# Patient Record
Sex: MALE | Race: WHITE | NOT HISPANIC OR LATINO | Employment: OTHER | URBAN - NONMETROPOLITAN AREA
[De-identification: names, ages, dates, MRNs, and addresses within clinical notes are randomized per-mention and may not be internally consistent; named-entity substitution may affect disease eponyms.]

---

## 2017-02-13 RX ORDER — DIPHENHYDRAMINE HCL 25 MG
CAPSULE ORAL
Qty: 30 CAPSULE | Refills: 2 | Status: SHIPPED | OUTPATIENT
Start: 2017-02-13 | End: 2017-05-16 | Stop reason: SDUPTHER

## 2017-02-27 ENCOUNTER — LAB (OUTPATIENT)
Dept: FAMILY MEDICINE CLINIC | Facility: CLINIC | Age: 52
End: 2017-02-27

## 2017-02-27 DIAGNOSIS — E11.9 TYPE 2 DIABETES MELLITUS WITHOUT COMPLICATION, WITHOUT LONG-TERM CURRENT USE OF INSULIN (HCC): ICD-10-CM

## 2017-02-27 DIAGNOSIS — E78.5 HYPERLIPIDEMIA, UNSPECIFIED HYPERLIPIDEMIA TYPE: ICD-10-CM

## 2017-02-27 DIAGNOSIS — M25.552 HIP PAIN, LEFT: ICD-10-CM

## 2017-02-27 DIAGNOSIS — E55.9 VITAMIN D DEFICIENCY: ICD-10-CM

## 2017-02-27 DIAGNOSIS — K21.9 GASTROESOPHAGEAL REFLUX DISEASE WITHOUT ESOPHAGITIS: ICD-10-CM

## 2017-02-27 LAB
25(OH)D3 SERPL-MCNC: 31 NG/ML
ALBUMIN SERPL-MCNC: 3.9 G/DL (ref 3.5–5)
ALBUMIN/GLOB SERPL: 1.3 G/DL (ref 1.5–2.5)
ALP SERPL-CCNC: 137 U/L (ref 40–129)
ALT SERPL W P-5'-P-CCNC: 22 U/L (ref 10–44)
ANION GAP SERPL CALCULATED.3IONS-SCNC: 6.9 MMOL/L (ref 3.6–11.2)
AST SERPL-CCNC: 20 U/L (ref 10–34)
BASOPHILS # BLD AUTO: 0.06 10*3/MM3 (ref 0–0.3)
BASOPHILS NFR BLD AUTO: 0.6 % (ref 0–2)
BILIRUB SERPL-MCNC: 0.3 MG/DL (ref 0.2–1.8)
BUN BLD-MCNC: 9 MG/DL (ref 7–21)
BUN/CREAT SERPL: 11.4 (ref 7–25)
CALCIUM SPEC-SCNC: 9.4 MG/DL (ref 7.7–10)
CHLORIDE SERPL-SCNC: 105 MMOL/L (ref 99–112)
CHOLEST SERPL-MCNC: 106 MG/DL (ref 0–200)
CO2 SERPL-SCNC: 30.1 MMOL/L (ref 24.3–31.9)
CREAT BLD-MCNC: 0.79 MG/DL (ref 0.43–1.29)
DEPRECATED RDW RBC AUTO: 46 FL (ref 37–54)
EOSINOPHIL # BLD AUTO: 0.51 10*3/MM3 (ref 0–0.7)
EOSINOPHIL NFR BLD AUTO: 4.7 % (ref 0–5)
ERYTHROCYTE [DISTWIDTH] IN BLOOD BY AUTOMATED COUNT: 13.6 % (ref 11.5–14.5)
GFR SERPL CREATININE-BSD FRML MDRD: 104 ML/MIN/1.73
GLOBULIN UR ELPH-MCNC: 3.1 GM/DL
GLUCOSE BLD-MCNC: 122 MG/DL (ref 70–110)
HBA1C MFR BLD: 5.6 % (ref 4.5–5.7)
HCT VFR BLD AUTO: 50.4 % (ref 42–52)
HDLC SERPL-MCNC: 31 MG/DL (ref 60–100)
HGB BLD-MCNC: 16.6 G/DL (ref 14–18)
IMM GRANULOCYTES # BLD: 0.05 10*3/MM3 (ref 0–0.03)
IMM GRANULOCYTES NFR BLD: 0.5 % (ref 0–0.5)
LDLC SERPL CALC-MCNC: 51 MG/DL (ref 0–100)
LDLC/HDLC SERPL: 1.64 {RATIO}
LYMPHOCYTES # BLD AUTO: 3.61 10*3/MM3 (ref 1–3)
LYMPHOCYTES NFR BLD AUTO: 33.5 % (ref 21–51)
MCH RBC QN AUTO: 32.2 PG (ref 27–33)
MCHC RBC AUTO-ENTMCNC: 32.9 G/DL (ref 33–37)
MCV RBC AUTO: 97.9 FL (ref 80–94)
MONOCYTES # BLD AUTO: 1.3 10*3/MM3 (ref 0.1–0.9)
MONOCYTES NFR BLD AUTO: 12.1 % (ref 0–10)
NEUTROPHILS # BLD AUTO: 5.24 10*3/MM3 (ref 1.4–6.5)
NEUTROPHILS NFR BLD AUTO: 48.6 % (ref 30–70)
OSMOLALITY SERPL CALC.SUM OF ELEC: 283.1 MOSM/KG (ref 273–305)
PLATELET # BLD AUTO: 302 10*3/MM3 (ref 130–400)
PMV BLD AUTO: 10.1 FL (ref 6–10)
POTASSIUM BLD-SCNC: 3.9 MMOL/L (ref 3.5–5.3)
PROT SERPL-MCNC: 7 G/DL (ref 6–8)
RBC # BLD AUTO: 5.15 10*6/MM3 (ref 4.7–6.1)
SODIUM BLD-SCNC: 142 MMOL/L (ref 135–153)
TRIGL SERPL-MCNC: 121 MG/DL (ref 0–150)
TSH SERPL DL<=0.05 MIU/L-ACNC: 4.73 MIU/ML (ref 0.55–4.78)
URATE SERPL-MCNC: 3.2 MG/DL (ref 3.7–7)
VIT B12 BLD-MCNC: 387 PG/ML (ref 211–911)
VLDLC SERPL-MCNC: 24.2 MG/DL
WBC NRBC COR # BLD: 10.77 10*3/MM3 (ref 4.5–12.5)

## 2017-02-27 PROCEDURE — 82607 VITAMIN B-12: CPT | Performed by: NURSE PRACTITIONER

## 2017-02-27 PROCEDURE — 82306 VITAMIN D 25 HYDROXY: CPT | Performed by: NURSE PRACTITIONER

## 2017-02-27 PROCEDURE — 80050 GENERAL HEALTH PANEL: CPT | Performed by: NURSE PRACTITIONER

## 2017-02-27 PROCEDURE — 84550 ASSAY OF BLOOD/URIC ACID: CPT | Performed by: NURSE PRACTITIONER

## 2017-02-27 PROCEDURE — 83036 HEMOGLOBIN GLYCOSYLATED A1C: CPT | Performed by: NURSE PRACTITIONER

## 2017-02-27 PROCEDURE — 36415 COLL VENOUS BLD VENIPUNCTURE: CPT

## 2017-02-27 PROCEDURE — 80061 LIPID PANEL: CPT | Performed by: NURSE PRACTITIONER

## 2017-03-01 ENCOUNTER — OFFICE VISIT (OUTPATIENT)
Dept: FAMILY MEDICINE CLINIC | Facility: CLINIC | Age: 52
End: 2017-03-01

## 2017-03-01 VITALS
DIASTOLIC BLOOD PRESSURE: 80 MMHG | BODY MASS INDEX: 31.83 KG/M2 | WEIGHT: 210 LBS | HEART RATE: 100 BPM | SYSTOLIC BLOOD PRESSURE: 120 MMHG | OXYGEN SATURATION: 96 % | HEIGHT: 68 IN | TEMPERATURE: 98.7 F

## 2017-03-01 DIAGNOSIS — K21.9 GASTROESOPHAGEAL REFLUX DISEASE WITHOUT ESOPHAGITIS: ICD-10-CM

## 2017-03-01 DIAGNOSIS — I10 BENIGN ESSENTIAL HYPERTENSION: Chronic | ICD-10-CM

## 2017-03-01 DIAGNOSIS — M05.79 RHEUMATOID ARTHRITIS INVOLVING MULTIPLE SITES WITH POSITIVE RHEUMATOID FACTOR (HCC): Chronic | ICD-10-CM

## 2017-03-01 DIAGNOSIS — E11.9 TYPE 2 DIABETES MELLITUS WITHOUT COMPLICATION, WITHOUT LONG-TERM CURRENT USE OF INSULIN (HCC): Primary | Chronic | ICD-10-CM

## 2017-03-01 DIAGNOSIS — F51.04 CHRONIC INSOMNIA: Chronic | ICD-10-CM

## 2017-03-01 DIAGNOSIS — E53.8 VITAMIN B 12 DEFICIENCY: ICD-10-CM

## 2017-03-01 DIAGNOSIS — E55.9 VITAMIN D DEFICIENCY: ICD-10-CM

## 2017-03-01 DIAGNOSIS — F17.200 TOBACCO USE DISORDER: ICD-10-CM

## 2017-03-01 DIAGNOSIS — E78.5 HYPERLIPIDEMIA, UNSPECIFIED HYPERLIPIDEMIA TYPE: Chronic | ICD-10-CM

## 2017-03-01 PROCEDURE — 99214 OFFICE O/P EST MOD 30 MIN: CPT | Performed by: NURSE PRACTITIONER

## 2017-03-01 RX ORDER — LANOLIN ALCOHOL/MO/W.PET/CERES
1000 CREAM (GRAM) TOPICAL DAILY
Qty: 30 TABLET | Refills: 5 | Status: SHIPPED | OUTPATIENT
Start: 2017-03-01 | End: 2017-09-05 | Stop reason: SDUPTHER

## 2017-03-01 RX ORDER — ATORVASTATIN CALCIUM 10 MG/1
10 TABLET, FILM COATED ORAL NIGHTLY
Qty: 30 TABLET | Refills: 5 | Status: SHIPPED | OUTPATIENT
Start: 2017-03-01 | End: 2017-09-05 | Stop reason: SDUPTHER

## 2017-03-01 RX ORDER — ERGOCALCIFEROL 1.25 MG/1
50000 CAPSULE ORAL WEEKLY
Qty: 12 CAPSULE | Refills: 0 | Status: SHIPPED | OUTPATIENT
Start: 2017-03-01 | End: 2017-06-02 | Stop reason: SDUPTHER

## 2017-03-01 NOTE — PROGRESS NOTES
HPI Comments: Raimundo presents to the clinic today to review his recent lab results pertaining to his diabetes, HTN, GERD and Hyperlipidemia. Overall, he reports he feels well except his continued joint pain. Refer to ROS for additional information.    Hypertension   The current episode started more than 1 year ago. The problem is controlled. Pertinent negatives include no anxiety, blurred vision, chest pain, headaches, malaise/fatigue, palpitations, peripheral edema, shortness of breath or sweats.   Diabetes   He has type 2 diabetes mellitus. His disease course has been improving. Pertinent negatives for diabetes include no blurred vision, no chest pain, no fatigue, no polydipsia, no polyphagia, no polyuria and no weakness. Symptoms are improving. Risk factors for coronary artery disease include dyslipidemia, hypertension, male sex, sedentary lifestyle and tobacco exposure. Current diabetic treatment includes oral agent (monotherapy). He is compliant with medication regimen most of the time. He has had a previous visit with a dietitian. An ACE inhibitor/angiotensin II receptor blocker is being taken. Eye exam is current.     The following portions of the patient's history were reviewed and updated as appropriate: allergies, current medications, past family history, past medical history, past social history, past surgical history and problem list.    Review of Systems   Constitutional: Negative for activity change, appetite change, chills, fatigue and fever.   HENT: Negative.    Eyes: Negative for visual disturbance.   Respiratory: Negative for cough, chest tightness, shortness of breath and wheezing.    Cardiovascular: Negative for chest pain, palpitations and leg swelling.   Gastrointestinal: Negative for abdominal pain, nausea and vomiting.   Endocrine: Negative for cold intolerance, heat intolerance, polydipsia, polyphagia and polyuria.   Musculoskeletal: Positive for arthralgias, back pain and joint swelling.    Skin: Negative for color change and rash.   Neurological: Negative for dizziness, tremors, speech difficulty, weakness, light-headedness and headaches.   Hematological: Negative for adenopathy.   Psychiatric/Behavioral: Positive for sleep disturbance. Negative for confusion, decreased concentration and suicidal ideas. The patient is not nervous/anxious.    All other systems reviewed and are negative.    Physical Exam   Constitutional: He is oriented to person, place, and time. He appears well-developed and well-nourished. No distress.   HENT:   Head: Normocephalic.   Nose: Nose normal.   Mouth/Throat: Oropharynx is clear and moist.   Eyes: Conjunctivae are normal. Pupils are equal, round, and reactive to light. Right eye exhibits no discharge. Left eye exhibits no discharge. No scleral icterus.   Neck: Neck supple. No JVD present.   Cardiovascular: Normal rate, regular rhythm and normal heart sounds.  Exam reveals no friction rub.    No murmur heard.  Pulmonary/Chest: Effort normal and breath sounds normal. No respiratory distress. He has no decreased breath sounds. He has no wheezes. He has no rhonchi. He has no rales.   Abdominal: Soft. Bowel sounds are normal. He exhibits no distension. There is no tenderness. There is no rebound and no guarding.   Musculoskeletal: He exhibits no edema or tenderness.    Raimundo had a diabetic foot exam performed (Thick left great toe nail; Very dry skin ) today.   During the foot exam he had a monofilament test performed.    Neurological Sensory Findings -  Unaltered sharp/dull right ankle/foot discrimination and unaltered sharp/dull left ankle/foot discrimination.    Vascular Status -  His exam exhibits right foot vasculature normal. His exam exhibits no right foot edema. His exam exhibits left foot vasculature normal. His exam exhibits no left foot edema.   Skin Integrity  -  His right foot skin is intact.     Raimundo 's left foot skin is intact. .  Lymphadenopathy:     He  has no cervical adenopathy.   Neurological: He is alert and oriented to person, place, and time.   Skin: Skin is warm and dry. No rash noted. No erythema.   Psychiatric: He has a normal mood and affect. His behavior is normal. Judgment and thought content normal.   Nursing note and vitals reviewed.    Assessment/Plan   Diagnoses and all orders for this visit:    Type 2 diabetes mellitus without complication, without long-term current use of insulin  Comments:  Reviewed his recent lab results pertaining to his DM, type 2: A1C: 5.6 with a FBS: 122 mg/dL.   Continue current regimen. Will refer to Podiatry.   Orders:  -     Ambulatory Referral to Podiatry    Benign essential hypertension  Comments:  Stable; Reviewed his CMP which included Creatinine of 0.79 and EGFR of 104.     Hyperlipidemia, unspecified hyperlipidemia type  Comments:  Reviewed lipid panel which included TC:106 mg/dL;Triglycerides:121 mg/dL; HDL:31 mg/dL and LDL:57 mg/dL.   Orders:  -     atorvastatin (LIPITOR) 10 MG tablet; Take 1 tablet by mouth Every Night.    Rheumatoid arthritis involving multiple sites with positive rheumatoid factor  Comments:  Discussed options with Raimundo. Will refer to  Rheumatology referral for him.     Chronic insomnia  Comments:  Discussed options with Raimundo. Will consider Trazadone. Sleep Hygiene reinforced.    Gastroesophageal reflux disease without esophagitis  Comments:  Stable; Continue current regimen.     Vitamin D deficiency  Comments:  Reviewed Vitamin D which was:31.  Orders:  -     vitamin D (ERGOCALCIFEROL) 32710 UNITS capsule capsule; Take 1 capsule by mouth 1 (One) Time Per Week for 12 doses.    Vitamin B 12 deficiency  Comments:  Reviewed Vitamin B12 which was 387.    Orders:  -     vitamin B-12 (CYANOCOBALAMIN) 1000 MCG tablet; Take 1 tablet by mouth Daily for 30 days.    Tobacco use disorder  Comments:  Encouraged tobacco cessation.     Findings and recommendations discussed with Raimundo. Reviewed lab  results with him. Lifestyle and risk reduction modifications. Will f/u with me in four months; sooner if problems/concerns. Routine labs ordered for him to have done prior to his next appt.

## 2017-03-01 NOTE — PATIENT INSTRUCTIONS
Hypertension  Hypertension is another name for high blood pressure. High blood pressure forces your heart to work harder to pump blood. A blood pressure reading has two numbers, which includes a higher number over a lower number (example: 110/72).  HOME CARE   · Have your blood pressure rechecked by your doctor.  · Only take medicine as told by your doctor. Follow the directions carefully. The medicine does not work as well if you skip doses. Skipping doses also puts you at risk for problems.  · Do not smoke.  · Monitor your blood pressure at home as told by your doctor.  GET HELP IF:  · You think you are having a reaction to the medicine you are taking.  · You have repeat headaches or feel dizzy.  · You have puffiness (swelling) in your ankles.  · You have trouble with your vision.  GET HELP RIGHT AWAY IF:   · You get a very bad headache and are confused.  · You feel weak, numb, or faint.  · You get chest or belly (abdominal) pain.  · You throw up (vomit).  · You cannot breathe very well.  MAKE SURE YOU:   · Understand these instructions.  · Will watch your condition.  · Will get help right away if you are not doing well or get worse.     This information is not intended to replace advice given to you by your health care provider. Make sure you discuss any questions you have with your health care provider.     Document Released: 06/05/2009 Document Revised: 12/23/2014 Document Reviewed: 10/10/2014  [employer] Patient Education ©2016 [employer]  Type 2 Diabetes Mellitus, Adult  Type 2 diabetes mellitus, often simply referred to as type 2 diabetes, is a long-lasting (chronic) disease. In type 2 diabetes, the pancreas does not make enough insulin (a hormone), the cells are less responsive to the insulin that is made (insulin resistance), or both. Normally, insulin moves sugars from food into the tissue cells. The tissue cells use the sugars for energy. The lack of insulin or the lack of normal response to  insulin causes excess sugars to build up in the blood instead of going into the tissue cells. As a result, high blood sugar (hyperglycemia) develops. The effect of high sugar (glucose) levels can cause many complications.   Type 2 diabetes was also previously called adult-onset diabetes, but it can occur at any age.     RISK FACTORS   A person is predisposed to developing type 2 diabetes if someone in the family has the disease and also has one or more of the following primary risk factors:  · Weight gain, or being overweight or obese.  · An inactive lifestyle.  · A history of consistently eating high-calorie foods.  Maintaining a normal weight and regular physical activity can reduce the chance of developing type 2 diabetes.  SYMPTOMS   A person with type 2 diabetes may not show symptoms initially. The symptoms of type 2 diabetes appear slowly. The symptoms include:  · Increased thirst (polydipsia).  · Increased urination (polyuria).  · Increased urination during the night (nocturia).  · Sudden or unexplained weight changes.  · Frequent, recurring infections.  · Tiredness (fatigue).  · Weakness.  · Vision changes, such as blurred vision.  · Fruity smell to your breath.  · Abdominal pain.  · Nausea or vomiting.  · Cuts or bruises which are slow to heal.  · Tingling or numbness in the hands or feet.  · An open skin wound (ulcer).  DIAGNOSIS  Type 2 diabetes is frequently not diagnosed until complications of diabetes are present. Type 2 diabetes is diagnosed when symptoms or complications are present and when blood glucose levels are increased. Your blood glucose level may be checked by one or more of the following blood tests:  · A fasting blood glucose test. You will not be allowed to eat for at least 8 hours before a blood sample is taken.  · A random blood glucose test. Your blood glucose is checked at any time of the day regardless of when you ate.  · A hemoglobin A1c blood glucose test. A hemoglobin A1c test  provides information about blood glucose control over the previous 3 months.  · An oral glucose tolerance test (OGTT). Your blood glucose is measured after you have not eaten (fasted) for 2 hours and then after you drink a glucose-containing beverage.  TREATMENT   · You may need to take insulin or diabetes medicine daily to keep blood glucose levels in the desired range.  · If you use insulin, you may need to adjust the dosage depending on the carbohydrates that you eat with each meal or snack.  · Lifestyle changes are recommended as part of your treatment. These may include:    Following an individualized diet plan developed by a nutritionist or dietitian.    Exercising daily.  Your health care providers will set individualized treatment goals for you based on your age, your medicines, how long you have had diabetes, and any other medical conditions you have. Generally, the goal of treatment is to maintain the following blood glucose levels:  · Before meals (preprandial):  mg/dL.  · After meals (postprandial): below 180 mg/dL.  · A1c: less than 6.5-7%.  HOME CARE INSTRUCTIONS   · Have your hemoglobin A1c level checked twice a year.  · Perform daily blood glucose monitoring as directed by your health care provider.  · Monitor urine ketones when you are ill and as directed by your health care provider.  · Take your diabetes medicine or insulin as directed by your health care provider to maintain your blood glucose levels in the desired range.    Never run out of diabetes medicine or insulin. It is needed every day.    If you are using insulin, you may need to adjust the amount of insulin given based on your intake of carbohydrates. Carbohydrates can raise blood glucose levels but need to be included in your diet. Carbohydrates provide vitamins, minerals, and fiber which are an essential part of a healthy diet. Carbohydrates are found in fruits, vegetables, whole grains, dairy products, legumes, and foods  containing added sugars.  · Eat healthy foods. You should make an appointment to see a registered dietitian to help you create an eating plan that is right for you.  · Lose weight if you are overweight.  · Carry a medical alert card or wear your medical alert jewelry.  · Carry a 15-gram carbohydrate snack with you at all times to treat low blood glucose (hypoglycemia). Some examples of 15-gram carbohydrate snacks include:    Glucose tablets, 3 or 4.    Glucose gel, 15-gram tube.    Raisins, 2 tablespoons (24 grams).    Jelly beans, 6.    Animal crackers, 8.    Regular pop, 4 ounces (120 mL).    Gummy treats, 9.  · Recognize hypoglycemia. Hypoglycemia occurs with blood glucose levels of 70 mg/dL and below. The risk for hypoglycemia increases when fasting or skipping meals, during or after intense exercise, and during sleep. Hypoglycemia symptoms can include:    Tremors or shakes.    Decreased ability to concentrate.    Sweating.    Increased heart rate.    Headache.    Dry mouth.    Hunger.    Irritability.    Anxiety.    Restless sleep.    Altered speech or coordination.    Confusion.  · Treat hypoglycemia promptly. If you are alert and able to safely swallow, follow the 15:15 rule:    Take 15-20 grams of rapid-acting glucose or carbohydrate. Rapid-acting options include glucose gel, glucose tablets, or 4 ounces (120 mL) of fruit juice, regular soda, or low-fat milk.    Check your blood glucose level 15 minutes after taking the glucose.    Take 15-20 grams more of glucose if the repeat blood glucose level is still 70 mg/dL or below.    Eat a meal or snack within 1 hour once blood glucose levels return to normal.  · Be alert to feeling very thirsty and urinating more frequently than usual, which are early signs of hyperglycemia. An early awareness of hyperglycemia allows for prompt treatment. Treat hyperglycemia as directed by your health care provider.  · Engage in at least 150 minutes of moderate-intensity physical  activity a week, spread over at least 3 days of the week or as directed by your health care provider. In addition, you should engage in resistance exercise at least 2 times a week or as directed by your health care provider. Try to spend no more than 90 minutes at one time inactive.  · Adjust your medicine and food intake as needed if you start a new exercise or sport.  · Follow your sick-day plan anytime you are unable to eat or drink as usual.  · Do not use any tobacco products including cigarettes, chewing tobacco, or electronic cigarettes. If you need help quitting, ask your health care provider.  · Limit alcohol intake to no more than 1 drink per day for nonpregnant women and 2 drinks per day for men. You should drink alcohol only when you are also eating food. Talk with your health care provider whether alcohol is safe for you. Tell your health care provider if you drink alcohol several times a week.  · Keep all follow-up visits as directed by your health care provider. This is important.  · Schedule an eye exam soon after the diagnosis of type 2 diabetes and then annually.  · Perform daily skin and foot care. Examine your skin and feet daily for cuts, bruises, redness, nail problems, bleeding, blisters, or sores. A foot exam by a health care provider should be done annually.  · Brush your teeth and gums at least twice a day and floss at least once a day. Follow up with your dentist regularly.  · Share your diabetes management plan with your workplace or school.  · Keep your immunizations up to date. It is recommended that you receive a flu (influenza) vaccine every year. It is also recommended that you receive a pneumonia (pneumococcal) vaccine. If you are 65 years of age or older and have never received a pneumonia vaccine, this vaccine may be given as a series of two separate shots. Ask your health care provider which additional vaccines may be recommended.  · Learn to manage stress.  · Obtain ongoing  diabetes education and support as needed.  · Participate in or seek rehabilitation as needed to maintain or improve independence and quality of life. Request a physical or occupational therapy referral if you are having foot or hand numbness, or difficulties with grooming, dressing, eating, or physical activity.  SEEK MEDICAL CARE IF:   · You are unable to eat food or drink fluids for more than 6 hours.  · You have nausea and vomiting for more than 6 hours.  · Your blood glucose level is over 240 mg/dL.  · There is a change in mental status.  · You develop an additional serious illness.  · You have diarrhea for more than 6 hours.  · You have been sick or have had a fever for a couple of days and are not getting better.  · You have pain during any physical activity.    SEEK IMMEDIATE MEDICAL CARE IF:  · You have difficulty breathing.  · You have moderate to large ketone levels.     This information is not intended to replace advice given to you by your health care provider. Make sure you discuss any questions you have with your health care provider.     Document Released: 12/18/2006 Document Revised: 09/07/2016 Document Reviewed: 10/20/2016  [employer] Interactive Patient Education ©2016 [employer]  Diabetes and Foot Care  Diabetes may cause you to have problems because of poor blood supply (circulation) to your feet and legs. This may cause the skin on your feet to become thinner, break easier, and heal more slowly. Your skin may become dry, and the skin may peel and crack. You may also have nerve damage in your legs and feet causing decreased feeling in them. You may not notice minor injuries to your feet that could lead to infections or more serious problems. Taking care of your feet is one of the most important things you can do for yourself.  HOME CARE INSTRUCTIONS  · Wear shoes at all times, even in the house. Do not go barefoot. Bare feet are easily injured.  · Check your feet daily for blisters, cuts, and  redness. If you cannot see the bottom of your feet, use a mirror or ask someone for help.  · Wash your feet with warm water (do not use hot water) and mild soap. Then pat your feet and the areas between your toes until they are completely dry. Do not soak your feet as this can dry your skin.  · Apply a moisturizing lotion or petroleum jelly (that does not contain alcohol and is unscented) to the skin on your feet and to dry, brittle toenails. Do not apply lotion between your toes.  · Trim your toenails straight across. Do not dig under them or around the cuticle. File the edges of your nails with an emery board or nail file.  · Do not cut corns or calluses or try to remove them with medicine.  · Wear clean socks or stockings every day. Make sure they are not too tight. Do not wear knee-high stockings since they may decrease blood flow to your legs.  · Wear shoes that fit properly and have enough cushioning. To break in new shoes, wear them for just a few hours a day. This prevents you from injuring your feet. Always look in your shoes before you put them on to be sure there are no objects inside.  · Do not cross your legs. This may decrease the blood flow to your feet.  · If you find a minor scrape, cut, or break in the skin on your feet, keep it and the skin around it clean and dry. These areas may be cleansed with mild soap and water. Do not cleanse the area with peroxide, alcohol, or iodine.  · When you remove an adhesive bandage, be sure not to damage the skin around it.  · If you have a wound, look at it several times a day to make sure it is healing.  · Do not use heating pads or hot water bottles. They may burn your skin. If you have lost feeling in your feet or legs, you may not know it is happening until it is too late.  · Make sure your health care provider performs a complete foot exam at least annually or more often if you have foot problems. Report any cuts, sores, or bruises to your health care  provider immediately.  SEEK MEDICAL CARE IF:   · You have an injury that is not healing.  · You have cuts or breaks in the skin.  · You have an ingrown nail.  · You notice redness on your legs or feet.  · You feel burning or tingling in your legs or feet.  · You have pain or cramps in your legs and feet.  · Your legs or feet are numb.  · Your feet always feel cold.  SEEK IMMEDIATE MEDICAL CARE IF:   · There is increasing redness, swelling, or pain in or around a wound.  · There is a red line that goes up your leg.  · Pus is coming from a wound.  · You develop a fever or as directed by your health care provider.  · You notice a bad smell coming from an ulcer or wound.     This information is not intended to replace advice given to you by your health care provider. Make sure you discuss any questions you have with your health care provider.     Document Released: 12/15/2001 Document Revised: 11/28/2016 Document Reviewed: 05/27/2014  [employer] Interactive Patient Education ©2016 [employer]

## 2017-03-30 ENCOUNTER — OFFICE VISIT (OUTPATIENT)
Dept: FAMILY MEDICINE CLINIC | Facility: CLINIC | Age: 52
End: 2017-03-30

## 2017-03-30 VITALS
DIASTOLIC BLOOD PRESSURE: 82 MMHG | TEMPERATURE: 97.3 F | WEIGHT: 208 LBS | OXYGEN SATURATION: 94 % | SYSTOLIC BLOOD PRESSURE: 120 MMHG | HEIGHT: 68 IN | HEART RATE: 87 BPM | BODY MASS INDEX: 31.52 KG/M2

## 2017-03-30 DIAGNOSIS — I10 BENIGN ESSENTIAL HYPERTENSION: ICD-10-CM

## 2017-03-30 DIAGNOSIS — S86.911A KNEE STRAIN, RIGHT, INITIAL ENCOUNTER: Primary | ICD-10-CM

## 2017-03-30 DIAGNOSIS — M54.42 CHRONIC LEFT-SIDED LOW BACK PAIN WITH LEFT-SIDED SCIATICA: ICD-10-CM

## 2017-03-30 DIAGNOSIS — G89.29 CHRONIC LEFT-SIDED LOW BACK PAIN WITH LEFT-SIDED SCIATICA: ICD-10-CM

## 2017-03-30 DIAGNOSIS — E78.2 MIXED HYPERLIPIDEMIA: ICD-10-CM

## 2017-03-30 PROCEDURE — 99214 OFFICE O/P EST MOD 30 MIN: CPT | Performed by: PHYSICIAN ASSISTANT

## 2017-03-30 RX ORDER — CYCLOBENZAPRINE HCL 10 MG
10 TABLET ORAL 3 TIMES DAILY PRN
Qty: 90 TABLET | Refills: 5 | Status: SHIPPED | OUTPATIENT
Start: 2017-03-30 | End: 2017-09-29 | Stop reason: SDUPTHER

## 2017-05-15 DIAGNOSIS — J30.9 CHRONIC ALLERGIC RHINITIS: ICD-10-CM

## 2017-05-15 DIAGNOSIS — E11.9 TYPE 2 DIABETES MELLITUS WITHOUT COMPLICATIONS (HCC): ICD-10-CM

## 2017-05-15 RX ORDER — METFORMIN HYDROCHLORIDE 500 MG/1
TABLET, EXTENDED RELEASE ORAL
Qty: 30 TABLET | Refills: 2 | Status: SHIPPED | OUTPATIENT
Start: 2017-05-15 | End: 2017-08-15 | Stop reason: SDUPTHER

## 2017-05-15 RX ORDER — LORATADINE 10 MG/1
TABLET ORAL
Qty: 30 TABLET | Refills: 4 | Status: SHIPPED | OUTPATIENT
Start: 2017-05-15 | End: 2017-09-29 | Stop reason: SDUPTHER

## 2017-05-16 RX ORDER — DIPHENHYDRAMINE HCL 25 MG
25 CAPSULE ORAL NIGHTLY PRN
Qty: 30 CAPSULE | Refills: 2 | Status: SHIPPED | OUTPATIENT
Start: 2017-05-16 | End: 2017-08-15 | Stop reason: SDUPTHER

## 2017-06-02 DIAGNOSIS — E55.9 VITAMIN D DEFICIENCY: ICD-10-CM

## 2017-06-05 RX ORDER — ERGOCALCIFEROL 1.25 MG/1
CAPSULE ORAL
Qty: 4 CAPSULE | Refills: 0 | Status: SHIPPED | OUTPATIENT
Start: 2017-06-05 | End: 2017-07-05

## 2017-06-27 ENCOUNTER — LAB (OUTPATIENT)
Dept: FAMILY MEDICINE CLINIC | Facility: CLINIC | Age: 52
End: 2017-06-27

## 2017-06-27 DIAGNOSIS — E78.5 HYPERLIPIDEMIA, UNSPECIFIED HYPERLIPIDEMIA TYPE: Chronic | ICD-10-CM

## 2017-06-27 DIAGNOSIS — E55.9 VITAMIN D DEFICIENCY: ICD-10-CM

## 2017-06-27 DIAGNOSIS — E53.8 VITAMIN B 12 DEFICIENCY: ICD-10-CM

## 2017-06-27 DIAGNOSIS — K21.9 GASTROESOPHAGEAL REFLUX DISEASE WITHOUT ESOPHAGITIS: ICD-10-CM

## 2017-06-27 DIAGNOSIS — M05.79 RHEUMATOID ARTHRITIS INVOLVING MULTIPLE SITES WITH POSITIVE RHEUMATOID FACTOR (HCC): Chronic | ICD-10-CM

## 2017-06-27 DIAGNOSIS — E11.9 TYPE 2 DIABETES MELLITUS WITHOUT COMPLICATION, WITHOUT LONG-TERM CURRENT USE OF INSULIN (HCC): ICD-10-CM

## 2017-06-27 LAB
25(OH)D3 SERPL-MCNC: 87 NG/ML
ALBUMIN SERPL-MCNC: 4.2 G/DL (ref 3.5–5)
ALBUMIN/GLOB SERPL: 1.4 G/DL (ref 1.5–2.5)
ALP SERPL-CCNC: 124 U/L (ref 40–129)
ALT SERPL W P-5'-P-CCNC: 23 U/L (ref 10–44)
ANION GAP SERPL CALCULATED.3IONS-SCNC: 6.7 MMOL/L (ref 3.6–11.2)
AST SERPL-CCNC: 24 U/L (ref 10–34)
BASOPHILS # BLD AUTO: 0.07 10*3/MM3 (ref 0–0.3)
BASOPHILS NFR BLD AUTO: 0.5 % (ref 0–2)
BILIRUB SERPL-MCNC: 0.6 MG/DL (ref 0.2–1.8)
BUN BLD-MCNC: 13 MG/DL (ref 7–21)
BUN/CREAT SERPL: 16.9 (ref 7–25)
CALCIUM SPEC-SCNC: 9.6 MG/DL (ref 7.7–10)
CHLORIDE SERPL-SCNC: 106 MMOL/L (ref 99–112)
CHOLEST SERPL-MCNC: 131 MG/DL (ref 0–200)
CO2 SERPL-SCNC: 28.3 MMOL/L (ref 24.3–31.9)
CREAT BLD-MCNC: 0.77 MG/DL (ref 0.43–1.29)
DEPRECATED RDW RBC AUTO: 48.3 FL (ref 37–54)
EOSINOPHIL # BLD AUTO: 0.45 10*3/MM3 (ref 0–0.7)
EOSINOPHIL NFR BLD AUTO: 3.5 % (ref 0–5)
ERYTHROCYTE [DISTWIDTH] IN BLOOD BY AUTOMATED COUNT: 13.5 % (ref 11.5–14.5)
ERYTHROCYTE [SEDIMENTATION RATE] IN BLOOD: 5 MM/HR (ref 0–15)
GFR SERPL CREATININE-BSD FRML MDRD: 107 ML/MIN/1.73
GLOBULIN UR ELPH-MCNC: 3 GM/DL
GLUCOSE BLD-MCNC: 95 MG/DL (ref 70–110)
HBA1C MFR BLD: 5.9 % (ref 4.5–5.7)
HCT VFR BLD AUTO: 50.8 % (ref 42–52)
HDLC SERPL-MCNC: 33 MG/DL (ref 60–100)
HGB BLD-MCNC: 16.8 G/DL (ref 14–18)
IMM GRANULOCYTES # BLD: 0.06 10*3/MM3 (ref 0–0.03)
IMM GRANULOCYTES NFR BLD: 0.5 % (ref 0–0.5)
LDLC SERPL CALC-MCNC: 73 MG/DL (ref 0–100)
LDLC/HDLC SERPL: 2.22 {RATIO}
LYMPHOCYTES # BLD AUTO: 3.77 10*3/MM3 (ref 1–3)
LYMPHOCYTES NFR BLD AUTO: 29.3 % (ref 21–51)
MCH RBC QN AUTO: 32.9 PG (ref 27–33)
MCHC RBC AUTO-ENTMCNC: 33.1 G/DL (ref 33–37)
MCV RBC AUTO: 99.4 FL (ref 80–94)
MONOCYTES # BLD AUTO: 1.59 10*3/MM3 (ref 0.1–0.9)
MONOCYTES NFR BLD AUTO: 12.3 % (ref 0–10)
NEUTROPHILS # BLD AUTO: 6.94 10*3/MM3 (ref 1.4–6.5)
NEUTROPHILS NFR BLD AUTO: 53.9 % (ref 30–70)
OSMOLALITY SERPL CALC.SUM OF ELEC: 281.2 MOSM/KG (ref 273–305)
PLATELET # BLD AUTO: 311 10*3/MM3 (ref 130–400)
PMV BLD AUTO: 10.2 FL (ref 6–10)
POTASSIUM BLD-SCNC: 3.6 MMOL/L (ref 3.5–5.3)
PROT SERPL-MCNC: 7.2 G/DL (ref 6–8)
RBC # BLD AUTO: 5.11 10*6/MM3 (ref 4.7–6.1)
SODIUM BLD-SCNC: 141 MMOL/L (ref 135–153)
TRIGL SERPL-MCNC: 124 MG/DL (ref 0–150)
TSH SERPL DL<=0.05 MIU/L-ACNC: 5.55 MIU/ML (ref 0.55–4.78)
URATE SERPL-MCNC: 4 MG/DL (ref 3.7–7)
VIT B12 BLD-MCNC: 610 PG/ML (ref 211–911)
VLDLC SERPL-MCNC: 24.8 MG/DL
WBC NRBC COR # BLD: 12.88 10*3/MM3 (ref 4.5–12.5)

## 2017-06-27 PROCEDURE — 83036 HEMOGLOBIN GLYCOSYLATED A1C: CPT | Performed by: NURSE PRACTITIONER

## 2017-06-27 PROCEDURE — 80061 LIPID PANEL: CPT | Performed by: NURSE PRACTITIONER

## 2017-06-27 PROCEDURE — 82607 VITAMIN B-12: CPT | Performed by: NURSE PRACTITIONER

## 2017-06-27 PROCEDURE — 82306 VITAMIN D 25 HYDROXY: CPT | Performed by: NURSE PRACTITIONER

## 2017-06-27 PROCEDURE — 85652 RBC SED RATE AUTOMATED: CPT | Performed by: NURSE PRACTITIONER

## 2017-06-27 PROCEDURE — 80050 GENERAL HEALTH PANEL: CPT | Performed by: NURSE PRACTITIONER

## 2017-06-27 PROCEDURE — 36415 COLL VENOUS BLD VENIPUNCTURE: CPT

## 2017-06-27 PROCEDURE — 84550 ASSAY OF BLOOD/URIC ACID: CPT | Performed by: NURSE PRACTITIONER

## 2017-07-03 DIAGNOSIS — E55.9 VITAMIN D DEFICIENCY: ICD-10-CM

## 2017-07-03 RX ORDER — ERGOCALCIFEROL 1.25 MG/1
CAPSULE ORAL
Qty: 4 CAPSULE | Refills: 0 | OUTPATIENT
Start: 2017-07-03

## 2017-07-05 ENCOUNTER — OFFICE VISIT (OUTPATIENT)
Dept: FAMILY MEDICINE CLINIC | Facility: CLINIC | Age: 52
End: 2017-07-05

## 2017-07-05 DIAGNOSIS — I10 BENIGN ESSENTIAL HYPERTENSION: Chronic | ICD-10-CM

## 2017-07-05 DIAGNOSIS — E55.9 VITAMIN D DEFICIENCY: ICD-10-CM

## 2017-07-05 DIAGNOSIS — E78.2 MIXED HYPERLIPIDEMIA: Chronic | ICD-10-CM

## 2017-07-05 DIAGNOSIS — E11.9 TYPE 2 DIABETES MELLITUS WITHOUT COMPLICATION, WITHOUT LONG-TERM CURRENT USE OF INSULIN (HCC): Primary | Chronic | ICD-10-CM

## 2017-07-05 DIAGNOSIS — M05.79 RHEUMATOID ARTHRITIS INVOLVING MULTIPLE SITES WITH POSITIVE RHEUMATOID FACTOR (HCC): Chronic | ICD-10-CM

## 2017-07-05 DIAGNOSIS — E53.8 VITAMIN B 12 DEFICIENCY: ICD-10-CM

## 2017-07-05 DIAGNOSIS — Z72.0 CHEWS TOBACCO: ICD-10-CM

## 2017-07-05 DIAGNOSIS — F51.04 CHRONIC INSOMNIA: Chronic | ICD-10-CM

## 2017-07-05 PROCEDURE — 99214 OFFICE O/P EST MOD 30 MIN: CPT | Performed by: NURSE PRACTITIONER

## 2017-07-05 RX ORDER — PSYLLIUM HUSK 3.4 G/7G
POWDER ORAL
Refills: 0 | COMMUNITY
Start: 2017-07-03 | End: 2017-09-05 | Stop reason: SDUPTHER

## 2017-07-05 NOTE — PATIENT INSTRUCTIONS
Type 2 Diabetes Mellitus, Diagnosis, Adult  Type 2 diabetes (type 2 diabetes mellitus) is a long-term (chronic) disease. It may be caused by one or both of these problems:  · Your body does not make enough of a hormone called insulin.  · Your body does not react in a normal way to insulin that it makes.  Insulin lets sugars (glucose) go into cells in the body. This gives you energy. If you have type 2 diabetes, sugars cannot get into cells. This causes high blood sugar (hyperglycemia).  Your doctor will set treatment goals for you. Generally, you should have these blood sugar levels:  · Before meals (preprandial):  mg/dL (4.4-7.2 mmol/L).  · After meals (postprandial): below 180 mg/dL (10 mmol/L).  · A1c (hemoglobin A1c) level: less than 7%.  HOME CARE  Questions to Ask Your Doctor  You may want to ask these questions:  · Do I need to meet with a diabetes educator?  · Where can I find a support group for people with diabetes?  · What equipment will I need to care for myself at home?  · What diabetes medicines do I need? When should I take them?  · How often do I need to check my blood sugar?  · What number can I call if I have questions?  · When is my next doctor's visit?  General Instructions  · Take over-the-counter and prescription medicines only as told by your doctor.  · Keep all follow-up visits as told by your doctor. This is important.  GET HELP IF:  · Your blood sugar is at or above 240 mg/dL (13.3 mmol/L) for 2 days in a row.  · You have been sick or have had a fever for 2 days or more and you are not getting better.  · You have any of these problems for more than 6 hours:    You cannot eat or drink.    You feel sick to your stomach (nauseous).    You throw up (vomit).    You have watery poop (diarrhea).  GET HELP RIGHT AWAY IF:  · Your blood sugar is lower than 54 mg/dL (3 mmol/L).  · You get confused.  · You have trouble:    Thinking clearly.    Breathing.  · You have moderate or large ketone  "levels in your pee (urine).     This information is not intended to replace advice given to you by your health care provider. Make sure you discuss any questions you have with your health care provider.     Document Released: 09/26/2009 Document Revised: 04/10/2017 Document Reviewed: 01/20/2017  [employer] Interactive Patient Education ©2017 [employer]    Fat and Cholesterol Restricted Diet  Getting too much fat and cholesterol in your diet may cause health problems. Following this diet helps keep your fat and cholesterol at normal levels. This can keep you from getting sick.  WHAT TYPES OF FAT SHOULD I CHOOSE?  · Choose monosaturated and polyunsaturated fats. These are found in foods such as olive oil, canola oil, flaxseeds, walnuts, almonds, and seeds.  · Eat more omega-3 fats. Good choices include salmon, mackerel, sardines, tuna, flaxseed oil, and ground flaxseeds.  · Limit saturated fats. These are in animal products such as meats, butter, and cream. They can also be in plant products such as palm oil, palm kernel oil, and coconut oil.  ·  Avoid foods with partially hydrogenated oils in them. These contain trans fats. Examples of foods that have trans fats are stick margarine, some tub margarines, cookies, crackers, and other baked goods.  WHAT GENERAL GUIDELINES DO I NEED TO FOLLOW?   · Check food labels. Look for the words \"trans fat\" and \"saturated fat.\"  · When preparing a meal:    Fill half of your plate with vegetables and green salads.    Fill one fourth of your plate with whole grains. Look for the word \"whole\" as the first word in the ingredient list.    Fill one fourth of your plate with lean protein foods.  · Limit fruit to two servings a day. Choose fruit instead of juice.  · Eat more foods with soluble fiber. Examples of foods with this type of fiber are apples, broccoli, carrots, beans, peas, and barley. Try to get 20-30 g (grams) of fiber per day.  · Eat more home-cooked foods. Eat less at " restaurants and buffets.  · Limit or avoid alcohol.  · Limit foods high in starch and sugar.  · Limit fried foods.  · Cook foods without frying them. Baking, boiling, grilling, and broiling are all great options.  · Lose weight if you are overweight. Losing even a small amount of weight can help your overall health. It can also help prevent diseases such as diabetes and heart disease.  WHAT FOODS CAN I EAT?  Grains  Whole grains, such as whole wheat or whole grain breads, crackers, cereals, and pasta. Unsweetened oatmeal, bulgur, barley, quinoa, or brown rice. Corn or whole wheat flour tortillas.  Vegetables  Fresh or frozen vegetables (raw, steamed, roasted, or grilled). Green salads.  Fruits  All fresh, canned (in natural juice), or frozen fruits.  Meat and Other Protein Products  Ground beef (85% or leaner), grass-fed beef, or beef trimmed of fat. Skinless chicken or turkey. Ground chicken or turkey. Pork trimmed of fat. All fish and seafood. Eggs. Dried beans, peas, or lentils. Unsalted nuts or seeds. Unsalted canned or dry beans.  Dairy  Low-fat dairy products, such as skim or 1% milk, 2% or reduced-fat cheeses, low-fat ricotta or cottage cheese, or plain low-fat yogurt.  Fats and Oils  Tub margarines without trans fats. Light or reduced-fat mayonnaise and salad dressings. Avocado. Olive, canola, sesame, or safflower oils. Natural peanut or almond butter (choose ones without added sugar and oil).  The items listed above may not be a complete list of recommended foods or beverages. Contact your dietitian for more options.  WHAT FOODS ARE NOT RECOMMENDED?  Grains  White bread. White pasta. White rice. Cornbread. Bagels, pastries, and croissants. Crackers that contain trans fat.  Vegetables  White potatoes. Corn. Creamed or fried vegetables. Vegetables in a cheese sauce.  Fruits  Dried fruits. Canned fruit in light or heavy syrup. Fruit juice.  Meat and Other Protein Products  Fatty cuts of meat. Ribs, chicken  wings, pelletier, sausage, bologna, salami, chitterlings, fatback, hot dogs, bratwurst, and packaged luncheon meats. Liver and organ meats.  Dairy  Whole or 2% milk, cream, half-and-half, and cream cheese. Whole milk cheeses. Whole-fat or sweetened yogurt. Full-fat cheeses. Nondairy creamers and whipped toppings. Processed cheese, cheese spreads, or cheese curds.  Sweets and Desserts  Corn syrup, sugars, honey, and molasses. Candy. Jam and jelly. Syrup. Sweetened cereals. Cookies, pies, cakes, donuts, muffins, and ice cream.  Fats and Oils  Butter, stick margarine, lard, shortening, ghee, or pelletier fat. Coconut, palm kernel, or palm oils.  Beverages  Alcohol. Sweetened drinks (such as sodas, lemonade, and fruit drinks or punches).  The items listed above may not be a complete list of foods and beverages to avoid. Contact your dietitian for more information.     This information is not intended to replace advice given to you by your health care provider. Make sure you discuss any questions you have with your health care provider.     Document Released: 06/18/2013 Document Revised: 01/08/2016 Document Reviewed: 03/19/2015  [employer] Interactive Patient Education ©2017 [employer]

## 2017-07-10 VITALS
DIASTOLIC BLOOD PRESSURE: 80 MMHG | BODY MASS INDEX: 31.98 KG/M2 | WEIGHT: 211 LBS | HEIGHT: 68 IN | SYSTOLIC BLOOD PRESSURE: 114 MMHG | TEMPERATURE: 98.5 F | OXYGEN SATURATION: 96 % | HEART RATE: 94 BPM | RESPIRATION RATE: 14 BRPM

## 2017-07-10 NOTE — PROGRESS NOTES
"  HPI Comments: Raimundo presents to the clinic today to review his recent lab results pertaining to his diabetes, HTN, GERD and Hyperlipidemia. Overall, he reports he feels well except his continued joint pain. Refer to ROS for additional information.    Hypertension   The current episode started more than 1 year ago. The problem is controlled. Pertinent negatives include no anxiety, blurred vision, chest pain, headaches, malaise/fatigue, palpitations, peripheral edema, shortness of breath or sweats.   Diabetes   He has type 2 diabetes mellitus. His disease course has been improving. Pertinent negatives for diabetes include no blurred vision, no chest pain, no fatigue, no polydipsia, no polyphagia, no polyuria and no weakness. Symptoms are improving. Risk factors for coronary artery disease include dyslipidemia, hypertension, male sex, sedentary lifestyle and tobacco exposure. Current diabetic treatment includes oral agent (monotherapy). He is compliant with medication regimen most of the time. He has had a previous visit with a dietitian. An ACE inhibitor/angiotensin II receptor blocker is being taken. Eye exam is current.     Vitals:    07/05/17 1036   BP: 114/80   BP Location: Right arm   Patient Position: Sitting   Cuff Size: Adult   Pulse: 94   Resp: 14   Temp: 98.5 °F (36.9 °C)   TempSrc: Oral   SpO2: 96%   Weight: 211 lb (95.7 kg)   Height: 68\" (172.7 cm)      The following portions of the patient's history were reviewed and updated as appropriate: allergies, current medications, past family history, past medical history, past social history, past surgical history and problem list.    Review of Systems   Constitutional: Negative for activity change, appetite change, chills, fatigue, fever and unexpected weight change.   HENT: Negative.    Eyes: Negative for visual disturbance.   Respiratory: Negative for cough, chest tightness, shortness of breath and wheezing.    Cardiovascular: Negative for chest pain and " palpitations.   Gastrointestinal: Negative for abdominal pain, nausea and vomiting.   Endocrine: Negative for cold intolerance, heat intolerance, polydipsia, polyphagia and polyuria.   Musculoskeletal: Positive for arthralgias and back pain.   Skin: Negative for color change and rash.   Allergic/Immunologic: Positive for environmental allergies.   Neurological: Negative for dizziness, tremors, speech difficulty, weakness, light-headedness and headaches.   Hematological: Negative for adenopathy.   Psychiatric/Behavioral: Positive for sleep disturbance. Negative for confusion, decreased concentration and suicidal ideas. The patient is not nervous/anxious.    All other systems reviewed and are negative.    Physical Exam   Constitutional: He is oriented to person, place, and time. He appears well-developed and well-nourished. No distress.   HENT:   Head: Normocephalic.   Right Ear: Tympanic membrane and ear canal normal.   Left Ear: Tympanic membrane and ear canal normal.   Nose: Nose normal.   Mouth/Throat: Oropharynx is clear and moist. Dental caries present. No oropharyngeal exudate or posterior oropharyngeal erythema.   Eyes: Conjunctivae and EOM are normal. Pupils are equal, round, and reactive to light. No scleral icterus.   Neck: Neck supple. No JVD present. No rigidity.   Cardiovascular: Normal rate, regular rhythm, normal heart sounds and intact distal pulses.  Exam reveals no friction rub.    No murmur heard.  Pulmonary/Chest: Effort normal. He has decreased breath sounds. He has no wheezes. He has no rhonchi. He has no rales.   Abdominal: Soft. Bowel sounds are normal. He exhibits no distension. There is no tenderness. There is no rigidity, no rebound and no guarding.   Musculoskeletal: He exhibits tenderness. He exhibits no edema.   Generalized tenderness with multiple joints   Lymphadenopathy:     He has no cervical adenopathy.   Neurological: He is alert and oriented to person, place, and time.   Skin:  Skin is warm and dry. No rash noted. No erythema.   Psychiatric: He has a normal mood and affect. His speech is normal and behavior is normal. Judgment and thought content normal. Cognition and memory are normal.   Vitals reviewed.    Assessment/Plan   Problems Addressed this Visit        Cardiovascular and Mediastinum    Benign essential hypertension    Hyperlipidemia    Relevant Orders    Lipid Panel       Digestive    Vitamin D deficiency    Relevant Orders    Vitamin D 25 Hydroxy    Vitamin B 12 deficiency    Relevant Orders    Vitamin B12       Endocrine    Type 2 diabetes mellitus without complications - Primary    Relevant Medications    Blood Glucose Monitoring Suppl W/DEVICE kit    Other Relevant Orders    Comprehensive Metabolic Panel    TSH    Hemoglobin A1c    MicroAlbumin, Urine, Random    Hepatitis C Antibody       Musculoskeletal and Integument    Rheumatoid arthritis with positive rheumatoid factor    Relevant Orders    CBC & Differential    Uric Acid    Sedimentation Rate       Other    Chews tobacco    Chronic insomnia      Findings and recommendations discussed with Raimundo. Reviewed lab results with him which included a FBS of 95 mg/dL and an A1C of 5.9. Lipid panel reviewed  with Raimundo which included a TC of 131 mg/dl; Triglycerides of 124 mg/dL; HDL of 33 mg/dL and LDL of 73 mg/dL. Reinforced lifestyle modifications which include nutrition and exercise recommendations. Discussed Health Maintenance recommendations and he would like to postpone Colonoscopy until later.   Raimundo will f/u with me in November; sooner if problems/concerns occur.

## 2017-07-15 DIAGNOSIS — E55.9 VITAMIN D DEFICIENCY: ICD-10-CM

## 2017-07-17 RX ORDER — ERGOCALCIFEROL 1.25 MG/1
CAPSULE ORAL
Qty: 4 CAPSULE | Refills: 0 | OUTPATIENT
Start: 2017-07-17

## 2017-08-02 DIAGNOSIS — E11.9 TYPE 2 DIABETES MELLITUS WITHOUT COMPLICATION, WITHOUT LONG-TERM CURRENT USE OF INSULIN (HCC): Chronic | ICD-10-CM

## 2017-08-02 RX ORDER — BLOOD-GLUCOSE METER
KIT MISCELLANEOUS
Refills: 0 | OUTPATIENT
Start: 2017-08-02

## 2017-08-15 DIAGNOSIS — K21.9 GASTROESOPHAGEAL REFLUX DISEASE WITHOUT ESOPHAGITIS: ICD-10-CM

## 2017-08-15 DIAGNOSIS — E11.9 TYPE 2 DIABETES MELLITUS WITHOUT COMPLICATIONS (HCC): ICD-10-CM

## 2017-08-15 RX ORDER — DIPHENHYDRAMINE HYDROCHLORIDE 12.5 MG/5ML
LIQUID ORAL
Qty: 30 CAPSULE | Refills: 2 | Status: SHIPPED | OUTPATIENT
Start: 2017-08-15 | End: 2017-11-04 | Stop reason: SDUPTHER

## 2017-08-15 RX ORDER — RANITIDINE 300 MG/1
TABLET ORAL
Qty: 60 TABLET | Refills: 2 | Status: SHIPPED | OUTPATIENT
Start: 2017-08-15 | End: 2017-11-04 | Stop reason: SDUPTHER

## 2017-08-15 RX ORDER — METFORMIN HYDROCHLORIDE 500 MG/1
TABLET, EXTENDED RELEASE ORAL
Qty: 30 TABLET | Refills: 2 | Status: SHIPPED | OUTPATIENT
Start: 2017-08-15 | End: 2017-11-04 | Stop reason: SDUPTHER

## 2017-09-05 DIAGNOSIS — E78.5 HYPERLIPIDEMIA, UNSPECIFIED HYPERLIPIDEMIA TYPE: Chronic | ICD-10-CM

## 2017-09-05 DIAGNOSIS — E53.8 VITAMIN B 12 DEFICIENCY: ICD-10-CM

## 2017-09-05 RX ORDER — ATORVASTATIN CALCIUM 10 MG/1
TABLET, FILM COATED ORAL
Qty: 30 TABLET | Refills: 5 | Status: ON HOLD | OUTPATIENT
Start: 2017-09-05 | End: 2018-02-22

## 2017-09-29 ENCOUNTER — OFFICE VISIT (OUTPATIENT)
Dept: FAMILY MEDICINE CLINIC | Facility: CLINIC | Age: 52
End: 2017-09-29

## 2017-09-29 VITALS
OXYGEN SATURATION: 95 % | SYSTOLIC BLOOD PRESSURE: 123 MMHG | HEIGHT: 68 IN | WEIGHT: 212.8 LBS | DIASTOLIC BLOOD PRESSURE: 75 MMHG | BODY MASS INDEX: 32.25 KG/M2 | TEMPERATURE: 98.5 F | HEART RATE: 104 BPM

## 2017-09-29 DIAGNOSIS — M05.79 RHEUMATOID ARTHRITIS INVOLVING MULTIPLE SITES WITH POSITIVE RHEUMATOID FACTOR (HCC): ICD-10-CM

## 2017-09-29 DIAGNOSIS — E78.2 MIXED HYPERLIPIDEMIA: ICD-10-CM

## 2017-09-29 DIAGNOSIS — I10 BENIGN ESSENTIAL HYPERTENSION: ICD-10-CM

## 2017-09-29 DIAGNOSIS — M54.42 CHRONIC LEFT-SIDED LOW BACK PAIN WITH LEFT-SIDED SCIATICA: Primary | ICD-10-CM

## 2017-09-29 DIAGNOSIS — J30.9 CHRONIC ALLERGIC RHINITIS: ICD-10-CM

## 2017-09-29 DIAGNOSIS — G89.29 CHRONIC LEFT-SIDED LOW BACK PAIN WITH LEFT-SIDED SCIATICA: Primary | ICD-10-CM

## 2017-09-29 PROCEDURE — 99214 OFFICE O/P EST MOD 30 MIN: CPT | Performed by: PHYSICIAN ASSISTANT

## 2017-09-29 RX ORDER — LORATADINE 10 MG/1
10 TABLET ORAL DAILY
Qty: 30 TABLET | Refills: 5 | Status: SHIPPED | OUTPATIENT
Start: 2017-09-29 | End: 2018-03-16 | Stop reason: SDUPTHER

## 2017-09-29 RX ORDER — METOPROLOL SUCCINATE 50 MG/1
50 TABLET, EXTENDED RELEASE ORAL DAILY
Qty: 30 TABLET | Refills: 5 | Status: SHIPPED | OUTPATIENT
Start: 2017-09-29 | End: 2018-03-16 | Stop reason: SDUPTHER

## 2017-09-29 RX ORDER — FOLIC ACID 1 MG/1
1 TABLET ORAL DAILY
Qty: 30 TABLET | Refills: 5 | Status: SHIPPED | OUTPATIENT
Start: 2017-09-29 | End: 2018-03-16 | Stop reason: SDUPTHER

## 2017-09-29 RX ORDER — CYCLOBENZAPRINE HCL 10 MG
10 TABLET ORAL 3 TIMES DAILY PRN
Qty: 90 TABLET | Refills: 5 | Status: SHIPPED | OUTPATIENT
Start: 2017-09-29 | End: 2018-03-16 | Stop reason: SDUPTHER

## 2017-09-29 NOTE — PROGRESS NOTES
Subjective   Raimundo Munguia is a 52 y.o. male.     History of Present Illness     Low-back pain-  He complains of low back pain in the left side with radiation to the left lower buttock and posterior thigh.  Pain is described as intermittent and varies from mild to severe aching.  Some relief with Flexeril.  Onset several years ago.    Rheumatoid arthritis-much improved with methotrexate    Chronic allergic rhinitis-stable    Hypertension-well controlled with metoprolol 50 mg daily    Dyslipidemia  Compliance with treatment has been good. The patient exercises intermittently. He is currently being prescribed the following medication for his dyslipidemia - lifestyle modifcation, atorvastatin. Patient denies side effects associated with his medications. Most recent lipids include  Lab Results   Component Value Date    TRIG 124 06/27/2017    TRIG 121 02/27/2017    HDL 33 (L) 06/27/2017    HDL 31 (L) 02/27/2017    LDLCALC 73 06/27/2017    LDLCALC 51 02/27/2017    LDL 45 05/02/2016    LDL 70 01/08/2016   stable    The following portions of the patient's history were reviewed and updated as appropriate: allergies, current medications, past family history, past medical history, past social history, past surgical history and problem list.    Review of Systems   Constitutional: Negative for activity change, appetite change and fever.   HENT: Negative for ear pain, sinus pressure and sore throat.    Eyes: Negative for pain and visual disturbance.   Respiratory: Negative for cough and chest tightness.    Cardiovascular: Negative for chest pain and palpitations.   Gastrointestinal: Negative for abdominal pain, constipation, diarrhea, nausea and vomiting.   Endocrine: Negative for polydipsia and polyuria.   Genitourinary: Negative for dysuria and frequency.   Musculoskeletal: Positive for back pain. Negative for myalgias.   Skin: Negative for color change and rash.   Allergic/Immunologic: Negative for food allergies and  immunocompromised state.   Neurological: Negative for dizziness, syncope and headaches.   Hematological: Negative for adenopathy. Does not bruise/bleed easily.   Psychiatric/Behavioral: Negative for hallucinations and suicidal ideas. The patient is not nervous/anxious.        Objective   Physical Exam   Constitutional: He is oriented to person, place, and time. He appears well-developed and well-nourished.   HENT:   Head: Normocephalic and atraumatic.   Nose: Nose normal.   Mouth/Throat: Oropharynx is clear and moist.   Eyes: Conjunctivae and EOM are normal. Pupils are equal, round, and reactive to light.   Neck: Normal range of motion. Neck supple. No tracheal deviation present. No thyromegaly present.   Cardiovascular: Normal rate, regular rhythm, normal heart sounds and intact distal pulses.    No murmur heard.  Pulmonary/Chest: Effort normal and breath sounds normal. No respiratory distress. He has no wheezes.   Abdominal: Soft. Bowel sounds are normal. There is no tenderness. There is no guarding.   Musculoskeletal: Normal range of motion. He exhibits no edema or tenderness.   Lymphadenopathy:     He has no cervical adenopathy.   Neurological: He is alert and oriented to person, place, and time.   Skin: Skin is warm and dry. No rash noted.   Psychiatric: He has a normal mood and affect. His behavior is normal.   Nursing note and vitals reviewed.      Assessment/Plan   Diagnoses and all orders for this visit:    Chronic left-sided low back pain with left-sided sciatica  -     cyclobenzaprine (FLEXERIL) 10 MG tablet; Take 1 tablet by mouth 3 (Three) Times a Day As Needed for Muscle Spasms.    Rheumatoid arthritis involving multiple sites with positive rheumatoid factor  -     folic acid (FOLVITE) 1 MG tablet; Take 1 tablet by mouth Daily.    Chronic allergic rhinitis  -     loratadine (CLARITIN) 10 MG tablet; Take 1 tablet by mouth Daily.    Benign essential hypertension  -     metoprolol succinate XL (TOPROL-XL)  50 MG 24 hr tablet; Take 1 tablet by mouth Daily.    Mixed hyperlipidemia

## 2017-11-01 ENCOUNTER — LAB (OUTPATIENT)
Dept: FAMILY MEDICINE CLINIC | Facility: CLINIC | Age: 52
End: 2017-11-01

## 2017-11-01 DIAGNOSIS — E11.9 TYPE 2 DIABETES MELLITUS WITHOUT COMPLICATION, WITHOUT LONG-TERM CURRENT USE OF INSULIN (HCC): ICD-10-CM

## 2017-11-01 DIAGNOSIS — M05.79 RHEUMATOID ARTHRITIS INVOLVING MULTIPLE SITES WITH POSITIVE RHEUMATOID FACTOR (HCC): ICD-10-CM

## 2017-11-01 DIAGNOSIS — E53.8 VITAMIN B 12 DEFICIENCY: ICD-10-CM

## 2017-11-01 DIAGNOSIS — E78.2 MIXED HYPERLIPIDEMIA: Chronic | ICD-10-CM

## 2017-11-01 DIAGNOSIS — E55.9 VITAMIN D DEFICIENCY: ICD-10-CM

## 2017-11-01 LAB
25(OH)D3 SERPL-MCNC: 32 NG/ML
ALBUMIN SERPL-MCNC: 4.2 G/DL (ref 3.5–5)
ALBUMIN/GLOB SERPL: 1.4 G/DL (ref 1.5–2.5)
ALP SERPL-CCNC: 134 U/L (ref 40–129)
ALT SERPL W P-5'-P-CCNC: 53 U/L (ref 10–44)
ANION GAP SERPL CALCULATED.3IONS-SCNC: 7.5 MMOL/L (ref 3.6–11.2)
AST SERPL-CCNC: 36 U/L (ref 10–34)
BASOPHILS # BLD AUTO: 0.06 10*3/MM3 (ref 0–0.3)
BASOPHILS NFR BLD AUTO: 0.7 % (ref 0–2)
BILIRUB SERPL-MCNC: 0.4 MG/DL (ref 0.2–1.8)
BUN BLD-MCNC: 10 MG/DL (ref 7–21)
BUN/CREAT SERPL: 13.5 (ref 7–25)
CALCIUM SPEC-SCNC: 9.7 MG/DL (ref 7.7–10)
CHLORIDE SERPL-SCNC: 106 MMOL/L (ref 99–112)
CHOLEST SERPL-MCNC: 103 MG/DL (ref 0–200)
CO2 SERPL-SCNC: 25.5 MMOL/L (ref 24.3–31.9)
CREAT BLD-MCNC: 0.74 MG/DL (ref 0.43–1.29)
DEPRECATED RDW RBC AUTO: 45.6 FL (ref 37–54)
EOSINOPHIL # BLD AUTO: 1.63 10*3/MM3 (ref 0–0.7)
EOSINOPHIL NFR BLD AUTO: 18.8 % (ref 0–5)
ERYTHROCYTE [DISTWIDTH] IN BLOOD BY AUTOMATED COUNT: 13.3 % (ref 11.5–14.5)
ERYTHROCYTE [SEDIMENTATION RATE] IN BLOOD: 5 MM/HR (ref 0–20)
GFR SERPL CREATININE-BSD FRML MDRD: 112 ML/MIN/1.73
GLOBULIN UR ELPH-MCNC: 3 GM/DL
GLUCOSE BLD-MCNC: 118 MG/DL (ref 70–110)
HBA1C MFR BLD: 6 % (ref 4.5–5.7)
HCT VFR BLD AUTO: 51 % (ref 42–52)
HCV AB SER DONR QL: NORMAL
HDLC SERPL-MCNC: 28 MG/DL (ref 60–100)
HGB BLD-MCNC: 16.9 G/DL (ref 14–18)
IMM GRANULOCYTES # BLD: 0.02 10*3/MM3 (ref 0–0.03)
IMM GRANULOCYTES NFR BLD: 0.2 % (ref 0–0.5)
LDLC SERPL CALC-MCNC: 51 MG/DL (ref 0–100)
LDLC/HDLC SERPL: 1.84 {RATIO}
LYMPHOCYTES # BLD AUTO: 2.76 10*3/MM3 (ref 1–3)
LYMPHOCYTES NFR BLD AUTO: 31.8 % (ref 21–51)
MCH RBC QN AUTO: 31.5 PG (ref 27–33)
MCHC RBC AUTO-ENTMCNC: 33.1 G/DL (ref 33–37)
MCV RBC AUTO: 95.1 FL (ref 80–94)
MONOCYTES # BLD AUTO: 1.1 10*3/MM3 (ref 0.1–0.9)
MONOCYTES NFR BLD AUTO: 12.7 % (ref 0–10)
NEUTROPHILS # BLD AUTO: 3.1 10*3/MM3 (ref 1.4–6.5)
NEUTROPHILS NFR BLD AUTO: 35.8 % (ref 30–70)
OSMOLALITY SERPL CALC.SUM OF ELEC: 277.7 MOSM/KG (ref 273–305)
PLATELET # BLD AUTO: 300 10*3/MM3 (ref 130–400)
PMV BLD AUTO: 10.5 FL (ref 6–10)
POTASSIUM BLD-SCNC: 4.4 MMOL/L (ref 3.5–5.3)
PROT SERPL-MCNC: 7.2 G/DL (ref 6–8)
RBC # BLD AUTO: 5.36 10*6/MM3 (ref 4.7–6.1)
SODIUM BLD-SCNC: 139 MMOL/L (ref 135–153)
TRIGL SERPL-MCNC: 118 MG/DL (ref 0–150)
TSH SERPL DL<=0.05 MIU/L-ACNC: 5.83 MIU/ML (ref 0.55–4.78)
URATE SERPL-MCNC: 3.7 MG/DL (ref 3.7–7)
VIT B12 BLD-MCNC: 692 PG/ML (ref 211–911)
VLDLC SERPL-MCNC: 23.6 MG/DL
WBC NRBC COR # BLD: 8.67 10*3/MM3 (ref 4.5–12.5)

## 2017-11-01 PROCEDURE — 83036 HEMOGLOBIN GLYCOSYLATED A1C: CPT | Performed by: NURSE PRACTITIONER

## 2017-11-01 PROCEDURE — 85652 RBC SED RATE AUTOMATED: CPT | Performed by: NURSE PRACTITIONER

## 2017-11-01 PROCEDURE — 82607 VITAMIN B-12: CPT | Performed by: NURSE PRACTITIONER

## 2017-11-01 PROCEDURE — 80061 LIPID PANEL: CPT | Performed by: NURSE PRACTITIONER

## 2017-11-01 PROCEDURE — 84550 ASSAY OF BLOOD/URIC ACID: CPT | Performed by: NURSE PRACTITIONER

## 2017-11-01 PROCEDURE — 80050 GENERAL HEALTH PANEL: CPT | Performed by: NURSE PRACTITIONER

## 2017-11-01 PROCEDURE — 86803 HEPATITIS C AB TEST: CPT | Performed by: NURSE PRACTITIONER

## 2017-11-01 PROCEDURE — 36415 COLL VENOUS BLD VENIPUNCTURE: CPT | Performed by: NURSE PRACTITIONER

## 2017-11-01 PROCEDURE — 82306 VITAMIN D 25 HYDROXY: CPT | Performed by: NURSE PRACTITIONER

## 2017-11-04 DIAGNOSIS — E11.9 TYPE 2 DIABETES MELLITUS WITHOUT COMPLICATIONS (HCC): ICD-10-CM

## 2017-11-04 DIAGNOSIS — K21.9 GASTROESOPHAGEAL REFLUX DISEASE WITHOUT ESOPHAGITIS: ICD-10-CM

## 2017-11-06 RX ORDER — METFORMIN HYDROCHLORIDE 500 MG/1
TABLET, EXTENDED RELEASE ORAL
Qty: 30 TABLET | Refills: 2 | Status: SHIPPED | OUTPATIENT
Start: 2017-11-06 | End: 2018-02-04 | Stop reason: SDUPTHER

## 2017-11-06 RX ORDER — RANITIDINE 300 MG/1
TABLET ORAL
Qty: 60 TABLET | Refills: 2 | Status: SHIPPED | OUTPATIENT
Start: 2017-11-06 | End: 2017-11-08

## 2017-11-06 RX ORDER — DIPHENHYDRAMINE HYDROCHLORIDE 12.5 MG/5ML
LIQUID ORAL
Qty: 30 CAPSULE | Refills: 2 | Status: SHIPPED | OUTPATIENT
Start: 2017-11-06 | End: 2018-02-04 | Stop reason: SDUPTHER

## 2017-11-08 ENCOUNTER — OFFICE VISIT (OUTPATIENT)
Dept: FAMILY MEDICINE CLINIC | Facility: CLINIC | Age: 52
End: 2017-11-08

## 2017-11-08 VITALS
BODY MASS INDEX: 31.83 KG/M2 | HEIGHT: 68 IN | SYSTOLIC BLOOD PRESSURE: 120 MMHG | WEIGHT: 210 LBS | DIASTOLIC BLOOD PRESSURE: 75 MMHG | OXYGEN SATURATION: 96 % | HEART RATE: 80 BPM | TEMPERATURE: 97.6 F

## 2017-11-08 DIAGNOSIS — E11.9 TYPE 2 DIABETES MELLITUS WITHOUT COMPLICATION, WITHOUT LONG-TERM CURRENT USE OF INSULIN (HCC): Primary | Chronic | ICD-10-CM

## 2017-11-08 DIAGNOSIS — E53.8 VITAMIN B 12 DEFICIENCY: ICD-10-CM

## 2017-11-08 DIAGNOSIS — I10 BENIGN ESSENTIAL HYPERTENSION: Chronic | ICD-10-CM

## 2017-11-08 DIAGNOSIS — F17.200 TOBACCO USE DISORDER: ICD-10-CM

## 2017-11-08 DIAGNOSIS — Z23 ENCOUNTER FOR IMMUNIZATION: ICD-10-CM

## 2017-11-08 DIAGNOSIS — R10.11 RUQ ABDOMINAL PAIN: ICD-10-CM

## 2017-11-08 DIAGNOSIS — M05.79 RHEUMATOID ARTHRITIS INVOLVING MULTIPLE SITES WITH POSITIVE RHEUMATOID FACTOR (HCC): Chronic | ICD-10-CM

## 2017-11-08 DIAGNOSIS — E78.2 MIXED HYPERLIPIDEMIA: Chronic | ICD-10-CM

## 2017-11-08 DIAGNOSIS — K21.9 GASTROESOPHAGEAL REFLUX DISEASE, ESOPHAGITIS PRESENCE NOT SPECIFIED: ICD-10-CM

## 2017-11-08 DIAGNOSIS — E55.9 VITAMIN D DEFICIENCY: ICD-10-CM

## 2017-11-08 PROCEDURE — 99214 OFFICE O/P EST MOD 30 MIN: CPT | Performed by: NURSE PRACTITIONER

## 2017-11-08 PROCEDURE — 90471 IMMUNIZATION ADMIN: CPT | Performed by: NURSE PRACTITIONER

## 2017-11-08 PROCEDURE — 90686 IIV4 VACC NO PRSV 0.5 ML IM: CPT | Performed by: NURSE PRACTITIONER

## 2017-11-08 RX ORDER — DEXLANSOPRAZOLE 60 MG/1
60 CAPSULE, DELAYED RELEASE ORAL DAILY
Qty: 30 CAPSULE | Refills: 0 | COMMUNITY
Start: 2017-11-08 | End: 2017-12-06 | Stop reason: SDUPTHER

## 2017-11-08 NOTE — PATIENT INSTRUCTIONS
Type 2 Diabetes Mellitus, Self Care, Adult  When you have type 2 diabetes (type 2 diabetes mellitus), you must keep your blood sugar (glucose) under control. You can do this with:  · Nutrition.  · Exercise.  · Lifestyle changes.  · Medicines or insulin, if needed.  · Support from your doctors and others.  HOW DO I MANAGE MY BLOOD SUGAR?  · Check your blood sugar level every day, as often as told.  · Call your doctor if your blood sugar is above your goal numbers for 2 tests in a row.  · Have your A1c (hemoglobin A1c) level checked at least two times a year. Have it checked more often if your doctor tells you to.  Your doctor will set treatment goals for you. Generally, you should have these blood sugar levels:  · Before meals (preprandial):  mg/dL (4.4-7.2 mmol/L).  · After meals (postprandial): lower than 180 mg/dL (10 mmol/L).  · A1c level: less than 7%.  WHAT DO I NEED TO KNOW ABOUT HIGH BLOOD SUGAR?  High blood sugar is called hyperglycemia. Know the signs of high blood sugar. Signs may include:  · Feeling:    Thirsty.    Hungry.    Very tired.  · Needing to pee (urinate) more than usual.  · Blurry vision.  WHAT DO I NEED TO KNOW ABOUT LOW BLOOD SUGAR?  Low blood sugar is called hypoglycemia. This is when blood sugar is at or below 70 mg/dL (3.9 mmol/L). Symptoms may include:  · Feeling:    Hungry.    Worried or nervous (anxious).    Sweaty and clammy.    Confused.    Dizzy.    Sleepy.    Sick to your stomach (nauseous).  · Having:    A fast heartbeat (palpitations).    A headache.    A change in your vision.    Jerky movements that you cannot control (seizure).    Nightmares.    Tingling or no feeling (numbness) around the mouth, lips, or tongue.  · Having trouble with:    Talking.    Paying attention (concentrating).    Moving (coordination).    Sleeping.  · Shaking.  · Passing out (fainting).  · Getting upset easily (irritability).  Treating low blood sugar  To treat low blood sugar, eat or drink  something sugary right away. If you can think clearly and swallow safely, follow the 15:15 rule:   · Take 15 grams of a fast-acting carb (carbohydrate). Some fast-acting carbs are:    1 tube of glucose gel.    3 sugar tablets (glucose pills).    6-8 pieces of hard candy.    4 oz (120 mL) of fruit juice.    4 oz (120 mL) regular (not diet) soda.  · Check your blood sugar 15 minutes after you take the carb.  · If your blood sugar is still at or below 70 mg/dL (3.9 mmol/L), take 15 grams of a carb again.  · If your blood sugar does not go above 70 mg/dL (3.9 mmol/L) after 3 tries, get help right away.  · After your blood sugar goes back to normal, eat a meal or a snack within 1 hour.  Treating very low blood sugar  If your blood sugar is at or below 54 mg/dL (3 mmol/L), you have very low blood sugar (severe hypoglycemia). This is an emergency. Do not wait to see if the symptoms will go away. Get medical help right away. Call your local emergency services (911 in the [place] Do not drive yourself to the hospital.  If you have very low blood sugar and you cannot eat or drink, you may need a glucagon shot (injection). A family member or friend should learn how to check your blood sugar and how to give you a glucagon shot. Ask your doctor if you need to have a glucagon shot kit at home.  WHAT ELSE IS IMPORTANT TO MANAGE MY DIABETES?  Medicine  Follow these instructions about insulin and diabetes medicines:  · Take them as told by your doctor.  · Adjust them as told by your doctor.  · Do not run out of them.  Having diabetes can raise your risk for other long-term conditions. These include heart or kidney disease. Your doctor may prescribe medicines to help prevent problems from diabetes.  Food  · Make healthy food choices. These include:    Chicken, fish, egg whites, and beans.    Oats, whole wheat, bulgur, brown rice, quinoa, and millet.    Fresh fruits and vegetables.    Low-fat dairy products.    Nuts, avocado, olive  oil, and canola oil.  · Make a food plan with a specialist (dietitian).  · Follow instructions from your doctor about what you cannot eat or drink.  · Drink enough fluid to keep your pee (urine) clear or pale yellow.  · Eat healthy snacks between healthy meals.  · Keep track of carbs that you eat. Read food labels. Learn food serving sizes.  · Follow your sick day plan when you cannot eat or drink normally. Make this plan with your doctor so it is ready to use.  Activity  · Exercise at least 3 times a week.  · Do not go more than 2 days without exercising.  · Talk with your doctor before you start a new exercise. Your doctor may need to adjust your insulin, medicines, or food.  Lifestyle  · Do not use any tobacco products. These include cigarettes, chewing tobacco, and e-cigarettes.If you need help quitting, ask your doctor.  · Ask your doctor how much alcohol is safe for you.  · Learn to deal with stress. If you need help with this, ask your doctor.  Body care  · Stay up to date with your shots (immunizations).  · Have your eyes and feet checked by a doctor as often as told.  · Check your skin and feet every day. Check for cuts, bruises, redness, blisters, or sores.  · Brush your teeth and gums two times a day.  · Floss at least one time a day.  · Go to the dentist least one time every 6 months.  · Stay at a healthy weight.  General instructions  · Take over-the-counter and prescription medicines only as told by your doctor.  · Share your diabetes care plan with:    Your work or school.    People you live with.  · Check your pee (urine) for ketones:    When you are sick.    As told by your doctor.  · Carry a card or wear jewelry that says that you have diabetes.  · Ask your doctor:    Do I need to meet with a diabetes educator?    Where can I find a support group for people with diabetes?  · Keep all follow-up visits as told by your doctor. This is important.  WHERE TO FIND MORE INFORMATION:  To learn more about  diabetes, visit:  · American Diabetes Association: www.diabetes.org  · American Association of Diabetes Educators: www.diabeteseducator.org/patient-resources     This information is not intended to replace advice given to you by your health care provider. Make sure you discuss any questions you have with your health care provider.     Document Released: 04/10/2017 Document Reviewed: 01/20/2017  [employer] Interactive Patient Education ©2017 [employer]  Food Choices for Gastroesophageal Reflux Disease, Adult  When you have gastroesophageal reflux disease (GERD), the foods you eat and your eating habits are very important. Choosing the right foods can help ease your discomfort.   WHAT GUIDELINES DO I NEED TO FOLLOW?   · Choose fruits, vegetables, whole grains, and low-fat dairy products.    · Choose low-fat meat, fish, and poultry.  · Limit fats such as oils, salad dressings, butter, nuts, and avocado.    · Keep a food diary. This helps you identify foods that cause symptoms.    · Avoid foods that cause symptoms. These may be different for everyone.    · Eat small meals often instead of 3 large meals a day.    · Eat your meals slowly, in a place where you are relaxed.    · Limit fried foods.    · Cook foods using methods other than frying.    · Avoid drinking alcohol.    · Avoid drinking large amounts of liquids with your meals.    · Avoid bending over or lying down until 2-3 hours after eating.    WHAT FOODS ARE NOT RECOMMENDED?   These are some foods and drinks that may make your symptoms worse:  Vegetables  Tomatoes. Tomato juice. Tomato and spaghetti sauce. Chili peppers. Onion and garlic. Horseradish.  Fruits  Oranges, grapefruit, and lemon (fruit and juice).  Meats  High-fat meats, fish, and poultry. This includes hot dogs, ribs, ham, sausage, salami, and pelletier.  Dairy  Whole milk and chocolate milk. Sour cream. Cream. Butter. Ice cream. Cream cheese.   Drinks  Coffee and tea. Bubbly (carbonated) drinks or  energy drinks.  Condiments  Hot sauce. Barbecue sauce.   Sweets/Desserts  Chocolate and cocoa. Donuts. Peppermint and spearmint.  Fats and Oils  High-fat foods. This includes French fries and potato chips.  Other  Vinegar. Strong spices. This includes black pepper, white pepper, red pepper, cayenne, montelongo powder, cloves, ginger, and chili powder.  The items listed above may not be a complete list of foods and drinks to avoid. Contact your dietitian for more information.     This information is not intended to replace advice given to you by your health care provider. Make sure you discuss any questions you have with your health care provider.     Document Released: 06/18/2013 Document Revised: 01/08/2016 Document Reviewed: 10/22/2014  [employer] Interactive Patient Education ©2017 [employer]

## 2017-11-08 NOTE — PROGRESS NOTES
"  HPI Comments:   Raimundo presents to the clinic today to review his recent lab results pertaining to his DM, type2, HTN, GERD and Hyperlipidemia. Overall, he reports he feels well except he has had increase in belching and RUQ abdominal pain. Refer to ROS for additional information.    Hypertension   The current episode started more than 1 year ago. The problem is controlled. Pertinent negatives include no anxiety, blurred vision, chest pain, headaches, malaise/fatigue, palpitations, peripheral edema, shortness of breath or sweats.     Diabetes   He has type 2 diabetes mellitus. His disease course has been improving. Pertinent negatives for diabetes include no blurred vision, no chest pain, no fatigue, no polydipsia, no polyphagia, no polyuria and no weakness. Risk factors for coronary artery disease include dyslipidemia, hypertension, male sex, sedentary lifestyle and tobacco exposure. Current diabetic treatment includes oral agent (monotherapy). He is compliant with medication regimen most of the time. He has had a previous visit with a dietitian. An ACE inhibitor/angiotensin II receptor blocker is being taken. Eye exam is current.       Vitals:    11/08/17 1203   BP: 120/75   BP Location: Left arm   Patient Position: Sitting   Pulse: 80   Temp: 97.6 °F (36.4 °C)   TempSrc: Tympanic   SpO2: 96%   Weight: 210 lb (95.3 kg)   Height: 68\" (172.7 cm)      The following portions of the patient's history were reviewed and updated as appropriate: allergies, current medications, past family history, past medical history, past social history, past surgical history and problem list.    Review of Systems   Constitutional: Positive for appetite change. Negative for activity change, chills and fever.   HENT: Negative.    Eyes: Negative for visual disturbance.   Respiratory: Negative for cough, chest tightness, shortness of breath and wheezing.    Cardiovascular: Negative for chest pain, palpitations and leg swelling. "   Gastrointestinal: Positive for abdominal distention and abdominal pain. Negative for nausea and vomiting.   Endocrine: Negative for cold intolerance, heat intolerance, polydipsia, polyphagia and polyuria.   Genitourinary: Negative for difficulty urinating.   Musculoskeletal: Positive for arthralgias, back pain and myalgias.   Skin: Negative for color change and rash.   Neurological: Negative for dizziness, tremors, speech difficulty, weakness, light-headedness and headaches.   Hematological: Negative for adenopathy.   Psychiatric/Behavioral: Negative for confusion, decreased concentration and suicidal ideas. The patient is not nervous/anxious.    All other systems reviewed and are negative.    Physical Exam   Constitutional: He is oriented to person, place, and time. He appears well-developed and well-nourished. No distress.   HENT:   Head: Normocephalic.   Right Ear: Tympanic membrane and ear canal normal.   Left Ear: Tympanic membrane and ear canal normal.   Nose: Nose normal.   Mouth/Throat: Oropharynx is clear and moist and mucous membranes are normal. Dental caries present. No oropharyngeal exudate or posterior oropharyngeal erythema.   Eyes: Conjunctivae are normal. Pupils are equal, round, and reactive to light. Right eye exhibits no discharge. Left eye exhibits no discharge. No scleral icterus.   Neck: Neck supple. No JVD present. No thyromegaly present.   Cardiovascular: Normal rate, regular rhythm and normal heart sounds.  Exam reveals no friction rub.    No murmur heard.  Pulmonary/Chest: Effort normal. No respiratory distress. He has decreased breath sounds. He has no wheezes. He has no rhonchi. He has no rales.   Abdominal: Soft. Bowel sounds are normal. He exhibits distension. There is tenderness in the right upper quadrant. There is no rigidity, no rebound, no guarding and no CVA tenderness.   Musculoskeletal: He exhibits tenderness. He exhibits no edema.        Right hip: He exhibits decreased  strength.        Left hip: He exhibits tenderness.   Lymphadenopathy:     He has no cervical adenopathy.   Neurological: He is alert and oriented to person, place, and time.   Skin: Skin is warm and dry. No rash noted. No erythema.   Psychiatric: He has a normal mood and affect. His behavior is normal. Judgment and thought content normal.   Vitals reviewed.    Assessment/Plan   Problems Addressed this Visit        Cardiovascular and Mediastinum    Benign essential hypertension    Hyperlipidemia    Relevant Orders    Lipid Panel       Digestive    Vitamin D deficiency    Relevant Orders    Vitamin D 25 Hydroxy    Vitamin B 12 deficiency    Relevant Orders    Vitamin B12       Endocrine    Type 2 diabetes mellitus without complications - Primary    Relevant Orders    Comprehensive Metabolic Panel    TSH    Hemoglobin A1c    MicroAlbumin, Urine, Random - Urine, Clean Catch       Musculoskeletal and Integument    Rheumatoid arthritis with positive rheumatoid factor       Other    Tobacco use disorder      Other Visit Diagnoses     Gastroesophageal reflux disease, esophagitis presence not specified        Will change Zantac to Dexilant 60 mg. A month supply was given to try and requested Raimundo to phone in two weeks to inform me of effectiveness.     Relevant Medications    dexlansoprazole (DEXILANT) 60 MG capsule    Other Relevant Orders    CBC & Differential    RUQ abdominal pain        US of Gallbladder ordered; Will continue to monitor.    Relevant Orders    US Gallbladder    Encounter for immunization        Influenza vaccination given today    Relevant Orders    Flu Vaccine Quad PF 3YR+ (Completed)          Results for orders placed or performed in visit on 11/01/17   Comprehensive Metabolic Panel   Result Value Ref Range    Glucose 118 (H) 70 - 110 mg/dL    BUN 10 7 - 21 mg/dL    Creatinine 0.74 0.43 - 1.29 mg/dL    Sodium 139 135 - 153 mmol/L    Potassium 4.4 3.5 - 5.3 mmol/L    Chloride 106 99 - 112 mmol/L     CO2 25.5 24.3 - 31.9 mmol/L    Calcium 9.7 7.7 - 10.0 mg/dL    Total Protein 7.2 6.0 - 8.0 g/dL    Albumin 4.20 3.50 - 5.00 g/dL    ALT (SGPT) 53 (H) 10 - 44 U/L    AST (SGOT) 36 (H) 10 - 34 U/L    Alkaline Phosphatase 134 (H) 40 - 129 U/L    Total Bilirubin 0.4 0.2 - 1.8 mg/dL    eGFR Non African Amer 112 >60 mL/min/1.73    Globulin 3.0 gm/dL    A/G Ratio 1.4 (L) 1.5 - 2.5 g/dL    BUN/Creatinine Ratio 13.5 7.0 - 25.0    Anion Gap 7.5 3.6 - 11.2 mmol/L   Lipid Panel   Result Value Ref Range    Total Cholesterol 103 0 - 200 mg/dL    Triglycerides 118 0 - 150 mg/dL    HDL Cholesterol 28 (L) 60 - 100 mg/dL    LDL Cholesterol  51 0 - 100 mg/dL    VLDL Cholesterol 23.6 mg/dL    LDL/HDL Ratio 1.84    TSH   Result Value Ref Range    TSH 5.830 (H) 0.550 - 4.780 mIU/mL   Hemoglobin A1c   Result Value Ref Range    Hemoglobin A1C 6.00 (H) 4.50 - 5.70 %   Vitamin D 25 Hydroxy   Result Value Ref Range    25 Hydroxy, Vitamin D 32.0 ng/ml   Vitamin B12   Result Value Ref Range    Vitamin B-12 692 211 - 911 pg/mL   Uric Acid   Result Value Ref Range    Uric Acid 3.7 3.7 - 7.0 mg/dL   Hepatitis C Antibody   Result Value Ref Range    Hepatitis C Ab Non-Reactive Non-Reactive   Sedimentation Rate   Result Value Ref Range    Sed Rate 5 0 - 20 mm/hr   CBC Auto Differential   Result Value Ref Range    WBC 8.67 4.50 - 12.50 10*3/mm3    RBC 5.36 4.70 - 6.10 10*6/mm3    Hemoglobin 16.9 14.0 - 18.0 g/dL    Hematocrit 51.0 42.0 - 52.0 %    MCV 95.1 (H) 80.0 - 94.0 fL    MCH 31.5 27.0 - 33.0 pg    MCHC 33.1 33.0 - 37.0 g/dL    RDW 13.3 11.5 - 14.5 %    RDW-SD 45.6 37.0 - 54.0 fl    MPV 10.5 (H) 6.0 - 10.0 fL    Platelets 300 130 - 400 10*3/mm3    Neutrophil % 35.8 30.0 - 70.0 %    Lymphocyte % 31.8 21.0 - 51.0 %    Monocyte % 12.7 (H) 0.0 - 10.0 %    Eosinophil % 18.8 (H) 0.0 - 5.0 %    Basophil % 0.7 0.0 - 2.0 %    Immature Grans % 0.2 0.0 - 0.5 %    Neutrophils, Absolute 3.10 1.40 - 6.50 10*3/mm3    Lymphocytes, Absolute 2.76 1.00 - 3.00  10*3/mm3    Monocytes, Absolute 1.10 (H) 0.10 - 0.90 10*3/mm3    Eosinophils, Absolute 1.63 (H) 0.00 - 0.70 10*3/mm3    Basophils, Absolute 0.06 0.00 - 0.30 10*3/mm3    Immature Grans, Absolute 0.02 0.00 - 0.03 10*3/mm3   Osmolality, Calculated   Result Value Ref Range    Osmolality Calc 277.7 273.0 - 305.0 mOsm/kg     Findings and recommendations discussed with Raimundo. Reviewed his recent lab recent with him which included an A1C of 6 and FBS of 118 mg/dL. Reviewed results of  his lipid panel which included TC: 103 mg/dL; Triglycerides: 118 mg/dL; HDL; 28 mg/dL and LDL: 51 mg/dL.   Will change Zantac to Dexilant 60 mg. A month supply was given to try and requested Raimundo to phone in two weeks to inform me of effectiveness. US of Gallbladder ordered for RUQ Pain.He will continue to  monitor his symptoms and if s/s of concern occur or symptoms worsen he will seek further medical evaluation.   Follow up with me will be scheduled in March with routine labs scheduled prior to his appt.Sooner if problems/concerns occur.

## 2017-11-22 ENCOUNTER — HOSPITAL ENCOUNTER (OUTPATIENT)
Dept: ULTRASOUND IMAGING | Facility: HOSPITAL | Age: 52
Discharge: HOME | End: 2017-11-22
Admitting: NURSE PRACTITIONER

## 2017-11-22 DIAGNOSIS — R10.11 RUQ ABDOMINAL PAIN: ICD-10-CM

## 2017-11-22 PROCEDURE — 76705 ECHO EXAM OF ABDOMEN: CPT

## 2017-11-22 PROCEDURE — 76705 ECHO EXAM OF ABDOMEN: CPT | Performed by: RADIOLOGY

## 2017-11-29 DIAGNOSIS — R93.89 ABNORMAL ULTRASOUND: Primary | ICD-10-CM

## 2017-11-29 NOTE — PROGRESS NOTES
Spoke with pt about the following per Aden Carl. He agreed to see [facility]. I ordered referral and sent to scheduling. [facility]    -- Please let Raimundo know that his GallBladder US was basically normal except two small polyps within the wall which I do not feel is causing his symptoms. If symptoms persist, have him to phone office and we will schedule him an appt with Dr Garg .

## 2017-12-06 DIAGNOSIS — K21.9 GASTROESOPHAGEAL REFLUX DISEASE, ESOPHAGITIS PRESENCE NOT SPECIFIED: ICD-10-CM

## 2017-12-06 RX ORDER — DEXLANSOPRAZOLE 60 MG/1
60 CAPSULE, DELAYED RELEASE ORAL DAILY
Qty: 30 CAPSULE | Refills: 3 | Status: SHIPPED | OUTPATIENT
Start: 2017-12-06 | End: 2017-12-11 | Stop reason: CLARIF

## 2017-12-07 ENCOUNTER — DOCUMENTATION (OUTPATIENT)
Dept: FAMILY MEDICINE CLINIC | Facility: CLINIC | Age: 52
End: 2017-12-07

## 2017-12-08 ENCOUNTER — DOCUMENTATION (OUTPATIENT)
Dept: FAMILY MEDICINE CLINIC | Facility: CLINIC | Age: 52
End: 2017-12-08

## 2017-12-08 NOTE — PROGRESS NOTES
Patient was denied coverage on Dexilant. [employer] preferred either Protonix, Prilosec or prevacid. Sent provider a message to choose one of these if appropriate to send in. PKF

## 2017-12-11 ENCOUNTER — TELEPHONE (OUTPATIENT)
Dept: FAMILY MEDICINE CLINIC | Facility: CLINIC | Age: 52
End: 2017-12-11

## 2017-12-11 RX ORDER — LANSOPRAZOLE 30 MG/1
30 CAPSULE, DELAYED RELEASE ORAL DAILY
Qty: 30 CAPSULE | Refills: 5 | Status: SHIPPED | OUTPATIENT
Start: 2017-12-11 | End: 2018-03-16 | Stop reason: SDUPTHER

## 2017-12-11 NOTE — TELEPHONE ENCOUNTER
----- Message from ABDIRAHMAN Coughlin sent at 12/11/2017  9:40 AM EST -----  Regarding: RE: Pt's Dexilant was denied on [place]  I sent in Lansoprazole for him..Please let him know if this is unsuccessful I will send him to GI. His gallbadder was ok  ----- Message -----     From: Scot Mccarthy MA     Sent: 12/8/2017   2:32 PM       To: ABDIRAHMAN Coughlin  Subject: Pt's Dexilant was denied on PA                   Insurance denied PA for Dexilant. They prefer Omeprazole, Pantoprazole, or Lansoprazole. Could you please choose one of the three and send in for patient?  Please advise.  Thanks  Scot HAIR

## 2018-01-02 ENCOUNTER — HOSPITAL ENCOUNTER (OUTPATIENT)
Dept: GENERAL RADIOLOGY | Facility: HOSPITAL | Age: 53
Discharge: HOME | End: 2018-01-02
Admitting: PHYSICIAN ASSISTANT

## 2018-01-02 ENCOUNTER — CONSULT (OUTPATIENT)
Dept: GASTROENTEROLOGY | Facility: CLINIC | Age: 53
End: 2018-01-02

## 2018-01-02 VITALS
BODY MASS INDEX: 31.74 KG/M2 | OXYGEN SATURATION: 95 % | SYSTOLIC BLOOD PRESSURE: 129 MMHG | HEIGHT: 68 IN | DIASTOLIC BLOOD PRESSURE: 86 MMHG | WEIGHT: 209.4 LBS | HEART RATE: 92 BPM

## 2018-01-02 DIAGNOSIS — R19.4 CHANGE IN BOWEL HABITS: ICD-10-CM

## 2018-01-02 DIAGNOSIS — R79.89 ABNORMAL LIVER FUNCTION TESTS: ICD-10-CM

## 2018-01-02 DIAGNOSIS — K62.5 BRIGHT RED BLOOD PER RECTUM: ICD-10-CM

## 2018-01-02 DIAGNOSIS — K21.9 GASTROESOPHAGEAL REFLUX DISEASE, ESOPHAGITIS PRESENCE NOT SPECIFIED: ICD-10-CM

## 2018-01-02 DIAGNOSIS — R10.13 DYSPEPSIA: ICD-10-CM

## 2018-01-02 DIAGNOSIS — R13.10 DYSPHAGIA, UNSPECIFIED TYPE: ICD-10-CM

## 2018-01-02 DIAGNOSIS — K82.4 GALLBLADDER POLYP: ICD-10-CM

## 2018-01-02 DIAGNOSIS — R10.13 DYSPEPSIA: Primary | ICD-10-CM

## 2018-01-02 DIAGNOSIS — R19.7 DIARRHEA, UNSPECIFIED TYPE: ICD-10-CM

## 2018-01-02 DIAGNOSIS — R10.11 RUQ ABDOMINAL PAIN: ICD-10-CM

## 2018-01-02 PROCEDURE — 74019 RADEX ABDOMEN 2 VIEWS: CPT | Performed by: RADIOLOGY

## 2018-01-02 PROCEDURE — 99214 OFFICE O/P EST MOD 30 MIN: CPT | Performed by: PHYSICIAN ASSISTANT

## 2018-01-02 PROCEDURE — 74019 RADEX ABDOMEN 2 VIEWS: CPT

## 2018-01-02 NOTE — PROGRESS NOTES
: 1965    Chief Complaint   Patient presents with   • Diarrhea   • Abdominal Pain       Raimundo Munguia is a 52 y.o. male who presents to the office today as a consultation from ABDIRAHMAN Swift for evaluation of abnormal ultrasound gallbladder.    History of Present Illness:  US gallbladder 2017:  The gallbladder was well demonstrated sonographically. There  were some persistent mucosal densities along the wall of the gallbladder  that did not move or shadow.  They measure only a few millimeters in  size and are felt to most likely represent polyps of the gallbladder  wall. There is no fluid around the gallbladder. The common hepatic  portion of the bile duct was normal measuring 2.7 mm.  IMPRESSION:  Two small mucosal densities consistent with polyps in the  wall of the gallbladder.    He reports RUQ abdominal pain which is intermittent and can be severe. Lifting seems to make this pain worse. This has been present for approx the past 1 year. No nausea or vomiting. Dyspepsia is frequent and occurs 1-2 time per week. Sometimes he has watery stools. He has lost about 32 lbs over the past 1 year. He has never had a colonoscopy. There is no known family history of colon cancer or colon polyps. He has small amount bright red rectal bleeding which is intermittent on the tissue after bowel movements. Bowel movements are described as daily about 2 times per day and sometimes he skips one day between. Stools vary between normal and watery.     Reports alcohol history but never drank daily. He has not drank alcohol in 20 years. No family history of liver disease. Has diabetes and is insulin dependent. No HLD.     Reports acid reflux and takes Prevacid 30 mg once daily which helps some with it. Sometimes has dysphagia with medications. No previous EGD.     Labs 2017:  Glucose 70 - 110 mg/dL 118 (H)   BUN 7 - 21 mg/dL 10   Creatinine 0.43 - 1.29 mg/dL 0.74   Sodium 135 - 153 mmol/L 139   Potassium 3.5 -  5.3 mmol/L 4.4   Chloride 99 - 112 mmol/L 106   CO2 24.3 - 31.9 mmol/L 25.5   Calcium 7.7 - 10.0 mg/dL 9.7   Total Protein 6.0 - 8.0 g/dL 7.2   Albumin 3.50 - 5.00 g/dL 4.20   ALT (SGPT) 10 - 44 U/L 53 (H)   AST (SGOT) 10 - 34 U/L 36 (H)   Alkaline Phosphatase 40 - 129 U/L 134 (H)   Comments: Note New Reference Ranges   Total Bilirubin 0.2 - 1.8 mg/dL 0.4   eGFR Non African Amer >60 mL/min/1.73 112   Globulin gm/dL 3.0   A/G Ratio 1.5 - 2.5 g/dL 1.4 (L)   BUN/Creatinine Ratio 7.0 - 25.0 13.5   Anion Gap 3.6 - 11.2 mmol/L 7.5     WBC 4.50 - 12.50 10*3/mm3 8.67   RBC 4.70 - 6.10 10*6/mm3 5.36   Hemoglobin 14.0 - 18.0 g/dL 16.9   Hematocrit 42.0 - 52.0 % 51.0   MCV 80.0 - 94.0 fL 95.1 (H)   MCH 27.0 - 33.0 pg 31.5   MCHC 33.0 - 37.0 g/dL 33.1   RDW 11.5 - 14.5 % 13.3   RDW-SD 37.0 - 54.0 fl 45.6   MPV 6.0 - 10.0 fL 10.5 (H)   Platelets 130 - 400 10*3/mm3 300   Neutrophil % 30.0 - 70.0 % 35.8   Lymphocyte % 21.0 - 51.0 % 31.8   Monocyte % 0.0 - 10.0 % 12.7 (H)   Eosinophil % 0.0 - 5.0 % 18.8 (H)   Basophil % 0.0 - 2.0 % 0.7   Immature Grans % 0.0 - 0.5 % 0.2   Neutrophils, Absolute 1.40 - 6.50 10*3/mm3 3.10   Lymphocytes, Absolute 1.00 - 3.00 10*3/mm3 2.76   Monocytes, Absolute 0.10 - 0.90 10*3/mm3 1.10 (H)   Eosinophils, Absolute 0.00 - 0.70 10*3/mm3 1.63 (H)   Basophils, Absolute 0.00 - 0.30 10*3/mm3 0.06   Immature Grans, Absolute 0.00 - 0.03 10*3/mm3 0.02     Total Cholesterol 0 - 200 mg/dL 103   Triglycerides 0 - 150 mg/dL 118   HDL Cholesterol 60 - 100 mg/dL 28 (L)   LDL Cholesterol  0 - 100 mg/dL 51   VLDL Cholesterol mg/dL 23.6   LDL/HDL Ratio  1.84         Review of Systems   Constitutional: Negative for chills, fatigue and fever.   HENT: Negative for congestion, sore throat, trouble swallowing and voice change.    Eyes: Negative for pain and visual disturbance.   Respiratory: Negative for cough, shortness of breath and wheezing.    Cardiovascular: Positive for chest pain. Negative for palpitations and leg  swelling.   Gastrointestinal: Positive for abdominal distention, abdominal pain, anal bleeding, blood in stool and diarrhea. Negative for constipation, nausea, rectal pain and vomiting.   Endocrine: Negative for cold intolerance and heat intolerance.   Genitourinary: Negative for difficulty urinating and frequency.   Musculoskeletal: Positive for arthralgias, back pain and myalgias.   Skin: Negative for rash and wound.   Allergic/Immunologic: Positive for environmental allergies. Negative for food allergies.   Neurological: Negative for dizziness and headaches.   Hematological: Does not bruise/bleed easily.   Psychiatric/Behavioral: Positive for sleep disturbance. The patient is nervous/anxious.        Past Medical History:   Diagnosis Date   • Abnormal blood chemistry 03/02/2016    Noted Triglycerides, HDL and LDL   • Allergic    • Cervicalgia    • DM type 2 (diabetes mellitus, type 2)    • GERD (gastroesophageal reflux disease)    • Helicobacter pylori (H. pylori) infection    • Hyperlipidemia    • Hypertension    • Insomnia    • Muscle spasm    • Obesity    • Osteopenia    • Palpitations    • RA (rheumatoid arthritis)    • Tension headache    • Vitamin D deficiency        History reviewed. No pertinent surgical history.    Family History   Problem Relation Age of Onset   • Diabetes Mother    • Hypertension Mother    • Hypertension Father    • Hyperlipidemia Father    • Diabetes Sister    • Hyperlipidemia Sister    • Hypertension Sister    • Hypertension Brother    • Hyperlipidemia Brother        Social History     Social History   • Marital status:      Spouse name: tess   • Number of children: 3   • Years of education: 9     Occupational History   • disabled      Social History Main Topics   • Smoking status: Never Smoker   • Smokeless tobacco: Current User     Types: Chew   • Alcohol use No   • Drug use: No   • Sexual activity: Defer     Other Topics Concern   • None     Social History Narrative  "      Current Outpatient Prescriptions:   •  atorvastatin (LIPITOR) 10 MG tablet, take 1 tablet by mouth at bedtime, Disp: 30 tablet, Rfl: 5  •  Blood Glucose Monitoring Suppl W/DEVICE kit, 1 Device Daily., Disp: 1 each, Rfl: 0  •  Cyanocobalamin ER (RA VITAMIN B-12 TR) 1000 MCG tablet controlled-release, Take 1,000 mcg by mouth Daily., Disp: 30 each, Rfl: 5  •  cyclobenzaprine (FLEXERIL) 10 MG tablet, Take 1 tablet by mouth 3 (Three) Times a Day As Needed for Muscle Spasms., Disp: 90 tablet, Rfl: 5  •  folic acid (FOLVITE) 1 MG tablet, Take 1 tablet by mouth Daily., Disp: 30 tablet, Rfl: 5  •  glucose blood test strip, 1 each by Other route Daily. Use as instructed, Disp: 100 each, Rfl: 1  •  Lancets misc, , Disp: , Rfl:   •  lansoprazole (PREVACID) 30 MG capsule, Take 1 capsule by mouth Daily., Disp: 30 capsule, Rfl: 5  •  loratadine (CLARITIN) 10 MG tablet, Take 1 tablet by mouth Daily., Disp: 30 tablet, Rfl: 5  •  metFORMIN ER (GLUCOPHAGE-XR) 500 MG 24 hr tablet, take 1 tablet by mouth once daily WITH SUPPER, Disp: 30 tablet, Rfl: 2  •  methotrexate 2.5 MG tablet, take 10 tablets by mouth every week, Disp: 40 tablet, Rfl: 2  •  metoprolol succinate XL (TOPROL-XL) 50 MG 24 hr tablet, Take 1 tablet by mouth Daily., Disp: 30 tablet, Rfl: 5  •  RA ALLERGY MEDICATION 25 MG capsule, take 1 capsule by mouth at bedtime if needed for itching, Disp: 30 capsule, Rfl: 2    Allergies:   Review of patient's allergies indicates no known allergies.    Vitals:  /86  Pulse 92  Ht 172.7 cm (68\")  Wt 95 kg (209 lb 6.4 oz)  SpO2 95%  BMI 31.84 kg/m2    Physical Exam   Constitutional: He is oriented to person, place, and time. He appears well-developed and well-nourished. No distress.   Chewing tobacco   HENT:   Head: Normocephalic and atraumatic.   Right Ear: External ear normal.   Left Ear: External ear normal.   Nose: Nose normal.   Mouth/Throat: Oropharynx is clear and moist.   Eyes: Conjunctivae and EOM are normal. " Right eye exhibits no discharge. Left eye exhibits no discharge. No scleral icterus.   Neck: Normal range of motion. Neck supple.   Cardiovascular: Normal rate, regular rhythm and normal heart sounds.  Exam reveals no gallop and no friction rub.    No murmur heard.  Pulmonary/Chest: Effort normal and breath sounds normal. No respiratory distress. He has no wheezes. He has no rales. He exhibits no tenderness.   Abdominal: Soft. Normal appearance and bowel sounds are normal. He exhibits no distension, no ascites and no mass. There is tenderness (LLQ). There is no rigidity and no guarding. No hernia.   Diastasis rectus noted   Musculoskeletal: Normal range of motion. He exhibits no edema or deformity.   Neurological: He is alert and oriented to person, place, and time. He exhibits normal muscle tone. Coordination normal.   Skin: Skin is warm and dry. No rash noted. No erythema. No pallor.   Psychiatric: He has a normal mood and affect. His behavior is normal. Judgment and thought content normal.   Nursing note and vitals reviewed.      Assessment/Plan:  1. Dyspepsia    2. RUQ abdominal pain    3. Change in bowel habits    4. Diarrhea, unspecified type    5. Bright red blood per rectum    6. Dysphagia, unspecified type    7. Gastroesophageal reflux disease, esophagitis presence not specified    8. Abnormal liver function tests    9. Gallbladder polyp      Orders Placed This Encounter   Procedures   • XR Abdomen Flat & Upright     ESOPHAGOGASTRODUODENOSCOPY WITH DILATATION CPT CODE: 88539 (N/A), COLONOSCOPY CPT CODE: 67530 (N/A)  He will need an esophagogastroduodenoscopy with possible dilatation of the esophagus and a colonoscopy performed with IV general sedation. All of the risks, benefits and alternatives of these procedures have been discussed with him, all of his questions have been answered and he has elected to proceed. He should follow up in the office after these procedures to discuss the results and further  recommendations can be made at that time.    New Medications Ordered This Visit   Medications   • polyethylene glycol (GoLYTELY) 236 g solution     Sig: Starting at 6pm the day before procedure, drink 8 ounces every 30 minutes until all gone or stools are clear. May add flavor packet.     Dispense:  4000 mL     Refill:  0     He will have abdominal film for now due to change in bowel habits and abdominal pain. HIDA may be indicated if EGD and colonoscopy do not explain symptoms. He would like to defer for now. He has previously tried getting Dexilant approved but insurance denied. He will continue Prevacid 30 mg once daily for now.     Due to gallbladder polyps (very small), he will need to have repeat US in about 3-6 months to monitor for growth. Also, it would be useful for him to have abdominal US at that time due to elevated AST/ALT and AP for further evaluation.        Return for follow up after procedure to discuss results.      Electronically signed 1/2/2018 11:37 AM  Susanna Bolivar PA-C, [facility]

## 2018-01-03 ENCOUNTER — TELEPHONE (OUTPATIENT)
Dept: GASTROENTEROLOGY | Facility: CLINIC | Age: 53
End: 2018-01-03

## 2018-01-03 PROBLEM — K62.5 BRIGHT RED BLOOD PER RECTUM: Status: ACTIVE | Noted: 2018-01-03

## 2018-01-03 PROBLEM — K21.9 GASTROESOPHAGEAL REFLUX DISEASE: Status: ACTIVE | Noted: 2018-01-03

## 2018-01-03 PROBLEM — R13.10 DYSPHAGIA: Status: ACTIVE | Noted: 2018-01-03

## 2018-01-03 PROBLEM — R19.4 CHANGE IN BOWEL HABITS: Status: ACTIVE | Noted: 2018-01-03

## 2018-01-03 PROBLEM — R10.13 DYSPEPSIA: Status: ACTIVE | Noted: 2018-01-03

## 2018-01-03 PROBLEM — R10.11 RUQ ABDOMINAL PAIN: Status: ACTIVE | Noted: 2018-01-03

## 2018-01-03 NOTE — TELEPHONE ENCOUNTER
Please let patient know that after his colonoscopy he should start taking Miralax 17 g with 8 oz liquid daily. His abdominal film showed right sided constipation. Please make sure he has f/u appt after procedures.

## 2018-01-03 NOTE — TELEPHONE ENCOUNTER
Spoke with patient and let him know his results from his x-ray. I let him know Susanna's recommendations and made him a follow up for after his procedure.

## 2018-02-04 DIAGNOSIS — E11.9 TYPE 2 DIABETES MELLITUS WITHOUT COMPLICATIONS (HCC): ICD-10-CM

## 2018-02-04 DIAGNOSIS — K21.9 GASTROESOPHAGEAL REFLUX DISEASE WITHOUT ESOPHAGITIS: ICD-10-CM

## 2018-02-05 RX ORDER — DIPHENHYDRAMINE HYDROCHLORIDE 12.5 MG/5ML
LIQUID ORAL
Qty: 30 CAPSULE | Refills: 2 | Status: ON HOLD | OUTPATIENT
Start: 2018-02-05 | End: 2018-02-22

## 2018-02-05 RX ORDER — METFORMIN HYDROCHLORIDE 500 MG/1
TABLET, EXTENDED RELEASE ORAL
Qty: 30 TABLET | Refills: 2 | Status: ON HOLD | OUTPATIENT
Start: 2018-02-05 | End: 2018-02-22

## 2018-02-05 RX ORDER — RANITIDINE 300 MG/1
TABLET ORAL
Qty: 60 TABLET | Refills: 2 | Status: ON HOLD | OUTPATIENT
Start: 2018-02-05 | End: 2018-05-04 | Stop reason: SDUPTHER

## 2018-02-05 NOTE — TELEPHONE ENCOUNTER
Spoke with patients pharmacy and they said it was probably ok to disregard this request. He said he wasn't sure why it was sent but that patient did  Golytely on 01/02/2018. I called patient to make sure. There was no answer. I left a message for patient to call back.

## 2018-02-07 ENCOUNTER — ANESTHESIA (OUTPATIENT)
Dept: PERIOP | Facility: HOSPITAL | Age: 53
End: 2018-02-07

## 2018-02-07 ENCOUNTER — HOSPITAL ENCOUNTER (OUTPATIENT)
Facility: HOSPITAL | Age: 53
Setting detail: HOSPITAL OUTPATIENT SURGERY
Discharge: HOME | End: 2018-02-07
Attending: INTERNAL MEDICINE | Admitting: INTERNAL MEDICINE

## 2018-02-07 ENCOUNTER — ANESTHESIA EVENT (OUTPATIENT)
Dept: PERIOP | Facility: HOSPITAL | Age: 53
End: 2018-02-07

## 2018-02-07 VITALS
TEMPERATURE: 97 F | RESPIRATION RATE: 20 BRPM | SYSTOLIC BLOOD PRESSURE: 108 MMHG | HEART RATE: 105 BPM | OXYGEN SATURATION: 92 % | DIASTOLIC BLOOD PRESSURE: 82 MMHG

## 2018-02-07 DIAGNOSIS — R10.13 DYSPEPSIA: ICD-10-CM

## 2018-02-07 DIAGNOSIS — K62.5 BRIGHT RED BLOOD PER RECTUM: ICD-10-CM

## 2018-02-07 DIAGNOSIS — R10.11 RUQ ABDOMINAL PAIN: ICD-10-CM

## 2018-02-07 DIAGNOSIS — R13.10 DYSPHAGIA, UNSPECIFIED TYPE: ICD-10-CM

## 2018-02-07 DIAGNOSIS — R19.4 CHANGE IN BOWEL HABITS: ICD-10-CM

## 2018-02-07 DIAGNOSIS — C18.7 MALIGNANT NEOPLASM OF SIGMOID COLON (HCC): Primary | ICD-10-CM

## 2018-02-07 DIAGNOSIS — K21.9 GASTROESOPHAGEAL REFLUX DISEASE, ESOPHAGITIS PRESENCE NOT SPECIFIED: ICD-10-CM

## 2018-02-07 PROCEDURE — 45380 COLONOSCOPY AND BIOPSY: CPT | Performed by: INTERNAL MEDICINE

## 2018-02-07 PROCEDURE — 43248 EGD GUIDE WIRE INSERTION: CPT | Performed by: INTERNAL MEDICINE

## 2018-02-07 PROCEDURE — 43239 EGD BIOPSY SINGLE/MULTIPLE: CPT | Performed by: INTERNAL MEDICINE

## 2018-02-07 PROCEDURE — 25010000002 MIDAZOLAM PER 1 MG: Performed by: NURSE ANESTHETIST, CERTIFIED REGISTERED

## 2018-02-07 PROCEDURE — 25010000002 FENTANYL CITRATE (PF) 100 MCG/2ML SOLUTION: Performed by: NURSE ANESTHETIST, CERTIFIED REGISTERED

## 2018-02-07 PROCEDURE — 25010000002 PROPOFOL 10 MG/ML EMULSION: Performed by: NURSE ANESTHETIST, CERTIFIED REGISTERED

## 2018-02-07 RX ORDER — ONDANSETRON 2 MG/ML
4 INJECTION INTRAMUSCULAR; INTRAVENOUS ONCE AS NEEDED
Status: DISCONTINUED | OUTPATIENT
Start: 2018-02-07 | End: 2018-02-07 | Stop reason: HOSPADM

## 2018-02-07 RX ORDER — SODIUM CHLORIDE, SODIUM LACTATE, POTASSIUM CHLORIDE, CALCIUM CHLORIDE 600; 310; 30; 20 MG/100ML; MG/100ML; MG/100ML; MG/100ML
125 INJECTION, SOLUTION INTRAVENOUS CONTINUOUS
Status: DISCONTINUED | OUTPATIENT
Start: 2018-02-07 | End: 2018-02-07 | Stop reason: HOSPADM

## 2018-02-07 RX ORDER — POLYETHYLENE GLYCOL-3350 AND ELECTROLYTES 236; 6.74; 5.86; 2.97; 22.74 G/274.31G; G/274.31G; G/274.31G; G/274.31G; G/274.31G
POWDER, FOR SOLUTION ORAL
Qty: 4000 ML | Refills: 0 | OUTPATIENT
Start: 2018-02-07

## 2018-02-07 RX ORDER — SODIUM CHLORIDE 0.9 % (FLUSH) 0.9 %
1-10 SYRINGE (ML) INJECTION AS NEEDED
Status: DISCONTINUED | OUTPATIENT
Start: 2018-02-07 | End: 2018-02-07 | Stop reason: HOSPADM

## 2018-02-07 RX ORDER — MEPERIDINE HYDROCHLORIDE 25 MG/ML
12.5 INJECTION INTRAMUSCULAR; INTRAVENOUS; SUBCUTANEOUS
Status: DISCONTINUED | OUTPATIENT
Start: 2018-02-07 | End: 2018-02-07 | Stop reason: HOSPADM

## 2018-02-07 RX ORDER — MIDAZOLAM HYDROCHLORIDE 1 MG/ML
INJECTION INTRAMUSCULAR; INTRAVENOUS AS NEEDED
Status: DISCONTINUED | OUTPATIENT
Start: 2018-02-07 | End: 2018-02-07 | Stop reason: SURG

## 2018-02-07 RX ORDER — FENTANYL CITRATE 50 UG/ML
INJECTION, SOLUTION INTRAMUSCULAR; INTRAVENOUS AS NEEDED
Status: DISCONTINUED | OUTPATIENT
Start: 2018-02-07 | End: 2018-02-07 | Stop reason: SURG

## 2018-02-07 RX ORDER — IPRATROPIUM BROMIDE AND ALBUTEROL SULFATE 2.5; .5 MG/3ML; MG/3ML
3 SOLUTION RESPIRATORY (INHALATION) ONCE AS NEEDED
Status: DISCONTINUED | OUTPATIENT
Start: 2018-02-07 | End: 2018-02-07 | Stop reason: HOSPADM

## 2018-02-07 RX ORDER — OXYCODONE HYDROCHLORIDE AND ACETAMINOPHEN 5; 325 MG/1; MG/1
1 TABLET ORAL ONCE AS NEEDED
Status: DISCONTINUED | OUTPATIENT
Start: 2018-02-07 | End: 2018-02-07 | Stop reason: HOSPADM

## 2018-02-07 RX ORDER — PROPOFOL 10 MG/ML
VIAL (ML) INTRAVENOUS AS NEEDED
Status: DISCONTINUED | OUTPATIENT
Start: 2018-02-07 | End: 2018-02-07 | Stop reason: SURG

## 2018-02-07 RX ORDER — FENTANYL CITRATE 50 UG/ML
50 INJECTION, SOLUTION INTRAMUSCULAR; INTRAVENOUS
Status: DISCONTINUED | OUTPATIENT
Start: 2018-02-07 | End: 2018-02-07 | Stop reason: HOSPADM

## 2018-02-07 RX ORDER — LIDOCAINE HYDROCHLORIDE 20 MG/ML
INJECTION, SOLUTION EPIDURAL; INFILTRATION; INTRACAUDAL; PERINEURAL AS NEEDED
Status: DISCONTINUED | OUTPATIENT
Start: 2018-02-07 | End: 2018-02-07 | Stop reason: SURG

## 2018-02-07 RX ADMIN — PROPOFOL 30 MG: 10 INJECTION, EMULSION INTRAVENOUS at 10:59

## 2018-02-07 RX ADMIN — LIDOCAINE HYDROCHLORIDE 100 MG: 20 INJECTION, SOLUTION EPIDURAL; INFILTRATION; INTRACAUDAL; PERINEURAL at 10:54

## 2018-02-07 RX ADMIN — PROPOFOL 30 MG: 10 INJECTION, EMULSION INTRAVENOUS at 11:04

## 2018-02-07 RX ADMIN — PROPOFOL 30 MG: 10 INJECTION, EMULSION INTRAVENOUS at 11:09

## 2018-02-07 RX ADMIN — PROPOFOL 50 MG: 10 INJECTION, EMULSION INTRAVENOUS at 10:54

## 2018-02-07 RX ADMIN — PROPOFOL 30 MG: 10 INJECTION, EMULSION INTRAVENOUS at 11:19

## 2018-02-07 RX ADMIN — MIDAZOLAM HYDROCHLORIDE 2 MG: 1 INJECTION, SOLUTION INTRAMUSCULAR; INTRAVENOUS at 10:50

## 2018-02-07 RX ADMIN — FENTANYL CITRATE 50 MCG: 50 INJECTION INTRAMUSCULAR; INTRAVENOUS at 10:52

## 2018-02-07 RX ADMIN — FENTANYL CITRATE 50 MCG: 50 INJECTION INTRAMUSCULAR; INTRAVENOUS at 10:54

## 2018-02-07 RX ADMIN — PROPOFOL 30 MG: 10 INJECTION, EMULSION INTRAVENOUS at 11:14

## 2018-02-07 RX ADMIN — SODIUM CHLORIDE, POTASSIUM CHLORIDE, SODIUM LACTATE AND CALCIUM CHLORIDE 125 ML/HR: 600; 310; 30; 20 INJECTION, SOLUTION INTRAVENOUS at 10:23

## 2018-02-07 NOTE — ANESTHESIA POSTPROCEDURE EVALUATION
Patient: Raimundo Munguia    Procedure Summary     Date Anesthesia Start Anesthesia Stop Room / Location    02/07/18 1050 1129 [facility] 10 / [facility]       Procedure Diagnosis Surgeon Provider    ESOPHAGOGASTRODUODENOSCOPY WITH DILATATION CPT CODE: 35706 (N/A Esophagus); COLONOSCOPY CPT CODE: 84342 (N/A ) Dyspepsia; Bright red blood per rectum; RUQ abdominal pain; Change in bowel habits; Gastroesophageal reflux disease, esophagitis presence not specified; Dysphagia, unspecified type  (Dyspepsia [R10.13]; Bright red blood per rectum [K62.5]; RUQ abdominal pain [R10.11]; Change in bowel habits [R19.4]; Gastroesophageal reflux disease, esophagitis presence not specified [K21.9]; Dysphagia, unspecified type [R13.10]) Teodoro Garg III, MD Villanueva Depa, DO          Anesthesia Type: general  Last vitals  BP   114/87 (02/07/18 1145)   Temp   97 °F (36.1 °C) (02/07/18 1130)   Pulse   102 (02/07/18 1145)   Resp   20 (02/07/18 1145)     SpO2   90 % (02/07/18 1145)     Post Anesthesia Care and Evaluation    Patient location during evaluation: bedside  Patient participation: complete - patient participated  Level of consciousness: awake and alert  Pain score: 1  Pain management: adequate  Airway patency: patent  Anesthetic complications: No anesthetic complications  PONV Status: none  Cardiovascular status: acceptable  Respiratory status: acceptable  Hydration status: acceptable

## 2018-02-07 NOTE — OP NOTE
02/07/18    ESOPHAGOGASTRODUODENOSCOPY WITH BIOPSIES AND ESOPHAGEAL DILATATION, COLONOSCOPY with biopsies Procedure Note    Raimundo Munguia  2/7/2018    Dr. Garg    Procedure(s):  ESOPHAGOGASTRODUODENOSCOPY WITH BIOPSIES AND ESOPHAGEAL DILATATION CPT CODE: 05477  COLONOSCOPY WITH BIOPSIES CPT CODE: 31100    Pre-op Diagnosis: Dyspepsia, dysphagia, abdominal pain, diarrhea, rectal bleeding, colon cancer screening, no prior colonoscopy      Post-op Diagnosis:   -Gastritis  -Rectosigmoid neoplasm        Anesthesia: Per Anesthesia service, general anesthesia      Estimated Blood Loss: Negligible      Findings: EGD performed with careful inspection of the esophagus, stomach, duodenum to the fourth portion.  Mild non-erosive gastritis was present.  The examination was otherwise normal.  Biopsies were taken randomly from the esophagus, gastric antrum and duodenum.  Esophagus was dilated using a 48 [place] Savary dilator.  Re-inspection of the esophagus, using the endoscope, revealed no significant injury to the esophagus.    Normal rectal examination.  Colonoscopy was performed to the cecum, confirmed by identification of typical cecal anatomy.  Bowel preparation was adequate.  All areas were examined very carefully.  A large friable villous polypoid neoplasm was found at the rectosigmoid junction between the 10-20 CM level--biopsies were taken from this lesion.  No other abnormality was identified.    He tolerated the procedures well and there were no complications.  He left the OR in stable and satisfactory condition.      Complications: None      Recommendations:   Findings discussed with the patient  Surgical consultation will be arranged    Grafts/Implants: N/A    Specimens: Esophagus, gastric antrum, duodenum, rectosigmoid     Teodoro Garg III, MD     Date: 2/7/2018  Time: 11:25 AM     David GARY    
Malnutrition alert placed in EMR, PA informed.

## 2018-02-07 NOTE — H&P
History of presenting illness: 52-year-old white male presents for EGD and colonoscopy in order to investigate dyspepsia, dysphagia, abdominal pain, diarrhea, rectal bleeding.  He has not previously undergone colonoscopy.    Review of Systems:   Negative and noncontributory    Past History:  Past Medical History:   Diagnosis Date   • Abnormal blood chemistry 03/02/2016    Noted Triglycerides, HDL and LDL   • Allergic    • Cervicalgia    • DM type 2 (diabetes mellitus, type 2)    • GERD (gastroesophageal reflux disease)    • Helicobacter pylori (H. pylori) infection    • Hyperlipidemia    • Hypertension    • Insomnia    • Muscle spasm    • Obesity    • Osteopenia    • Palpitations    • RA (rheumatoid arthritis)    • Stroke    • Tension headache    • Vitamin D deficiency      History reviewed. No pertinent surgical history.  Family History   Problem Relation Age of Onset   • Diabetes Mother    • Hypertension Mother    • Hypertension Father    • Hyperlipidemia Father    • Diabetes Sister    • Hyperlipidemia Sister    • Hypertension Sister    • Hypertension Brother    • Hyperlipidemia Brother      Social History     Social History   • Marital status: Legally      Spouse name: tess   • Number of children: 3   • Years of education: 9     Occupational History   • disabled      Social History Main Topics   • Smoking status: Never Smoker   • Smokeless tobacco: Current User     Types: Chew   • Alcohol use No   • Drug use: No   • Sexual activity: Defer     Other Topics Concern   • None     Social History Narrative     Prior to Admission medications    Medication Sig Start Date End Date Taking? Authorizing Provider   atorvastatin (LIPITOR) 10 MG tablet take 1 tablet by mouth at bedtime 9/5/17  Yes ABDIRAHMAN Coughlin   Blood Glucose Monitoring Suppl W/DEVICE kit 1 Device Daily. 7/5/17  Yes ABDIRAHMAN Coughlin   Cyanocobalamin ER (RA VITAMIN B-12 TR) 1000 MCG tablet controlled-release Take 1,000 mcg by  mouth Daily. 9/5/17  Yes ABDIRAHMAN Coughlin   cyclobenzaprine (FLEXERIL) 10 MG tablet Take 1 tablet by mouth 3 (Three) Times a Day As Needed for Muscle Spasms. 9/29/17  Yes BRENDA Scherer   folic acid (FOLVITE) 1 MG tablet Take 1 tablet by mouth Daily. 9/29/17  Yes BRENDA Scherer   glucose blood test strip 1 each by Other route Daily. Use as instructed 7/5/17  Yes ABDIRAHMAN Coughlin   [place] [facility] [place] Historical Provider, MD   lansoprazole (PREVACID) 30 MG capsule Take 1 capsule by mouth Daily. 12/11/17  Yes ABDIRAHMAN Coughlin   loratadine (CLARITIN) 10 MG tablet Take 1 tablet by mouth Daily. 9/29/17  Yes BRENDA Scherer   metFORMIN ER (GLUCOPHAGE-XR) 500 MG 24 hr tablet take 1 tablet by mouth once daily WITH SUPPER 2/5/18  Yes ABDIRAHMAN Coughlin   methotrexate 2.5 MG tablet take 10 tablets by mouth every week 2/5/18  Yes ABDIRAHMAN Coughlin   metoprolol succinate XL (TOPROL-XL) 50 MG 24 hr tablet Take 1 tablet by mouth Daily. 9/29/17  Yes BRENDA Scherer   polyethylene glycol (GoLYTELY) 236 g solution Starting at 6pm the day before procedure, drink 8 ounces every 30 minutes until all gone or stools are clear. May add flavor packet. 1/2/18  Yes Susanna Bolivar PA-C   RA ALLERGY MEDICATION 25 MG capsule take 1 capsule by mouth at bedtime if needed for itching 2/5/18  Yes ABDIRAHMAN Coughlin   raNITIdine (ZANTAC) 300 MG tablet take 1 tablet by mouth twice a day 2/5/18  Yes ABDIRAHMAN Coughlin       Current medication:  I have reviewed the list of current medications.    Allergies:   Review of patient's allergies indicates no known allergies.    Vital Signs  Temp:  [97.8 °F (36.6 °C)] 97.8 °F (36.6 °C)  Heart Rate:  [112] 112  Resp:  [20] 20  BP: (129)/(84) 129/84    Physical exam:  Alert, oriented ×3  HEENT: Normal  Neck: No mass  Chest: Clear  Heart: Regular rhythm  Abdomen: Soft, nontender, active BS  Extremities: No edema  Neuro: No focal  deficit    Assessment/Plan:   Dyspepsia  Dysphagia  Abdominal pain  Diarrhea  Rectal bleeding  Colon cancer screening    REC  EGD and screening colonoscopy are planned.  Procedures, benefits, risks and alternatives were discussed with the patient.      Teodoro Garg III, MD  02/07/18  10:46 AM

## 2018-02-07 NOTE — ANESTHESIA PREPROCEDURE EVALUATION
Anesthesia Evaluation     Patient summary reviewed and Nursing notes reviewed   no history of anesthetic complications:  NPO Solid Status: > 8 hours  NPO Liquid Status: > 8 hours           Airway   Mallampati: II  TM distance: >3 FB  Neck ROM: full  no difficulty expected  Dental - normal exam   (+) poor dentition    Pulmonary - negative pulmonary ROS and normal exam    breath sounds clear to auscultation  Cardiovascular - normal exam  Exercise tolerance: poor (<4 METS)    Patient on routine beta blocker and Beta blocker given within 24 hours of surgery  Rhythm: regular  Rate: normal    (+) hypertension, hyperlipidemia      Neuro/Psych  (+) TIA, headaches, psychiatric history (insomia),     (-) CVA  GI/Hepatic/Renal/Endo    (+) obesity, morbid obesity, GERD poorly controlled, renal disease (bph), diabetes mellitus type 2 well controlled,     Musculoskeletal     (+) neck pain,   Abdominal  - normal exam    Bowel sounds: normal.   Substance History - negative use     OB/GYN negative ob/gyn ROS         Other   (+) arthritis                     Anesthesia Plan    ASA 3     general   total IV anesthesia(Pt stopped chewing at 0700)  intravenous induction   Anesthetic plan and risks discussed with patient.    Plan discussed with CRNA.

## 2018-02-09 LAB
LAB AP CASE REPORT: NORMAL
Lab: NORMAL
PATH REPORT.FINAL DX SPEC: NORMAL

## 2018-02-12 ENCOUNTER — OFFICE VISIT (OUTPATIENT)
Dept: SURGERY | Facility: CLINIC | Age: 53
End: 2018-02-12

## 2018-02-12 VITALS — HEIGHT: 68 IN | BODY MASS INDEX: 31.67 KG/M2 | WEIGHT: 209 LBS

## 2018-02-12 DIAGNOSIS — K62.3 RECTAL PROLAPSE: ICD-10-CM

## 2018-02-12 DIAGNOSIS — K62.1 RECTAL POLYP: Primary | ICD-10-CM

## 2018-02-12 PROBLEM — E66.9 OBESITY (BMI 30-39.9): Status: ACTIVE | Noted: 2018-02-12

## 2018-02-12 PROCEDURE — 99204 OFFICE O/P NEW MOD 45 MIN: CPT | Performed by: SURGERY

## 2018-02-12 RX ORDER — SODIUM, POTASSIUM,MAG SULFATES 17.5-3.13G
SOLUTION, RECONSTITUTED, ORAL ORAL
Qty: 2 BOTTLE | Refills: 0 | Status: ON HOLD | OUTPATIENT
Start: 2018-02-12 | End: 2018-02-22

## 2018-02-12 NOTE — PROGRESS NOTES
Subjective   Raimundo Munguia is a 52 y.o. male.     History of Present Illness He had a colonoscopy recently with a large polyp in the rectosigmoid areas that appears to be more abnormal looking due to prolapsing. It could not be removed with the colonoscope. He has frequent loose stools and does strain a lot. His father had pancreatic cancer in his 50s.     The following portions of the patient's history were reviewed and updated as appropriate: current medications, past family history, past medical history, past social history, past surgical history and problem list.    Review of Systems   Constitutional: Negative for activity change, appetite change, chills, fever and unexpected weight change.   HENT: Negative for congestion, facial swelling and sore throat.    Eyes: Negative for photophobia and visual disturbance.   Respiratory: Negative for chest tightness, shortness of breath and wheezing.    Cardiovascular: Negative for chest pain, palpitations and leg swelling.   Gastrointestinal: Positive for blood in stool and diarrhea. Negative for abdominal distention, abdominal pain, anal bleeding, constipation, nausea, rectal pain and vomiting.   Endocrine: Negative for cold intolerance, heat intolerance, polydipsia and polyuria.   Genitourinary: Negative for difficulty urinating, dysuria, flank pain and urgency.   Musculoskeletal: Negative for back pain and myalgias.   Skin: Negative for rash and wound.   Allergic/Immunologic: Negative for immunocompromised state.   Neurological: Negative for dizziness, seizures, syncope, light-headedness, numbness and headaches.   Hematological: Negative for adenopathy. Does not bruise/bleed easily.   Psychiatric/Behavioral: Negative for behavioral problems and confusion. The patient is not nervous/anxious.        Objective   Physical Exam   Constitutional: He is oriented to person, place, and time. He appears well-developed and well-nourished. He does not appear ill. No distress.    HENT:   Head: Normocephalic. Head is without laceration. Hair is normal.   Right Ear: Hearing and ear canal normal.   Left Ear: Hearing and ear canal normal.   Nose: Nose normal. No sinus tenderness. No epistaxis. Right sinus exhibits no maxillary sinus tenderness and no frontal sinus tenderness. Left sinus exhibits no maxillary sinus tenderness and no frontal sinus tenderness.   Eyes: Conjunctivae and lids are normal. Pupils are equal, round, and reactive to light.   Neck: Normal range of motion. No JVD present. No tracheal tenderness present. No tracheal deviation present. No thyroid mass and no thyromegaly present.   Cardiovascular: Normal rate and regular rhythm.  Exam reveals no gallop.    No murmur heard.  Pulmonary/Chest: Effort normal and breath sounds normal. No stridor. He has no wheezes. He exhibits no tenderness.   Abdominal: Soft. Bowel sounds are normal. He exhibits no distension, no ascites and no mass. There is no tenderness. There is no rebound and no guarding. No hernia.   Musculoskeletal: He exhibits no edema or deformity.   Lymphadenopathy:     He has no cervical adenopathy.     He has no axillary adenopathy.        Right: No inguinal and no supraclavicular adenopathy present.        Left: No inguinal and no supraclavicular adenopathy present.   Neurological: He is alert and oriented to person, place, and time. He exhibits normal muscle tone.   Skin: Skin is warm, dry and intact. No rash noted. No erythema. No pallor.   Psychiatric: He has a normal mood and affect. His behavior is normal. Thought content normal.   Vitals reviewed.      Assessment/Plan   Raimundo was seen today for advice only.    Diagnoses and all orders for this visit:    Rectal polyp    Rectal prolapse    Sigmoid resection.      Patient's BMI is above normal parameters. Follow-up plan includes:  educational material.

## 2018-02-22 ENCOUNTER — HOSPITAL ENCOUNTER (INPATIENT)
Facility: HOSPITAL | Age: 53
LOS: 3 days | Discharge: HOME | End: 2018-02-25
Attending: SURGERY | Admitting: SURGERY

## 2018-02-22 ENCOUNTER — ANESTHESIA EVENT (OUTPATIENT)
Dept: PERIOP | Facility: HOSPITAL | Age: 53
End: 2018-02-22

## 2018-02-22 ENCOUNTER — ANESTHESIA (OUTPATIENT)
Dept: PERIOP | Facility: HOSPITAL | Age: 53
End: 2018-02-22

## 2018-02-22 DIAGNOSIS — K62.1 RECTAL POLYP: ICD-10-CM

## 2018-02-22 DIAGNOSIS — K62.3 RECTAL PROLAPSE: ICD-10-CM

## 2018-02-22 LAB
GLUCOSE BLDC GLUCOMTR-MCNC: 110 MG/DL (ref 70–130)
GLUCOSE BLDC GLUCOMTR-MCNC: 119 MG/DL (ref 70–130)

## 2018-02-22 PROCEDURE — 25010000002 PROPOFOL 10 MG/ML EMULSION: Performed by: NURSE ANESTHETIST, CERTIFIED REGISTERED

## 2018-02-22 PROCEDURE — 25010000002 FENTANYL CITRATE (PF) 100 MCG/2ML SOLUTION: Performed by: NURSE ANESTHETIST, CERTIFIED REGISTERED

## 2018-02-22 PROCEDURE — 0DTN0ZZ RESECTION OF SIGMOID COLON, OPEN APPROACH: ICD-10-PCS | Performed by: SURGERY

## 2018-02-22 PROCEDURE — 82962 GLUCOSE BLOOD TEST: CPT

## 2018-02-22 PROCEDURE — 25010000002 CEFOXITIN: Performed by: SURGERY

## 2018-02-22 PROCEDURE — 94799 UNLISTED PULMONARY SVC/PX: CPT

## 2018-02-22 PROCEDURE — 25010000002 SUCCINYLCHOLINE PER 20 MG: Performed by: NURSE ANESTHETIST, CERTIFIED REGISTERED

## 2018-02-22 PROCEDURE — 25010000002 ONDANSETRON PER 1 MG: Performed by: NURSE ANESTHETIST, CERTIFIED REGISTERED

## 2018-02-22 PROCEDURE — 44145 PARTIAL REMOVAL OF COLON: CPT | Performed by: SURGERY

## 2018-02-22 PROCEDURE — 88309 TISSUE EXAM BY PATHOLOGIST: CPT | Performed by: SURGERY

## 2018-02-22 PROCEDURE — 25010000002 ENOXAPARIN PER 10 MG: Performed by: SURGERY

## 2018-02-22 RX ORDER — RANITIDINE 300 MG/1
300 TABLET ORAL 2 TIMES DAILY
COMMUNITY
End: 2018-03-14

## 2018-02-22 RX ORDER — ONDANSETRON 2 MG/ML
4 INJECTION INTRAMUSCULAR; INTRAVENOUS EVERY 6 HOURS PRN
Status: DISCONTINUED | OUTPATIENT
Start: 2018-02-22 | End: 2018-02-25 | Stop reason: HOSPADM

## 2018-02-22 RX ORDER — ONDANSETRON 2 MG/ML
4 INJECTION INTRAMUSCULAR; INTRAVENOUS ONCE AS NEEDED
Status: DISCONTINUED | OUTPATIENT
Start: 2018-02-22 | End: 2018-02-22 | Stop reason: HOSPADM

## 2018-02-22 RX ORDER — PROPOFOL 10 MG/ML
VIAL (ML) INTRAVENOUS AS NEEDED
Status: DISCONTINUED | OUTPATIENT
Start: 2018-02-22 | End: 2018-02-22 | Stop reason: SURG

## 2018-02-22 RX ORDER — LANOLIN ALCOHOL/MO/W.PET/CERES
1000 CREAM (GRAM) TOPICAL DAILY
Status: CANCELLED | OUTPATIENT
Start: 2018-02-22

## 2018-02-22 RX ORDER — SODIUM CHLORIDE 0.9 % (FLUSH) 0.9 %
1-10 SYRINGE (ML) INJECTION AS NEEDED
Status: DISCONTINUED | OUTPATIENT
Start: 2018-02-22 | End: 2018-02-22 | Stop reason: HOSPADM

## 2018-02-22 RX ORDER — OXYCODONE HYDROCHLORIDE AND ACETAMINOPHEN 5; 325 MG/1; MG/1
1 TABLET ORAL ONCE AS NEEDED
Status: DISCONTINUED | OUTPATIENT
Start: 2018-02-22 | End: 2018-02-22 | Stop reason: HOSPADM

## 2018-02-22 RX ORDER — ALVIMOPAN 12 MG/1
12 CAPSULE ORAL ONCE
Status: COMPLETED | OUTPATIENT
Start: 2018-02-22 | End: 2018-02-22

## 2018-02-22 RX ORDER — SODIUM CHLORIDE, SODIUM LACTATE, POTASSIUM CHLORIDE, CALCIUM CHLORIDE 600; 310; 30; 20 MG/100ML; MG/100ML; MG/100ML; MG/100ML
125 INJECTION, SOLUTION INTRAVENOUS CONTINUOUS
Status: DISCONTINUED | OUTPATIENT
Start: 2018-02-22 | End: 2018-02-25 | Stop reason: HOSPADM

## 2018-02-22 RX ORDER — FENTANYL CITRATE 50 UG/ML
50 INJECTION, SOLUTION INTRAMUSCULAR; INTRAVENOUS
Status: DISCONTINUED | OUTPATIENT
Start: 2018-02-22 | End: 2018-02-22 | Stop reason: HOSPADM

## 2018-02-22 RX ORDER — DIPHENHYDRAMINE HCL 25 MG
25 CAPSULE ORAL NIGHTLY PRN
Status: DISCONTINUED | OUTPATIENT
Start: 2018-02-22 | End: 2018-02-25 | Stop reason: HOSPADM

## 2018-02-22 RX ORDER — MAGNESIUM HYDROXIDE 1200 MG/15ML
LIQUID ORAL AS NEEDED
Status: DISCONTINUED | OUTPATIENT
Start: 2018-02-22 | End: 2018-02-22 | Stop reason: HOSPADM

## 2018-02-22 RX ORDER — METOPROLOL SUCCINATE 50 MG/1
50 TABLET, EXTENDED RELEASE ORAL DAILY
Status: DISCONTINUED | OUTPATIENT
Start: 2018-02-23 | End: 2018-02-25 | Stop reason: HOSPADM

## 2018-02-22 RX ORDER — NALOXONE HCL 0.4 MG/ML
0.4 VIAL (ML) INJECTION
Status: DISCONTINUED | OUTPATIENT
Start: 2018-02-22 | End: 2018-02-25 | Stop reason: HOSPADM

## 2018-02-22 RX ORDER — LIDOCAINE HYDROCHLORIDE 20 MG/ML
INJECTION, SOLUTION INFILTRATION; PERINEURAL AS NEEDED
Status: DISCONTINUED | OUTPATIENT
Start: 2018-02-22 | End: 2018-02-22 | Stop reason: SURG

## 2018-02-22 RX ORDER — PANTOPRAZOLE SODIUM 40 MG/1
40 TABLET, DELAYED RELEASE ORAL EVERY MORNING
Status: DISCONTINUED | OUTPATIENT
Start: 2018-02-23 | End: 2018-02-25 | Stop reason: HOSPADM

## 2018-02-22 RX ORDER — FAMOTIDINE 20 MG/1
40 TABLET, FILM COATED ORAL 2 TIMES DAILY
Status: DISCONTINUED | OUTPATIENT
Start: 2018-02-22 | End: 2018-02-25 | Stop reason: HOSPADM

## 2018-02-22 RX ORDER — ATORVASTATIN CALCIUM 10 MG/1
10 TABLET, FILM COATED ORAL NIGHTLY
COMMUNITY
End: 2018-03-14 | Stop reason: SDUPTHER

## 2018-02-22 RX ORDER — SUCCINYLCHOLINE CHLORIDE 20 MG/ML
INJECTION INTRAMUSCULAR; INTRAVENOUS AS NEEDED
Status: DISCONTINUED | OUTPATIENT
Start: 2018-02-22 | End: 2018-02-22 | Stop reason: SURG

## 2018-02-22 RX ORDER — ALVIMOPAN 12 MG/1
12 CAPSULE ORAL ONCE
Status: DISCONTINUED | OUTPATIENT
Start: 2018-02-22 | End: 2018-02-22 | Stop reason: HOSPADM

## 2018-02-22 RX ORDER — METOPROLOL TARTRATE 5 MG/5ML
INJECTION INTRAVENOUS AS NEEDED
Status: DISCONTINUED | OUTPATIENT
Start: 2018-02-22 | End: 2018-02-22 | Stop reason: SURG

## 2018-02-22 RX ORDER — SODIUM CHLORIDE 0.9 % (FLUSH) 0.9 %
1-10 SYRINGE (ML) INJECTION AS NEEDED
Status: DISCONTINUED | OUTPATIENT
Start: 2018-02-22 | End: 2018-02-25 | Stop reason: HOSPADM

## 2018-02-22 RX ORDER — HYDROMORPHONE HYDROCHLORIDE 1 MG/ML
0.5 INJECTION, SOLUTION INTRAMUSCULAR; INTRAVENOUS; SUBCUTANEOUS
Status: DISCONTINUED | OUTPATIENT
Start: 2018-02-22 | End: 2018-02-25 | Stop reason: HOSPADM

## 2018-02-22 RX ORDER — MEPERIDINE HYDROCHLORIDE 25 MG/ML
12.5 INJECTION INTRAMUSCULAR; INTRAVENOUS; SUBCUTANEOUS
Status: DISCONTINUED | OUTPATIENT
Start: 2018-02-22 | End: 2018-02-22 | Stop reason: HOSPADM

## 2018-02-22 RX ORDER — FAMOTIDINE 10 MG/ML
INJECTION, SOLUTION INTRAVENOUS AS NEEDED
Status: DISCONTINUED | OUTPATIENT
Start: 2018-02-22 | End: 2018-02-22 | Stop reason: SURG

## 2018-02-22 RX ORDER — IPRATROPIUM BROMIDE AND ALBUTEROL SULFATE 2.5; .5 MG/3ML; MG/3ML
3 SOLUTION RESPIRATORY (INHALATION) ONCE AS NEEDED
Status: DISCONTINUED | OUTPATIENT
Start: 2018-02-22 | End: 2018-02-22 | Stop reason: HOSPADM

## 2018-02-22 RX ORDER — CYCLOBENZAPRINE HCL 10 MG
10 TABLET ORAL 3 TIMES DAILY PRN
Status: DISCONTINUED | OUTPATIENT
Start: 2018-02-22 | End: 2018-02-25 | Stop reason: HOSPADM

## 2018-02-22 RX ORDER — ONDANSETRON 2 MG/ML
INJECTION INTRAMUSCULAR; INTRAVENOUS AS NEEDED
Status: DISCONTINUED | OUTPATIENT
Start: 2018-02-22 | End: 2018-02-22 | Stop reason: SURG

## 2018-02-22 RX ORDER — CETIRIZINE HYDROCHLORIDE 10 MG/1
10 TABLET ORAL DAILY
Status: DISCONTINUED | OUTPATIENT
Start: 2018-02-22 | End: 2018-02-25 | Stop reason: HOSPADM

## 2018-02-22 RX ORDER — FENTANYL CITRATE 50 UG/ML
INJECTION, SOLUTION INTRAMUSCULAR; INTRAVENOUS AS NEEDED
Status: DISCONTINUED | OUTPATIENT
Start: 2018-02-22 | End: 2018-02-22 | Stop reason: SURG

## 2018-02-22 RX ORDER — HYDROCODONE BITARTRATE AND ACETAMINOPHEN 7.5; 325 MG/1; MG/1
1 TABLET ORAL EVERY 4 HOURS PRN
Status: DISCONTINUED | OUTPATIENT
Start: 2018-02-22 | End: 2018-02-25 | Stop reason: HOSPADM

## 2018-02-22 RX ORDER — ATORVASTATIN CALCIUM 10 MG/1
10 TABLET, FILM COATED ORAL NIGHTLY
Status: DISCONTINUED | OUTPATIENT
Start: 2018-02-22 | End: 2018-02-25 | Stop reason: HOSPADM

## 2018-02-22 RX ORDER — HYDROCODONE BITARTRATE AND ACETAMINOPHEN 7.5; 325 MG/1; MG/1
2 TABLET ORAL EVERY 4 HOURS PRN
Status: DISCONTINUED | OUTPATIENT
Start: 2018-02-22 | End: 2018-02-25 | Stop reason: HOSPADM

## 2018-02-22 RX ORDER — ROCURONIUM BROMIDE 10 MG/ML
INJECTION, SOLUTION INTRAVENOUS AS NEEDED
Status: DISCONTINUED | OUTPATIENT
Start: 2018-02-22 | End: 2018-02-22 | Stop reason: SURG

## 2018-02-22 RX ORDER — DIPHENHYDRAMINE HCL 25 MG
25 CAPSULE ORAL NIGHTLY PRN
COMMUNITY
End: 2018-03-16 | Stop reason: SDUPTHER

## 2018-02-22 RX ORDER — FOLIC ACID 1 MG/1
1 TABLET ORAL DAILY
Status: DISCONTINUED | OUTPATIENT
Start: 2018-02-22 | End: 2018-02-25 | Stop reason: HOSPADM

## 2018-02-22 RX ADMIN — CEFOXITIN 2 G: 2 INJECTION, POWDER, FOR SOLUTION INTRAVENOUS at 11:33

## 2018-02-22 RX ADMIN — HYDROCODONE BITARTRATE AND ACETAMINOPHEN 2 TABLET: 7.5; 325 TABLET ORAL at 21:02

## 2018-02-22 RX ADMIN — HYDROCODONE BITARTRATE AND ACETAMINOPHEN 2 TABLET: 7.5; 325 TABLET ORAL at 15:50

## 2018-02-22 RX ADMIN — SODIUM CHLORIDE, POTASSIUM CHLORIDE, SODIUM LACTATE AND CALCIUM CHLORIDE: 600; 310; 30; 20 INJECTION, SOLUTION INTRAVENOUS at 12:27

## 2018-02-22 RX ADMIN — ENOXAPARIN SODIUM 40 MG: 40 INJECTION SUBCUTANEOUS at 17:00

## 2018-02-22 RX ADMIN — ONDANSETRON 4 MG: 2 INJECTION, SOLUTION INTRAMUSCULAR; INTRAVENOUS at 12:53

## 2018-02-22 RX ADMIN — FENTANYL CITRATE 100 MCG: 50 INJECTION INTRAMUSCULAR; INTRAVENOUS at 11:37

## 2018-02-22 RX ADMIN — FENTANYL CITRATE 50 MCG: 50 INJECTION INTRAMUSCULAR; INTRAVENOUS at 13:10

## 2018-02-22 RX ADMIN — ROCURONIUM BROMIDE 5 MG: 10 INJECTION INTRAVENOUS at 11:37

## 2018-02-22 RX ADMIN — FAMOTIDINE 20 MG: 10 INJECTION, SOLUTION INTRAVENOUS at 11:33

## 2018-02-22 RX ADMIN — METOPROLOL TARTRATE 2 MG: 1 INJECTION, SOLUTION INTRAVENOUS at 12:20

## 2018-02-22 RX ADMIN — BUPIVACAINE HYDROCHLORIDE 5 ML/HR: 5 INJECTION, SOLUTION EPIDURAL; INTRACAUDAL; PERINEURAL at 13:52

## 2018-02-22 RX ADMIN — SUCCINYLCHOLINE CHLORIDE 120 MG: 20 INJECTION, SOLUTION INTRAMUSCULAR; INTRAVENOUS at 11:37

## 2018-02-22 RX ADMIN — FAMOTIDINE 40 MG: 20 TABLET, FILM COATED ORAL at 20:56

## 2018-02-22 RX ADMIN — METFORMIN HYDROCHLORIDE 500 MG: 500 TABLET ORAL at 17:01

## 2018-02-22 RX ADMIN — ROCURONIUM BROMIDE 25 MG: 10 INJECTION INTRAVENOUS at 11:50

## 2018-02-22 RX ADMIN — FOLIC ACID 1 MG: 1 TABLET ORAL at 17:01

## 2018-02-22 RX ADMIN — PROPOFOL 120 MG: 10 INJECTION, EMULSION INTRAVENOUS at 11:37

## 2018-02-22 RX ADMIN — FENTANYL CITRATE 50 MCG: 50 INJECTION INTRAMUSCULAR; INTRAVENOUS at 12:43

## 2018-02-22 RX ADMIN — CETIRIZINE HYDROCHLORIDE 10 MG: 10 TABLET ORAL at 16:54

## 2018-02-22 RX ADMIN — SODIUM CHLORIDE, POTASSIUM CHLORIDE, SODIUM LACTATE AND CALCIUM CHLORIDE 125 ML/HR: 600; 310; 30; 20 INJECTION, SOLUTION INTRAVENOUS at 20:56

## 2018-02-22 RX ADMIN — ALVIMOPAN 12 MG: 12 CAPSULE ORAL at 10:31

## 2018-02-22 RX ADMIN — SODIUM CHLORIDE, POTASSIUM CHLORIDE, SODIUM LACTATE AND CALCIUM CHLORIDE 125 ML/HR: 600; 310; 30; 20 INJECTION, SOLUTION INTRAVENOUS at 11:11

## 2018-02-22 RX ADMIN — FENTANYL CITRATE 50 MCG: 50 INJECTION INTRAMUSCULAR; INTRAVENOUS at 13:55

## 2018-02-22 RX ADMIN — ATORVASTATIN CALCIUM 10 MG: 10 TABLET, FILM COATED ORAL at 20:56

## 2018-02-22 RX ADMIN — FENTANYL CITRATE 50 MCG: 50 INJECTION INTRAMUSCULAR; INTRAVENOUS at 14:02

## 2018-02-22 RX ADMIN — LIDOCAINE HYDROCHLORIDE 40 MG: 20 INJECTION, SOLUTION INFILTRATION; PERINEURAL at 11:37

## 2018-02-22 RX ADMIN — METOPROLOL TARTRATE 3 MG: 1 INJECTION, SOLUTION INTRAVENOUS at 12:27

## 2018-02-22 RX ADMIN — ROCURONIUM BROMIDE 20 MG: 10 INJECTION INTRAVENOUS at 12:27

## 2018-02-22 RX ADMIN — FENTANYL CITRATE 50 MCG: 50 INJECTION INTRAMUSCULAR; INTRAVENOUS at 13:00

## 2018-02-22 RX ADMIN — FENTANYL CITRATE 50 MCG: 50 INJECTION INTRAMUSCULAR; INTRAVENOUS at 13:05

## 2018-02-22 NOTE — OP NOTE
COLON RESECTION SIGMOID  Procedure Note    Raimundo Munguia  2/22/2018    Pre-op Diagnosis:   Rectal prolapse [K62.3]  Rectal polyp [K62.1]    Post-op Diagnosis: same        Procedure(s):  COLON RESECTION SIGMOID    Surgeon(s):  Ej Tinoco MD    Anesthesia: General    Staff:   Circulator: Ilana Knott RN; Merced Perez RN  Scrub Person: Stefani Robbins LPN  Assistant: Corby Mccall CSA    Estimated Blood Loss: <500ml    Specimens:                  Order Name Source Comment Collection Info Order Time   TISSUE PATHOLOGY EXAM Large Intestine, Sigmoid Colon  Collected By: Ej Tinoco MD 2/22/2018 12:59 PM         Drains:   Naso/Oral/Gastric Tube 02/22/18 1142 18 right mouth (Active)       Urethral Catheter 02/22/18 1145 100% silicone 16 (Active)           Procedure: He was placed in lithotomy, prepped and draped. His incision was about 5 inches long in the lower midline. It was taken through his fat and fascia with cautery. The sigmoid had adhesions in the Left pelvis that were mobilized with cautery. The peritoneum opened laterally down into the pelvis and the sigmoid mobilized to below the peritoneal reflection. The polyp was several cm above the peritoneal reflection. The upper sigmoid had fat cleared off with the cautery and it was divided with the stapler. The mesantary was divided with cautery and 2-0 vicryl ties. The distal colon was divided with the contour stapler and the speciman removed. The area was irrigated and the proximal sigmoid staple line removed with cautery. A 33 sizer went in easily and the purse string placed. The EEA stapler placed up in the rectum and attached to the anvil. It was tightened down and fired. The donuts were intact and no air leak was seen when a large pichardo was used to place are in the rectum . The abdomen was irrigated and suctioned and the On Q catheters were placed. The fascia was closed with PDS running suture. The subcutaneous area irrigated and  closed with vicryl. Staples were used on the skin.       Findings: polyp in lower sigmoid    Complications: none   Grafts / Implants N/A    Ej Tinoco MD     Date: 2/22/2018  Time: 1:05 PM

## 2018-02-22 NOTE — ANESTHESIA POSTPROCEDURE EVALUATION
Patient: Raimundo Munguia    Procedure Summary     Date Anesthesia Start Anesthesia Stop Room / Location    02/22/18 1133 1331  [facility] OR 01 / [facility]       Procedure Diagnosis Surgeon Provider    COLON RESECTION SIGMOID (N/A Abdomen) Rectal prolapse; Rectal polyp  (Rectal prolapse [K62.3]; Rectal polyp [K62.1]) MD Wisam Haddad MD          Anesthesia Type: general  Last vitals  BP   147/91 (02/22/18 1338)   Temp   97.1 °F (36.2 °C) (02/22/18 1328)   Pulse   93 (02/22/18 1338)   Resp   14 (02/22/18 1338)     SpO2   96 % (02/22/18 1338)     Post Anesthesia Care and Evaluation    Patient location during evaluation: PACU  Patient participation: complete - patient participated  Level of consciousness: awake and alert  Pain score: 1  Pain management: adequate  Airway patency: patent  Anesthetic complications: No anesthetic complications  PONV Status: controlled  Cardiovascular status: acceptable  Respiratory status: acceptable  Hydration status: acceptable

## 2018-02-22 NOTE — PLAN OF CARE
Problem: Anesthesia, General (Adult)  Goal: Signs and Symptoms of Listed Potential Problems Will be Absent or Manageable (Anesthesia, General)  Outcome: Ongoing (interventions implemented as appropriate)

## 2018-02-22 NOTE — NURSING NOTE
Informed CIELO Joe that po medication Entereg was ordered post op and pharm had not brought the medication for the pt yet.

## 2018-02-22 NOTE — ANESTHESIA PROCEDURE NOTES
Airway  Urgency: elective    Date/Time: 2/22/2018 11:42 AM  Airway not difficult    General Information and Staff    Patient location during procedure: OR  Anesthesiologist: MADELYN RICE  CRNA: NADER VICKERS    Indications and Patient Condition  Indications for airway management: airway protection    Preoxygenated: yes  MILS maintained throughout  Mask difficulty assessment: 2 - vent by mask + OA or adjuvant +/- NMBA    Final Airway Details  Final airway type: endotracheal airway      Successful airway: ETT  Cuffed: yes   Successful intubation technique: direct laryngoscopy and video laryngoscopy  Facilitating devices/methods: intubating stylet  Endotracheal tube insertion site: oral  Blade: Mary  Blade size: #4  ETT size: 7.5 mm  Placement verified by: chest auscultation, capnometry and palpation of cuff   Cuff volume (mL): 6  Measured from: lips  ETT to lips (cm): 22  Number of attempts at approach: 1    Additional Comments  DL X 1.  Patient has very limited ROM, poor dentition, obvious tobacco in back of throat, unable to visualize.  Glidescope in room. Mask ventilation.  Attempt x 1 with glidescope.  Dentition as preop. No complications noted. Patient tolerated well.  ETT secured.

## 2018-02-22 NOTE — ANESTHESIA PREPROCEDURE EVALUATION
Anesthesia Evaluation     Patient summary reviewed and Nursing notes reviewed   history of anesthetic complications: prolonged sedation  NPO Solid Status: > 8 hours  NPO Liquid Status: > 8 hours           Airway   Mallampati: II  TM distance: <3 FB  Neck ROM: limited  possible difficult intubation and small opening  Dental - normal exam   (+) poor dentition    Pulmonary - negative pulmonary ROS and normal exam    breath sounds clear to auscultation  (+) decreased breath sounds,   (-) asthma, not a smoker  Cardiovascular - normal exam  Exercise tolerance: good (4-7 METS)    NYHA Classification: II  Patient on routine beta blocker and Beta blocker given within 24 hours of surgery  Rhythm: regular  Rate: normal    (+) hypertension, dysrhythmias, hyperlipidemia  (-) past MI, angina, CHF      Neuro/Psych  (+) TIA, headaches,     (-) seizures, CVAPsychiatric history: insomia.  GI/Hepatic/Renal/Endo    (+) obesity,  GERD poorly controlled, renal disease (bph), diabetes mellitus type 2 well controlled,   (-) morbid obesity    Musculoskeletal     (+) back pain, neck pain,   (-) radiculopathy  Abdominal  - normal exam    Bowel sounds: normal.   Substance History - negative use     OB/GYN negative ob/gyn ROS         Other   (+) arthritis                   Anesthesia Plan    ASA 3     general     intravenous induction   Anesthetic plan and risks discussed with patient.  Use of blood products discussed with patient  Consented to blood products.

## 2018-02-23 LAB
ANION GAP SERPL CALCULATED.3IONS-SCNC: 12.4 MMOL/L (ref 3.6–11.2)
BUN BLD-MCNC: 7 MG/DL (ref 7–21)
BUN/CREAT SERPL: 13 (ref 7–25)
CALCIUM SPEC-SCNC: 8.4 MG/DL (ref 7.7–10)
CHLORIDE SERPL-SCNC: 105 MMOL/L (ref 99–112)
CO2 SERPL-SCNC: 20.6 MMOL/L (ref 24.3–31.9)
CREAT BLD-MCNC: 0.54 MG/DL (ref 0.43–1.29)
DEPRECATED RDW RBC AUTO: 45.7 FL (ref 37–54)
ERYTHROCYTE [DISTWIDTH] IN BLOOD BY AUTOMATED COUNT: 13.5 % (ref 11.5–14.5)
GFR SERPL CREATININE-BSD FRML MDRD: >150 ML/MIN/1.73
GLUCOSE BLD-MCNC: 109 MG/DL (ref 70–110)
GLUCOSE BLDC GLUCOMTR-MCNC: 100 MG/DL (ref 70–130)
HCT VFR BLD AUTO: 46.1 % (ref 42–52)
HGB BLD-MCNC: 15.5 G/DL (ref 14–18)
HYPOCHROMIA BLD QL: ABNORMAL
LYMPHOCYTES # BLD MANUAL: 2.06 10*3/MM3 (ref 1–3)
LYMPHOCYTES NFR BLD MANUAL: 10 % (ref 21–51)
LYMPHOCYTES NFR BLD MANUAL: 5 % (ref 0–10)
MCH RBC QN AUTO: 31.3 PG (ref 27–33)
MCHC RBC AUTO-ENTMCNC: 33.6 G/DL (ref 33–37)
MCV RBC AUTO: 93.1 FL (ref 80–94)
MONOCYTES # BLD AUTO: 1.03 10*3/MM3 (ref 0.1–0.9)
NEUTROPHILS # BLD AUTO: 17.54 10*3/MM3 (ref 1.4–6.5)
NEUTROPHILS NFR BLD MANUAL: 83 % (ref 30–70)
NEUTS BAND NFR BLD MANUAL: 2 % (ref 4–12)
OSMOLALITY SERPL CALC.SUM OF ELEC: 274.2 MOSM/KG (ref 273–305)
PLAT MORPH BLD: NORMAL
PLATELET # BLD AUTO: 330 10*3/MM3 (ref 130–400)
PMV BLD AUTO: 10.3 FL (ref 6–10)
POTASSIUM BLD-SCNC: 4.9 MMOL/L (ref 3.5–5.3)
RBC # BLD AUTO: 4.95 10*6/MM3 (ref 4.7–6.1)
SODIUM BLD-SCNC: 138 MMOL/L (ref 135–153)
WBC NRBC COR # BLD: 20.63 10*3/MM3 (ref 4.5–12.5)

## 2018-02-23 PROCEDURE — 82962 GLUCOSE BLOOD TEST: CPT

## 2018-02-23 PROCEDURE — 80048 BASIC METABOLIC PNL TOTAL CA: CPT | Performed by: SURGERY

## 2018-02-23 PROCEDURE — 99024 POSTOP FOLLOW-UP VISIT: CPT | Performed by: SURGERY

## 2018-02-23 PROCEDURE — 85007 BL SMEAR W/DIFF WBC COUNT: CPT | Performed by: SURGERY

## 2018-02-23 PROCEDURE — 85025 COMPLETE CBC W/AUTO DIFF WBC: CPT | Performed by: SURGERY

## 2018-02-23 PROCEDURE — 94799 UNLISTED PULMONARY SVC/PX: CPT

## 2018-02-23 PROCEDURE — 25010000002 ENOXAPARIN PER 10 MG: Performed by: SURGERY

## 2018-02-23 RX ADMIN — PANTOPRAZOLE SODIUM 40 MG: 40 TABLET, DELAYED RELEASE ORAL at 05:32

## 2018-02-23 RX ADMIN — FAMOTIDINE 40 MG: 20 TABLET, FILM COATED ORAL at 09:42

## 2018-02-23 RX ADMIN — METOPROLOL SUCCINATE 50 MG: 50 TABLET, FILM COATED, EXTENDED RELEASE ORAL at 09:42

## 2018-02-23 RX ADMIN — HYDROCODONE BITARTRATE AND ACETAMINOPHEN 2 TABLET: 7.5; 325 TABLET ORAL at 05:35

## 2018-02-23 RX ADMIN — HYDROCODONE BITARTRATE AND ACETAMINOPHEN 1 TABLET: 7.5; 325 TABLET ORAL at 20:08

## 2018-02-23 RX ADMIN — SODIUM CHLORIDE, POTASSIUM CHLORIDE, SODIUM LACTATE AND CALCIUM CHLORIDE 125 ML/HR: 600; 310; 30; 20 INJECTION, SOLUTION INTRAVENOUS at 15:34

## 2018-02-23 RX ADMIN — ATORVASTATIN CALCIUM 10 MG: 10 TABLET, FILM COATED ORAL at 20:08

## 2018-02-23 RX ADMIN — ENOXAPARIN SODIUM 40 MG: 40 INJECTION SUBCUTANEOUS at 09:43

## 2018-02-23 RX ADMIN — CETIRIZINE HYDROCHLORIDE 10 MG: 10 TABLET ORAL at 09:42

## 2018-02-23 RX ADMIN — METFORMIN HYDROCHLORIDE 500 MG: 500 TABLET ORAL at 09:43

## 2018-02-23 RX ADMIN — METFORMIN HYDROCHLORIDE 500 MG: 500 TABLET ORAL at 20:08

## 2018-02-23 RX ADMIN — FAMOTIDINE 40 MG: 20 TABLET, FILM COATED ORAL at 20:08

## 2018-02-23 RX ADMIN — HYDROCODONE BITARTRATE AND ACETAMINOPHEN 2 TABLET: 7.5; 325 TABLET ORAL at 15:43

## 2018-02-23 RX ADMIN — FOLIC ACID 1 MG: 1 TABLET ORAL at 09:42

## 2018-02-23 RX ADMIN — SODIUM CHLORIDE, POTASSIUM CHLORIDE, SODIUM LACTATE AND CALCIUM CHLORIDE 125 ML/HR: 600; 310; 30; 20 INJECTION, SOLUTION INTRAVENOUS at 05:32

## 2018-02-23 NOTE — PAYOR COMM NOTE
"  [facility]  NPI: [id number]    Utilization Review   Contact:Salma Valenzuela RN, BSN  Phone: [phone]  Fax: [phone]    [employer]/Attn: luca Garcia Auth req/auth on file need to know how many days approved for  REF: [id number]  DX: K62.3, K62.1  Marge Adams (52 y.o. Male)     Date of Birth Social Security Number Address Home Phone MRN    1965  [facility] [address] [place] [id number] [phone] [id number]    Alevism Marital Status          None Legally        Admission Date Admission Type Admitting Provider Attending Provider Department, Room/Bed    2/22/18 Elective Ej Tinoco MD Lauber, David J, MD [address], [address]/    Discharge Date Discharge Disposition Discharge Destination                      Attending Provider: Ej Tinoco MD     Allergies:  No Known Allergies    Isolation:  None   Infection:  None   Code Status:  FULL    Ht:  172 cm (67.72\")   Wt:  94.8 kg (209 lb)    Admission Cmt:  None   Principal Problem:  None                Active Insurance as of 2/22/2018     Primary Coverage     Payor Plan Insurance Group Employer/Plan Group    [employer] [place] [employer]      Payor Plan Address Payor Plan Phone Number Effective From Effective To    [address] [address] [phone] 6/22/2016     [place] [address]       Subscriber Name Subscriber Birth Date Member ID       MARGE ADAMS 1965 [id number]                 Emergency Contacts      (Rel.) Home Phone Work Phone Mobile Phone    LeónDebra (Sister) -- -- [phone]    Marianela Tucker (Mother) -- -- [phone]    Kat Traore (Sister) -- -- [phone]               History & Physical      Ej Tinoco MD at 2/12/2018 11:00 AM          Subjective   Marge Adams is a 52 y.o. male.     History of Present Illness He had a colonoscopy recently with a large polyp in the rectosigmoid areas that appears to be more abnormal looking due to " prolapsing. It could not be removed with the colonoscope. He has frequent loose stools and does strain a lot. His father had pancreatic cancer in his 50s.     The following portions of the patient's history were reviewed and updated as appropriate: current medications, past family history, past medical history, past social history, past surgical history and problem list.    Review of Systems   Constitutional: Negative for activity change, appetite change, chills, fever and unexpected weight change.   HENT: Negative for congestion, facial swelling and sore throat.    Eyes: Negative for photophobia and visual disturbance.   Respiratory: Negative for chest tightness, shortness of breath and wheezing.    Cardiovascular: Negative for chest pain, palpitations and leg swelling.   Gastrointestinal: Positive for blood in stool and diarrhea. Negative for abdominal distention, abdominal pain, anal bleeding, constipation, nausea, rectal pain and vomiting.   Endocrine: Negative for cold intolerance, heat intolerance, polydipsia and polyuria.   Genitourinary: Negative for difficulty urinating, dysuria, flank pain and urgency.   Musculoskeletal: Negative for back pain and myalgias.   Skin: Negative for rash and wound.   Allergic/Immunologic: Negative for immunocompromised state.   Neurological: Negative for dizziness, seizures, syncope, light-headedness, numbness and headaches.   Hematological: Negative for adenopathy. Does not bruise/bleed easily.   Psychiatric/Behavioral: Negative for behavioral problems and confusion. The patient is not nervous/anxious.        Objective   Physical Exam   Constitutional: He is oriented to person, place, and time. He appears well-developed and well-nourished. He does not appear ill. No distress.   HENT:   Head: Normocephalic. Head is without laceration. Hair is normal.   Right Ear: Hearing and ear canal normal.   Left Ear: Hearing and ear canal normal.   Nose: Nose normal. No sinus tenderness. No  epistaxis. Right sinus exhibits no maxillary sinus tenderness and no frontal sinus tenderness. Left sinus exhibits no maxillary sinus tenderness and no frontal sinus tenderness.   Eyes: Conjunctivae and lids are normal. Pupils are equal, round, and reactive to light.   Neck: Normal range of motion. No JVD present. No tracheal tenderness present. No tracheal deviation present. No thyroid mass and no thyromegaly present.   Cardiovascular: Normal rate and regular rhythm.  Exam reveals no gallop.    No murmur heard.  Pulmonary/Chest: Effort normal and breath sounds normal. No stridor. He has no wheezes. He exhibits no tenderness.   Abdominal: Soft. Bowel sounds are normal. He exhibits no distension, no ascites and no mass. There is no tenderness. There is no rebound and no guarding. No hernia.   Musculoskeletal: He exhibits no edema or deformity.   Lymphadenopathy:     He has no cervical adenopathy.     He has no axillary adenopathy.        Right: No inguinal and no supraclavicular adenopathy present.        Left: No inguinal and no supraclavicular adenopathy present.   Neurological: He is alert and oriented to person, place, and time. He exhibits normal muscle tone.   Skin: Skin is warm, dry and intact. No rash noted. No erythema. No pallor.   Psychiatric: He has a normal mood and affect. His behavior is normal. Thought content normal.   Vitals reviewed.      Assessment/Plan   Raimundo was seen today for advice only.    Diagnoses and all orders for this visit:    Rectal polyp    Rectal prolapse    Sigmoid resection.      Patient's BMI is above normal parameters. Follow-up plan includes:  educational material.       Electronically signed by Ej Tinoco MD at 2/12/2018 11:08 AM      Ej Tinoco MD at 2/22/2018 10:14 AM            H&P reviewed. The patient was examined and there are no changes to the H&P.           Electronically signed by Ej Tinoco MD at 2/22/2018 10:14 AM    Source Note              Subjective   Raimundo Munguia is a 52 y.o. male.     History of Present Illness He had a colonoscopy recently with a large polyp in the rectosigmoid areas that appears to be more abnormal looking due to prolapsing. It could not be removed with the colonoscope. He has frequent loose stools and does strain a lot. His father had pancreatic cancer in his 50s.     The following portions of the patient's history were reviewed and updated as appropriate: current medications, past family history, past medical history, past social history, past surgical history and problem list.    Review of Systems   Constitutional: Negative for activity change, appetite change, chills, fever and unexpected weight change.   HENT: Negative for congestion, facial swelling and sore throat.    Eyes: Negative for photophobia and visual disturbance.   Respiratory: Negative for chest tightness, shortness of breath and wheezing.    Cardiovascular: Negative for chest pain, palpitations and leg swelling.   Gastrointestinal: Positive for blood in stool and diarrhea. Negative for abdominal distention, abdominal pain, anal bleeding, constipation, nausea, rectal pain and vomiting.   Endocrine: Negative for cold intolerance, heat intolerance, polydipsia and polyuria.   Genitourinary: Negative for difficulty urinating, dysuria, flank pain and urgency.   Musculoskeletal: Negative for back pain and myalgias.   Skin: Negative for rash and wound.   Allergic/Immunologic: Negative for immunocompromised state.   Neurological: Negative for dizziness, seizures, syncope, light-headedness, numbness and headaches.   Hematological: Negative for adenopathy. Does not bruise/bleed easily.   Psychiatric/Behavioral: Negative for behavioral problems and confusion. The patient is not nervous/anxious.        Objective   Physical Exam   Constitutional: He is oriented to person, place, and time. He appears well-developed and well-nourished. He does not appear ill. No  distress.   HENT:   Head: Normocephalic. Head is without laceration. Hair is normal.   Right Ear: Hearing and ear canal normal.   Left Ear: Hearing and ear canal normal.   Nose: Nose normal. No sinus tenderness. No epistaxis. Right sinus exhibits no maxillary sinus tenderness and no frontal sinus tenderness. Left sinus exhibits no maxillary sinus tenderness and no frontal sinus tenderness.   Eyes: Conjunctivae and lids are normal. Pupils are equal, round, and reactive to light.   Neck: Normal range of motion. No JVD present. No tracheal tenderness present. No tracheal deviation present. No thyroid mass and no thyromegaly present.   Cardiovascular: Normal rate and regular rhythm.  Exam reveals no gallop.    No murmur heard.  Pulmonary/Chest: Effort normal and breath sounds normal. No stridor. He has no wheezes. He exhibits no tenderness.   Abdominal: Soft. Bowel sounds are normal. He exhibits no distension, no ascites and no mass. There is no tenderness. There is no rebound and no guarding. No hernia.   Musculoskeletal: He exhibits no edema or deformity.   Lymphadenopathy:     He has no cervical adenopathy.     He has no axillary adenopathy.        Right: No inguinal and no supraclavicular adenopathy present.        Left: No inguinal and no supraclavicular adenopathy present.   Neurological: He is alert and oriented to person, place, and time. He exhibits normal muscle tone.   Skin: Skin is warm, dry and intact. No rash noted. No erythema. No pallor.   Psychiatric: He has a normal mood and affect. His behavior is normal. Thought content normal.   Vitals reviewed.      Assessment/Plan   Raimundo was seen today for advice only.    Diagnoses and all orders for this visit:    Rectal polyp    Rectal prolapse    Sigmoid resection.      Patient's BMI is above normal parameters. Follow-up plan includes:  educational material.        Electronically signed by Ej Tinoco MD at 2/12/2018 11:08 AM              Emergency  Department Notes     No notes of this type exist for this encounter.           Operative/Procedure Notes (last 24 hours) (Notes from 2/22/2018  9:27 AM through 2/23/2018  9:27 AM)      Ej Tinoco MD at 2/22/2018  1:05 PM  Version 1 of 1         COLON RESECTION SIGMOID  Procedure Note    Raimundo Munguia  2/22/2018    Pre-op Diagnosis:   Rectal prolapse [K62.3]  Rectal polyp [K62.1]    Post-op Diagnosis: same        Procedure(s):  COLON RESECTION SIGMOID    Surgeon(s):  Ej Tinoco MD    Anesthesia: General    Staff:   Circulator: Ilana Knott RN; Merced Perez RN  Scrub Person: Stefani Robbins LPN  Assistant: Corby Mccall CSA    Estimated Blood Loss: <500ml    Specimens:                  Order Name Source Comment Collection Info Order Time   TISSUE PATHOLOGY EXAM Large Intestine, Sigmoid Colon  Collected By: Ej Tinoco MD 2/22/2018 12:59 PM         Drains:   Naso/Oral/Gastric Tube 02/22/18 1142 18 right mouth (Active)       Urethral Catheter 02/22/18 1145 100% silicone 16 (Active)           Procedure: He was placed in lithotomy, prepped and draped. His incision was about 5 inches long in the lower midline. It was taken through his fat and fascia with cautery. The sigmoid had adhesions in the Left pelvis that were mobilized with cautery. The peritoneum opened laterally down into the pelvis and the sigmoid mobilized to below the peritoneal reflection. The polyp was several cm above the peritoneal reflection. The upper sigmoid had fat cleared off with the cautery and it was divided with the stapler. The mesantary was divided with cautery and 2-0 vicryl ties. The distal colon was divided with the contour stapler and the speciman removed. The area was irrigated and the proximal sigmoid staple line removed with cautery. A 33 sizer went in easily and the purse string placed. The EEA stapler placed up in the rectum and attached to the anvil. It was tightened down and fired. The donuts were  intact and no air leak was seen when a large pichardo was used to place are in the rectum . The abdomen was irrigated and suctioned and the On Q catheters were placed. The fascia was closed with PDS running suture. The subcutaneous area irrigated and closed with vicryl. Staples were used on the skin.       Findings: polyp in lower sigmoid    Complications: none   Grafts / Implants N/A    Ej Tinoco MD     Date: 2/22/2018  Time: 1:05 PM

## 2018-02-23 NOTE — PLAN OF CARE
Problem: Patient Care Overview (Adult)  Goal: Adult Individualization and Mutuality  Outcome: Ongoing (interventions implemented as appropriate)    Goal: Discharge Needs Assessment  Outcome: Ongoing (interventions implemented as appropriate)      Problem: Anesthesia, General (Adult)  Goal: Signs and Symptoms of Listed Potential Problems Will be Absent or Manageable (Anesthesia, General)  Outcome: Ongoing (interventions implemented as appropriate)      Problem: Infection, Risk/Actual (Adult)  Goal: Infection Prevention/Resolution  Outcome: Ongoing (interventions implemented as appropriate)      Problem: Pain, Acute (Adult)  Goal: Acceptable Pain Control/Comfort Level  Outcome: Ongoing (interventions implemented as appropriate)

## 2018-02-23 NOTE — PROGRESS NOTES
Post op sigmoid colectomy    The patient is tolerating clear liquids with no nausea or vomiting. He is ambulating some. Afeb and VSS. Wound ok. Abdomen is soft and mildly sore. WBC is elevated, BMP is ok. Remove pichardo in am and advance diet, repeat cbc in am.

## 2018-02-24 LAB
BASOPHILS # BLD AUTO: 0.06 10*3/MM3 (ref 0–0.3)
BASOPHILS NFR BLD AUTO: 0.4 % (ref 0–2)
CRP SERPL-MCNC: 15.28 MG/DL (ref 0–0.99)
DEPRECATED RDW RBC AUTO: 44.2 FL (ref 37–54)
EOSINOPHIL # BLD AUTO: 0.37 10*3/MM3 (ref 0–0.7)
EOSINOPHIL NFR BLD AUTO: 2.3 % (ref 0–5)
ERYTHROCYTE [DISTWIDTH] IN BLOOD BY AUTOMATED COUNT: 13.3 % (ref 11.5–14.5)
GLUCOSE BLDC GLUCOMTR-MCNC: 102 MG/DL (ref 70–130)
GLUCOSE BLDC GLUCOMTR-MCNC: 114 MG/DL (ref 70–130)
HCT VFR BLD AUTO: 39.2 % (ref 42–52)
HGB BLD-MCNC: 12.9 G/DL (ref 14–18)
IMM GRANULOCYTES # BLD: 0.04 10*3/MM3 (ref 0–0.03)
IMM GRANULOCYTES NFR BLD: 0.3 % (ref 0–0.5)
LYMPHOCYTES # BLD AUTO: 1.38 10*3/MM3 (ref 1–3)
LYMPHOCYTES NFR BLD AUTO: 8.6 % (ref 21–51)
MCH RBC QN AUTO: 31.8 PG (ref 27–33)
MCHC RBC AUTO-ENTMCNC: 32.9 G/DL (ref 33–37)
MCV RBC AUTO: 96.6 FL (ref 80–94)
MONOCYTES # BLD AUTO: 2 10*3/MM3 (ref 0.1–0.9)
MONOCYTES NFR BLD AUTO: 12.5 % (ref 0–10)
NEUTROPHILS # BLD AUTO: 12.15 10*3/MM3 (ref 1.4–6.5)
NEUTROPHILS NFR BLD AUTO: 75.9 % (ref 30–70)
PLATELET # BLD AUTO: 375 10*3/MM3 (ref 130–400)
PMV BLD AUTO: 9.4 FL (ref 6–10)
RBC # BLD AUTO: 4.06 10*6/MM3 (ref 4.7–6.1)
WBC NRBC COR # BLD: 16 10*3/MM3 (ref 4.5–12.5)

## 2018-02-24 PROCEDURE — 86140 C-REACTIVE PROTEIN: CPT | Performed by: SURGERY

## 2018-02-24 PROCEDURE — 85025 COMPLETE CBC W/AUTO DIFF WBC: CPT | Performed by: SURGERY

## 2018-02-24 PROCEDURE — 99024 POSTOP FOLLOW-UP VISIT: CPT | Performed by: SURGERY

## 2018-02-24 PROCEDURE — 25010000002 ENOXAPARIN PER 10 MG: Performed by: SURGERY

## 2018-02-24 PROCEDURE — 82962 GLUCOSE BLOOD TEST: CPT

## 2018-02-24 RX ADMIN — SODIUM CHLORIDE, POTASSIUM CHLORIDE, SODIUM LACTATE AND CALCIUM CHLORIDE 125 ML/HR: 600; 310; 30; 20 INJECTION, SOLUTION INTRAVENOUS at 17:39

## 2018-02-24 RX ADMIN — ENOXAPARIN SODIUM 40 MG: 40 INJECTION SUBCUTANEOUS at 08:52

## 2018-02-24 RX ADMIN — FOLIC ACID 1 MG: 1 TABLET ORAL at 08:53

## 2018-02-24 RX ADMIN — HYDROCODONE BITARTRATE AND ACETAMINOPHEN 2 TABLET: 7.5; 325 TABLET ORAL at 09:25

## 2018-02-24 RX ADMIN — CETIRIZINE HYDROCHLORIDE 10 MG: 10 TABLET ORAL at 08:53

## 2018-02-24 RX ADMIN — HYDROCODONE BITARTRATE AND ACETAMINOPHEN 1 TABLET: 7.5; 325 TABLET ORAL at 23:17

## 2018-02-24 RX ADMIN — SODIUM CHLORIDE, POTASSIUM CHLORIDE, SODIUM LACTATE AND CALCIUM CHLORIDE 125 ML/HR: 600; 310; 30; 20 INJECTION, SOLUTION INTRAVENOUS at 01:11

## 2018-02-24 RX ADMIN — PANTOPRAZOLE SODIUM 40 MG: 40 TABLET, DELAYED RELEASE ORAL at 06:02

## 2018-02-24 RX ADMIN — METOPROLOL SUCCINATE 50 MG: 50 TABLET, FILM COATED, EXTENDED RELEASE ORAL at 08:53

## 2018-02-24 RX ADMIN — SODIUM CHLORIDE, POTASSIUM CHLORIDE, SODIUM LACTATE AND CALCIUM CHLORIDE 125 ML/HR: 600; 310; 30; 20 INJECTION, SOLUTION INTRAVENOUS at 09:28

## 2018-02-24 RX ADMIN — HYDROCODONE BITARTRATE AND ACETAMINOPHEN 1 TABLET: 7.5; 325 TABLET ORAL at 02:33

## 2018-02-24 RX ADMIN — METFORMIN HYDROCHLORIDE 500 MG: 500 TABLET ORAL at 17:35

## 2018-02-24 RX ADMIN — FAMOTIDINE 40 MG: 20 TABLET, FILM COATED ORAL at 20:11

## 2018-02-24 RX ADMIN — FAMOTIDINE 40 MG: 20 TABLET, FILM COATED ORAL at 08:53

## 2018-02-24 RX ADMIN — ATORVASTATIN CALCIUM 10 MG: 10 TABLET, FILM COATED ORAL at 20:11

## 2018-02-24 NOTE — PLAN OF CARE
Problem: Patient Care Overview (Adult)  Goal: Plan of Care Review  Outcome: Ongoing (interventions implemented as appropriate)   02/22/18 2145 02/24/18 0925   Coping/Psychosocial Response Interventions   Plan Of Care Reviewed With --  patient   Patient Care Overview   Progress no change --      Goal: Adult Individualization and Mutuality  Outcome: Ongoing (interventions implemented as appropriate)    Goal: Discharge Needs Assessment  Outcome: Ongoing (interventions implemented as appropriate)      Problem: Infection, Risk/Actual (Adult)  Goal: Infection Prevention/Resolution  Outcome: Ongoing (interventions implemented as appropriate)   02/24/18 1051   Infection, Risk/Actual (Adult)   Infection Prevention/Resolution making progress toward outcome       Problem: Pain, Acute (Adult)  Goal: Acceptable Pain Control/Comfort Level  Outcome: Ongoing (interventions implemented as appropriate)   02/24/18 1051   Pain, Acute (Adult)   Acceptable Pain Control/Comfort Level making progress toward outcome

## 2018-02-24 NOTE — PROGRESS NOTES
Post op sigmoid colectomy    Doing well.  Had some urinary retention after pichardo removal but doing well.    AFVSS  NAD,AOx3  RRR  CTAB  S/appropriately tender/OnQ in place    53 yo M s/p sigmoid colectomy  -advance diet  -ambulate  -d/c OnQ

## 2018-02-24 NOTE — PLAN OF CARE
Problem: Patient Care Overview (Adult)  Goal: Plan of Care Review  Outcome: Ongoing (interventions implemented as appropriate)    Goal: Adult Individualization and Mutuality  Outcome: Ongoing (interventions implemented as appropriate)      Problem: Infection, Risk/Actual (Adult)  Goal: Infection Prevention/Resolution  Outcome: Ongoing (interventions implemented as appropriate)      Problem: Pain, Acute (Adult)  Goal: Acceptable Pain Control/Comfort Level  Outcome: Ongoing (interventions implemented as appropriate)   02/24/18 0010   Pain, Acute (Adult)   Acceptable Pain Control/Comfort Level making progress toward outcome

## 2018-02-24 NOTE — NURSING NOTE
Called and spoke to Dr. Cowart about removing the OnQ pump after reading his note. Dr. Cowart states to leave in for one more day since the surgery was 2/22/18.

## 2018-02-25 VITALS
WEIGHT: 209 LBS | OXYGEN SATURATION: 94 % | HEART RATE: 118 BPM | SYSTOLIC BLOOD PRESSURE: 132 MMHG | TEMPERATURE: 98.6 F | RESPIRATION RATE: 20 BRPM | BODY MASS INDEX: 31.67 KG/M2 | HEIGHT: 68 IN | DIASTOLIC BLOOD PRESSURE: 92 MMHG

## 2018-02-25 LAB
CRP SERPL-MCNC: 10.3 MG/DL (ref 0–0.99)
DEPRECATED RDW RBC AUTO: 45.1 FL (ref 37–54)
ERYTHROCYTE [DISTWIDTH] IN BLOOD BY AUTOMATED COUNT: 13.6 % (ref 11.5–14.5)
GLUCOSE BLDC GLUCOMTR-MCNC: 98 MG/DL (ref 70–130)
HCT VFR BLD AUTO: 42.5 % (ref 42–52)
HGB BLD-MCNC: 13.8 G/DL (ref 14–18)
MCH RBC QN AUTO: 31.2 PG (ref 27–33)
MCHC RBC AUTO-ENTMCNC: 32.5 G/DL (ref 33–37)
MCV RBC AUTO: 96.2 FL (ref 80–94)
PLATELET # BLD AUTO: 458 10*3/MM3 (ref 130–400)
PMV BLD AUTO: 9.5 FL (ref 6–10)
RBC # BLD AUTO: 4.42 10*6/MM3 (ref 4.7–6.1)
WBC NRBC COR # BLD: 10.14 10*3/MM3 (ref 4.5–12.5)

## 2018-02-25 PROCEDURE — 25010000002 ENOXAPARIN PER 10 MG: Performed by: SURGERY

## 2018-02-25 PROCEDURE — 99024 POSTOP FOLLOW-UP VISIT: CPT | Performed by: SURGERY

## 2018-02-25 PROCEDURE — 85027 COMPLETE CBC AUTOMATED: CPT | Performed by: SURGERY

## 2018-02-25 PROCEDURE — 94799 UNLISTED PULMONARY SVC/PX: CPT

## 2018-02-25 PROCEDURE — 82962 GLUCOSE BLOOD TEST: CPT

## 2018-02-25 PROCEDURE — 86140 C-REACTIVE PROTEIN: CPT | Performed by: SURGERY

## 2018-02-25 RX ORDER — HYDROCODONE BITARTRATE AND ACETAMINOPHEN 7.5; 325 MG/1; MG/1
1 TABLET ORAL EVERY 4 HOURS PRN
Qty: 30 TABLET | Refills: 0 | Status: CANCELLED | OUTPATIENT
Start: 2018-02-25 | End: 2018-03-04

## 2018-02-25 RX ORDER — HYDROCODONE BITARTRATE AND ACETAMINOPHEN 5; 325 MG/1; MG/1
1 TABLET ORAL EVERY 6 HOURS PRN
Qty: 30 TABLET | Refills: 0 | Status: SHIPPED | OUTPATIENT
Start: 2018-02-25 | End: 2018-04-26

## 2018-02-25 RX ADMIN — FAMOTIDINE 40 MG: 20 TABLET, FILM COATED ORAL at 09:00

## 2018-02-25 RX ADMIN — FOLIC ACID 1 MG: 1 TABLET ORAL at 09:00

## 2018-02-25 RX ADMIN — ENOXAPARIN SODIUM 40 MG: 40 INJECTION SUBCUTANEOUS at 09:00

## 2018-02-25 RX ADMIN — CETIRIZINE HYDROCHLORIDE 10 MG: 10 TABLET ORAL at 09:00

## 2018-02-25 RX ADMIN — SODIUM CHLORIDE, POTASSIUM CHLORIDE, SODIUM LACTATE AND CALCIUM CHLORIDE 125 ML/HR: 600; 310; 30; 20 INJECTION, SOLUTION INTRAVENOUS at 03:56

## 2018-02-25 RX ADMIN — PANTOPRAZOLE SODIUM 40 MG: 40 TABLET, DELAYED RELEASE ORAL at 06:00

## 2018-02-25 RX ADMIN — METOPROLOL SUCCINATE 50 MG: 50 TABLET, FILM COATED, EXTENDED RELEASE ORAL at 09:00

## 2018-02-25 NOTE — PLAN OF CARE
Problem: Patient Care Overview (Adult)  Goal: Plan of Care Review   02/25/18 0058   Coping/Psychosocial Response Interventions   Plan Of Care Reviewed With patient;friend   Patient Care Overview   Progress no change       Problem: Infection, Risk/Actual (Adult)  Goal: Infection Prevention/Resolution   02/25/18 0058   Infection, Risk/Actual (Adult)   Infection Prevention/Resolution making progress toward outcome       Problem: Pain, Acute (Adult)  Goal: Acceptable Pain Control/Comfort Level   02/25/18 0058   Pain, Acute (Adult)   Acceptable Pain Control/Comfort Level making progress toward outcome

## 2018-02-25 NOTE — DISCHARGE SUMMARY
Date of Discharge:  2/25/2018    Discharge Diagnosis: Rectal prolapse, rectal polyp    Presenting Problem/History of Present Illness  Rectal prolapse [K62.3]  Rectal polyp [K62.1]  Rectal polyp [K62.1]       Hospital Course  Patient is a 52 y.o. male presented with rectal prolapse and sigmoid polyp requiring sigmoid colectomy.  The patient underwent open sigmoid colectomy and was transferred to the floor postoperatively.  His pain was controlled with On-Q catheter and oral pain medication.  His Hernandez catheter was removed on postoperative day 2.  He required in out catheter but is now voiding on his own.  His On-Q pump was removed and his incision is well-healed.  He was having resumption of bowel function with bowel movements without bleeding or other abnormality.  No fevers reported he was ready for discharge home on postoperative day 3.    Procedures Performed  Procedure(s):  COLON RESECTION SIGMOID       Consults:   Consults     No orders found from 1/24/2018 to 2/23/2018.          Condition on Discharge: Low midline incision closed with absorbable sutures.  Healing well.  No cellulitis.    Vital Signs  Temp:  [98.4 °F (36.9 °C)-99.2 °F (37.3 °C)] 98.5 °F (36.9 °C)  Heart Rate:  [] 98  Resp:  [18-20] 20  BP: (104-139)/(66-91) 115/66        Discharge Disposition      Discharge Medications   Raimundo Munguia   Home Medication Instructions LUIZ:[id number]    Printed on:02/25/18 0955   Medication Information                      atorvastatin (LIPITOR) 10 MG tablet  Take 10 mg by mouth Every Night.             Cyanocobalamin ER (RA VITAMIN B-12 TR) 1000 MCG tablet controlled-release  Take 1,000 mcg by mouth Daily.             cyclobenzaprine (FLEXERIL) 10 MG tablet  Take 1 tablet by mouth 3 (Three) Times a Day As Needed for Muscle Spasms.             diphenhydrAMINE (BENADRYL) 25 mg capsule  Take 25 mg by mouth At Night As Needed for Itching or Sleep.             folic acid (FOLVITE) 1 MG tablet  Take 1  tablet by mouth Daily.             HYDROcodone-acetaminophen (NORCO) 5-325 MG per tablet  Take 1 tablet by mouth Every 6 (Six) Hours As Needed for Moderate Pain .             lansoprazole (PREVACID) 30 MG capsule  Take 1 capsule by mouth Daily.             loratadine (CLARITIN) 10 MG tablet  Take 1 tablet by mouth Daily.             metFORMIN (GLUCOPHAGE) 500 MG tablet  Take 500 mg by mouth Daily With Dinner.             methotrexate 2.5 MG tablet  Take 25 mg by mouth 1 (One) Time Per Week. Prior to [facility] Admission, Patient was on: takes on tuesdays             metoprolol succinate XL (TOPROL-XL) 50 MG 24 hr tablet  Take 1 tablet by mouth Daily.             raNITIdine (ZANTAC) 300 MG tablet  Take 300 mg by mouth 2 (Two) Times a Day.                 Discharge Diet:   Diet Instructions     Advance Diet as Tolerated                     Activity at Discharge:   Activity Instructions     Discharge activity       No lifting over 10 lbs for 6 weeks  No driving while on narcotics                 Follow-up Appointments  Future Appointments  Date Time Provider Department Center   3/6/2018 10:00 AM LAB [place] [facility] None   3/14/2018 11:00 AM ABDIRAHMAN Coughlin [facility] None   3/29/2018 11:40 AM BRENDA Scherer [facility] None     Additional Instructions for the Follow-ups that You Need to Schedule     Discharge Follow-up with Specialty: 1 Week    As directed    Follow Up:  1 Week    Follow Up Details:  Dr. Tinoco                     Test Results Pending at Discharge   Order Current Status    Tissue Pathology Exam In process           Alfredo Cowart MD  02/25/18  9:55 AM

## 2018-02-26 ENCOUNTER — TELEPHONE (OUTPATIENT)
Dept: MEDSURG UNIT | Facility: HOSPITAL | Age: 53
End: 2018-02-26

## 2018-02-27 LAB
LAB AP CASE REPORT: NORMAL
Lab: NORMAL
PATH REPORT.FINAL DX SPEC: NORMAL

## 2018-03-05 ENCOUNTER — OFFICE VISIT (OUTPATIENT)
Dept: SURGERY | Facility: CLINIC | Age: 53
End: 2018-03-05

## 2018-03-05 VITALS — WEIGHT: 209 LBS | BODY MASS INDEX: 31.67 KG/M2 | HEIGHT: 68 IN

## 2018-03-05 DIAGNOSIS — K62.1 RECTAL POLYP: Primary | ICD-10-CM

## 2018-03-05 DIAGNOSIS — E78.5 HYPERLIPIDEMIA, UNSPECIFIED HYPERLIPIDEMIA TYPE: Chronic | ICD-10-CM

## 2018-03-05 DIAGNOSIS — E53.8 VITAMIN B 12 DEFICIENCY: ICD-10-CM

## 2018-03-05 PROBLEM — K62.3 RECTAL PROLAPSE: Status: RESOLVED | Noted: 2018-02-12 | Resolved: 2018-03-05

## 2018-03-05 PROBLEM — R10.11 RIGHT UPPER QUADRANT ABDOMINAL PAIN: Status: RESOLVED | Noted: 2017-11-08 | Resolved: 2018-03-05

## 2018-03-05 PROBLEM — R10.11 RUQ ABDOMINAL PAIN: Status: RESOLVED | Noted: 2018-01-03 | Resolved: 2018-03-05

## 2018-03-05 PROCEDURE — 99024 POSTOP FOLLOW-UP VISIT: CPT | Performed by: SURGERY

## 2018-03-05 RX ORDER — ATORVASTATIN CALCIUM 10 MG/1
TABLET, FILM COATED ORAL
Qty: 30 TABLET | Refills: 5 | Status: SHIPPED | OUTPATIENT
Start: 2018-03-05 | End: 2018-03-14 | Stop reason: ALTCHOICE

## 2018-03-05 NOTE — PROGRESS NOTES
Subjective   Raimundo Munguia is a 52 y.o. male.     History of Present Illness He did very well post sigmoid resection and went home just a three days post op. He is eating well and bowels moving. No problems. His path showed only a tiny microscopic area of cancer. Nodes all negative. No more rectal prolapse.    The following portions of the patient's history were reviewed and updated as appropriate: current medications, past family history, past medical history, past social history, past surgical history and problem list.    Review of Systems    Objective   Physical Exam wound ok. Staples removed.     Assessment/Plan   Raimundo was seen today for post-op follow-up.    Diagnoses and all orders for this visit:    Rectal polyp    Return 1 mo, follow up with Dr Garg in 6 mos.

## 2018-03-06 ENCOUNTER — LAB (OUTPATIENT)
Dept: FAMILY MEDICINE CLINIC | Facility: CLINIC | Age: 53
End: 2018-03-06

## 2018-03-06 DIAGNOSIS — K21.9 GASTROESOPHAGEAL REFLUX DISEASE, ESOPHAGITIS PRESENCE NOT SPECIFIED: ICD-10-CM

## 2018-03-06 DIAGNOSIS — E55.9 VITAMIN D DEFICIENCY: ICD-10-CM

## 2018-03-06 DIAGNOSIS — E53.8 VITAMIN B 12 DEFICIENCY: ICD-10-CM

## 2018-03-06 DIAGNOSIS — E78.2 MIXED HYPERLIPIDEMIA: Chronic | ICD-10-CM

## 2018-03-06 DIAGNOSIS — E11.9 TYPE 2 DIABETES MELLITUS WITHOUT COMPLICATION, WITHOUT LONG-TERM CURRENT USE OF INSULIN (HCC): ICD-10-CM

## 2018-03-06 LAB
25(OH)D3 SERPL-MCNC: 28 NG/ML
ALBUMIN SERPL-MCNC: 4.1 G/DL (ref 3.5–5)
ALBUMIN/GLOB SERPL: 1.6 G/DL (ref 1.5–2.5)
ALP SERPL-CCNC: 135 U/L (ref 40–129)
ALT SERPL W P-5'-P-CCNC: 22 U/L (ref 10–44)
ANION GAP SERPL CALCULATED.3IONS-SCNC: 5.9 MMOL/L (ref 3.6–11.2)
AST SERPL-CCNC: 14 U/L (ref 10–34)
BASOPHILS # BLD AUTO: 0.05 10*3/MM3 (ref 0–0.3)
BASOPHILS NFR BLD AUTO: 0.6 % (ref 0–2)
BILIRUB SERPL-MCNC: 0.2 MG/DL (ref 0.2–1.8)
BUN BLD-MCNC: 9 MG/DL (ref 7–21)
BUN/CREAT SERPL: 11.1 (ref 7–25)
CALCIUM SPEC-SCNC: 8.9 MG/DL (ref 7.7–10)
CHLORIDE SERPL-SCNC: 105 MMOL/L (ref 99–112)
CHOLEST SERPL-MCNC: 92 MG/DL (ref 0–200)
CO2 SERPL-SCNC: 29.1 MMOL/L (ref 24.3–31.9)
CREAT BLD-MCNC: 0.81 MG/DL (ref 0.43–1.29)
DEPRECATED RDW RBC AUTO: 47.5 FL (ref 37–54)
EOSINOPHIL # BLD AUTO: 0.51 10*3/MM3 (ref 0–0.7)
EOSINOPHIL NFR BLD AUTO: 5.7 % (ref 0–5)
ERYTHROCYTE [DISTWIDTH] IN BLOOD BY AUTOMATED COUNT: 13.9 % (ref 11.5–14.5)
GFR SERPL CREATININE-BSD FRML MDRD: 100 ML/MIN/1.73
GLOBULIN UR ELPH-MCNC: 2.6 GM/DL
GLUCOSE BLD-MCNC: 115 MG/DL (ref 70–110)
HBA1C MFR BLD: 6.1 % (ref 4.5–5.7)
HCT VFR BLD AUTO: 44.2 % (ref 42–52)
HDLC SERPL-MCNC: 25 MG/DL (ref 60–100)
HGB BLD-MCNC: 14.2 G/DL (ref 14–18)
IMM GRANULOCYTES # BLD: 0.05 10*3/MM3 (ref 0–0.03)
IMM GRANULOCYTES NFR BLD: 0.6 % (ref 0–0.5)
LDLC SERPL CALC-MCNC: 38 MG/DL (ref 0–100)
LDLC/HDLC SERPL: 1.5 {RATIO}
LYMPHOCYTES # BLD AUTO: 2.52 10*3/MM3 (ref 1–3)
LYMPHOCYTES NFR BLD AUTO: 28 % (ref 21–51)
MCH RBC QN AUTO: 30.8 PG (ref 27–33)
MCHC RBC AUTO-ENTMCNC: 32.1 G/DL (ref 33–37)
MCV RBC AUTO: 95.9 FL (ref 80–94)
MONOCYTES # BLD AUTO: 1.16 10*3/MM3 (ref 0.1–0.9)
MONOCYTES NFR BLD AUTO: 12.9 % (ref 0–10)
NEUTROPHILS # BLD AUTO: 4.72 10*3/MM3 (ref 1.4–6.5)
NEUTROPHILS NFR BLD AUTO: 52.2 % (ref 30–70)
OSMOLALITY SERPL CALC.SUM OF ELEC: 279 MOSM/KG (ref 273–305)
PLATELET # BLD AUTO: 495 10*3/MM3 (ref 130–400)
PMV BLD AUTO: 9.4 FL (ref 6–10)
POTASSIUM BLD-SCNC: 3.8 MMOL/L (ref 3.5–5.3)
PROT SERPL-MCNC: 6.7 G/DL (ref 6–8)
RBC # BLD AUTO: 4.61 10*6/MM3 (ref 4.7–6.1)
SODIUM BLD-SCNC: 140 MMOL/L (ref 135–153)
TRIGL SERPL-MCNC: 147 MG/DL (ref 0–150)
TSH SERPL DL<=0.05 MIU/L-ACNC: 3.41 MIU/ML (ref 0.55–4.78)
VIT B12 BLD-MCNC: 585 PG/ML (ref 211–911)
VLDLC SERPL-MCNC: 29.4 MG/DL
WBC NRBC COR # BLD: 9.01 10*3/MM3 (ref 4.5–12.5)

## 2018-03-06 PROCEDURE — 82607 VITAMIN B-12: CPT | Performed by: NURSE PRACTITIONER

## 2018-03-06 PROCEDURE — 36415 COLL VENOUS BLD VENIPUNCTURE: CPT

## 2018-03-06 PROCEDURE — 83036 HEMOGLOBIN GLYCOSYLATED A1C: CPT | Performed by: NURSE PRACTITIONER

## 2018-03-06 PROCEDURE — 82306 VITAMIN D 25 HYDROXY: CPT | Performed by: NURSE PRACTITIONER

## 2018-03-06 PROCEDURE — 80050 GENERAL HEALTH PANEL: CPT | Performed by: NURSE PRACTITIONER

## 2018-03-06 PROCEDURE — 80061 LIPID PANEL: CPT | Performed by: NURSE PRACTITIONER

## 2018-03-14 ENCOUNTER — OFFICE VISIT (OUTPATIENT)
Dept: FAMILY MEDICINE CLINIC | Facility: CLINIC | Age: 53
End: 2018-03-14

## 2018-03-14 VITALS
BODY MASS INDEX: 30.92 KG/M2 | WEIGHT: 204 LBS | HEIGHT: 68 IN | HEART RATE: 102 BPM | SYSTOLIC BLOOD PRESSURE: 115 MMHG | DIASTOLIC BLOOD PRESSURE: 79 MMHG | TEMPERATURE: 97.5 F | OXYGEN SATURATION: 94 %

## 2018-03-14 DIAGNOSIS — M05.79 RHEUMATOID ARTHRITIS INVOLVING MULTIPLE SITES WITH POSITIVE RHEUMATOID FACTOR (HCC): ICD-10-CM

## 2018-03-14 DIAGNOSIS — E11.9 TYPE 2 DIABETES MELLITUS WITHOUT COMPLICATION, UNSPECIFIED LONG TERM INSULIN USE STATUS: Primary | Chronic | ICD-10-CM

## 2018-03-14 DIAGNOSIS — J30.9 CHRONIC ALLERGIC RHINITIS, UNSPECIFIED SEASONALITY, UNSPECIFIED TRIGGER: Chronic | ICD-10-CM

## 2018-03-14 DIAGNOSIS — M62.838 MUSCLE SPASMS OF BOTH LOWER EXTREMITIES: ICD-10-CM

## 2018-03-14 DIAGNOSIS — E55.9 VITAMIN D DEFICIENCY: ICD-10-CM

## 2018-03-14 DIAGNOSIS — F51.04 CHRONIC INSOMNIA: Chronic | ICD-10-CM

## 2018-03-14 DIAGNOSIS — I10 BENIGN ESSENTIAL HYPERTENSION: Chronic | ICD-10-CM

## 2018-03-14 DIAGNOSIS — K21.9 GASTROESOPHAGEAL REFLUX DISEASE WITHOUT ESOPHAGITIS: ICD-10-CM

## 2018-03-14 DIAGNOSIS — Z72.0 CHEWS TOBACCO: ICD-10-CM

## 2018-03-14 DIAGNOSIS — G89.29 CHRONIC LEFT-SIDED LOW BACK PAIN WITH LEFT-SIDED SCIATICA: ICD-10-CM

## 2018-03-14 DIAGNOSIS — M05.79 RHEUMATOID ARTHRITIS INVOLVING MULTIPLE SITES WITH POSITIVE RHEUMATOID FACTOR (HCC): Chronic | ICD-10-CM

## 2018-03-14 DIAGNOSIS — E78.5 HYPERLIPIDEMIA, UNSPECIFIED HYPERLIPIDEMIA TYPE: Chronic | ICD-10-CM

## 2018-03-14 DIAGNOSIS — M54.42 CHRONIC LEFT-SIDED LOW BACK PAIN WITH LEFT-SIDED SCIATICA: ICD-10-CM

## 2018-03-14 DIAGNOSIS — E53.8 VITAMIN B 12 DEFICIENCY: ICD-10-CM

## 2018-03-14 PROCEDURE — 99214 OFFICE O/P EST MOD 30 MIN: CPT | Performed by: NURSE PRACTITIONER

## 2018-03-14 RX ORDER — PRAVASTATIN SODIUM 10 MG
10 TABLET ORAL NIGHTLY
Qty: 30 TABLET | Refills: 3 | Status: SHIPPED | OUTPATIENT
Start: 2018-03-14 | End: 2018-03-16 | Stop reason: SDUPTHER

## 2018-03-14 RX ORDER — NEOMYCIN SULFATE 500 MG/1
TABLET ORAL
Refills: 0 | COMMUNITY
Start: 2018-02-21 | End: 2018-04-26

## 2018-03-14 NOTE — PATIENT INSTRUCTIONS
Type 2 Diabetes Mellitus, Diagnosis, Adult  Type 2 diabetes (type 2 diabetes mellitus) is a long-term (chronic) disease. It may be caused by one or both of these problems:  · Your body does not make enough of a hormone called insulin.  · Your body does not react in a normal way to insulin that it makes.  Insulin lets sugars (glucose) go into cells in the body. This gives you energy. If you have type 2 diabetes, sugars cannot get into cells. This causes high blood sugar (hyperglycemia).  Your doctor will set treatment goals for you. Generally, you should have these blood sugar levels:  · Before meals (preprandial):  mg/dL (4.4-7.2 mmol/L).  · After meals (postprandial): below 180 mg/dL (10 mmol/L).  · A1c (hemoglobin A1c) level: less than 7%.  Follow these instructions at home:  Questions to Ask Your Doctor     You may want to ask these questions:  · Do I need to meet with a diabetes educator?  · Where can I find a support group for people with diabetes?  · What equipment will I need to care for myself at home?  · What diabetes medicines do I need? When should I take them?  · How often do I need to check my blood sugar?  · What number can I call if I have questions?  · When is my next doctor's visit?  General instructions   · Take over-the-counter and prescription medicines only as told by your doctor.  · Keep all follow-up visits as told by your doctor. This is important.  Contact a doctor if:  · Your blood sugar is at or above 240 mg/dL (13.3 mmol/L) for 2 days in a row.  · You have been sick or have had a fever for 2 days or more and you are not getting better.  · You have any of these problems for more than 6 hours:  ¨ You cannot eat or drink.  ¨ You feel sick to your stomach (nauseous).  ¨ You throw up (vomit).  ¨ You have watery poop (diarrhea).  Get help right away if:  · Your blood sugar is lower than 54 mg/dL (3 mmol/L).  · You get confused.  · You have trouble:  ¨ Thinking  clearly.  ¨ Breathing.  · You have moderate or large ketone levels in your pee (urine).  This information is not intended to replace advice given to you by your health care provider. Make sure you discuss any questions you have with your health care provider.  Document Released: 09/26/2009 Document Revised: 05/25/2017 Document Reviewed: 01/20/2017  "[employer] Interactive Patient Education © 2017 "[employer] Inc.  Insomnia  Insomnia is a sleep disorder that makes it difficult to fall asleep or to stay asleep. Insomnia can cause tiredness (fatigue), low energy, difficulty concentrating, mood swings, and poor performance at work or school.  There are three different ways to classify insomnia:  · Difficulty falling asleep.  · Difficulty staying asleep.  · Waking up too early in the morning.  Any type of insomnia can be long-term (chronic) or short-term (acute). Both are common. Short-term insomnia usually lasts for three months or less. Chronic insomnia occurs at least three times a week for longer than three months.  What are the causes?  Insomnia may be caused by another condition, situation, or substance, such as:  · Anxiety.  · Certain medicines.  · Gastroesophageal reflux disease (GERD) or other gastrointestinal conditions.  · Asthma or other breathing conditions.  · Restless legs syndrome, sleep apnea, or other sleep disorders.  · Chronic pain.  · Menopause. This may include hot flashes.  · Stroke.  · Abuse of alcohol, tobacco, or illegal drugs.  · Depression.  · Caffeine.  · Neurological disorders, such as Alzheimer disease.  · An overactive thyroid (hyperthyroidism).  The cause of insomnia may not be known.  What increases the risk?  Risk factors for insomnia include:  · Gender. Women are more commonly affected than men.  · Age. Insomnia is more common as you get older.  · Stress. This may involve your professional or personal life.  · Income. Insomnia is more common in people with lower income.  · Lack of  exercise.  · Irregular work schedule or night shifts.  · Traveling between different time zones.  What are the signs or symptoms?  If you have insomnia, trouble falling asleep or trouble staying asleep is the main symptom. This may lead to other symptoms, such as:  · Feeling fatigued.  · Feeling nervous about going to sleep.  · Not feeling rested in the morning.  · Having trouble concentrating.  · Feeling irritable, anxious, or depressed.  How is this treated?  Treatment for insomnia depends on the cause. If your insomnia is caused by an underlying condition, treatment will focus on addressing the condition. Treatment may also include:  · Medicines to help you sleep.  · Counseling or therapy.  · Lifestyle adjustments.  Follow these instructions at home:  · Take medicines only as directed by your health care provider.  · Keep regular sleeping and waking hours. Avoid naps.  · Keep a sleep diary to help you and your health care provider figure out what could be causing your insomnia. Include:  ¨ When you sleep.  ¨ When you wake up during the night.  ¨ How well you sleep.  ¨ How rested you feel the next day.  ¨ Any side effects of medicines you are taking.  ¨ What you eat and drink.  · Make your bedroom a comfortable place where it is easy to fall asleep:  ¨ Put up shades or special blackout curtains to block light from outside.  ¨ Use a white noise machine to block noise.  ¨ Keep the temperature cool.  · Exercise regularly as directed by your health care provider. Avoid exercising right before bedtime.  · Use relaxation techniques to manage stress. Ask your health care provider to suggest some techniques that may work well for you. These may include:  ¨ Breathing exercises.  ¨ Routines to release muscle tension.  ¨ Visualizing peaceful scenes.  · Cut back on alcohol, caffeinated beverages, and cigarettes, especially close to bedtime. These can disrupt your sleep.  · Do not overeat or eat spicy foods right before  bedtime. This can lead to digestive discomfort that can make it hard for you to sleep.  · Limit screen use before bedtime. This includes:  ¨ Watching TV.  ¨ Using your smartphone, tablet, and computer.  · Stick to a routine. This can help you fall asleep faster. Try to do a quiet activity, brush your teeth, and go to bed at the same time each night.  · Get out of bed if you are still awake after 15 minutes of trying to sleep. Keep the lights down, but try reading or doing a quiet activity. When you feel sleepy, go back to bed.  · Make sure that you drive carefully. Avoid driving if you feel very sleepy.  · Keep all follow-up appointments as directed by your health care provider. This is important.  Contact a health care provider if:  · You are tired throughout the day or have trouble in your daily routine due to sleepiness.  · You continue to have sleep problems or your sleep problems get worse.  Get help right away if:  · You have serious thoughts about hurting yourself or someone else.  This information is not intended to replace advice given to you by your health care provider. Make sure you discuss any questions you have with your health care provider.  Document Released: 12/15/2001 Document Revised: 05/19/2017 Document Reviewed: 09/18/2015  [employer] Interactive Patient Education © 2017 [employer]

## 2018-03-14 NOTE — PROGRESS NOTES
Raimundo presents to the clinic today to review his recent lab results pertaining to his DM, type 2, HTN, GERD, Rheumatoid Arthritis and Hyperlipidemia. Overall, he reports he feels well. He did have a colon resection in February by Dr Tinoco for Rectal  Prolapse and Polyp. Raimundo  reports he is doing well.      Hypertension   The current episode started more than 1 year ago. The problem is controlled. Pertinent negatives include no anxiety, blurred vision, chest pain, headaches, malaise/fatigue, palpitations, peripheral edema, shortness of breath or sweats.     Diabetes   He has type 2 diabetes mellitus. His disease course has been improving. Pertinent negatives for diabetes include no blurred vision, no chest pain, no fatigue, no polydipsia, no polyphagia, no polyuria and no weakness. Risk factors for coronary artery disease include dyslipidemia, hypertension, male sex, sedentary lifestyle and tobacco exposure. Current diabetic treatment includes oral agent (monotherapy). He is compliant with medication regimen most of the time. He has had a previous visit with a dietitian. An ACE inhibitor/angiotensin II receptor blocker is being taken. Eye exam is current.      Hyperlipidemia   This is a chronic problem. The current episode started more than 1 year ago. The problem is controlled. Factors aggravating his hyperlipidemia include fatty foods. Pertinent negatives include no chest pain or shortness of breath. Current antihyperlipidemic treatment includes statins. The current treatment provides significant improvement of lipids. Compliance problems include adherence to diet and adherence to exercise.  Risk factors for coronary artery disease include diabetes mellitus, dyslipidemia, hypertension, male sex and obesity.      Refer to ROS for additional information.  Vitals:    03/14/18 1128   BP: 115/79   Pulse: 102   Temp: 97.5 °F (36.4 °C)   TempSrc: Temporal Artery    SpO2: 94%   Weight: 92.5 kg (204 lb)   Height:  "172 cm (67.72\")      The following portions of the patient's history were reviewed and updated as appropriate: allergies, current medications, past family history, past medical history, past social history, past surgical history and problem list.    Review of Systems   Constitutional: Positive for activity change (Due to surgery). Negative for appetite change, chills, fatigue, fever and unexpected weight change.   HENT: Negative.    Eyes: Negative for visual disturbance.   Respiratory: Negative for cough, chest tightness, shortness of breath and wheezing.    Cardiovascular: Negative for chest pain, palpitations and leg swelling.   Gastrointestinal: Negative for abdominal pain, nausea and vomiting.        Continues to have mild postoperative discomfort   Endocrine: Negative for cold intolerance, heat intolerance, polydipsia, polyphagia and polyuria.   Musculoskeletal: Positive for arthralgias, back pain, joint swelling and neck pain. Negative for neck stiffness.   Skin: Positive for wound (Abdominal Incision ). Negative for color change and rash.   Neurological: Negative for dizziness, tremors, speech difficulty, weakness, light-headedness and headaches.   Hematological: Negative for adenopathy.   Psychiatric/Behavioral: Positive for sleep disturbance. Negative for confusion, decreased concentration and suicidal ideas. The patient is not nervous/anxious.    All other systems reviewed and are negative.    Physical Exam   Constitutional: He is oriented to person, place, and time. He appears well-developed and well-nourished. No distress.   HENT:   Head: Normocephalic and atraumatic.   Nose: Nose normal.   Mouth/Throat: Oropharynx is clear and moist.   Eyes: Conjunctivae are normal. Pupils are equal, round, and reactive to light. Right eye exhibits no discharge. Left eye exhibits no discharge. No scleral icterus.   Neck: Neck supple. No JVD present. No thyromegaly present.   Cardiovascular: Normal rate, regular rhythm, " normal heart sounds and intact distal pulses.  Exam reveals no friction rub.    No murmur heard.  Pulmonary/Chest: Effort normal. No respiratory distress. He has no wheezes. He has no rales.   Abdominal: Soft. Bowel sounds are normal. He exhibits no distension. There is tenderness. There is no rebound and no guarding.   Musculoskeletal: He exhibits no edema.    Raimundo had a diabetic foot exam performed (Extreme scaly and thick nails which need attention) today.  Vascular Status -  His right foot exhibits normal right foot vasculature  and normal right foot edema. His left foot exhibits normal left foot vasculature  and normal left foot edema.  Skin Integrity  -  His right foot skin is intact.His left foot skin is intact..  Lymphadenopathy:     He has no cervical adenopathy.   Neurological: He is alert and oriented to person, place, and time.   Skin: Skin is warm and dry. No rash noted. No erythema.   Abdominal Incision which is covered with surgical bandage   Psychiatric: He has a normal mood and affect. His behavior is normal. Judgment and thought content normal.   Vitals reviewed.    Assessment/Plan   Problems Addressed this Visit        Cardiovascular and Mediastinum    Benign essential hypertension    Relevant Medications    metoprolol succinate XL (TOPROL-XL) 50 MG 24 hr tablet    Hyperlipidemia    Relevant Medications    pravastatin (PRAVACHOL) 10 MG tablet       Respiratory    Chronic allergic rhinitis    Relevant Medications    loratadine (CLARITIN) 10 MG tablet    diphenhydrAMINE (BENADRYL) 25 mg capsule       Digestive    Vitamin D deficiency    Gastroesophageal reflux disease without esophagitis    Relevant Medications    lansoprazole (PREVACID) 30 MG capsule    Vitamin B 12 deficiency       Endocrine    Type 2 diabetes mellitus without complications - Primary    Relevant Medications    metFORMIN (GLUCOPHAGE) 500 MG tablet    Other Relevant Orders    Ambulatory Referral to Podiatry       Nervous and  Auditory    Lumbago    Relevant Medications    cyclobenzaprine (FLEXERIL) 10 MG tablet       Musculoskeletal and Integument    Rheumatoid arthritis with positive rheumatoid factor    Relevant Medications    folic acid (FOLVITE) 1 MG tablet    Muscle spasms of both lower extremities    Relevant Medications    cyclobenzaprine (FLEXERIL) 10 MG tablet       Other    Chews tobacco    Chronic insomnia    Relevant Orders    Ambulatory Referral to Sleep Medicine      Other Visit Diagnoses    None.       Results for orders placed or performed in visit on 03/06/18   Comprehensive Metabolic Panel   Result Value Ref Range    Glucose 115 (H) 70 - 110 mg/dL    BUN 9 7 - 21 mg/dL    Creatinine 0.81 0.43 - 1.29 mg/dL    Sodium 140 135 - 153 mmol/L    Potassium 3.8 3.5 - 5.3 mmol/L    Chloride 105 99 - 112 mmol/L    CO2 29.1 24.3 - 31.9 mmol/L    Calcium 8.9 7.7 - 10.0 mg/dL    Total Protein 6.7 6.0 - 8.0 g/dL    Albumin 4.10 3.50 - 5.00 g/dL    ALT (SGPT) 22 10 - 44 U/L    AST (SGOT) 14 10 - 34 U/L    Alkaline Phosphatase 135 (H) 40 - 129 U/L    Total Bilirubin 0.2 0.2 - 1.8 mg/dL    eGFR Non African Amer 100 >60 mL/min/1.73    Globulin 2.6 gm/dL    A/G Ratio 1.6 1.5 - 2.5 g/dL    BUN/Creatinine Ratio 11.1 7.0 - 25.0    Anion Gap 5.9 3.6 - 11.2 mmol/L   Lipid Panel   Result Value Ref Range    Total Cholesterol 92 0 - 200 mg/dL    Triglycerides 147 0 - 150 mg/dL    HDL Cholesterol 25 (L) 60 - 100 mg/dL    LDL Cholesterol  38 0 - 100 mg/dL    VLDL Cholesterol 29.4 mg/dL    LDL/HDL Ratio 1.50    TSH   Result Value Ref Range    TSH 3.410 0.550 - 4.780 mIU/mL   Hemoglobin A1c   Result Value Ref Range    Hemoglobin A1C 6.10 (H) 4.50 - 5.70 %   Vitamin D 25 Hydroxy   Result Value Ref Range    25 Hydroxy, Vitamin D 28.0 ng/ml   Vitamin B12   Result Value Ref Range    Vitamin B-12 585 211 - 911 pg/mL   CBC Auto Differential   Result Value Ref Range    WBC 9.01 4.50 - 12.50 10*3/mm3    RBC 4.61 (L) 4.70 - 6.10 10*6/mm3    Hemoglobin 14.2  14.0 - 18.0 g/dL    Hematocrit 44.2 42.0 - 52.0 %    MCV 95.9 (H) 80.0 - 94.0 fL    MCH 30.8 27.0 - 33.0 pg    MCHC 32.1 (L) 33.0 - 37.0 g/dL    RDW 13.9 11.5 - 14.5 %    RDW-SD 47.5 37.0 - 54.0 fl    MPV 9.4 6.0 - 10.0 fL    Platelets 495 (H) 130 - 400 10*3/mm3    Neutrophil % 52.2 30.0 - 70.0 %    Lymphocyte % 28.0 21.0 - 51.0 %    Monocyte % 12.9 (H) 0.0 - 10.0 %    Eosinophil % 5.7 (H) 0.0 - 5.0 %    Basophil % 0.6 0.0 - 2.0 %    Immature Grans % 0.6 (H) 0.0 - 0.5 %    Neutrophils, Absolute 4.72 1.40 - 6.50 10*3/mm3    Lymphocytes, Absolute 2.52 1.00 - 3.00 10*3/mm3    Monocytes, Absolute 1.16 (H) 0.10 - 0.90 10*3/mm3    Eosinophils, Absolute 0.51 0.00 - 0.70 10*3/mm3    Basophils, Absolute 0.05 0.00 - 0.30 10*3/mm3    Immature Grans, Absolute 0.05 (H) 0.00 - 0.03 10*3/mm3   Osmolality, Calculated   Result Value Ref Range    Osmolality Calc 279.0 273.0 - 305.0 mOsm/kg     Findings and recommendations discussed with Raimundo. Reviewed recent lab results with him which included a FBS of 115 mg/dL and  A1C of 6.4.    Reviewed lipid panel which included TC:92 mg/dL;Triglycerides:147 mg/dL; HDL:25 mg/dL and LDL:38 mg/dL. Will change statin at this time.   Referrals to Podiatry and Sleep Medicine made  Lifestyle modifications including nutrition and risk reduction reinforced. Raimundo will f/u with me in July with routine labs prior to his visit; Sooner if problems/concerns occur.

## 2018-03-16 RX ORDER — PRAVASTATIN SODIUM 10 MG
10 TABLET ORAL NIGHTLY
Qty: 30 TABLET | Refills: 4 | Status: SHIPPED | OUTPATIENT
Start: 2018-03-16 | End: 2018-07-20 | Stop reason: SDUPTHER

## 2018-03-16 RX ORDER — LORATADINE 10 MG/1
10 TABLET ORAL DAILY
Qty: 30 TABLET | Refills: 4 | Status: SHIPPED | OUTPATIENT
Start: 2018-03-16 | End: 2018-07-20 | Stop reason: SDUPTHER

## 2018-03-16 RX ORDER — DIPHENHYDRAMINE HCL 25 MG
25 CAPSULE ORAL NIGHTLY PRN
Qty: 30 CAPSULE | Refills: 4 | Status: SHIPPED | OUTPATIENT
Start: 2018-03-16 | End: 2018-07-20

## 2018-03-16 RX ORDER — CYCLOBENZAPRINE HCL 10 MG
10 TABLET ORAL 3 TIMES DAILY PRN
Qty: 90 TABLET | Refills: 4 | Status: SHIPPED | OUTPATIENT
Start: 2018-03-16 | End: 2018-09-05 | Stop reason: SDUPTHER

## 2018-03-16 RX ORDER — FOLIC ACID 1 MG/1
1 TABLET ORAL DAILY
Qty: 30 TABLET | Refills: 4 | Status: SHIPPED | OUTPATIENT
Start: 2018-03-16 | End: 2018-07-20 | Stop reason: SDUPTHER

## 2018-03-16 RX ORDER — LANSOPRAZOLE 30 MG/1
30 CAPSULE, DELAYED RELEASE ORAL DAILY
Qty: 30 CAPSULE | Refills: 4 | Status: SHIPPED | OUTPATIENT
Start: 2018-03-16 | End: 2018-07-20 | Stop reason: SDUPTHER

## 2018-03-16 RX ORDER — METOPROLOL SUCCINATE 50 MG/1
50 TABLET, EXTENDED RELEASE ORAL DAILY
Qty: 30 TABLET | Refills: 4 | Status: SHIPPED | OUTPATIENT
Start: 2018-03-16 | End: 2018-07-20 | Stop reason: SDUPTHER

## 2018-03-29 ENCOUNTER — OFFICE VISIT (OUTPATIENT)
Dept: FAMILY MEDICINE CLINIC | Facility: CLINIC | Age: 53
End: 2018-03-29

## 2018-03-29 VITALS
OXYGEN SATURATION: 96 % | HEIGHT: 68 IN | DIASTOLIC BLOOD PRESSURE: 70 MMHG | HEART RATE: 92 BPM | TEMPERATURE: 98.9 F | BODY MASS INDEX: 30.77 KG/M2 | SYSTOLIC BLOOD PRESSURE: 100 MMHG | WEIGHT: 203 LBS

## 2018-03-29 DIAGNOSIS — F51.01 PRIMARY INSOMNIA: Primary | ICD-10-CM

## 2018-03-29 DIAGNOSIS — E11.9 TYPE 2 DIABETES MELLITUS WITHOUT COMPLICATION, WITHOUT LONG-TERM CURRENT USE OF INSULIN (HCC): ICD-10-CM

## 2018-03-29 DIAGNOSIS — I10 BENIGN ESSENTIAL HYPERTENSION: ICD-10-CM

## 2018-03-29 DIAGNOSIS — E78.2 MIXED HYPERLIPIDEMIA: ICD-10-CM

## 2018-03-29 PROCEDURE — 99214 OFFICE O/P EST MOD 30 MIN: CPT | Performed by: PHYSICIAN ASSISTANT

## 2018-03-29 RX ORDER — TRAZODONE HYDROCHLORIDE 50 MG/1
50 TABLET ORAL NIGHTLY
Qty: 30 TABLET | Refills: 5 | Status: SHIPPED | OUTPATIENT
Start: 2018-03-29 | End: 2018-04-26

## 2018-03-29 NOTE — PROGRESS NOTES
Subjective   Raimundo Munguia is a 52 y.o. male.     Chief complaint-trouble sleeping    History of Present Illness     Trouble sleeping-  He complains of trouble going to sleep and staying asleep.  Minimal relief with Benadryl.  Onset several years ago.    Hypertension-well controlled with metoprolol    Diabetes  Current symptoms include none.  Evaluation to date has been: fasting blood sugar. Home sugars: BGs are running  consistent with Hgb A1C. Current treatments: metformin and more intensive attention to diet which has been effective. Last dilated eye exam 1/27/18. Most recent hemoglobin A1c   Lab Results   Component Value Date    HGBA1C 6.10 (H) 03/06/2018    HGBA1C 6.00 (H) 11/01/2017    HGBA1C 5.90 (H) 06/27/2017    Stable    Hyperlipidemia-  Well-controlled with pravastatin    The following portions of the patient's history were reviewed and updated as appropriate: allergies, current medications, past family history, past medical history, past social history, past surgical history and problem list.    Review of Systems   Constitutional: Negative for activity change, appetite change and fever.   HENT: Negative for ear pain, sinus pressure and sore throat.    Eyes: Negative for pain and visual disturbance.   Respiratory: Negative for cough and chest tightness.    Cardiovascular: Negative for chest pain and palpitations.   Gastrointestinal: Negative for abdominal pain, constipation, diarrhea, nausea and vomiting.   Endocrine: Negative for polydipsia and polyuria.   Genitourinary: Negative for dysuria and frequency.   Musculoskeletal: Negative for back pain and myalgias.   Skin: Negative for color change and rash.   Allergic/Immunologic: Negative for food allergies and immunocompromised state.   Neurological: Negative for dizziness, syncope and headaches.   Hematological: Negative for adenopathy. Does not bruise/bleed easily.   Psychiatric/Behavioral: Positive for sleep disturbance. Negative for hallucinations  "and suicidal ideas. The patient is not nervous/anxious.        /70   Pulse 92   Temp 98.9 °F (37.2 °C) (Temporal Artery )   Ht 172.7 cm (68\")   Wt 92.1 kg (203 lb)   SpO2 96%   BMI 30.87 kg/m²   Lab on 03/06/2018   Component Date Value Ref Range Status   • Glucose 03/06/2018 115* 70 - 110 mg/dL Final   • BUN 03/06/2018 9  7 - 21 mg/dL Final   • Creatinine 03/06/2018 0.81  0.43 - 1.29 mg/dL Final   • Sodium 03/06/2018 140  135 - 153 mmol/L Final   • Potassium 03/06/2018 3.8  3.5 - 5.3 mmol/L Final   • Chloride 03/06/2018 105  99 - 112 mmol/L Final   • CO2 03/06/2018 29.1  24.3 - 31.9 mmol/L Final   • Calcium 03/06/2018 8.9  7.7 - 10.0 mg/dL Final   • Total Protein 03/06/2018 6.7  6.0 - 8.0 g/dL Final   • Albumin 03/06/2018 4.10  3.50 - 5.00 g/dL Final   • ALT (SGPT) 03/06/2018 22  10 - 44 U/L Final   • AST (SGOT) 03/06/2018 14  10 - 34 U/L Final   • Alkaline Phosphatase 03/06/2018 135* 40 - 129 U/L Final   • Total Bilirubin 03/06/2018 0.2  0.2 - 1.8 mg/dL Final   • eGFR Non African Amer 03/06/2018 100  >60 mL/min/1.73 Final   • Globulin 03/06/2018 2.6  gm/dL Final   • A/G Ratio 03/06/2018 1.6  1.5 - 2.5 g/dL Final   • BUN/Creatinine Ratio 03/06/2018 11.1  7.0 - 25.0 Final   • Anion Gap 03/06/2018 5.9  3.6 - 11.2 mmol/L Final   • Total Cholesterol 03/06/2018 92  0 - 200 mg/dL Final   • Triglycerides 03/06/2018 147  0 - 150 mg/dL Final   • HDL Cholesterol 03/06/2018 25* 60 - 100 mg/dL Final   • LDL Cholesterol  03/06/2018 38  0 - 100 mg/dL Final   • VLDL Cholesterol 03/06/2018 29.4  mg/dL Final   • LDL/HDL Ratio 03/06/2018 1.50   Final   • TSH 03/06/2018 3.410  0.550 - 4.780 mIU/mL Final   • Hemoglobin A1C 03/06/2018 6.10* 4.50 - 5.70 % Final   • 25 Hydroxy, Vitamin D 03/06/2018 28.0  ng/ml Final   • Vitamin B-12 03/06/2018 585  211 - 911 pg/mL Final   • WBC 03/06/2018 9.01  4.50 - 12.50 10*3/mm3 Final   • RBC 03/06/2018 4.61* 4.70 - 6.10 10*6/mm3 Final   • Hemoglobin 03/06/2018 14.2  14.0 - 18.0 g/dL Final "   • Hematocrit 03/06/2018 44.2  42.0 - 52.0 % Final   • MCV 03/06/2018 95.9* 80.0 - 94.0 fL Final   • MCH 03/06/2018 30.8  27.0 - 33.0 pg Final   • MCHC 03/06/2018 32.1* 33.0 - 37.0 g/dL Final   • RDW 03/06/2018 13.9  11.5 - 14.5 % Final   • RDW-SD 03/06/2018 47.5  37.0 - 54.0 fl Final   • MPV 03/06/2018 9.4  6.0 - 10.0 fL Final   • Platelets 03/06/2018 495* 130 - 400 10*3/mm3 Final   • Neutrophil % 03/06/2018 52.2  30.0 - 70.0 % Final   • Lymphocyte % 03/06/2018 28.0  21.0 - 51.0 % Final   • Monocyte % 03/06/2018 12.9* 0.0 - 10.0 % Final   • Eosinophil % 03/06/2018 5.7* 0.0 - 5.0 % Final   • Basophil % 03/06/2018 0.6  0.0 - 2.0 % Final   • Immature Grans % 03/06/2018 0.6* 0.0 - 0.5 % Final   • Neutrophils, Absolute 03/06/2018 4.72  1.40 - 6.50 10*3/mm3 Final   • Lymphocytes, Absolute 03/06/2018 2.52  1.00 - 3.00 10*3/mm3 Final   • Monocytes, Absolute 03/06/2018 1.16* 0.10 - 0.90 10*3/mm3 Final   • Eosinophils, Absolute 03/06/2018 0.51  0.00 - 0.70 10*3/mm3 Final   • Basophils, Absolute 03/06/2018 0.05  0.00 - 0.30 10*3/mm3 Final   • Immature Grans, Absolute 03/06/2018 0.05* 0.00 - 0.03 10*3/mm3 Final   • Osmolality Calc 03/06/2018 279.0  273.0 - 305.0 mOsm/kg Final       Physical Exam   Constitutional: He is oriented to person, place, and time. He appears well-developed and well-nourished.   HENT:   Head: Normocephalic and atraumatic.   Nose: Nose normal.   Mouth/Throat: Oropharynx is clear and moist.   Eyes: Conjunctivae and EOM are normal. Pupils are equal, round, and reactive to light.   Neck: Normal range of motion. Neck supple. No tracheal deviation present. No thyromegaly present.   Cardiovascular: Normal rate, regular rhythm, normal heart sounds and intact distal pulses.    No murmur heard.  Pulmonary/Chest: Effort normal and breath sounds normal. No respiratory distress. He has no wheezes.   Abdominal: Soft. Bowel sounds are normal. There is no tenderness. There is no guarding.   Musculoskeletal: Normal  range of motion. He exhibits no edema or tenderness.   Lymphadenopathy:     He has no cervical adenopathy.   Neurological: He is alert and oriented to person, place, and time.   Skin: Skin is warm and dry. No rash noted.   Psychiatric: He has a normal mood and affect. His behavior is normal.   Nursing note and vitals reviewed.      Assessment/Plan     Diagnoses and all orders for this visit:    Primary insomnia  Comments:  Start trazodone  Orders:  -     traZODone (DESYREL) 50 MG tablet; Take 1 tablet by mouth Every Night.    Benign essential hypertension    Type 2 diabetes mellitus without complication, without long-term current use of insulin    Mixed hyperlipidemia                 This document has been electronically signed by:  Cherie Amaro PA-C

## 2018-04-09 ENCOUNTER — OFFICE VISIT (OUTPATIENT)
Dept: SURGERY | Facility: CLINIC | Age: 53
End: 2018-04-09

## 2018-04-09 VITALS — BODY MASS INDEX: 31.37 KG/M2 | HEIGHT: 68 IN | WEIGHT: 207 LBS

## 2018-04-09 DIAGNOSIS — E66.9 OBESITY (BMI 30-39.9): Primary | ICD-10-CM

## 2018-04-09 DIAGNOSIS — Z90.49 STATUS POST COLECTOMY: ICD-10-CM

## 2018-04-09 PROBLEM — K62.1 RECTAL POLYP: Status: RESOLVED | Noted: 2018-02-12 | Resolved: 2018-04-09

## 2018-04-09 PROCEDURE — 99024 POSTOP FOLLOW-UP VISIT: CPT | Performed by: SURGERY

## 2018-04-09 NOTE — PROGRESS NOTES
Subjective   Raimundo Munguia is a 52 y.o. male.     History of Present Illness He is doing very well. No GI issues. His bowels are working well.     The following portions of the patient's history were reviewed and updated as appropriate: current medications, past family history, past medical history, past social history, past surgical history and problem list.    Review of Systems    Objective   Physical Exam wound ok    Assessment/Plan   Raimundo was seen today for follow-up.    Diagnoses and all orders for this visit:    Obesity (BMI 30-39.9)    Status post colectomy    return here prn. Follow up with Dr Garg.

## 2018-04-26 ENCOUNTER — OFFICE VISIT (OUTPATIENT)
Dept: FAMILY MEDICINE CLINIC | Facility: CLINIC | Age: 53
End: 2018-04-26

## 2018-04-26 VITALS
HEIGHT: 68 IN | WEIGHT: 204 LBS | BODY MASS INDEX: 30.92 KG/M2 | TEMPERATURE: 98.8 F | SYSTOLIC BLOOD PRESSURE: 114 MMHG | HEART RATE: 112 BPM | OXYGEN SATURATION: 100 % | DIASTOLIC BLOOD PRESSURE: 70 MMHG

## 2018-04-26 DIAGNOSIS — E11.9 TYPE 2 DIABETES MELLITUS WITHOUT COMPLICATION, WITHOUT LONG-TERM CURRENT USE OF INSULIN (HCC): ICD-10-CM

## 2018-04-26 DIAGNOSIS — I10 BENIGN ESSENTIAL HYPERTENSION: ICD-10-CM

## 2018-04-26 DIAGNOSIS — F51.04 CHRONIC INSOMNIA: Primary | ICD-10-CM

## 2018-04-26 PROCEDURE — 99214 OFFICE O/P EST MOD 30 MIN: CPT | Performed by: PHYSICIAN ASSISTANT

## 2018-04-26 RX ORDER — TRAZODONE HYDROCHLORIDE 100 MG/1
100 TABLET ORAL NIGHTLY
Qty: 30 TABLET | Refills: 5 | Status: SHIPPED | OUTPATIENT
Start: 2018-04-26 | End: 2018-10-26 | Stop reason: SDUPTHER

## 2018-04-26 NOTE — PROGRESS NOTES
Subjective   Raimundo Munguia is a 52 y.o. male.     Chief complaint-insomnia    History of Present Illness     Insomnia-  He complains of chronic insomnia.  Improved with trazodone.  He reports not at goal with 50 mg dose trazodone.  Not at goal    Diabetes  Current symptoms include none.  Evaluation to date has been: fasting blood sugar. Home sugars: BGs are running  consistent with Hgb A1C. Current treatments: metformin and more intensive attention to diet which has been effective.  Most recent hemoglobin A1c   Lab Results   Component Value Date    HGBA1C 6.10 (H) 03/06/2018    HGBA1C 6.00 (H) 11/01/2017    HGBA1C 5.90 (H) 06/27/2017    Stable    Hypertension-well controlled with metoprolol    The following portions of the patient's history were reviewed and updated as appropriate: allergies, current medications, past family history, past medical history, past social history, past surgical history and problem list.    Review of Systems   Constitutional: Negative for activity change, appetite change and fever.   HENT: Negative for ear pain, sinus pressure and sore throat.    Eyes: Negative for pain and visual disturbance.   Respiratory: Negative for cough and chest tightness.    Cardiovascular: Negative for chest pain and palpitations.   Gastrointestinal: Negative for abdominal pain, constipation, diarrhea, nausea and vomiting.   Endocrine: Negative for polydipsia and polyuria.   Genitourinary: Negative for dysuria and frequency.   Musculoskeletal: Negative for back pain and myalgias.   Skin: Negative for color change and rash.   Allergic/Immunologic: Negative for food allergies and immunocompromised state.   Neurological: Negative for dizziness, syncope and headaches.   Hematological: Negative for adenopathy. Does not bruise/bleed easily.   Psychiatric/Behavioral: Positive for sleep disturbance. Negative for hallucinations and suicidal ideas. The patient is not nervous/anxious.        /70   Pulse 112    "Temp 98.8 °F (37.1 °C) (Oral)   Ht 172.7 cm (67.99\")   Wt 92.5 kg (204 lb)   SpO2 100%   BMI 31.03 kg/m²   Lab on 03/06/2018   Component Date Value Ref Range Status   • Glucose 03/06/2018 115* 70 - 110 mg/dL Final   • BUN 03/06/2018 9  7 - 21 mg/dL Final   • Creatinine 03/06/2018 0.81  0.43 - 1.29 mg/dL Final   • Sodium 03/06/2018 140  135 - 153 mmol/L Final   • Potassium 03/06/2018 3.8  3.5 - 5.3 mmol/L Final   • Chloride 03/06/2018 105  99 - 112 mmol/L Final   • CO2 03/06/2018 29.1  24.3 - 31.9 mmol/L Final   • Calcium 03/06/2018 8.9  7.7 - 10.0 mg/dL Final   • Total Protein 03/06/2018 6.7  6.0 - 8.0 g/dL Final   • Albumin 03/06/2018 4.10  3.50 - 5.00 g/dL Final   • ALT (SGPT) 03/06/2018 22  10 - 44 U/L Final   • AST (SGOT) 03/06/2018 14  10 - 34 U/L Final   • Alkaline Phosphatase 03/06/2018 135* 40 - 129 U/L Final   • Total Bilirubin 03/06/2018 0.2  0.2 - 1.8 mg/dL Final   • eGFR Non African Amer 03/06/2018 100  >60 mL/min/1.73 Final   • Globulin 03/06/2018 2.6  gm/dL Final   • A/G Ratio 03/06/2018 1.6  1.5 - 2.5 g/dL Final   • BUN/Creatinine Ratio 03/06/2018 11.1  7.0 - 25.0 Final   • Anion Gap 03/06/2018 5.9  3.6 - 11.2 mmol/L Final   • Total Cholesterol 03/06/2018 92  0 - 200 mg/dL Final   • Triglycerides 03/06/2018 147  0 - 150 mg/dL Final   • HDL Cholesterol 03/06/2018 25* 60 - 100 mg/dL Final   • LDL Cholesterol  03/06/2018 38  0 - 100 mg/dL Final   • VLDL Cholesterol 03/06/2018 29.4  mg/dL Final   • LDL/HDL Ratio 03/06/2018 1.50   Final   • TSH 03/06/2018 3.410  0.550 - 4.780 mIU/mL Final   • Hemoglobin A1C 03/06/2018 6.10* 4.50 - 5.70 % Final   • 25 Hydroxy, Vitamin D 03/06/2018 28.0  ng/ml Final   • Vitamin B-12 03/06/2018 585  211 - 911 pg/mL Final   • WBC 03/06/2018 9.01  4.50 - 12.50 10*3/mm3 Final   • RBC 03/06/2018 4.61* 4.70 - 6.10 10*6/mm3 Final   • Hemoglobin 03/06/2018 14.2  14.0 - 18.0 g/dL Final   • Hematocrit 03/06/2018 44.2  42.0 - 52.0 % Final   • MCV 03/06/2018 95.9* 80.0 - 94.0 fL " Final   • MCH 03/06/2018 30.8  27.0 - 33.0 pg Final   • MCHC 03/06/2018 32.1* 33.0 - 37.0 g/dL Final   • RDW 03/06/2018 13.9  11.5 - 14.5 % Final   • RDW-SD 03/06/2018 47.5  37.0 - 54.0 fl Final   • MPV 03/06/2018 9.4  6.0 - 10.0 fL Final   • Platelets 03/06/2018 495* 130 - 400 10*3/mm3 Final   • Neutrophil % 03/06/2018 52.2  30.0 - 70.0 % Final   • Lymphocyte % 03/06/2018 28.0  21.0 - 51.0 % Final   • Monocyte % 03/06/2018 12.9* 0.0 - 10.0 % Final   • Eosinophil % 03/06/2018 5.7* 0.0 - 5.0 % Final   • Basophil % 03/06/2018 0.6  0.0 - 2.0 % Final   • Immature Grans % 03/06/2018 0.6* 0.0 - 0.5 % Final   • Neutrophils, Absolute 03/06/2018 4.72  1.40 - 6.50 10*3/mm3 Final   • Lymphocytes, Absolute 03/06/2018 2.52  1.00 - 3.00 10*3/mm3 Final   • Monocytes, Absolute 03/06/2018 1.16* 0.10 - 0.90 10*3/mm3 Final   • Eosinophils, Absolute 03/06/2018 0.51  0.00 - 0.70 10*3/mm3 Final   • Basophils, Absolute 03/06/2018 0.05  0.00 - 0.30 10*3/mm3 Final   • Immature Grans, Absolute 03/06/2018 0.05* 0.00 - 0.03 10*3/mm3 Final   • Osmolality Calc 03/06/2018 279.0  273.0 - 305.0 mOsm/kg Final       Physical Exam   Constitutional: He is oriented to person, place, and time. He appears well-developed and well-nourished.   HENT:   Head: Normocephalic and atraumatic.   Nose: Nose normal.   Mouth/Throat: Oropharynx is clear and moist.   Eyes: Conjunctivae and EOM are normal. Pupils are equal, round, and reactive to light.   Neck: Normal range of motion. Neck supple. No tracheal deviation present. No thyromegaly present.   Cardiovascular: Normal rate, regular rhythm, normal heart sounds and intact distal pulses.    No murmur heard.  Pulmonary/Chest: Effort normal and breath sounds normal. No respiratory distress. He has no wheezes.   Abdominal: Soft. Bowel sounds are normal. There is no tenderness. There is no guarding.   Musculoskeletal: Normal range of motion. He exhibits no edema or tenderness.   Lymphadenopathy:     He has no cervical  adenopathy.   Neurological: He is alert and oriented to person, place, and time.   Skin: Skin is warm and dry. No rash noted.   Psychiatric: He has a normal mood and affect. His behavior is normal.   Nursing note and vitals reviewed.      Assessment/Plan     Diagnoses and all orders for this visit:    Chronic insomnia  Comments:  Increase trazodone 100 mg daily at bedtime  Orders:  -     traZODone (DESYREL) 100 MG tablet; Take 1 tablet by mouth Every Night.    Type 2 diabetes mellitus without complication, without long-term current use of insulin    Benign essential hypertension                 This document has been electronically signed by:  Cherie Amaro PA-C

## 2018-05-04 DIAGNOSIS — K21.9 GASTROESOPHAGEAL REFLUX DISEASE WITHOUT ESOPHAGITIS: ICD-10-CM

## 2018-05-04 RX ORDER — RANITIDINE 300 MG/1
TABLET ORAL
Qty: 60 TABLET | Refills: 2 | Status: SHIPPED | OUTPATIENT
Start: 2018-05-04 | End: 2018-07-20 | Stop reason: SDUPTHER

## 2018-07-12 ENCOUNTER — LAB (OUTPATIENT)
Dept: FAMILY MEDICINE CLINIC | Facility: CLINIC | Age: 53
End: 2018-07-12

## 2018-07-12 DIAGNOSIS — E78.2 MIXED HYPERLIPIDEMIA: ICD-10-CM

## 2018-07-12 DIAGNOSIS — K21.00 GASTROESOPHAGEAL REFLUX DISEASE WITH ESOPHAGITIS: ICD-10-CM

## 2018-07-12 DIAGNOSIS — E11.9 TYPE 2 DIABETES MELLITUS WITHOUT COMPLICATION, WITHOUT LONG-TERM CURRENT USE OF INSULIN (HCC): ICD-10-CM

## 2018-07-12 DIAGNOSIS — E55.9 VITAMIN D DEFICIENCY: ICD-10-CM

## 2018-07-12 DIAGNOSIS — I10 BENIGN ESSENTIAL HYPERTENSION: ICD-10-CM

## 2018-07-12 DIAGNOSIS — M05.79 RHEUMATOID ARTHRITIS INVOLVING MULTIPLE SITES WITH POSITIVE RHEUMATOID FACTOR (HCC): ICD-10-CM

## 2018-07-12 DIAGNOSIS — E53.8 VITAMIN B 12 DEFICIENCY: ICD-10-CM

## 2018-07-12 DIAGNOSIS — M05.79 RHEUMATOID ARTHRITIS INVOLVING MULTIPLE SITES WITH POSITIVE RHEUMATOID FACTOR (HCC): Primary | ICD-10-CM

## 2018-07-12 LAB
25(OH)D3 SERPL-MCNC: 34 NG/ML
ALBUMIN SERPL-MCNC: 4 G/DL (ref 3.5–5)
ALBUMIN/GLOB SERPL: 1.5 G/DL (ref 1.5–2.5)
ALP SERPL-CCNC: 116 U/L (ref 40–129)
ALT SERPL W P-5'-P-CCNC: 29 U/L (ref 10–44)
ANION GAP SERPL CALCULATED.3IONS-SCNC: 7 MMOL/L (ref 3.6–11.2)
AST SERPL-CCNC: 21 U/L (ref 10–34)
BASOPHILS # BLD AUTO: 0.07 10*3/MM3 (ref 0–0.3)
BASOPHILS NFR BLD AUTO: 0.6 % (ref 0–2)
BILIRUB SERPL-MCNC: 0.3 MG/DL (ref 0.2–1.8)
BUN BLD-MCNC: 13 MG/DL (ref 7–21)
BUN/CREAT SERPL: 16.9 (ref 7–25)
CALCIUM SPEC-SCNC: 8.7 MG/DL (ref 7.7–10)
CHLORIDE SERPL-SCNC: 106 MMOL/L (ref 99–112)
CHOLEST SERPL-MCNC: 85 MG/DL (ref 0–200)
CO2 SERPL-SCNC: 27 MMOL/L (ref 24.3–31.9)
CREAT BLD-MCNC: 0.77 MG/DL (ref 0.43–1.29)
DEPRECATED RDW RBC AUTO: 48.4 FL (ref 37–54)
EOSINOPHIL # BLD AUTO: 0.66 10*3/MM3 (ref 0–0.7)
EOSINOPHIL NFR BLD AUTO: 5.4 % (ref 0–5)
ERYTHROCYTE [DISTWIDTH] IN BLOOD BY AUTOMATED COUNT: 14.9 % (ref 11.5–14.5)
GFR SERPL CREATININE-BSD FRML MDRD: 107 ML/MIN/1.73
GLOBULIN UR ELPH-MCNC: 2.6 GM/DL
GLUCOSE BLD-MCNC: 90 MG/DL (ref 70–110)
HBA1C MFR BLD: 6.3 % (ref 4.5–5.7)
HCT VFR BLD AUTO: 49.2 % (ref 42–52)
HDLC SERPL-MCNC: 23 MG/DL (ref 60–100)
HGB BLD-MCNC: 16.3 G/DL (ref 14–18)
IMM GRANULOCYTES # BLD: 0.03 10*3/MM3 (ref 0–0.03)
IMM GRANULOCYTES NFR BLD: 0.2 % (ref 0–0.5)
LDLC SERPL CALC-MCNC: 43 MG/DL (ref 0–100)
LDLC/HDLC SERPL: 1.88 {RATIO}
LYMPHOCYTES # BLD AUTO: 2.28 10*3/MM3 (ref 1–3)
LYMPHOCYTES NFR BLD AUTO: 18.7 % (ref 21–51)
MCH RBC QN AUTO: 30.1 PG (ref 27–33)
MCHC RBC AUTO-ENTMCNC: 33.1 G/DL (ref 33–37)
MCV RBC AUTO: 90.8 FL (ref 80–94)
MONOCYTES # BLD AUTO: 0.98 10*3/MM3 (ref 0.1–0.9)
MONOCYTES NFR BLD AUTO: 8 % (ref 0–10)
NEUTROPHILS # BLD AUTO: 8.18 10*3/MM3 (ref 1.4–6.5)
NEUTROPHILS NFR BLD AUTO: 67.1 % (ref 30–70)
OSMOLALITY SERPL CALC.SUM OF ELEC: 279 MOSM/KG (ref 273–305)
PLATELET # BLD AUTO: 321 10*3/MM3 (ref 130–400)
PMV BLD AUTO: 10.1 FL (ref 6–10)
POTASSIUM BLD-SCNC: 3.9 MMOL/L (ref 3.5–5.3)
PROT SERPL-MCNC: 6.6 G/DL (ref 6–8)
RBC # BLD AUTO: 5.42 10*6/MM3 (ref 4.7–6.1)
SODIUM BLD-SCNC: 140 MMOL/L (ref 135–153)
TRIGL SERPL-MCNC: 94 MG/DL (ref 0–150)
TSH SERPL DL<=0.05 MIU/L-ACNC: 3.92 MIU/ML (ref 0.55–4.78)
VIT B12 BLD-MCNC: 713 PG/ML (ref 211–911)
VLDLC SERPL-MCNC: 18.8 MG/DL
WBC NRBC COR # BLD: 12.2 10*3/MM3 (ref 4.5–12.5)

## 2018-07-12 PROCEDURE — 80050 GENERAL HEALTH PANEL: CPT | Performed by: PHYSICIAN ASSISTANT

## 2018-07-12 PROCEDURE — 83036 HEMOGLOBIN GLYCOSYLATED A1C: CPT | Performed by: PHYSICIAN ASSISTANT

## 2018-07-12 PROCEDURE — 82306 VITAMIN D 25 HYDROXY: CPT | Performed by: PHYSICIAN ASSISTANT

## 2018-07-12 PROCEDURE — 36415 COLL VENOUS BLD VENIPUNCTURE: CPT

## 2018-07-12 PROCEDURE — 82607 VITAMIN B-12: CPT | Performed by: PHYSICIAN ASSISTANT

## 2018-07-12 PROCEDURE — 80061 LIPID PANEL: CPT | Performed by: PHYSICIAN ASSISTANT

## 2018-07-18 ENCOUNTER — OFFICE VISIT (OUTPATIENT)
Dept: FAMILY MEDICINE CLINIC | Facility: CLINIC | Age: 53
End: 2018-07-18

## 2018-07-18 VITALS
HEART RATE: 74 BPM | BODY MASS INDEX: 30.62 KG/M2 | TEMPERATURE: 98.4 F | SYSTOLIC BLOOD PRESSURE: 128 MMHG | HEIGHT: 68 IN | OXYGEN SATURATION: 96 % | DIASTOLIC BLOOD PRESSURE: 80 MMHG | WEIGHT: 202 LBS

## 2018-07-18 DIAGNOSIS — F17.200 TOBACCO USE DISORDER: ICD-10-CM

## 2018-07-18 DIAGNOSIS — M05.79 RHEUMATOID ARTHRITIS INVOLVING MULTIPLE SITES WITH POSITIVE RHEUMATOID FACTOR (HCC): Chronic | ICD-10-CM

## 2018-07-18 DIAGNOSIS — E78.2 MIXED HYPERLIPIDEMIA: ICD-10-CM

## 2018-07-18 DIAGNOSIS — J30.9 CHRONIC ALLERGIC RHINITIS, UNSPECIFIED SEASONALITY, UNSPECIFIED TRIGGER: Chronic | ICD-10-CM

## 2018-07-18 DIAGNOSIS — E55.9 VITAMIN D DEFICIENCY: ICD-10-CM

## 2018-07-18 DIAGNOSIS — K21.9 GASTROESOPHAGEAL REFLUX DISEASE WITHOUT ESOPHAGITIS: ICD-10-CM

## 2018-07-18 DIAGNOSIS — Z13.0 SCREENING FOR ENDOCRINE/METABOLIC/IMMUNITY DISORDERS: ICD-10-CM

## 2018-07-18 DIAGNOSIS — Z13.29 SCREENING FOR ENDOCRINE/METABOLIC/IMMUNITY DISORDERS: ICD-10-CM

## 2018-07-18 DIAGNOSIS — I10 BENIGN ESSENTIAL HYPERTENSION: Chronic | ICD-10-CM

## 2018-07-18 DIAGNOSIS — Z13.228 SCREENING FOR ENDOCRINE/METABOLIC/IMMUNITY DISORDERS: ICD-10-CM

## 2018-07-18 DIAGNOSIS — E53.8 VITAMIN B 12 DEFICIENCY: ICD-10-CM

## 2018-07-18 DIAGNOSIS — E11.69 CONTROLLED TYPE 2 DIABETES MELLITUS WITH OTHER SPECIFIED COMPLICATION, WITHOUT LONG-TERM CURRENT USE OF INSULIN (HCC): Primary | ICD-10-CM

## 2018-07-18 DIAGNOSIS — E66.9 OBESITY (BMI 30-39.9): ICD-10-CM

## 2018-07-18 PROCEDURE — 99214 OFFICE O/P EST MOD 30 MIN: CPT | Performed by: NURSE PRACTITIONER

## 2018-07-18 NOTE — PATIENT INSTRUCTIONS
"DASH Eating Plan  DASH stands for \"Dietary Approaches to Stop Hypertension.\" The DASH eating plan is a healthy eating plan that has been shown to reduce high blood pressure (hypertension). It may also reduce your risk for type 2 diabetes, heart disease, and stroke. The DASH eating plan may also help with weight loss.  What are tips for following this plan?  General guidelines  · Avoid eating more than 2,300 mg (milligrams) of salt (sodium) a day. If you have hypertension, you may need to reduce your sodium intake to 1,500 mg a day.  · Limit alcohol intake to no more than 1 drink a day for nonpregnant women and 2 drinks a day for men. One drink equals 12 oz of beer, 5 oz of wine, or 1½ oz of hard liquor.  · Work with your health care provider to maintain a healthy body weight or to lose weight. Ask what an ideal weight is for you.  · Get at least 30 minutes of exercise that causes your heart to beat faster (aerobic exercise) most days of the week. Activities may include walking, swimming, or biking.  · Work with your health care provider or diet and nutrition specialist (dietitian) to adjust your eating plan to your individual calorie needs.  Reading food labels  · Check food labels for the amount of sodium per serving. Choose foods with less than 5 percent of the Daily Value of sodium. Generally, foods with less than 300 mg of sodium per serving fit into this eating plan.  · To find whole grains, look for the word \"whole\" as the first word in the ingredient list.  Shopping  · Buy products labeled as \"low-sodium\" or \"no salt added.\"  · Buy fresh foods. Avoid canned foods and premade or frozen meals.  Cooking  · Avoid adding salt when cooking. Use salt-free seasonings or herbs instead of table salt or sea salt. Check with your health care provider or pharmacist before using salt substitutes.  · Do not judd foods. Cook foods using healthy methods such as baking, boiling, grilling, and broiling instead.  · Cook with " heart-healthy oils, such as olive, canola, soybean, or sunflower oil.  Meal planning    · Eat a balanced diet that includes:  ? 5 or more servings of fruits and vegetables each day. At each meal, try to fill half of your plate with fruits and vegetables.  ? Up to 6-8 servings of whole grains each day.  ? Less than 6 oz of lean meat, poultry, or fish each day. A 3-oz serving of meat is about the same size as a deck of cards. One egg equals 1 oz.  ? 2 servings of low-fat dairy each day.  ? A serving of nuts, seeds, or beans 5 times each week.  ? Heart-healthy fats. Healthy fats called Omega-3 fatty acids are found in foods such as flaxseeds and coldwater fish, like sardines, salmon, and mackerel.  · Limit how much you eat of the following:  ? Canned or prepackaged foods.  ? Food that is high in trans fat, such as fried foods.  ? Food that is high in saturated fat, such as fatty meat.  ? Sweets, desserts, sugary drinks, and other foods with added sugar.  ? Full-fat dairy products.  · Do not salt foods before eating.  · Try to eat at least 2 vegetarian meals each week.  · Eat more home-cooked food and less restaurant, buffet, and fast food.  · When eating at a restaurant, ask that your food be prepared with less salt or no salt, if possible.  What foods are recommended?  The items listed may not be a complete list. Talk with your dietitian about what dietary choices are best for you.  Grains  Whole-grain or whole-wheat bread. Whole-grain or whole-wheat pasta. Brown rice. Oatmeal. Quinoa. Bulgur. Whole-grain and low-sodium cereals. Ivana bread. Low-fat, low-sodium crackers. Whole-wheat flour tortillas.  Vegetables  Fresh or frozen vegetables (raw, steamed, roasted, or grilled). Low-sodium or reduced-sodium tomato and vegetable juice. Low-sodium or reduced-sodium tomato sauce and tomato paste. Low-sodium or reduced-sodium canned vegetables.  Fruits  All fresh, dried, or frozen fruit. Canned fruit in natural juice (without  added sugar).  Meat and other protein foods  Skinless chicken or turkey. Ground chicken or turkey. Pork with fat trimmed off. Fish and seafood. Egg whites. Dried beans, peas, or lentils. Unsalted nuts, nut butters, and seeds. Unsalted canned beans. Lean cuts of beef with fat trimmed off. Low-sodium, lean deli meat.  Dairy  Low-fat (1%) or fat-free (skim) milk. Fat-free, low-fat, or reduced-fat cheeses. Nonfat, low-sodium ricotta or cottage cheese. Low-fat or nonfat yogurt. Low-fat, low-sodium cheese.  Fats and oils  Soft margarine without trans fats. Vegetable oil. Low-fat, reduced-fat, or light mayonnaise and salad dressings (reduced-sodium). Canola, safflower, olive, soybean, and sunflower oils. Avocado.  Seasoning and other foods  Herbs. Spices. Seasoning mixes without salt. Unsalted popcorn and pretzels. Fat-free sweets.  What foods are not recommended?  The items listed may not be a complete list. Talk with your dietitian about what dietary choices are best for you.  Grains  Baked goods made with fat, such as croissants, muffins, or some breads. Dry pasta or rice meal packs.  Vegetables  Creamed or fried vegetables. Vegetables in a cheese sauce. Regular canned vegetables (not low-sodium or reduced-sodium). Regular canned tomato sauce and paste (not low-sodium or reduced-sodium). Regular tomato and vegetable juice (not low-sodium or reduced-sodium). Pickles. Olives.  Fruits  Canned fruit in a light or heavy syrup. Fried fruit. Fruit in cream or butter sauce.  Meat and other protein foods  Fatty cuts of meat. Ribs. Fried meat. [place]. Sausage. Bologna and other processed lunch meats. Salami. Fatback. Hotdogs. Bratwurst. Salted nuts and seeds. Canned beans with added salt. Canned or smoked fish. Whole eggs or egg yolks. Chicken or turkey with skin.  Dairy  Whole or 2% milk, cream, and half-and-half. Whole or full-fat cream cheese. Whole-fat or sweetened yogurt. Full-fat cheese. Nondairy creamers. Whipped toppings.  Processed cheese and cheese spreads.  Fats and oils  Butter. Stick margarine. Lard. Shortening. Ghee. [place] fat. Tropical oils, such as coconut, palm kernel, or palm oil.  Seasoning and other foods  Salted popcorn and pretzels. Onion salt, garlic salt, seasoned salt, table salt, and sea salt. Worcestershire sauce. Tartar sauce. Barbecue sauce. Teriyaki sauce. Soy sauce, including reduced-sodium. Steak sauce. Canned and packaged gravies. Fish sauce. Oyster sauce. Cocktail sauce. Horseradish that you find on the shelf. Ketchup. Mustard. Meat flavorings and tenderizers. Bouillon cubes. Hot sauce and Tabasco sauce. Premade or packaged marinades. Premade or packaged taco seasonings. Relishes. Regular salad dressings.  Where to find more information:  · National Heart, Lung, and Blood [employer]: www.nhlbi.nih.gov  · American Heart Association: www.heart.org  Summary  · The DASH eating plan is a healthy eating plan that has been shown to reduce high blood pressure (hypertension). It may also reduce your risk for type 2 diabetes, heart disease, and stroke.  · With the DASH eating plan, you should limit salt (sodium) intake to 2,300 mg a day. If you have hypertension, you may need to reduce your sodium intake to 1,500 mg a day.  · When on the DASH eating plan, aim to eat more fresh fruits and vegetables, whole grains, lean proteins, low-fat dairy, and heart-healthy fats.  · Work with your health care provider or diet and nutrition specialist (dietitian) to adjust your eating plan to your individual calorie needs.  This information is not intended to replace advice given to you by your health care provider. Make sure you discuss any questions you have with your health care provider.  Document Released: 12/06/2012 Document Revised: 12/11/2017 Document Reviewed: 12/11/2017  [employer] Interactive Patient Education © 2018 [employer]  Rheumatoid Arthritis  Rheumatoid arthritis (RA) is a long-term (chronic) disease. RA causes  inflammation in your joints. Your joints may feel painful, stiff, swollen, warm, or tender. RA may start slowly. Usually, it affects the small joints of the hands and feet. It can also affect other parts of the body, even the heart, eyes, or lungs. Symptoms of RA often come and go. Sometimes, symptoms get worse for a while. These are called flares.  There is no cure for RA, but your doctor will work with you to find the best treatment option for you. This will depend on how the disease is changing in your body.  Follow these instructions at home:  · Take over-the-counter and prescription medicines only as told by your doctor. Your doctor may change (adjust) your medicines every 3 months.  · Start an exercise program as told by your doctor.  · Rest when you have a flare.  · Return to your normal activities as told by your doctor. Ask your doctor what activities are safe for you.  · Keep all follow-up visits as told by your doctor. This is important.  Contact a doctor if:  · You have a flare.  · You have a fever.  · You have problems (side effects) because of your medicines.  Get help right away if:  · You have chest pain.  · You have trouble breathing.  · You have a hot, painful joint all of a sudden, and it is worse than your usual joint aches.  This information is not intended to replace advice given to you by your health care provider. Make sure you discuss any questions you have with your health care provider.  Document Released: 03/11/2013 Document Revised: 05/25/2017 Document Reviewed: 09/29/2016  [employer] Interactive Patient Education © 2018 [employer]  Type 2 Diabetes Mellitus, Self Care, Adult  When you have type 2 diabetes (type 2 diabetes mellitus), you must keep your blood sugar (glucose) under control. You can do this with:  · Nutrition.  · Exercise.  · Lifestyle changes.  · Medicines or insulin, if needed.  · Support from your doctors and others.    How do I manage my blood sugar?  · Check your blood  sugar level every day, as often as told.  · Call your doctor if your blood sugar is above your goal numbers for 2 tests in a row.  · Have your A1c (hemoglobin A1c) level checked at least two times a year. Have it checked more often if your doctor tells you to.  Your doctor will set treatment goals for you. Generally, you should have these blood sugar levels:  · Before meals (preprandial):  mg/dL (4.4-7.2 mmol/L).  · After meals (postprandial): lower than 180 mg/dL (10 mmol/L).  · A1c level: less than 7%.    What do I need to know about high blood sugar?  High blood sugar is called hyperglycemia. Know the signs of high blood sugar. Signs may include:  · Feeling:  ? Thirsty.  ? Hungry.  ? Very tired.  · Needing to pee (urinate) more than usual.  · Blurry vision.    What do I need to know about low blood sugar?  Low blood sugar is called hypoglycemia. This is when blood sugar is at or below 70 mg/dL (3.9 mmol/L). Symptoms may include:  · Feeling:  ? Hungry.  ? Worried or nervous (anxious).  ? Sweaty and clammy.  ? Confused.  ? Dizzy.  ? Sleepy.  ? Sick to your stomach (nauseous).  · Having:  ? A fast heartbeat (palpitations).  ? A headache.  ? A change in your vision.  ? Jerky movements that you cannot control (seizure).  ? Nightmares.  ? Tingling or no feeling (numbness) around the mouth, lips, or tongue.  · Having trouble with:  ? Talking.  ? Paying attention (concentrating).  ? Moving (coordination).  ? Sleeping.  · Shaking.  · Passing out (fainting).  · Getting upset easily (irritability).    Treating low blood sugar    To treat low blood sugar, eat or drink something sugary right away. If you can think clearly and swallow safely, follow the 15:15 rule:  · Take 15 grams of a fast-acting carb (carbohydrate). Some fast-acting carbs are:  ? 1 tube of glucose gel.  ? 3 sugar tablets (glucose pills).  ? 6-8 pieces of hard candy.  ? 4 oz (120 mL) of fruit juice.  ? 4 oz (120 mL) regular (not diet) soda.  · Check  your blood sugar 15 minutes after you take the carb.  · If your blood sugar is still at or below 70 mg/dL (3.9 mmol/L), take 15 grams of a carb again.  · If your blood sugar does not go above 70 mg/dL (3.9 mmol/L) after 3 tries, get help right away.  · After your blood sugar goes back to normal, eat a meal or a snack within 1 hour.    Treating very low blood sugar  If your blood sugar is at or below 54 mg/dL (3 mmol/L), you have very low blood sugar (severe hypoglycemia). This is an emergency. Do not wait to see if the symptoms will go away. Get medical help right away. Call your local emergency services (911 in the [place] Do not drive yourself to the hospital.  If you have very low blood sugar and you cannot eat or drink, you may need a glucagon shot (injection). A family member or friend should learn how to check your blood sugar and how to give you a glucagon shot. Ask your doctor if you need to have a glucagon shot kit at home.  What else is important to manage my diabetes?  Medicine  Follow these instructions about insulin and diabetes medicines:  · Take them as told by your doctor.  · Adjust them as told by your doctor.  · Do not run out of them.    Having diabetes can raise your risk for other long-term conditions. These include heart or kidney disease. Your doctor may prescribe medicines to help prevent problems from diabetes.  Food    · Make healthy food choices. These include:  ? Chicken, fish, egg whites, and beans.  ? Oats, whole wheat, bulgur, brown rice, quinoa, and millet.  ? Fresh fruits and vegetables.  ? Low-fat dairy products.  ? Nuts, avocado, olive oil, and canola oil.  · Make a food plan with a specialist (dietitian).  · Follow instructions from your doctor about what you cannot eat or drink.  · Drink enough fluid to keep your pee (urine) clear or pale yellow.  · Eat healthy snacks between healthy meals.  · Keep track of carbs that you eat. Read food labels. Learn food serving sizes.  · Follow  your sick day plan when you cannot eat or drink normally. Make this plan with your doctor so it is ready to use.  Activity  · Exercise at least 3 times a week.  · Do not go more than 2 days without exercising.  · Talk with your doctor before you start a new exercise. Your doctor may need to adjust your insulin, medicines, or food.  Lifestyle    · Do not use any tobacco products. These include cigarettes, chewing tobacco, and e-cigarettes.If you need help quitting, ask your doctor.  · Ask your doctor how much alcohol is safe for you.  · Learn to deal with stress. If you need help with this, ask your doctor.  Body care  · Stay up to date with your shots (immunizations).  · Have your eyes and feet checked by a doctor as often as told.  · Check your skin and feet every day. Check for cuts, bruises, redness, blisters, or sores.  · Brush your teeth and gums two times a day.  · Floss at least one time a day.  · Go to the dentist least one time every 6 months.  · Stay at a healthy weight.  General instructions    · Take over-the-counter and prescription medicines only as told by your doctor.  · Share your diabetes care plan with:  ? Your work or school.  ? People you live with.  · Check your pee (urine) for ketones:  ? When you are sick.  ? As told by your doctor.  · Carry a card or wear jewelry that says that you have diabetes.  · Ask your doctor:  ? Do I need to meet with a diabetes educator?  ? Where can I find a support group for people with diabetes?  · Keep all follow-up visits as told by your doctor. This is important.  Where to find more information:  To learn more about diabetes, visit:  · American Diabetes Association: www.diabetes.org  · American Association of Diabetes Educators: www.diabeteseducator.org/patient-resources    This information is not intended to replace advice given to you by your health care provider. Make sure you discuss any questions you have with your health care provider.  Document Released:  04/10/2017 Document Revised: 05/25/2017 Document Reviewed: 01/20/2017  [employer] Interactive Patient Education © 2018 [employer]

## 2018-07-18 NOTE — PROGRESS NOTES
History of Present Illness   Raimundo presents to the clinic today to review his recent lab results pertaining to his DM, type 2, HTN, GERD, Rheumatoid Arthritis and Hyperlipidemia. Overall, he reports he feels well. He did have a colon resection in February by Dr Tinoco for Rectal  Prolapse and Polyp. Raimundo reports he is doing well.      Hypertension   The current episode started more than 1 year ago. The problem is controlled. Pertinent negatives include no anxiety, blurred vision, chest pain, headaches, malaise/fatigue, palpitations, peripheral edema, shortness of breath or sweats.     Diabetes   He has type 2 diabetes mellitus. His disease course has been improving. Pertinent negatives for diabetes include no blurred vision, no chest pain, no fatigue, no polydipsia, no polyphagia, no polyuria and no weakness. He does share he has noticed some intermittent neuropathy on his left lower extremity.  Risk factors for coronary artery disease include dyslipidemia, hypertension, male sex, sedentary lifestyle and tobacco exposure. Current diabetic treatment includes oral agent (monotherapy). He is compliant with medication regimen most of the time. He has had a previous visit with a dietitian. An ACE inhibitor/angiotensin II receptor blocker is being taken. Eye exam is current.    Hyperlipidemia   This is a chronic problem. The current episode started more than 1 year ago. The problem is controlled. Factors aggravating his hyperlipidemia include fatty foods. Pertinent negatives include no chest pain or shortness of breath. Current antihyperlipidemic treatment includes statins. The current treatment provides significant improvement of lipids. Compliance problems include adherence to diet and adherence to exercise.  Risk factors for coronary artery disease include diabetes mellitus, dyslipidemia, hypertension, male sex and obesity.     Vitals:    07/18/18 1033   BP: 128/80   Pulse: 74   Temp: 98.4 °F (36.9 °C)  "  TempSrc: Temporal Artery    SpO2: 96%   Weight: 91.6 kg (202 lb)   Height: 172.7 cm (68\")      The following portions of the patient's history were reviewed and updated as appropriate: allergies, current medications, past family history, past medical history, past social history, past surgical history and problem list.    Review of Systems   Constitutional: Negative for activity change, appetite change, chills, fatigue, fever and unexpected weight change.   HENT: Negative.    Eyes: Negative for visual disturbance.   Respiratory: Negative for cough, chest tightness, shortness of breath and wheezing.    Cardiovascular: Negative for chest pain, palpitations and leg swelling.   Gastrointestinal: Negative for abdominal pain, nausea and vomiting.   Endocrine: Negative for cold intolerance, heat intolerance, polydipsia, polyphagia and polyuria.   Skin: Negative for color change and rash.   Neurological: Positive for numbness (Intermittent mild neuropathy). Negative for dizziness, tremors, speech difficulty, weakness, light-headedness and headaches.   Hematological: Negative for adenopathy.   Psychiatric/Behavioral: Positive for sleep disturbance (Improving with Trazodone). Negative for confusion, decreased concentration and suicidal ideas. The patient is not nervous/anxious.    All other systems reviewed and are negative.    Physical Exam   Constitutional: He is oriented to person, place, and time. He appears well-developed and well-nourished. No distress.   HENT:   Head: Normocephalic.   Right Ear: Tympanic membrane and ear canal normal.   Left Ear: Tympanic membrane and ear canal normal.   Nose: Nose normal.   Mouth/Throat: Oropharynx is clear and moist and mucous membranes are normal. Dental caries present.   Eyes: Pupils are equal, round, and reactive to light. Conjunctivae are normal. Right eye exhibits no discharge. Left eye exhibits no discharge. No scleral icterus.   Neck: Neck supple. No JVD present. No " thyromegaly present.   Cardiovascular: Normal rate, regular rhythm and normal heart sounds.  Exam reveals no friction rub.    No murmur heard.  Pulmonary/Chest: Effort normal. No respiratory distress. He has decreased breath sounds. He has no wheezes. He has no rhonchi. He has no rales.   Abdominal: Soft. Bowel sounds are normal. He exhibits no distension. There is no tenderness. There is no rebound and no guarding.   Musculoskeletal: He exhibits no edema.   Lymphadenopathy:     He has no cervical adenopathy.   Neurological: He is alert and oriented to person, place, and time.   Skin: Skin is warm and dry. Capillary refill takes less than 2 seconds. No rash noted. No erythema.   Psychiatric: He has a normal mood and affect. His speech is normal and behavior is normal. Judgment and thought content normal.   Vitals reviewed.    Assessment/Plan   Problems Addressed this Visit        Cardiovascular and Mediastinum    Benign essential hypertension    Relevant Medications    metoprolol succinate XL (TOPROL-XL) 50 MG 24 hr tablet    Hyperlipidemia    Relevant Medications    pravastatin (PRAVACHOL) 10 MG tablet    Other Relevant Orders    Lipid Panel       Respiratory    Chronic allergic rhinitis    Relevant Medications    loratadine (CLARITIN) 10 MG tablet       Digestive    Vitamin D deficiency    Relevant Orders    Vitamin D 25 Hydroxy    Gastroesophageal reflux disease without esophagitis    Relevant Medications    raNITIdine (ZANTAC) 300 MG tablet    lansoprazole (PREVACID) 30 MG capsule    Other Relevant Orders    CBC & Differential    Vitamin B 12 deficiency    Gastroesophageal reflux disease    Relevant Medications    raNITIdine (ZANTAC) 300 MG tablet    lansoprazole (PREVACID) 30 MG capsule    Other Relevant Orders    CBC & Differential    Obesity (BMI 30-39.9)       Endocrine    Type 2 diabetes mellitus, controlled (CMS/[facility]) - Primary    Relevant Medications    metFORMIN (GLUCOPHAGE) 500 MG tablet    Other  Relevant Orders    Comprehensive Metabolic Panel    TSH    Hemoglobin A1c       Musculoskeletal and Integument    Rheumatoid arthritis with positive rheumatoid factor (CMS/HCC)    Relevant Medications    folic acid (FOLVITE) 1 MG tablet    methotrexate 2.5 MG tablet    Other Relevant Orders    Uric Acid       Other    Tobacco use disorder      Other Visit Diagnoses     Screening for endocrine/metabolic/immunity disorders        Will check Varicella titer prior to  Varicella vaccination due to Raimundo's uncertainty of statue. His mother has told him he never had chickenpox.     Relevant Orders    Varicella Zoster Antibody, IgG        Findings and recommendations discussed with Raimundo. Reviewed his recent lab results with him.   Lipid panel reviewed. Encouraged increase in exercise. May consider reduction of statin at next visit.   Lifestyle modifications including portion sizes and healthy food choices reinforced.  Reviewed health maintenance recommendations. Raimundo reports he has never had Chickenpox. Titer will be ordered.

## 2018-07-20 PROBLEM — K62.5 BRIGHT RED BLOOD PER RECTUM: Status: RESOLVED | Noted: 2018-01-03 | Resolved: 2018-07-20

## 2018-07-20 PROBLEM — R10.13 DYSPEPSIA: Status: RESOLVED | Noted: 2018-01-03 | Resolved: 2018-07-20

## 2018-07-20 PROBLEM — R19.4 CHANGE IN BOWEL HABITS: Status: RESOLVED | Noted: 2018-01-03 | Resolved: 2018-07-20

## 2018-07-20 PROBLEM — R13.10 DYSPHAGIA: Status: RESOLVED | Noted: 2018-01-03 | Resolved: 2018-07-20

## 2018-07-20 RX ORDER — RANITIDINE 300 MG/1
300 TABLET ORAL 2 TIMES DAILY
Qty: 60 TABLET | Refills: 4 | Status: SHIPPED | OUTPATIENT
Start: 2018-07-20 | End: 2018-10-26 | Stop reason: SDUPTHER

## 2018-07-20 RX ORDER — LORATADINE 10 MG/1
10 TABLET ORAL DAILY
Qty: 30 TABLET | Refills: 4 | Status: SHIPPED | OUTPATIENT
Start: 2018-07-20 | End: 2018-10-26 | Stop reason: SDUPTHER

## 2018-07-20 RX ORDER — LANSOPRAZOLE 30 MG/1
30 CAPSULE, DELAYED RELEASE ORAL DAILY
Qty: 30 CAPSULE | Refills: 4 | Status: SHIPPED | OUTPATIENT
Start: 2018-07-20 | End: 2018-10-26 | Stop reason: SDUPTHER

## 2018-07-20 RX ORDER — PRAVASTATIN SODIUM 10 MG
10 TABLET ORAL NIGHTLY
Qty: 30 TABLET | Refills: 4 | Status: SHIPPED | OUTPATIENT
Start: 2018-07-20 | End: 2018-09-05 | Stop reason: ALTCHOICE

## 2018-07-20 RX ORDER — METOPROLOL SUCCINATE 50 MG/1
50 TABLET, EXTENDED RELEASE ORAL DAILY
Qty: 30 TABLET | Refills: 4 | Status: SHIPPED | OUTPATIENT
Start: 2018-07-20 | End: 2018-10-26 | Stop reason: SDUPTHER

## 2018-07-20 RX ORDER — FOLIC ACID 1 MG/1
1 TABLET ORAL DAILY
Qty: 30 TABLET | Refills: 4 | Status: SHIPPED | OUTPATIENT
Start: 2018-07-20 | End: 2019-04-26 | Stop reason: SDUPTHER

## 2018-09-05 DIAGNOSIS — M62.838 MUSCLE SPASMS OF BOTH LOWER EXTREMITIES: ICD-10-CM

## 2018-09-05 DIAGNOSIS — M54.42 CHRONIC LEFT-SIDED LOW BACK PAIN WITH LEFT-SIDED SCIATICA: ICD-10-CM

## 2018-09-05 DIAGNOSIS — G89.29 CHRONIC LEFT-SIDED LOW BACK PAIN WITH LEFT-SIDED SCIATICA: ICD-10-CM

## 2018-09-05 DIAGNOSIS — E78.5 HYPERLIPIDEMIA, UNSPECIFIED HYPERLIPIDEMIA TYPE: Chronic | ICD-10-CM

## 2018-09-05 DIAGNOSIS — E53.8 VITAMIN B 12 DEFICIENCY: ICD-10-CM

## 2018-09-05 RX ORDER — ATORVASTATIN CALCIUM 10 MG/1
TABLET, FILM COATED ORAL
Qty: 30 TABLET | Refills: 5 | Status: SHIPPED | OUTPATIENT
Start: 2018-09-05 | End: 2019-03-12 | Stop reason: SDUPTHER

## 2018-09-05 RX ORDER — CYCLOBENZAPRINE HCL 10 MG
TABLET ORAL
Qty: 90 TABLET | Refills: 4 | Status: SHIPPED | OUTPATIENT
Start: 2018-09-05 | End: 2018-10-26

## 2018-09-05 RX ORDER — PSYLLIUM HUSK 3.4 G/7G
POWDER ORAL
Qty: 30 EACH | Refills: 5 | Status: SHIPPED | OUTPATIENT
Start: 2018-09-05 | End: 2019-02-15

## 2018-09-06 ENCOUNTER — OFFICE VISIT (OUTPATIENT)
Dept: GASTROENTEROLOGY | Facility: CLINIC | Age: 53
End: 2018-09-06

## 2018-09-06 VITALS
OXYGEN SATURATION: 94 % | DIASTOLIC BLOOD PRESSURE: 79 MMHG | BODY MASS INDEX: 30.58 KG/M2 | WEIGHT: 201.8 LBS | HEART RATE: 87 BPM | SYSTOLIC BLOOD PRESSURE: 121 MMHG | HEIGHT: 68 IN

## 2018-09-06 DIAGNOSIS — Z85.038 HISTORY OF COLON CANCER: Primary | ICD-10-CM

## 2018-09-06 DIAGNOSIS — K82.4 GALLBLADDER POLYP: ICD-10-CM

## 2018-09-06 DIAGNOSIS — A04.8 H. PYLORI INFECTION: ICD-10-CM

## 2018-09-06 PROCEDURE — 99214 OFFICE O/P EST MOD 30 MIN: CPT | Performed by: PHYSICIAN ASSISTANT

## 2018-09-06 NOTE — PROGRESS NOTES
Chief Complaint   Patient presents with   • Follow-up     procedure 2/18       Raimundo Munguia is a 53 y.o. male with PMH of rectosigmoid colon cancer who presents to the office today for follow up regarding colon cancer and previous procedures.     HPI  Today, he states that he is overall feeling well. Prior to his diagnosis of colon cancer in Feb 2018, he hasdlost about 32 lbs over 1 year. He had never had a colonoscopy. There is no known family history of colon cancer or colon polyps. He had reported small amount bright red rectal bleeding which was intermittent on the tissue after bowel movements. Bowel movements were described as daily about 2 times per day and sometimes he skips one day between. Stools were varying between normal and watery. He now has normal bowel habits without rectal bleeding and reports that abdominal pain has resolved.      He had EGD and colonoscopy by Dr. Garg on which showed gastritis and rectosigmoid neoplasm. Pathology showed minimal acute duodenitis, mild to moderate chronic gastritis with focal intestinal metaplasia which was negative for dysplasia but H pylori positive. Esophagus showed minimal chronic inflammation. Rectosigmoid biopsy showed features of tubulovillous adenoma with features of prolapse but no definite high grade dysplasia or invasive malignancy identified. Colon resection of tumor was completed on 2/22/2018 by Dr. Tinoco. Pathology showed focal intramucosal carcinoma with margins uninvolved. Lymph nodes negative.    Reports alcohol history but never drank daily. He has not drank alcohol in 20 years. No family history of liver disease. Has diabetes and is insulin dependent. No HLD. Reports acid reflux and takes Prevacid 30 mg once daily which helps some with it. Sometimes has dysphagia with medications. No previous EGD.      US gallbladder 11/22/2017:  IMPRESSION:  Two small mucosal densities consistent with polyps in the  wall of the gallbladder.    Review of  Systems   Constitutional: Positive for chills, fatigue and fever.   HENT: Negative for congestion, sore throat and trouble swallowing.    Eyes: Negative.    Respiratory: Negative.    Cardiovascular: Positive for chest pain.   Gastrointestinal: Negative for abdominal distention, abdominal pain, anal bleeding, blood in stool, constipation, diarrhea, nausea, rectal pain and vomiting.   Endocrine: Negative for cold intolerance and heat intolerance.   Genitourinary: Negative.    Musculoskeletal: Positive for arthralgias, back pain and myalgias.   Skin: Negative.    Allergic/Immunologic: Positive for environmental allergies. Negative for food allergies.   Neurological: Positive for dizziness and light-headedness.   Hematological: Bruises/bleeds easily.   Psychiatric/Behavioral: Positive for sleep disturbance. The patient is nervous/anxious.      Specialty Problems        Gastroenterology Problems    Gastroesophageal reflux disease without esophagitis        Gastroesophageal reflux disease            Past Medical History:   Diagnosis Date   • Abnormal blood chemistry 03/02/2016    Noted Triglycerides, HDL and LDL   • Allergic    • Cervicalgia    • DM type 2 (diabetes mellitus, type 2) (CMS/[facility])    • GERD (gastroesophageal reflux disease)    • Helicobacter pylori (H. pylori) infection    • Hyperlipidemia    • Hypertension    • Insomnia    • Muscle spasm    • Obesity    • Osteopenia    • Palpitations    • RA (rheumatoid arthritis) (CMS/[facility])    • Stroke (CMS/[facility])    • Tension headache    • Vitamin D deficiency      Past Surgical History:   Procedure Laterality Date   • COLON RESECTION N/A 2/22/2018    Procedure: COLON RESECTION SIGMOID;  Surgeon: Ej Tinoco MD;  Location: [facility];  Service:    • COLONOSCOPY N/A 2/7/2018    Procedure: COLONOSCOPY CPT CODE: 64284;  Surgeon: Teodoro Garg III, MD;  Location: [facility];  Service:    • ENDOSCOPY N/A 2/7/2018    Procedure: ESOPHAGOGASTRODUODENOSCOPY WITH DILATATION CPT  CODE: 32501;  Surgeon: Teodoro Garg III, MD;  Location: [facility];  Service:      Family History   Problem Relation Age of Onset   • Diabetes Mother    • Hypertension Mother    • Hypertension Father    • Hyperlipidemia Father    • Diabetes Sister    • Hyperlipidemia Sister    • Hypertension Sister    • Hypertension Brother    • Hyperlipidemia Brother      Social History   Substance Use Topics   • Smoking status: Never Smoker   • Smokeless tobacco: Current User     Types: Snuff   • Alcohol use No       CURRENT MEDICATION:  •  atorvastatin (LIPITOR) 10 MG tablet, take 1 tablet by mouth at bedtime, Disp: 30 tablet, Rfl: 5  •  cyclobenzaprine (FLEXERIL) 10 MG tablet, TAKE 1 TABLET BY MOUTH 3 TIMES A DAY AS NEEDED FOR MUSCLE SPASMS, Disp: 90 tablet, Rfl: 4  •  folic acid (FOLVITE) 1 MG tablet, Take 1 tablet by mouth Daily., Disp: 30 tablet, Rfl: 4  •  lansoprazole (PREVACID) 30 MG capsule, Take 1 capsule by mouth Daily., Disp: 30 capsule, Rfl: 4  •  loratadine (CLARITIN) 10 MG tablet, Take 1 tablet by mouth Daily., Disp: 30 tablet, Rfl: 4  •  metFORMIN (GLUCOPHAGE) 500 MG tablet, Take 1 tablet by mouth Daily With Dinner., Disp: 30 tablet, Rfl: 4  •  methotrexate 2.5 MG tablet, take 10 tablets by mouth every week, Disp: 40 tablet, Rfl: 2  •  methotrexate 2.5 MG tablet, Take 10 tablets by mouth 1 (One) Time Per Week., Disp: 40 tablet, Rfl: 4  •  metoprolol succinate XL (TOPROL-XL) 50 MG 24 hr tablet, Take 1 tablet by mouth Daily., Disp: 30 tablet, Rfl: 4  •  ONE TOUCH ULTRA TEST test strip, TEST three times a day, Disp: 90 each, Rfl: 5  •  RA VITAMIN B-12 TR 1000 MCG tablet controlled-release, take 1 tablet by mouth once daily, Disp: 30 each, Rfl: 5  •  raNITIdine (ZANTAC) 300 MG tablet, Take 1 tablet by mouth 2 (Two) Times a Day., Disp: 60 tablet, Rfl: 4  •  traZODone (DESYREL) 100 MG tablet, Take 1 tablet by mouth Every Night., Disp: 30 tablet, Rfl: 5    ALLERGIES:  Patient has no known allergies.    VISIT  "VITALS:  /79   Pulse 87   Ht 172.7 cm (68\")   Wt 91.5 kg (201 lb 12.8 oz)   SpO2 94%   BMI 30.68 kg/m²     Physical Exam   Constitutional: He is oriented to person, place, and time. He appears well-developed and well-nourished. No distress.   HENT:   Head: Normocephalic and atraumatic.   Nose: Nose normal.   Mouth/Throat: Oropharynx is clear and moist.   glasses   Eyes: Conjunctivae are normal. Right eye exhibits no discharge. Left eye exhibits no discharge. No scleral icterus.   Neck: Normal range of motion. No JVD present.   Cardiovascular: Normal rate, regular rhythm and normal heart sounds.  Exam reveals no gallop and no friction rub.    No murmur heard.  Pulmonary/Chest: Effort normal and breath sounds normal. No respiratory distress. He has no wheezes. He has no rales. He exhibits no tenderness.   Abdominal: Soft. Bowel sounds are normal. He exhibits no mass. There is tenderness (periumbilical).   Incisional scar below the umbilicus   Musculoskeletal: Normal range of motion. He exhibits no edema or deformity.   Neurological: He is alert and oriented to person, place, and time. Coordination normal.   Skin: Skin is warm and dry. No rash noted. He is not diaphoretic. No erythema.   Psychiatric: He has a normal mood and affect. His behavior is normal. Judgment and thought content normal.   Vitals reviewed.      Assessment/Plan     1. History of colon cancer    2. Gallbladder polyp    3. H. pylori infection      Patient was offered to be scheduled for follow up colonoscopy now with Dr. Tinoco to monitor for residual tumor or other colon polyps. He would like to defer this procedure until Feb 2019. We will place him on a recall list to be called closer to that time to arrange.     For monitoring of gallbladder polyps noted on last US (liver enzymes returned to normal), he will have US gallbladder.     H pylori noted on pathology will be discussed further at his next appointment. It is uncertain if he " completed therapy for this in the past. He does not wish to hold his PPI at this time for confirmation testing.           Follow up after next colonoscopy to discuss results and H pylori.        Electronically signed 9/6/2018 3:59 PM  Susanna Bolivar PA-C, [facility]

## 2018-09-20 ENCOUNTER — HOSPITAL ENCOUNTER (OUTPATIENT)
Dept: ULTRASOUND IMAGING | Facility: HOSPITAL | Age: 53
Discharge: HOME | End: 2018-09-20
Admitting: PHYSICIAN ASSISTANT

## 2018-09-20 DIAGNOSIS — K82.4 GALLBLADDER POLYP: ICD-10-CM

## 2018-09-20 PROCEDURE — 76705 ECHO EXAM OF ABDOMEN: CPT | Performed by: RADIOLOGY

## 2018-09-20 PROCEDURE — 76705 ECHO EXAM OF ABDOMEN: CPT

## 2018-10-12 DIAGNOSIS — E11.69 CONTROLLED TYPE 2 DIABETES MELLITUS WITH OTHER SPECIFIED COMPLICATION, WITHOUT LONG-TERM CURRENT USE OF INSULIN (HCC): ICD-10-CM

## 2018-10-26 ENCOUNTER — OFFICE VISIT (OUTPATIENT)
Dept: FAMILY MEDICINE CLINIC | Facility: CLINIC | Age: 53
End: 2018-10-26

## 2018-10-26 VITALS
OXYGEN SATURATION: 98 % | HEIGHT: 68 IN | HEART RATE: 94 BPM | DIASTOLIC BLOOD PRESSURE: 74 MMHG | TEMPERATURE: 97.5 F | WEIGHT: 202 LBS | BODY MASS INDEX: 30.62 KG/M2 | SYSTOLIC BLOOD PRESSURE: 116 MMHG

## 2018-10-26 DIAGNOSIS — K21.9 GASTROESOPHAGEAL REFLUX DISEASE WITHOUT ESOPHAGITIS: ICD-10-CM

## 2018-10-26 DIAGNOSIS — E11.69 CONTROLLED TYPE 2 DIABETES MELLITUS WITH OTHER SPECIFIED COMPLICATION, WITHOUT LONG-TERM CURRENT USE OF INSULIN (HCC): ICD-10-CM

## 2018-10-26 DIAGNOSIS — I10 BENIGN ESSENTIAL HYPERTENSION: Chronic | ICD-10-CM

## 2018-10-26 DIAGNOSIS — M62.838 MUSCLE SPASM: ICD-10-CM

## 2018-10-26 DIAGNOSIS — F51.04 CHRONIC INSOMNIA: ICD-10-CM

## 2018-10-26 DIAGNOSIS — M05.79 RHEUMATOID ARTHRITIS INVOLVING MULTIPLE SITES WITH POSITIVE RHEUMATOID FACTOR (HCC): Primary | ICD-10-CM

## 2018-10-26 PROCEDURE — 96372 THER/PROPH/DIAG INJ SC/IM: CPT | Performed by: PHYSICIAN ASSISTANT

## 2018-10-26 PROCEDURE — 90471 IMMUNIZATION ADMIN: CPT | Performed by: PHYSICIAN ASSISTANT

## 2018-10-26 PROCEDURE — 90686 IIV4 VACC NO PRSV 0.5 ML IM: CPT | Performed by: PHYSICIAN ASSISTANT

## 2018-10-26 PROCEDURE — 99214 OFFICE O/P EST MOD 30 MIN: CPT | Performed by: PHYSICIAN ASSISTANT

## 2018-10-26 RX ORDER — TRAZODONE HYDROCHLORIDE 100 MG/1
100 TABLET ORAL NIGHTLY
Qty: 30 TABLET | Refills: 5 | Status: SHIPPED | OUTPATIENT
Start: 2018-10-26 | End: 2018-12-17 | Stop reason: SDUPTHER

## 2018-10-26 RX ORDER — TIZANIDINE 4 MG/1
4 TABLET ORAL EVERY 8 HOURS PRN
Qty: 90 TABLET | Refills: 3 | Status: SHIPPED | OUTPATIENT
Start: 2018-10-26 | End: 2019-03-12 | Stop reason: ALTCHOICE

## 2018-10-26 RX ORDER — METOPROLOL SUCCINATE 50 MG/1
50 TABLET, EXTENDED RELEASE ORAL DAILY
Qty: 30 TABLET | Refills: 4 | Status: SHIPPED | OUTPATIENT
Start: 2018-10-26 | End: 2018-11-21 | Stop reason: DRUGHIGH

## 2018-10-26 RX ORDER — RANITIDINE 300 MG/1
300 TABLET ORAL 2 TIMES DAILY
Qty: 60 TABLET | Refills: 4 | Status: SHIPPED | OUTPATIENT
Start: 2018-10-26 | End: 2019-04-26 | Stop reason: SDUPTHER

## 2018-10-26 RX ORDER — LANSOPRAZOLE 30 MG/1
30 CAPSULE, DELAYED RELEASE ORAL DAILY
Qty: 30 CAPSULE | Refills: 4 | Status: SHIPPED | OUTPATIENT
Start: 2018-10-26 | End: 2019-02-14 | Stop reason: SDUPTHER

## 2018-10-26 RX ORDER — LORATADINE 10 MG/1
10 TABLET ORAL DAILY
Qty: 30 TABLET | Refills: 4 | Status: SHIPPED | OUTPATIENT
Start: 2018-10-26 | End: 2019-03-12 | Stop reason: SDUPTHER

## 2018-10-26 RX ORDER — KETOROLAC TROMETHAMINE 30 MG/ML
60 INJECTION, SOLUTION INTRAMUSCULAR; INTRAVENOUS ONCE
Status: COMPLETED | OUTPATIENT
Start: 2018-10-26 | End: 2018-10-26

## 2018-10-26 RX ADMIN — KETOROLAC TROMETHAMINE 60 MG: 30 INJECTION, SOLUTION INTRAMUSCULAR; INTRAVENOUS at 09:48

## 2018-10-26 NOTE — PROGRESS NOTES
Subjective   Marge Adams is a 53 y.o. male.     Chief complaint-back pain    History of Present Illness     Back pain-  He complains of intermittent moderate to severe throbbing aching pain in the lower back with associated muscle spasms for greater than 1 year.  Some relief with Flexeril.    Rheumatoid arthritis-  He reports pain in bilateral hips intermittently for several years.  Some relief with  methotrexate.  Not at goal    Insomnia-stable with trazodone    Gastroesophageal reflux disease-controlled with Prevacid and Zantac    Hypertension-stable with metoprolol    Diabetes mellitus type 2-  Currently controlled with metformin and diet  Results for MARGE ADAMS (MRN [id number]) as of 10/26/2018 09:43   Ref. Range 3/6/2018 10:01 7/12/2018 11:50   Hemoglobin A1C Latest Ref Range: 4.50 - 5.70 % 6.10 (H) 6.30 (H)     The following portions of the patient's history were reviewed and updated as appropriate: allergies, current medications, past family history, past medical history, past social history, past surgical history and problem list.    Review of Systems   Constitutional: Negative for activity change, appetite change and fever.   HENT: Negative for ear pain, sinus pressure and sore throat.    Eyes: Negative for pain and visual disturbance.   Respiratory: Negative for cough and chest tightness.    Cardiovascular: Negative for chest pain and palpitations.   Gastrointestinal: Negative for abdominal pain, constipation, diarrhea, nausea and vomiting.   Endocrine: Negative for polydipsia and polyuria.   Genitourinary: Negative for dysuria and frequency.   Musculoskeletal: Positive for arthralgias, back pain and myalgias (muscle spasm).   Skin: Negative for color change and rash.   Allergic/Immunologic: Negative for food allergies and immunocompromised state.   Neurological: Negative for dizziness, syncope and headaches.   Hematological: Negative for adenopathy. Does not bruise/bleed easily.  "  Psychiatric/Behavioral: Negative for hallucinations and suicidal ideas. The patient is not nervous/anxious.        /74   Pulse 94   Temp 97.5 °F (36.4 °C) (Oral)   Ht 172.7 cm (67.99\")   Wt 91.6 kg (202 lb)   SpO2 98%   BMI 30.72 kg/m²   Orders Only on 07/12/2018   Component Date Value Ref Range Status   • Total Cholesterol 07/12/2018 85  0 - 200 mg/dL Final   • Triglycerides 07/12/2018 94  0 - 150 mg/dL Final   • HDL Cholesterol 07/12/2018 23* 60 - 100 mg/dL Final   • LDL Cholesterol  07/12/2018 43  0 - 100 mg/dL Final   • VLDL Cholesterol 07/12/2018 18.8  mg/dL Final   • LDL/HDL Ratio 07/12/2018 1.88   Final   • 25 Hydroxy, Vitamin D 07/12/2018 34.0  ng/ml Final       Physical Exam   Constitutional: He is oriented to person, place, and time. He appears well-developed and well-nourished.   HENT:   Head: Normocephalic and atraumatic.   Nose: Nose normal.   Mouth/Throat: Oropharynx is clear and moist.   Eyes: Pupils are equal, round, and reactive to light. Conjunctivae and EOM are normal.   Neck: Normal range of motion. Neck supple. No tracheal deviation present. No thyromegaly present.   Cardiovascular: Normal rate, regular rhythm, normal heart sounds and intact distal pulses.    No murmur heard.  Pulmonary/Chest: Effort normal and breath sounds normal. No respiratory distress. He has no wheezes.   Abdominal: Soft. Bowel sounds are normal. There is no tenderness. There is no guarding.   Musculoskeletal: Normal range of motion. He exhibits tenderness. He exhibits no edema.   Muscle spasms noted bilateral lumbar paraspinal musculature   Lymphadenopathy:     He has no cervical adenopathy.   Neurological: He is alert and oriented to person, place, and time.   Skin: Skin is warm and dry. No rash noted.   Psychiatric: He has a normal mood and affect. His behavior is normal.   Nursing note and vitals reviewed.      Assessment/Plan     Diagnoses and all orders for this visit:    Rheumatoid arthritis involving " multiple sites with positive rheumatoid factor (CMS/[facility])  -     Ambulatory Referral to Rheumatology  -     ketorolac (TORADOL) injection 60 mg; Inject 2 mL into the appropriate muscle as directed by prescriber 1 (One) Time.    Muscle spasm  -     tiZANidine (ZANAFLEX) 4 MG tablet; Take 1 tablet by mouth Every 8 (Eight) Hours As Needed for Muscle Spasms.    Chronic insomnia  Comments:  Continue trazodone 100 mg daily at bedtime  Orders:  -     traZODone (DESYREL) 100 MG tablet; Take 1 tablet by mouth Every Night.    Gastroesophageal reflux disease without esophagitis  Comments:  Stable  Orders:  -     lansoprazole (PREVACID) 30 MG capsule; Take 1 capsule by mouth Daily.  -     raNITIdine (ZANTAC) 300 MG tablet; Take 1 tablet by mouth 2 (Two) Times a Day.    Benign essential hypertension  Comments:  Well  controlled. Continue current regimen.   Orders:  -     metoprolol succinate XL (TOPROL-XL) 50 MG 24 hr tablet; Take 1 tablet by mouth Daily.    Controlled type 2 diabetes mellitus with other specified complication, without long-term current use of insulin (CMS/[facility])  -     metFORMIN (GLUCOPHAGE) 500 MG tablet; Take 1 tablet by mouth Daily With Dinner.    Other orders  -     Fluarix/Fluzone/Afluria Quad>6 Months  -     loratadine (CLARITIN) 10 MG tablet; Take 1 tablet by mouth Daily.                 This document has been electronically signed by:  Cherie Amaro PA-C

## 2018-11-13 ENCOUNTER — LAB (OUTPATIENT)
Dept: FAMILY MEDICINE CLINIC | Facility: CLINIC | Age: 53
End: 2018-11-13

## 2018-11-13 DIAGNOSIS — E55.9 VITAMIN D DEFICIENCY: ICD-10-CM

## 2018-11-13 DIAGNOSIS — Z13.228 SCREENING FOR ENDOCRINE/METABOLIC/IMMUNITY DISORDERS: ICD-10-CM

## 2018-11-13 DIAGNOSIS — E78.2 MIXED HYPERLIPIDEMIA: ICD-10-CM

## 2018-11-13 DIAGNOSIS — Z13.29 SCREENING FOR ENDOCRINE/METABOLIC/IMMUNITY DISORDERS: ICD-10-CM

## 2018-11-13 DIAGNOSIS — M05.79 RHEUMATOID ARTHRITIS INVOLVING MULTIPLE SITES WITH POSITIVE RHEUMATOID FACTOR (HCC): Chronic | ICD-10-CM

## 2018-11-13 DIAGNOSIS — K21.9 GASTROESOPHAGEAL REFLUX DISEASE WITHOUT ESOPHAGITIS: ICD-10-CM

## 2018-11-13 DIAGNOSIS — Z13.0 SCREENING FOR ENDOCRINE/METABOLIC/IMMUNITY DISORDERS: ICD-10-CM

## 2018-11-13 DIAGNOSIS — E11.69 CONTROLLED TYPE 2 DIABETES MELLITUS WITH OTHER SPECIFIED COMPLICATION, WITHOUT LONG-TERM CURRENT USE OF INSULIN (HCC): ICD-10-CM

## 2018-11-13 LAB
25(OH)D3 SERPL-MCNC: 29 NG/ML
ALBUMIN SERPL-MCNC: 4.2 G/DL (ref 3.5–5)
ALBUMIN/GLOB SERPL: 1.8 G/DL (ref 1.5–2.5)
ALP SERPL-CCNC: 112 U/L (ref 40–129)
ALT SERPL W P-5'-P-CCNC: 34 U/L (ref 10–44)
ANION GAP SERPL CALCULATED.3IONS-SCNC: 6.7 MMOL/L (ref 3.6–11.2)
AST SERPL-CCNC: 22 U/L (ref 10–34)
BASOPHILS # BLD AUTO: 0.06 10*3/MM3 (ref 0–0.3)
BASOPHILS NFR BLD AUTO: 0.6 % (ref 0–2)
BILIRUB SERPL-MCNC: 0.4 MG/DL (ref 0.2–1.8)
BUN BLD-MCNC: 9 MG/DL (ref 7–21)
BUN/CREAT SERPL: 10 (ref 7–25)
CALCIUM SPEC-SCNC: 9 MG/DL (ref 7.7–10)
CHLORIDE SERPL-SCNC: 108 MMOL/L (ref 99–112)
CHOLEST SERPL-MCNC: 82 MG/DL (ref 0–200)
CO2 SERPL-SCNC: 27.3 MMOL/L (ref 24.3–31.9)
CREAT BLD-MCNC: 0.9 MG/DL (ref 0.43–1.29)
DEPRECATED RDW RBC AUTO: 46.9 FL (ref 37–54)
EOSINOPHIL # BLD AUTO: 0.3 10*3/MM3 (ref 0–0.7)
EOSINOPHIL NFR BLD AUTO: 2.8 % (ref 0–5)
ERYTHROCYTE [DISTWIDTH] IN BLOOD BY AUTOMATED COUNT: 14.1 % (ref 11.5–14.5)
GFR SERPL CREATININE-BSD FRML MDRD: 89 ML/MIN/1.73
GLOBULIN UR ELPH-MCNC: 2.3 GM/DL
GLUCOSE BLD-MCNC: 96 MG/DL (ref 70–110)
HBA1C MFR BLD: 6 % (ref 4.5–5.7)
HCT VFR BLD AUTO: 47.2 % (ref 42–52)
HDLC SERPL-MCNC: 26 MG/DL (ref 60–100)
HGB BLD-MCNC: 15.7 G/DL (ref 14–18)
IMM GRANULOCYTES # BLD: 0.02 10*3/MM3 (ref 0–0.03)
IMM GRANULOCYTES NFR BLD: 0.2 % (ref 0–0.5)
LDLC SERPL CALC-MCNC: 36 MG/DL (ref 0–100)
LDLC/HDLC SERPL: 1.38 {RATIO}
LYMPHOCYTES # BLD AUTO: 2.41 10*3/MM3 (ref 1–3)
LYMPHOCYTES NFR BLD AUTO: 22.9 % (ref 21–51)
MCH RBC QN AUTO: 31.9 PG (ref 27–33)
MCHC RBC AUTO-ENTMCNC: 33.3 G/DL (ref 33–37)
MCV RBC AUTO: 95.9 FL (ref 80–94)
MONOCYTES # BLD AUTO: 1.1 10*3/MM3 (ref 0.1–0.9)
MONOCYTES NFR BLD AUTO: 10.4 % (ref 0–10)
NEUTROPHILS # BLD AUTO: 6.64 10*3/MM3 (ref 1.4–6.5)
NEUTROPHILS NFR BLD AUTO: 63.1 % (ref 30–70)
OSMOLALITY SERPL CALC.SUM OF ELEC: 281.7 MOSM/KG (ref 273–305)
PLATELET # BLD AUTO: 341 10*3/MM3 (ref 130–400)
PMV BLD AUTO: 10.4 FL (ref 6–10)
POTASSIUM BLD-SCNC: 4.1 MMOL/L (ref 3.5–5.3)
PROT SERPL-MCNC: 6.5 G/DL (ref 6–8)
RBC # BLD AUTO: 4.92 10*6/MM3 (ref 4.7–6.1)
SODIUM BLD-SCNC: 142 MMOL/L (ref 135–153)
TRIGL SERPL-MCNC: 101 MG/DL (ref 0–150)
TSH SERPL DL<=0.05 MIU/L-ACNC: 3.42 MIU/ML (ref 0.55–4.78)
URATE SERPL-MCNC: 4 MG/DL (ref 3.7–7)
VLDLC SERPL-MCNC: 20.2 MG/DL
WBC NRBC COR # BLD: 10.53 10*3/MM3 (ref 4.5–12.5)

## 2018-11-13 PROCEDURE — 80061 LIPID PANEL: CPT | Performed by: NURSE PRACTITIONER

## 2018-11-13 PROCEDURE — 86787 VARICELLA-ZOSTER ANTIBODY: CPT | Performed by: NURSE PRACTITIONER

## 2018-11-13 PROCEDURE — 83036 HEMOGLOBIN GLYCOSYLATED A1C: CPT | Performed by: NURSE PRACTITIONER

## 2018-11-13 PROCEDURE — 84550 ASSAY OF BLOOD/URIC ACID: CPT | Performed by: NURSE PRACTITIONER

## 2018-11-13 PROCEDURE — 80050 GENERAL HEALTH PANEL: CPT | Performed by: NURSE PRACTITIONER

## 2018-11-13 PROCEDURE — 82306 VITAMIN D 25 HYDROXY: CPT | Performed by: NURSE PRACTITIONER

## 2018-11-15 LAB — VZV IGG SER IA-ACNC: 231 INDEX

## 2018-11-21 ENCOUNTER — OFFICE VISIT (OUTPATIENT)
Dept: FAMILY MEDICINE CLINIC | Facility: CLINIC | Age: 53
End: 2018-11-21

## 2018-11-21 VITALS
HEART RATE: 77 BPM | RESPIRATION RATE: 14 BRPM | WEIGHT: 202 LBS | SYSTOLIC BLOOD PRESSURE: 120 MMHG | BODY MASS INDEX: 30.62 KG/M2 | DIASTOLIC BLOOD PRESSURE: 82 MMHG | OXYGEN SATURATION: 95 % | TEMPERATURE: 97.8 F | HEIGHT: 68 IN

## 2018-11-21 DIAGNOSIS — M05.79 RHEUMATOID ARTHRITIS INVOLVING MULTIPLE SITES WITH POSITIVE RHEUMATOID FACTOR (HCC): ICD-10-CM

## 2018-11-21 DIAGNOSIS — E55.9 VITAMIN D DEFICIENCY: ICD-10-CM

## 2018-11-21 DIAGNOSIS — Z72.0 CHEWS TOBACCO: ICD-10-CM

## 2018-11-21 DIAGNOSIS — J01.00 ACUTE MAXILLARY SINUSITIS, RECURRENCE NOT SPECIFIED: ICD-10-CM

## 2018-11-21 DIAGNOSIS — I10 BENIGN ESSENTIAL HYPERTENSION: Chronic | ICD-10-CM

## 2018-11-21 DIAGNOSIS — E78.2 MIXED HYPERLIPIDEMIA: Chronic | ICD-10-CM

## 2018-11-21 DIAGNOSIS — E11.40 CONTROLLED TYPE 2 DIABETES WITH NEUROPATHY (HCC): Primary | Chronic | ICD-10-CM

## 2018-11-21 DIAGNOSIS — R53.83 FATIGUE, UNSPECIFIED TYPE: ICD-10-CM

## 2018-11-21 DIAGNOSIS — K21.9 GASTROESOPHAGEAL REFLUX DISEASE WITHOUT ESOPHAGITIS: ICD-10-CM

## 2018-11-21 PROCEDURE — 99214 OFFICE O/P EST MOD 30 MIN: CPT | Performed by: NURSE PRACTITIONER

## 2018-11-21 RX ORDER — ERGOCALCIFEROL 1.25 MG/1
50000 CAPSULE ORAL WEEKLY
Qty: 12 CAPSULE | Refills: 0 | Status: SHIPPED | OUTPATIENT
Start: 2018-11-21 | End: 2019-02-14 | Stop reason: SDUPTHER

## 2018-11-21 RX ORDER — AMOXICILLIN AND CLAVULANATE POTASSIUM 875; 125 MG/1; MG/1
1 TABLET, FILM COATED ORAL 2 TIMES DAILY
Qty: 20 TABLET | Refills: 0 | Status: SHIPPED | OUTPATIENT
Start: 2018-11-21 | End: 2019-02-27

## 2018-11-21 RX ORDER — METOPROLOL SUCCINATE 25 MG/1
25 TABLET, EXTENDED RELEASE ORAL DAILY
Qty: 30 TABLET | Refills: 2 | Status: SHIPPED | OUTPATIENT
Start: 2018-11-21 | End: 2019-02-14 | Stop reason: SDUPTHER

## 2018-11-21 NOTE — PROGRESS NOTES
History of Present Illness   Raimundo presents to the clinic today to review his recent lab results pertaining to his DM, type 2, HTN, GERD, Rheumatoid Arthritis and Hyperlipidemia.Raimundo is c/o upper respiratory symptoms which  started over a week ago.    Hypertension   The current episode started more than 1 year ago. The problem is controlled. Pertinent negatives include no anxiety, blurred vision, chest pain, headaches, palpitations, peripheral edema, shortness of breath or sweats.     Diabetes   He has type 2 diabetes mellitus. His disease course has been improving. Pertinent negatives for diabetes include no blurred vision, no chest pain, and no polyphagia. He does share he has noticed some intermittent neuropathy on his left lower extremity.  Risk factors for coronary artery disease include dyslipidemia, hypertension, male sex, sedentary lifestyle and tobacco exposure. Current diabetic treatment includes oral agent (monotherapy). He is compliant with medication regimen most of the time. He has had a previous visit with a dietitian. An ACE inhibitor/angiotensin II receptor blocker is not being taken due to s/e. Eye exam is current..    Hyperlipidemia   This is a chronic problem. The current episode started more than 1 year ago. The problem is controlled. Factors aggravating his hyperlipidemia include fatty foods. Pertinent negatives include no chest pain or shortness of breath. Current antihyperlipidemic treatment includes statins. The current treatment provides significant improvement of lipids. Compliance problems include adherence to diet and adherence to exercise.  Risk factors for coronary artery disease include diabetes mellitus, dyslipidemia, hypertension, male sex and obesity.   URI    The current episode started 1 to 4 weeks ago. The problem has been gradually worsening. Maximum temperature: Tactile. Associated symptoms include congestion, headaches (Sinus), rhinorrhea and sinus pain. Pertinent  "negatives include no abdominal pain, chest pain, coughing, nausea, rash, sneezing, sore throat, vomiting or wheezing. Ear pain: Pressure. He has tried nothing for the symptoms.     Vitals:    11/21/18 1302   BP: 120/82   Pulse: 77   Resp: 14   Temp: 97.8 °F (36.6 °C)   TempSrc: Temporal   SpO2: 95%   Weight: 91.6 kg (202 lb)   Height: 172.7 cm (68\")      The following portions of the patient's history were reviewed and updated as appropriate: allergies, current medications, past family history, past medical history, past social history, past surgical history and problem list.    Review of Systems   Constitutional: Positive for fatigue. Negative for activity change, appetite change, chills, fever and unexpected weight change.   HENT: Positive for postnasal drip and sinus pressure. Negative for ear discharge, tinnitus and trouble swallowing.    Eyes: Negative for discharge, redness and visual disturbance.   Respiratory: Negative for shortness of breath.    Cardiovascular: Negative for palpitations and leg swelling.   Endocrine: Positive for polydipsia and polyuria. Negative for cold intolerance, heat intolerance and polyphagia.   Musculoskeletal: Positive for arthralgias (Baseline) and myalgias.   Skin: Negative for color change.   Neurological: Negative for dizziness, tremors, speech difficulty, weakness and light-headedness.   Hematological: Negative for adenopathy.   Psychiatric/Behavioral: Negative for confusion, decreased concentration and suicidal ideas. The patient is not nervous/anxious.    All other systems reviewed and are negative.    Physical Exam   Constitutional: He is oriented to person, place, and time. He appears well-developed and well-nourished. No distress.   HENT:   Head: Normocephalic.   Right Ear: Tympanic membrane is bulging. Tympanic membrane is not erythematous. A middle ear effusion is present.   Left Ear: Tympanic membrane is bulging. Tympanic membrane is not erythematous. A middle ear " effusion is present.   Nose: Mucosal edema, rhinorrhea and sinus tenderness present. Right sinus exhibits maxillary sinus tenderness. Right sinus exhibits no frontal sinus tenderness. Left sinus exhibits maxillary sinus tenderness. Left sinus exhibits no frontal sinus tenderness.   Mouth/Throat: Oropharynx is clear and moist and mucous membranes are normal. Dental caries present. No oropharyngeal exudate (Mucoid) or posterior oropharyngeal erythema.   Eyes: Conjunctivae are normal. Pupils are equal, round, and reactive to light. Right eye exhibits no discharge. Left eye exhibits no discharge. No scleral icterus.   Neck: Neck supple. No JVD present. No thyromegaly present.   Cardiovascular: Normal rate, regular rhythm and normal heart sounds. Exam reveals no friction rub.   No murmur heard.  Pulmonary/Chest: Effort normal. No respiratory distress. He has decreased breath sounds. He has no wheezes. He has no rhonchi. He has no rales.   Abdominal: Soft. He exhibits no distension. There is no tenderness. There is no rebound and no guarding.   Musculoskeletal: He exhibits no edema.   Lymphadenopathy:     He has no cervical adenopathy.   Neurological: He is alert and oriented to person, place, and time.   Skin: Skin is warm and dry. No rash noted. No erythema.   Psychiatric: He has a normal mood and affect. His behavior is normal. Judgment and thought content normal.   Vitals reviewed.    Assessment/Plan   Problems Addressed this Visit        Cardiovascular and Mediastinum    Benign essential hypertension    Relevant Medications    metoprolol succinate XL (TOPROL XL) 25 MG 24 hr tablet    Hyperlipidemia    Relevant Orders    Lipid Panel       Digestive    Vitamin D deficiency    Relevant Medications    vitamin D (ERGOCALCIFEROL) 70649 units capsule capsule    Other Relevant Orders    Vitamin D 25 Hydroxy    Gastroesophageal reflux disease without esophagitis       Endocrine    Controlled type 2 diabetes with neuropathy  (CMS/[facility]) - Primary    Relevant Orders    Comprehensive Metabolic Panel    TSH    Hemoglobin A1c    Vitamin B12    MicroAlbumin, Urine, Random - Urine, Clean Catch       Musculoskeletal and Integument    Rheumatoid arthritis with positive rheumatoid factor (CMS/[facility])       Other    Chews tobacco      Other Visit Diagnoses     Acute maxillary sinusitis, recurrence not specified        Treatment options reviewed. Counseled regarding supportive care measures.     Relevant Medications    amoxicillin-clavulanate (AUGMENTIN) 875-125 MG per tablet    Fatigue, unspecified type        Will reduce Metoprolol to 25 mg to see if it improves the fatigue. Discussed medications which could be contributing.     Relevant Orders    CBC & Differential        Results for orders placed or performed in visit on 11/13/18   Comprehensive Metabolic Panel   Result Value Ref Range    Glucose 96 70 - 110 mg/dL    BUN 9 7 - 21 mg/dL    Creatinine 0.90 0.43 - 1.29 mg/dL    Sodium 142 135 - 153 mmol/L    Potassium 4.1 3.5 - 5.3 mmol/L    Chloride 108 99 - 112 mmol/L    CO2 27.3 24.3 - 31.9 mmol/L    Calcium 9.0 7.7 - 10.0 mg/dL    Total Protein 6.5 6.0 - 8.0 g/dL    Albumin 4.20 3.50 - 5.00 g/dL    ALT (SGPT) 34 10 - 44 U/L    AST (SGOT) 22 10 - 34 U/L    Alkaline Phosphatase 112 40 - 129 U/L    Total Bilirubin 0.4 0.2 - 1.8 mg/dL    eGFR Non African Amer 89 >60 mL/min/1.73    Globulin 2.3 gm/dL    A/G Ratio 1.8 1.5 - 2.5 g/dL    BUN/Creatinine Ratio 10.0 7.0 - 25.0    Anion Gap 6.7 3.6 - 11.2 mmol/L   Lipid Panel   Result Value Ref Range    Total Cholesterol 82 0 - 200 mg/dL    Triglycerides 101 0 - 150 mg/dL    HDL Cholesterol 26 (L) 60 - 100 mg/dL    LDL Cholesterol  36 0 - 100 mg/dL    VLDL Cholesterol 20.2 mg/dL    LDL/HDL Ratio 1.38    TSH   Result Value Ref Range    TSH 3.420 0.550 - 4.780 mIU/mL   Hemoglobin A1c   Result Value Ref Range    Hemoglobin A1C 6.00 (H) 4.50 - 5.70 %   Vitamin D 25 Hydroxy   Result Value Ref Range    25 Hydroxy,  Vitamin D 29.0 ng/ml   Uric Acid   Result Value Ref Range    Uric Acid 4.0 3.7 - 7.0 mg/dL   Varicella Zoster Antibody, IgG   Result Value Ref Range    Varicella IgG 231 Immune >165 index   CBC Auto Differential   Result Value Ref Range    WBC 10.53 4.50 - 12.50 10*3/mm3    RBC 4.92 4.70 - 6.10 10*6/mm3    Hemoglobin 15.7 14.0 - 18.0 g/dL    Hematocrit 47.2 42.0 - 52.0 %    MCV 95.9 (H) 80.0 - 94.0 fL    MCH 31.9 27.0 - 33.0 pg    MCHC 33.3 33.0 - 37.0 g/dL    RDW 14.1 11.5 - 14.5 %    RDW-SD 46.9 37.0 - 54.0 fl    MPV 10.4 (H) 6.0 - 10.0 fL    Platelets 341 130 - 400 10*3/mm3    Neutrophil % 63.1 30.0 - 70.0 %    Lymphocyte % 22.9 21.0 - 51.0 %    Monocyte % 10.4 (H) 0.0 - 10.0 %    Eosinophil % 2.8 0.0 - 5.0 %    Basophil % 0.6 0.0 - 2.0 %    Immature Grans % 0.2 0.0 - 0.5 %    Neutrophils, Absolute 6.64 (H) 1.40 - 6.50 10*3/mm3    Lymphocytes, Absolute 2.41 1.00 - 3.00 10*3/mm3    Monocytes, Absolute 1.10 (H) 0.10 - 0.90 10*3/mm3    Eosinophils, Absolute 0.30 0.00 - 0.70 10*3/mm3    Basophils, Absolute 0.06 0.00 - 0.30 10*3/mm3    Immature Grans, Absolute 0.02 0.00 - 0.03 10*3/mm3   Osmolality, Calculated   Result Value Ref Range    Osmolality Calc 281.7 273.0 - 305.0 mOsm/kg     Findings and recommendations discussed with Raimundo. Reviewed his recent lab tests which included a FBS of 96 mg/dL and an A1C of 6. Will reduce Metoprolol to 25 mg to see if it improves the fatigue. Discussed medications which could be contributing. He had been scheduled for an overnight Pulse Ox twice but the report indicated no data. Raimundo said he did not have the time to do it when they sent him the equipment.   Lifestyle modifications reinforced including nutrition and activity recommendations. Tobacco cessation encouraged.   Discussed his Varicella titer which indicated he previously had an episode of Varicella.   He is being f/u by GI due to Gallbladder polyps   He will f/u with me in February with routine labs done prior to the  appt. Encouraged him if he had problems/concerns to f/u sooner.

## 2018-11-21 NOTE — PATIENT INSTRUCTIONS
Type 2 Diabetes Mellitus, Self Care, Adult  Caring for yourself after you have been diagnosed with type 2 diabetes (type 2 diabetes mellitus) means keeping your blood sugar (glucose) under control with a balance of:  · Nutrition.  · Exercise.  · Lifestyle changes.  · Medicines or insulin, if necessary.  · Support from your team of health care providers and others.    The following information explains what you need to know to manage your diabetes at home.  What do I need to do to manage my blood glucose?  · Check your blood glucose every day, as often as told by your health care provider.  · Contact your health care provider if your blood glucose is above your target for 2 tests in a row.  · Have your A1c (hemoglobin A1c) level checked at least two times a year, or as often as told by your health care provider.  Your health care provider will set individualized treatment goals for you. Generally, the goal of treatment is to maintain the following blood glucose levels:  · Before meals (preprandial):  mg/dL (4.4-7.2 mmol/L).  · After meals (postprandial): below 180 mg/dL (10 mmol/L).  · A1c level: less than 7%.    What do I need to know about hyperglycemia and hypoglycemia?  What is hyperglycemia?  Hyperglycemia, also called high blood glucose, occurs when blood glucose is too high. Make sure you know the early signs of hyperglycemia, such as:  · Increased thirst.  · Hunger.  · Feeling very tired.  · Needing to urinate more often than usual.  · Blurry vision.    What is hypoglycemia?  Hypoglycemia, also called low blood glucose, occurs with a blood glucose level at or below 70 mg/dL (3.9 mmol/L). The risk for hypoglycemia increases during or after exercise, during sleep, during illness, and when skipping meals or not eating for a long time (fasting).  It is important to know the symptoms of hypoglycemia and treat it right away. Always have a 15-gram rapid-acting carbohydrate snack with you to treat low blood  glucose. Family members and close friends should also know the symptoms and should understand how to treat hypoglycemia, in case you are not able to treat yourself.  What are the symptoms of hypoglycemia?  Hypoglycemia symptoms can include:  · Hunger.  · Anxiety.  · Sweating and feeling clammy.  · Confusion.  · Dizziness or feeling light-headed.  · Sleepiness.  · Nausea.  · Increased heart rate.  · Headache.  · Blurry vision.  · Seizure.  · Nightmares.  · Tingling or numbness around the mouth, lips, or tongue.  · A change in speech.  · Decreased ability to concentrate.  · A change in coordination.  · Restless sleep.  · Tremors or shakes.  · Fainting.  · Irritability.    How do I treat hypoglycemia?    If you are alert and able to swallow safely, follow the 15:15 rule:  · Take 15 grams of a rapid-acting carbohydrate. Rapid-acting options include:  ? 1 tube of glucose gel.  ? 3 glucose pills.  ? 6-8 pieces of hard candy.  ? 4 oz (120 mL) of fruit juice.  ? 4 oz (120 mL) of regular (not diet) soda.  · Check your blood glucose 15 minutes after you take the carbohydrate.  · If the repeat blood glucose level is still at or below 70 mg/dL (3.9 mmol/L), take 15 grams of a carbohydrate again.  · If your blood glucose level does not increase above 70 mg/dL (3.9 mmol/L) after 3 tries, seek emergency medical care.  · After your blood glucose level returns to normal, eat a meal or a snack within 1 hour.    How do I treat severe hypoglycemia?  Severe hypoglycemia is when your blood glucose level is at or below 54 mg/dL (3 mmol/L). Severe hypoglycemia is an emergency. Do not wait to see if the symptoms will go away. Get medical help right away. Call your local emergency services (911 in the [place] Do not drive yourself to the hospital.  If you have severe hypoglycemia and you cannot eat or drink, you may need an injection of glucagon. A family member or close friend should learn how to check your blood glucose and how to give you  a glucagon injection. Ask your health care provider if you need to have an emergency glucagon injection kit available.  Severe hypoglycemia may need to be treated in a hospital. The treatment may include getting glucose through an IV tube. You may also need treatment for the cause of your hypoglycemia.  Can having diabetes put me at risk for other conditions?  Having diabetes can put you at risk for other long-term (chronic) conditions, such as heart disease and kidney disease. Your health care provider may prescribe medicines to help prevent complications from diabetes. These medicines may include:  · Aspirin.  · Medicine to lower cholesterol.  · Medicine to control blood pressure.    What else can I do to manage my diabetes?  Take your diabetes medicines as told  · If your health care provider prescribed insulin or diabetes medicines, take them every day.  · Do not run out of insulin or other diabetes medicines that you take. Plan ahead so you always have these available.  · If you use insulin, adjust your dosage based on how physically active you are and what foods you eat. Your health care provider will tell you how to adjust your dosage.  Make healthy food choices    The things that you eat and drink affect your blood glucose and your insulin dosage. Making good choices helps to control your diabetes and prevent other health problems. A healthy meal plan includes eating lean proteins, complex carbohydrates, fresh fruits and vegetables, low-fat dairy products, and healthy fats.  Make an appointment to see a diet and nutrition specialist (registered dietitian) to help you create an eating plan that is right for you. Make sure that you:  · Follow instructions from your health care provider about eating or drinking restrictions.  · Drink enough fluid to keep your urine clear or pale yellow.  · Eat healthy snacks between nutritious meals.  · Track the carbohydrates that you eat. Do this by reading food labels and  learning the standard serving sizes of foods.  · Follow your sick day plan whenever you cannot eat or drink as usual. Make this plan in advance with your health care provider.    Stay active    Exercise regularly, as told by your health care provider. This may include:  · Stretching and doing strength exercises, such as yoga or weightlifting, at least 2 times a week.  · Doing at least 150 minutes of moderate-intensity or vigorous-intensity exercise each week. This could be brisk walking, biking, or water aerobics.  ? Spread out your activity over at least 3 days of the week.  ? Do not go more than 2 days in a row without doing some kind of physical activity.    When you start a new exercise or activity, work with your health care provider to adjust your insulin, medicines, or food intake as needed.  Make healthy lifestyle choices  · Do not use any tobacco products, such as cigarettes, chewing tobacco, and e-cigarettes. If you need help quitting, ask your health care provider.  · If your health care provider says that alcohol is safe for you, limit alcohol intake to no more than 1 drink per day for nonpregnant women and 2 drinks per day for men. One drink equals 12 oz of beer, 5 oz of wine, or 1½ oz of hard liquor.  · Learn to manage stress. If you need help with this, ask your health care provider.  Care for your body    · Keep your immunizations up to date. In addition to getting vaccinations as told by your health care provider, it is recommended that you get vaccinated against the following illnesses:  ? The flu (influenza). Get a flu shot every year.  ? Pneumonia.  ? Hepatitis B.  · Schedule an eye exam soon after your diagnosis, and then one time every year after that.  · Check your skin and feet every day for cuts, bruises, redness, blisters, or sores. Schedule a foot exam with your health care provider once every year.  · Brush your teeth and gums two times a day, and floss at least one time a day. Visit your  dentist at least once every 6 months.  · Maintain a healthy weight.  General instructions  · Take over-the-counter and prescription medicines only as told by your health care provider.  · Share your diabetes management plan with people in your workplace, school, and household.  · Check your urine for ketones when you are ill and as told by your health care provider.  · Ask your health care provider:  ? Do I need to meet with a diabetes educator?  ? Where can I find a support group for people with diabetes?  · Carry a medical alert card or wear medical alert jewelry.  · Keep all follow-up visits as told by your health care provider. This is important.  Where to find more information:  For more information about diabetes, visit:  · American Diabetes Association (ADA): www.diabetes.org  · American Association of Diabetes Educators (AADE): www.diabeteseducator.org/patient-resources    This information is not intended to replace advice given to you by your health care provider. Make sure you discuss any questions you have with your health care provider.  Document Released: 04/10/2017 Document Revised: 05/25/2017 Document Reviewed: 01/20/2017  Elsevier Interactive Patient Education © 2018 [employer]

## 2018-12-17 DIAGNOSIS — F51.04 CHRONIC INSOMNIA: ICD-10-CM

## 2018-12-17 RX ORDER — TRAZODONE HYDROCHLORIDE 100 MG/1
TABLET ORAL
Qty: 30 TABLET | Refills: 0 | Status: SHIPPED | OUTPATIENT
Start: 2018-12-17 | End: 2019-01-31 | Stop reason: SDUPTHER

## 2019-01-28 DIAGNOSIS — M05.79 RHEUMATOID ARTHRITIS INVOLVING MULTIPLE SITES WITH POSITIVE RHEUMATOID FACTOR (HCC): Chronic | ICD-10-CM

## 2019-01-31 DIAGNOSIS — F51.04 CHRONIC INSOMNIA: ICD-10-CM

## 2019-02-01 ENCOUNTER — TELEPHONE (OUTPATIENT)
Dept: UROLOGY | Facility: CLINIC | Age: 54
End: 2019-02-01

## 2019-02-01 DIAGNOSIS — Z85.038 HISTORY OF COLON CANCER: Primary | ICD-10-CM

## 2019-02-01 RX ORDER — TRAZODONE HYDROCHLORIDE 100 MG/1
TABLET ORAL
Qty: 30 TABLET | Refills: 0 | Status: SHIPPED | OUTPATIENT
Start: 2019-02-01 | End: 2019-04-26

## 2019-02-01 NOTE — TELEPHONE ENCOUNTER
PT called wanted to know about his yearly colonoscopy I let him know you would set it up and call him back with a date and time

## 2019-02-01 NOTE — TELEPHONE ENCOUNTER
Susanna, if you put a case request in and let me know who you would like to do this for him I will send information up to them. Also what bowel prep.    Please and Thank you

## 2019-02-04 NOTE — TELEPHONE ENCOUNTER
Susanna, I spoke with patient and he would like to have a sample of [place]-prep. I told him when he comes to pick it up to please ask for me and I would go over instructions with him. He voiced understanding. Information was sent to Dr. Tinoco's office.

## 2019-02-08 ENCOUNTER — TELEPHONE (OUTPATIENT)
Dept: SURGERY | Facility: CLINIC | Age: 54
End: 2019-02-08

## 2019-02-11 ENCOUNTER — TELEPHONE (OUTPATIENT)
Dept: GASTROENTEROLOGY | Facility: CLINIC | Age: 54
End: 2019-02-11

## 2019-02-11 DIAGNOSIS — K82.4 GALLBLADDER POLYP: Primary | ICD-10-CM

## 2019-02-11 NOTE — TELEPHONE ENCOUNTER
Patient will be due for next repeat gallbladder US due to gb polyps in 2 years from 9/2018. Guidelines say follow first in 6 mo, then 1 year then 2 years. Please place him on a recall list for repeat gb US 9/2020. Please let him know that this is the plan, if he develops any gb symptoms such as abdominal pain, nausea, vomiting or diarrhea, we will need to perform sooner. Thanks.

## 2019-02-14 DIAGNOSIS — E55.9 VITAMIN D DEFICIENCY: ICD-10-CM

## 2019-02-14 DIAGNOSIS — K21.9 GASTROESOPHAGEAL REFLUX DISEASE WITHOUT ESOPHAGITIS: ICD-10-CM

## 2019-02-14 DIAGNOSIS — E11.69 CONTROLLED TYPE 2 DIABETES MELLITUS WITH OTHER SPECIFIED COMPLICATION, WITHOUT LONG-TERM CURRENT USE OF INSULIN (HCC): ICD-10-CM

## 2019-02-14 RX ORDER — LANSOPRAZOLE 30 MG/1
CAPSULE, DELAYED RELEASE ORAL
Qty: 30 CAPSULE | Refills: 1 | Status: SHIPPED | OUTPATIENT
Start: 2019-02-14 | End: 2019-04-26 | Stop reason: SDUPTHER

## 2019-02-15 RX ORDER — CYCLOBENZAPRINE HCL 10 MG
TABLET ORAL
Qty: 90 TABLET | Refills: 1 | OUTPATIENT
Start: 2019-02-15

## 2019-02-15 RX ORDER — METOPROLOL SUCCINATE 25 MG/1
TABLET, EXTENDED RELEASE ORAL
Qty: 30 TABLET | Refills: 1 | Status: SHIPPED | OUTPATIENT
Start: 2019-02-15 | End: 2019-04-26 | Stop reason: SDUPTHER

## 2019-02-15 RX ORDER — ERGOCALCIFEROL 1.25 MG/1
CAPSULE ORAL
Qty: 4 CAPSULE | Refills: 0 | Status: SHIPPED | OUTPATIENT
Start: 2019-02-15 | End: 2019-04-26 | Stop reason: SDUPTHER

## 2019-02-15 RX ORDER — LANOLIN ALCOHOL/MO/W.PET/CERES
CREAM (GRAM) TOPICAL
Qty: 30 TABLET | Refills: 1 | Status: SHIPPED | OUTPATIENT
Start: 2019-02-15 | End: 2019-04-26 | Stop reason: SDUPTHER

## 2019-02-19 ENCOUNTER — LAB (OUTPATIENT)
Dept: FAMILY MEDICINE CLINIC | Facility: CLINIC | Age: 54
End: 2019-02-19

## 2019-02-19 DIAGNOSIS — R53.83 FATIGUE, UNSPECIFIED TYPE: ICD-10-CM

## 2019-02-19 DIAGNOSIS — E55.9 VITAMIN D DEFICIENCY: ICD-10-CM

## 2019-02-19 DIAGNOSIS — E11.40 CONTROLLED TYPE 2 DIABETES WITH NEUROPATHY (HCC): ICD-10-CM

## 2019-02-19 DIAGNOSIS — E78.2 MIXED HYPERLIPIDEMIA: Chronic | ICD-10-CM

## 2019-02-19 LAB
25(OH)D3 SERPL-MCNC: 116 NG/ML
ALBUMIN SERPL-MCNC: 4.2 G/DL (ref 3.5–5)
ALBUMIN/GLOB SERPL: 1.6 G/DL (ref 1.5–2.5)
ALP SERPL-CCNC: 106 U/L (ref 40–129)
ALT SERPL W P-5'-P-CCNC: 21 U/L (ref 10–44)
ANION GAP SERPL CALCULATED.3IONS-SCNC: 6.7 MMOL/L (ref 3.6–11.2)
AST SERPL-CCNC: 19 U/L (ref 10–34)
BASOPHILS # BLD AUTO: 0.05 10*3/MM3 (ref 0–0.3)
BASOPHILS NFR BLD AUTO: 0.5 % (ref 0–2)
BILIRUB SERPL-MCNC: 0.4 MG/DL (ref 0.2–1.8)
BUN BLD-MCNC: 10 MG/DL (ref 7–21)
BUN/CREAT SERPL: 11.9 (ref 7–25)
CALCIUM SPEC-SCNC: 9.2 MG/DL (ref 7.7–10)
CHLORIDE SERPL-SCNC: 107 MMOL/L (ref 99–112)
CHOLEST SERPL-MCNC: 87 MG/DL (ref 0–200)
CO2 SERPL-SCNC: 25.3 MMOL/L (ref 24.3–31.9)
CREAT BLD-MCNC: 0.84 MG/DL (ref 0.43–1.29)
DEPRECATED RDW RBC AUTO: 43.7 FL (ref 37–54)
EOSINOPHIL # BLD AUTO: 0.48 10*3/MM3 (ref 0–0.7)
EOSINOPHIL NFR BLD AUTO: 4.9 % (ref 0–5)
ERYTHROCYTE [DISTWIDTH] IN BLOOD BY AUTOMATED COUNT: 13.6 % (ref 11.5–14.5)
GFR SERPL CREATININE-BSD FRML MDRD: 96 ML/MIN/1.73
GLOBULIN UR ELPH-MCNC: 2.6 GM/DL
GLUCOSE BLD-MCNC: 100 MG/DL (ref 70–110)
HBA1C MFR BLD: 5.8 % (ref 4.5–5.7)
HCT VFR BLD AUTO: 48.6 % (ref 42–52)
HDLC SERPL-MCNC: 29 MG/DL (ref 60–100)
HGB BLD-MCNC: 16.5 G/DL (ref 14–18)
IMM GRANULOCYTES # BLD AUTO: 0.02 10*3/MM3 (ref 0–0.03)
IMM GRANULOCYTES NFR BLD AUTO: 0.2 % (ref 0–0.5)
LDLC SERPL CALC-MCNC: 37 MG/DL (ref 0–100)
LDLC/HDLC SERPL: 1.28 {RATIO}
LYMPHOCYTES # BLD AUTO: 3.1 10*3/MM3 (ref 1–3)
LYMPHOCYTES NFR BLD AUTO: 31.3 % (ref 21–51)
MCH RBC QN AUTO: 31.3 PG (ref 27–33)
MCHC RBC AUTO-ENTMCNC: 34 G/DL (ref 33–37)
MCV RBC AUTO: 92 FL (ref 80–94)
MONOCYTES # BLD AUTO: 1.26 10*3/MM3 (ref 0.1–0.9)
MONOCYTES NFR BLD AUTO: 12.7 % (ref 0–10)
NEUTROPHILS # BLD AUTO: 4.98 10*3/MM3 (ref 1.4–6.5)
NEUTROPHILS NFR BLD AUTO: 50.4 % (ref 30–70)
OSMOLALITY SERPL CALC.SUM OF ELEC: 276.7 MOSM/KG (ref 273–305)
PLATELET # BLD AUTO: 318 10*3/MM3 (ref 130–400)
PMV BLD AUTO: 10.2 FL (ref 6–10)
POTASSIUM BLD-SCNC: 3.9 MMOL/L (ref 3.5–5.3)
PROT SERPL-MCNC: 6.8 G/DL (ref 6–8)
RBC # BLD AUTO: 5.28 10*6/MM3 (ref 4.7–6.1)
SODIUM BLD-SCNC: 139 MMOL/L (ref 135–153)
TRIGL SERPL-MCNC: 104 MG/DL (ref 0–150)
TSH SERPL DL<=0.05 MIU/L-ACNC: 4.65 MIU/ML (ref 0.55–4.78)
VIT B12 BLD-MCNC: 1299 PG/ML (ref 211–911)
VLDLC SERPL-MCNC: 20.8 MG/DL
WBC NRBC COR # BLD: 9.89 10*3/MM3 (ref 4.5–12.5)

## 2019-02-19 PROCEDURE — 83036 HEMOGLOBIN GLYCOSYLATED A1C: CPT | Performed by: NURSE PRACTITIONER

## 2019-02-19 PROCEDURE — 80061 LIPID PANEL: CPT | Performed by: NURSE PRACTITIONER

## 2019-02-19 PROCEDURE — 80050 GENERAL HEALTH PANEL: CPT | Performed by: NURSE PRACTITIONER

## 2019-02-19 PROCEDURE — 82607 VITAMIN B-12: CPT | Performed by: NURSE PRACTITIONER

## 2019-02-19 PROCEDURE — 82306 VITAMIN D 25 HYDROXY: CPT | Performed by: NURSE PRACTITIONER

## 2019-02-20 LAB — ALBUMIN UR-MCNC: 14.8 MG/L

## 2019-02-20 PROCEDURE — 82043 UR ALBUMIN QUANTITATIVE: CPT | Performed by: NURSE PRACTITIONER

## 2019-02-27 ENCOUNTER — OFFICE VISIT (OUTPATIENT)
Dept: FAMILY MEDICINE CLINIC | Facility: CLINIC | Age: 54
End: 2019-02-27

## 2019-02-27 DIAGNOSIS — E78.2 MIXED HYPERLIPIDEMIA: Chronic | ICD-10-CM

## 2019-02-27 DIAGNOSIS — M05.79 RHEUMATOID ARTHRITIS INVOLVING MULTIPLE SITES WITH POSITIVE RHEUMATOID FACTOR (HCC): Chronic | ICD-10-CM

## 2019-02-27 DIAGNOSIS — K21.9 GASTROESOPHAGEAL REFLUX DISEASE WITHOUT ESOPHAGITIS: ICD-10-CM

## 2019-02-27 DIAGNOSIS — Z72.0 CHEWS TOBACCO: ICD-10-CM

## 2019-02-27 DIAGNOSIS — Z85.038 HISTORY OF COLON CANCER: ICD-10-CM

## 2019-02-27 DIAGNOSIS — I10 BENIGN ESSENTIAL HYPERTENSION: Chronic | ICD-10-CM

## 2019-02-27 DIAGNOSIS — E55.9 VITAMIN D DEFICIENCY: ICD-10-CM

## 2019-02-27 DIAGNOSIS — E11.40 CONTROLLED TYPE 2 DIABETES WITH NEUROPATHY (HCC): Primary | Chronic | ICD-10-CM

## 2019-02-27 PROCEDURE — 99214 OFFICE O/P EST MOD 30 MIN: CPT | Performed by: NURSE PRACTITIONER

## 2019-02-27 NOTE — PROGRESS NOTES
History of Present Illness   Raimundo presents to the clinic today to review his recent lab results pertaining to his DM, type 2, HTN, GERD, Rheumatoid Arthritis and Hyperlipidemia. In addition, Raimundo has a history of Insomnia and Vitamin deficiency  Raimundo reports he is scheduled for a f/u colonoscopy due to his recent h/o colon cancer.   Hypertension   The current episode started more than 1 year ago. The problem is controlled. Pertinent negatives include no anxiety, blurred vision, chest pain, headaches, palpitations, peripheral edema, shortness of breath or sweats.      Diabetes   He has type 2 diabetes mellitus. His disease course has been improving. Pertinent negatives for diabetes include no blurred vision, no chest pain, and no polyphagia. He does share he has noticed some intermittent neuropathy on his left lower extremity.  Risk factors for coronary artery disease include dyslipidemia, hypertension, male sex, sedentary lifestyle and tobacco exposure. Current diabetic treatment includes oral agent (monotherapy). He is compliant with medication regimen most of the time. He has had a previous visit with a dietitian. An ACE inhibitor/angiotensin II receptor blocker is not being taken due to s/e. Eye exam is current..     Hyperlipidemia   This is a chronic problem. The current episode started more than 1 year ago. The problem is controlled. Factors aggravating his hyperlipidemia include fatty foods. Pertinent negatives include no chest pain or shortness of breath. Current antihyperlipidemic treatment includes statins. The current treatment provides significant improvement of lipids. Compliance problems include adherence to diet and adherence to exercise.  Risk factors for coronary artery disease include diabetes mellitus, dyslipidemia, hypertension, male sex and obesity.     Vitals:    02/27/19 1326   BP: 120/80   Pulse: 86   Resp: 14   Temp: 98.3 °F (36.8 °C)   TempSrc: Tympanic   SpO2: 95%   Weight: 91.6  "kg (202 lb)   Height: 172.7 cm (68\")      The following portions of the patient's history were reviewed and updated as appropriate: allergies, current medications, past family history, past medical history, past social history, past surgical history and problem list.    Review of Systems   Constitutional: Positive for appetite change. Negative for activity change, chills, fever and unexpected weight change.   Respiratory: Negative for chest tightness, shortness of breath and wheezing.    Cardiovascular: Negative for chest pain, palpitations and leg swelling.   Gastrointestinal: Negative for abdominal pain, constipation, diarrhea, nausea and vomiting.   Endocrine: Negative for cold intolerance, heat intolerance, polydipsia, polyphagia and polyuria.   Musculoskeletal: Positive for arthralgias.   Skin: Negative for color change and rash.   Neurological: Positive for numbness (Intermittent peripheral neuropathy) and headaches. Negative for dizziness, tremors, speech difficulty, weakness and light-headedness.   Hematological: Negative for adenopathy.   Psychiatric/Behavioral: Positive for sleep disturbance. Negative for confusion, decreased concentration and suicidal ideas. The patient is not nervous/anxious.    All other systems reviewed and are negative.    Physical Exam   Constitutional: He is oriented to person, place, and time. He appears well-developed and well-nourished. No distress.   HENT:   Head: Normocephalic.   Nose: Nose normal.   Mouth/Throat: Oropharynx is clear and moist. Dental caries present. No oropharyngeal exudate.   Eyes: Conjunctivae are normal. Pupils are equal, round, and reactive to light. Right eye exhibits no discharge. Left eye exhibits no discharge. No scleral icterus.   Neck: Neck supple. No JVD present. No thyromegaly present.   Cardiovascular: Normal rate, regular rhythm, normal heart sounds and intact distal pulses. Exam reveals no friction rub.   No murmur heard.  Pulmonary/Chest: " Effort normal. No respiratory distress. He has decreased breath sounds. He has no wheezes. He has no rales.   Abdominal: Soft. Bowel sounds are normal. There is no tenderness. There is no rebound and no guarding.   Musculoskeletal: He exhibits tenderness. He exhibits no edema.   Lymphadenopathy:     He has no cervical adenopathy.   Neurological: He is alert and oriented to person, place, and time.   Skin: Skin is warm and dry. No rash noted. No erythema.   Psychiatric: He has a normal mood and affect. His behavior is normal. Judgment and thought content normal.   Vitals reviewed.    Assessment/Plan   Problems Addressed this Visit        Cardiovascular and Mediastinum    Benign essential hypertension    Hyperlipidemia    Relevant Orders    Lipid Panel       Digestive    Vitamin D deficiency    Relevant Orders    Vitamin D 25 Hydroxy    Gastroesophageal reflux disease without esophagitis    Relevant Orders    CBC & Differential       Endocrine    Controlled type 2 diabetes with neuropathy (CMS/HCC) - Primary    Relevant Orders    Comprehensive Metabolic Panel    TSH    Hemoglobin A1c    Vitamin B12    MicroAlbumin, Urine, Random - Urine, Clean Catch       Musculoskeletal and Integument    Rheumatoid arthritis with positive rheumatoid factor (CMS/HCC)    Relevant Orders    Uric Acid       Other    Chews tobacco    History of colon cancer          Results for orders placed or performed in visit on 02/19/19   Comprehensive Metabolic Panel   Result Value Ref Range    Glucose 100 70 - 110 mg/dL    BUN 10 7 - 21 mg/dL    Creatinine 0.84 0.43 - 1.29 mg/dL    Sodium 139 135 - 153 mmol/L    Potassium 3.9 3.5 - 5.3 mmol/L    Chloride 107 99 - 112 mmol/L    CO2 25.3 24.3 - 31.9 mmol/L    Calcium 9.2 7.7 - 10.0 mg/dL    Total Protein 6.8 6.0 - 8.0 g/dL    Albumin 4.20 3.50 - 5.00 g/dL    ALT (SGPT) 21 10 - 44 U/L    AST (SGOT) 19 10 - 34 U/L    Alkaline Phosphatase 106 40 - 129 U/L    Total Bilirubin 0.4 0.2 - 1.8 mg/dL    eGFR  Non  Amer 96 >60 mL/min/1.73    Globulin 2.6 gm/dL    A/G Ratio 1.6 1.5 - 2.5 g/dL    BUN/Creatinine Ratio 11.9 7.0 - 25.0    Anion Gap 6.7 3.6 - 11.2 mmol/L   Lipid Panel   Result Value Ref Range    Total Cholesterol 87 0 - 200 mg/dL    Triglycerides 104 0 - 150 mg/dL    HDL Cholesterol 29 (L) 60 - 100 mg/dL    LDL Cholesterol  37 0 - 100 mg/dL    VLDL Cholesterol 20.8 mg/dL    LDL/HDL Ratio 1.28    TSH   Result Value Ref Range    TSH 4.649 0.550 - 4.780 mIU/mL   Hemoglobin A1c   Result Value Ref Range    Hemoglobin A1C 5.80 (H) 4.50 - 5.70 %   Vitamin D 25 Hydroxy   Result Value Ref Range    25 Hydroxy, Vitamin D 116.0 ng/ml   Vitamin B12   Result Value Ref Range    Vitamin B-12 1,299 (H) 211 - 911 pg/mL   MicroAlbumin, Urine, Random - Urine, Clean Catch   Result Value Ref Range    Microalbumin, Urine 14.8 mg/L   CBC Auto Differential   Result Value Ref Range    WBC 9.89 4.50 - 12.50 10*3/mm3    RBC 5.28 4.70 - 6.10 10*6/mm3    Hemoglobin 16.5 14.0 - 18.0 g/dL    Hematocrit 48.6 42.0 - 52.0 %    MCV 92.0 80.0 - 94.0 fL    MCH 31.3 27.0 - 33.0 pg    MCHC 34.0 33.0 - 37.0 g/dL    RDW 13.6 11.5 - 14.5 %    RDW-SD 43.7 37.0 - 54.0 fl    MPV 10.2 (H) 6.0 - 10.0 fL    Platelets 318 130 - 400 10*3/mm3    Neutrophil % 50.4 30.0 - 70.0 %    Lymphocyte % 31.3 21.0 - 51.0 %    Monocyte % 12.7 (H) 0.0 - 10.0 %    Eosinophil % 4.9 0.0 - 5.0 %    Basophil % 0.5 0.0 - 2.0 %    Immature Grans % 0.2 0.0 - 0.5 %    Neutrophils, Absolute 4.98 1.40 - 6.50 10*3/mm3    Lymphocytes, Absolute 3.10 (H) 1.00 - 3.00 10*3/mm3    Monocytes, Absolute 1.26 (H) 0.10 - 0.90 10*3/mm3    Eosinophils, Absolute 0.48 0.00 - 0.70 10*3/mm3    Basophils, Absolute 0.05 0.00 - 0.30 10*3/mm3    Immature Grans, Absolute 0.02 0.00 - 0.03 10*3/mm3   Osmolality, Calculated   Result Value Ref Range    Osmolality Calc 276.7 273.0 - 305.0 mOsm/kg     Findings and recommendations discussed with Raimundo. Reviewed his recent labs with him. He had been notified  to discontinue the weekly Vitamin D when the value was reviewed. Reinforced lifestyle modification including nutrition and activity and tobacco cessation. Encouraged him to keep appt with Dr Tinoco for the colonoscopy. He will f/u with me in May with routine labs prior to his visit; sooner if possible.

## 2019-02-27 NOTE — PATIENT INSTRUCTIONS
Insomnia  Insomnia is a sleep disorder that makes it difficult to fall asleep or to stay asleep. Insomnia can cause tiredness (fatigue), low energy, difficulty concentrating, mood swings, and poor performance at work or school.  There are three different ways to classify insomnia:  · Difficulty falling asleep.  · Difficulty staying asleep.  · Waking up too early in the morning.    Any type of insomnia can be long-term (chronic) or short-term (acute). Both are common. Short-term insomnia usually lasts for three months or less. Chronic insomnia occurs at least three times a week for longer than three months.  What are the causes?  Insomnia may be caused by another condition, situation, or substance, such as:  · Anxiety.  · Certain medicines.  · Gastroesophageal reflux disease (GERD) or other gastrointestinal conditions.  · Asthma or other breathing conditions.  · Restless legs syndrome, sleep apnea, or other sleep disorders.  · Chronic pain.  · Menopause. This may include hot flashes.  · Stroke.  · Abuse of alcohol, tobacco, or illegal drugs.  · Depression.  · Caffeine.  · Neurological disorders, such as Alzheimer disease.  · An overactive thyroid (hyperthyroidism).    The cause of insomnia may not be known.  What increases the risk?  Risk factors for insomnia include:  · Gender. Women are more commonly affected than men.  · Age. Insomnia is more common as you get older.  · Stress. This may involve your professional or personal life.  · Income. Insomnia is more common in people with lower income.  · Lack of exercise.  · Irregular work schedule or night shifts.  · Traveling between different time zones.    What are the signs or symptoms?  If you have insomnia, trouble falling asleep or trouble staying asleep is the main symptom. This may lead to other symptoms, such as:  · Feeling fatigued.  · Feeling nervous about going to sleep.  · Not feeling rested in the morning.  · Having trouble concentrating.  · Feeling  irritable, anxious, or depressed.    How is this treated?  Treatment for insomnia depends on the cause. If your insomnia is caused by an underlying condition, treatment will focus on addressing the condition. Treatment may also include:  · Medicines to help you sleep.  · Counseling or therapy.  · Lifestyle adjustments.    Follow these instructions at home:  · Take medicines only as directed by your health care provider.  · Keep regular sleeping and waking hours. Avoid naps.  · Keep a sleep diary to help you and your health care provider figure out what could be causing your insomnia. Include:  ? When you sleep.  ? When you wake up during the night.  ? How well you sleep.  ? How rested you feel the next day.  ? Any side effects of medicines you are taking.  ? What you eat and drink.  · Make your bedroom a comfortable place where it is easy to fall asleep:  ? Put up shades or special blackout curtains to block light from outside.  ? Use a white noise machine to block noise.  ? Keep the temperature cool.  · Exercise regularly as directed by your health care provider. Avoid exercising right before bedtime.  · Use relaxation techniques to manage stress. Ask your health care provider to suggest some techniques that may work well for you. These may include:  ? Breathing exercises.  ? Routines to release muscle tension.  ? Visualizing peaceful scenes.  · Cut back on alcohol, caffeinated beverages, and cigarettes, especially close to bedtime. These can disrupt your sleep.  · Do not overeat or eat spicy foods right before bedtime. This can lead to digestive discomfort that can make it hard for you to sleep.  · Limit screen use before bedtime. This includes:  ? Watching TV.  ? Using your smartphone, tablet, and computer.  · Stick to a routine. This can help you fall asleep faster. Try to do a quiet activity, brush your teeth, and go to bed at the same time each night.  · Get out of bed if you are still awake after 15 minutes  of trying to sleep. Keep the lights down, but try reading or doing a quiet activity. When you feel sleepy, go back to bed.  · Make sure that you drive carefully. Avoid driving if you feel very sleepy.  · Keep all follow-up appointments as directed by your health care provider. This is important.  Contact a health care provider if:  · You are tired throughout the day or have trouble in your daily routine due to sleepiness.  · You continue to have sleep problems or your sleep problems get worse.  Get help right away if:  · You have serious thoughts about hurting yourself or someone else.  This information is not intended to replace advice given to you by your health care provider. Make sure you discuss any questions you have with your health care provider.  Document Released: 12/15/2001 Document Revised: 05/19/2017 Document Reviewed: 09/18/2015  [employer] Interactive Patient Education © 2018 [employer]  Type 2 Diabetes Mellitus, Self Care, Adult  Caring for yourself after you have been diagnosed with type 2 diabetes (type 2 diabetes mellitus) means keeping your blood sugar (glucose) under control with a balance of:  · Nutrition.  · Exercise.  · Lifestyle changes.  · Medicines or insulin, if necessary.  · Support from your team of health care providers and others.    The following information explains what you need to know to manage your diabetes at home.  What do I need to do to manage my blood glucose?  · Check your blood glucose every day, as often as told by your health care provider.  · Contact your health care provider if your blood glucose is above your target for 2 tests in a row.  · Have your A1c (hemoglobin A1c) level checked at least two times a year, or as often as told by your health care provider.  Your health care provider will set individualized treatment goals for you. Generally, the goal of treatment is to maintain the following blood glucose levels:  · Before meals (preprandial):  mg/dL  (4.4-7.2 mmol/L).  · After meals (postprandial): below 180 mg/dL (10 mmol/L).  · A1c level: less than 7%.    What do I need to know about hyperglycemia and hypoglycemia?  What is hyperglycemia?  Hyperglycemia, also called high blood glucose, occurs when blood glucose is too high. Make sure you know the early signs of hyperglycemia, such as:  · Increased thirst.  · Hunger.  · Feeling very tired.  · Needing to urinate more often than usual.  · Blurry vision.    What is hypoglycemia?  Hypoglycemia, also called low blood glucose, occurs with a blood glucose level at or below 70 mg/dL (3.9 mmol/L). The risk for hypoglycemia increases during or after exercise, during sleep, during illness, and when skipping meals or not eating for a long time (fasting).  It is important to know the symptoms of hypoglycemia and treat it right away. Always have a 15-gram rapid-acting carbohydrate snack with you to treat low blood glucose. Family members and close friends should also know the symptoms and should understand how to treat hypoglycemia, in case you are not able to treat yourself.  What are the symptoms of hypoglycemia?  Hypoglycemia symptoms can include:  · Hunger.  · Anxiety.  · Sweating and feeling clammy.  · Confusion.  · Dizziness or feeling light-headed.  · Sleepiness.  · Nausea.  · Increased heart rate.  · Headache.  · Blurry vision.  · Seizure.  · Nightmares.  · Tingling or numbness around the mouth, lips, or tongue.  · A change in speech.  · Decreased ability to concentrate.  · A change in coordination.  · Restless sleep.  · Tremors or shakes.  · Fainting.  · Irritability.    How do I treat hypoglycemia?    If you are alert and able to swallow safely, follow the 15:15 rule:  · Take 15 grams of a rapid-acting carbohydrate. Rapid-acting options include:  ? 1 tube of glucose gel.  ? 3 glucose pills.  ? 6-8 pieces of hard candy.  ? 4 oz (120 mL) of fruit juice.  ? 4 oz (120 mL) of regular (not diet) soda.  · Check your blood  glucose 15 minutes after you take the carbohydrate.  · If the repeat blood glucose level is still at or below 70 mg/dL (3.9 mmol/L), take 15 grams of a carbohydrate again.  · If your blood glucose level does not increase above 70 mg/dL (3.9 mmol/L) after 3 tries, seek emergency medical care.  · After your blood glucose level returns to normal, eat a meal or a snack within 1 hour.    How do I treat severe hypoglycemia?  Severe hypoglycemia is when your blood glucose level is at or below 54 mg/dL (3 mmol/L). Severe hypoglycemia is an emergency. Do not wait to see if the symptoms will go away. Get medical help right away. Call your local emergency services (911 in the [place] Do not drive yourself to the hospital.  If you have severe hypoglycemia and you cannot eat or drink, you may need an injection of glucagon. A family member or close friend should learn how to check your blood glucose and how to give you a glucagon injection. Ask your health care provider if you need to have an emergency glucagon injection kit available.  Severe hypoglycemia may need to be treated in a hospital. The treatment may include getting glucose through an IV tube. You may also need treatment for the cause of your hypoglycemia.  Can having diabetes put me at risk for other conditions?  Having diabetes can put you at risk for other long-term (chronic) conditions, such as heart disease and kidney disease. Your health care provider may prescribe medicines to help prevent complications from diabetes. These medicines may include:  · Aspirin.  · Medicine to lower cholesterol.  · Medicine to control blood pressure.    What else can I do to manage my diabetes?  Take your diabetes medicines as told  · If your health care provider prescribed insulin or diabetes medicines, take them every day.  · Do not run out of insulin or other diabetes medicines that you take. Plan ahead so you always have these available.  · If you use insulin, adjust your dosage  based on how physically active you are and what foods you eat. Your health care provider will tell you how to adjust your dosage.  Make healthy food choices    The things that you eat and drink affect your blood glucose and your insulin dosage. Making good choices helps to control your diabetes and prevent other health problems. A healthy meal plan includes eating lean proteins, complex carbohydrates, fresh fruits and vegetables, low-fat dairy products, and healthy fats.  Make an appointment to see a diet and nutrition specialist (registered dietitian) to help you create an eating plan that is right for you. Make sure that you:  · Follow instructions from your health care provider about eating or drinking restrictions.  · Drink enough fluid to keep your urine clear or pale yellow.  · Eat healthy snacks between nutritious meals.  · Track the carbohydrates that you eat. Do this by reading food labels and learning the standard serving sizes of foods.  · Follow your sick day plan whenever you cannot eat or drink as usual. Make this plan in advance with your health care provider.    Stay active    Exercise regularly, as told by your health care provider. This may include:  · Stretching and doing strength exercises, such as yoga or weightlifting, at least 2 times a week.  · Doing at least 150 minutes of moderate-intensity or vigorous-intensity exercise each week. This could be brisk walking, biking, or water aerobics.  ? Spread out your activity over at least 3 days of the week.  ? Do not go more than 2 days in a row without doing some kind of physical activity.    When you start a new exercise or activity, work with your health care provider to adjust your insulin, medicines, or food intake as needed.  Make healthy lifestyle choices  · Do not use any tobacco products, such as cigarettes, chewing tobacco, and e-cigarettes. If you need help quitting, ask your health care provider.  · If your health care provider says that  alcohol is safe for you, limit alcohol intake to no more than 1 drink per day for nonpregnant women and 2 drinks per day for men. One drink equals 12 oz of beer, 5 oz of wine, or 1½ oz of hard liquor.  · Learn to manage stress. If you need help with this, ask your health care provider.  Care for your body    · Keep your immunizations up to date. In addition to getting vaccinations as told by your health care provider, it is recommended that you get vaccinated against the following illnesses:  ? The flu (influenza). Get a flu shot every year.  ? Pneumonia.  ? Hepatitis B.  · Schedule an eye exam soon after your diagnosis, and then one time every year after that.  · Check your skin and feet every day for cuts, bruises, redness, blisters, or sores. Schedule a foot exam with your health care provider once every year.  · Brush your teeth and gums two times a day, and floss at least one time a day. Visit your dentist at least once every 6 months.  · Maintain a healthy weight.  General instructions  · Take over-the-counter and prescription medicines only as told by your health care provider.  · Share your diabetes management plan with people in your workplace, school, and household.  · Check your urine for ketones when you are ill and as told by your health care provider.  · Ask your health care provider:  ? Do I need to meet with a diabetes educator?  ? Where can I find a support group for people with diabetes?  · Carry a medical alert card or wear medical alert jewelry.  · Keep all follow-up visits as told by your health care provider. This is important.  Where to find more information:  For more information about diabetes, visit:  · American Diabetes Association (ADA): www.diabetes.org  · American Association of Diabetes Educators (AADE): www.diabeteseducator.org/patient-resources    This information is not intended to replace advice given to you by your health care provider. Make sure you discuss any questions you have  with your health care provider.  Document Released: 04/10/2017 Document Revised: 05/25/2017 Document Reviewed: 01/20/2017  [employer] Interactive Patient Education © 2018 [employer]

## 2019-02-28 VITALS
TEMPERATURE: 98.3 F | SYSTOLIC BLOOD PRESSURE: 120 MMHG | OXYGEN SATURATION: 95 % | HEART RATE: 86 BPM | BODY MASS INDEX: 30.62 KG/M2 | DIASTOLIC BLOOD PRESSURE: 80 MMHG | WEIGHT: 202 LBS | HEIGHT: 68 IN | RESPIRATION RATE: 14 BRPM

## 2019-02-28 PROBLEM — Z85.038 HISTORY OF COLON CANCER: Status: ACTIVE | Noted: 2019-02-28

## 2019-03-05 ENCOUNTER — TELEPHONE (OUTPATIENT)
Dept: SURGERY | Facility: CLINIC | Age: 54
End: 2019-03-05

## 2019-03-07 ENCOUNTER — HOSPITAL ENCOUNTER (OUTPATIENT)
Facility: HOSPITAL | Age: 54
Setting detail: HOSPITAL OUTPATIENT SURGERY
Discharge: HOME | End: 2019-03-07
Attending: SURGERY | Admitting: SURGERY

## 2019-03-07 ENCOUNTER — ANESTHESIA EVENT (OUTPATIENT)
Dept: PERIOP | Facility: HOSPITAL | Age: 54
End: 2019-03-07

## 2019-03-07 ENCOUNTER — ANESTHESIA (OUTPATIENT)
Dept: PERIOP | Facility: HOSPITAL | Age: 54
End: 2019-03-07

## 2019-03-07 VITALS
OXYGEN SATURATION: 94 % | TEMPERATURE: 97.8 F | HEIGHT: 68 IN | WEIGHT: 203 LBS | BODY MASS INDEX: 30.77 KG/M2 | DIASTOLIC BLOOD PRESSURE: 84 MMHG | HEART RATE: 83 BPM | RESPIRATION RATE: 20 BRPM | SYSTOLIC BLOOD PRESSURE: 120 MMHG

## 2019-03-07 PROCEDURE — 25010000002 PROPOFOL 1000 MG/ML EMULSION: Performed by: NURSE ANESTHETIST, CERTIFIED REGISTERED

## 2019-03-07 PROCEDURE — S0260 H&P FOR SURGERY: HCPCS | Performed by: SURGERY

## 2019-03-07 PROCEDURE — 45378 DIAGNOSTIC COLONOSCOPY: CPT | Performed by: SURGERY

## 2019-03-07 RX ORDER — IPRATROPIUM BROMIDE AND ALBUTEROL SULFATE 2.5; .5 MG/3ML; MG/3ML
3 SOLUTION RESPIRATORY (INHALATION) ONCE AS NEEDED
Status: CANCELLED | OUTPATIENT
Start: 2019-03-07

## 2019-03-07 RX ORDER — OXYCODONE HYDROCHLORIDE AND ACETAMINOPHEN 5; 325 MG/1; MG/1
1 TABLET ORAL ONCE AS NEEDED
Status: CANCELLED | OUTPATIENT
Start: 2019-03-07

## 2019-03-07 RX ORDER — SODIUM CHLORIDE 0.9 % (FLUSH) 0.9 %
3-10 SYRINGE (ML) INJECTION AS NEEDED
Status: DISCONTINUED | OUTPATIENT
Start: 2019-03-07 | End: 2019-03-07 | Stop reason: HOSPADM

## 2019-03-07 RX ORDER — ONDANSETRON 2 MG/ML
4 INJECTION INTRAMUSCULAR; INTRAVENOUS ONCE AS NEEDED
Status: CANCELLED | OUTPATIENT
Start: 2019-03-07

## 2019-03-07 RX ORDER — MEPERIDINE HYDROCHLORIDE 25 MG/ML
12.5 INJECTION INTRAMUSCULAR; INTRAVENOUS; SUBCUTANEOUS
Status: CANCELLED | OUTPATIENT
Start: 2019-03-07 | End: 2019-03-08

## 2019-03-07 RX ORDER — SODIUM CHLORIDE, SODIUM LACTATE, POTASSIUM CHLORIDE, CALCIUM CHLORIDE 600; 310; 30; 20 MG/100ML; MG/100ML; MG/100ML; MG/100ML
125 INJECTION, SOLUTION INTRAVENOUS CONTINUOUS
Status: DISCONTINUED | OUTPATIENT
Start: 2019-03-07 | End: 2019-03-07 | Stop reason: HOSPADM

## 2019-03-07 RX ORDER — FENTANYL CITRATE 50 UG/ML
50 INJECTION, SOLUTION INTRAMUSCULAR; INTRAVENOUS
Status: CANCELLED | OUTPATIENT
Start: 2019-03-07

## 2019-03-07 RX ORDER — SODIUM CHLORIDE 0.9 % (FLUSH) 0.9 %
3 SYRINGE (ML) INJECTION EVERY 12 HOURS SCHEDULED
Status: DISCONTINUED | OUTPATIENT
Start: 2019-03-07 | End: 2019-03-07 | Stop reason: HOSPADM

## 2019-03-07 RX ADMIN — SODIUM CHLORIDE, POTASSIUM CHLORIDE, SODIUM LACTATE AND CALCIUM CHLORIDE: 600; 310; 30; 20 INJECTION, SOLUTION INTRAVENOUS at 07:25

## 2019-03-07 RX ADMIN — PROPOFOL 200 MCG/KG/MIN: 10 INJECTION, EMULSION INTRAVENOUS at 07:33

## 2019-03-07 NOTE — OP NOTE
COLONOSCOPY FOR SCREENING  Procedure Note    Raimundo Munguia  3/7/2019    Pre-op Diagnosis:   History of colon cancer [Z85.038]    Post-op Diagnosis: poor prep, normal colon         Procedure(s):  COLONOSCOPY FOR SCREENING CPT CODE:     Surgeon(s):  Ej Tinoco MD    Anesthesia: General    Staff:   Circulator: Yancy Bedolla RN  Endo Technician: Iban Jarrell    Estimated Blood Loss: none    Specimens:                * No orders in the log *      Drains:      Procedure: The colonoscope passed easily to the cecum and the ileocecal valve was normal. There was a lot of liquid stool and vegetable matter throughout the colon. No inflammation or polyps were seen. The anastamosis was open and the rectum had a few hemorrhoids. He should have another colonoscopy in a year or two.     Findings: poor prep, normal colon    Complications: none   Grafts / Implants N/A    Ej Tinoco MD     Date: 3/7/2019  Time: 7:51 AM

## 2019-03-07 NOTE — ANESTHESIA POSTPROCEDURE EVALUATION
Patient: Ramiundo Munguia    Procedure Summary     Date:  03/07/19 Room / Location:  [facility] OR 08 /  [facility] OR    Anesthesia Start:  0730 Anesthesia Stop:  0749    Procedure:  COLONOSCOPY FOR SCREENING CPT CODE:  (N/A ) Diagnosis:       History of colon cancer      (History of colon cancer [Z85.038])    Surgeon:  Ej Tinoco MD Provider:  Rich Melendez MD    Anesthesia Type:  general ASA Status:  3          Anesthesia Type: general  Last vitals  BP   120/84 (03/07/19 0810)   Temp   97.8 °F (36.6 °C) (03/07/19 0750)   Pulse   83 (03/07/19 0810)   Resp   20 (03/07/19 0810)     SpO2   94 % (03/07/19 0810)     Post Anesthesia Care and Evaluation    Patient location during evaluation: bedside  Patient participation: complete - patient participated  Level of consciousness: awake and alert  Pain score: 1  Pain management: adequate  Airway patency: patent  Anesthetic complications: No anesthetic complications  PONV Status: none  Cardiovascular status: acceptable  Respiratory status: acceptable  Hydration status: acceptable

## 2019-03-07 NOTE — ANESTHESIA PREPROCEDURE EVALUATION
Anesthesia Evaluation     Patient summary reviewed and Nursing notes reviewed   history of anesthetic complications: prolonged sedation  NPO Solid Status: > 8 hours  NPO Liquid Status: > 8 hours           Airway   Mallampati: II  TM distance: <3 FB  Neck ROM: limited  possible difficult intubation and small opening  Dental - normal exam   (+) poor dentition    Pulmonary - negative pulmonary ROS and normal exam    breath sounds clear to auscultation  (+) decreased breath sounds,   (-) asthma, not a smoker  Cardiovascular - normal exam  Exercise tolerance: good (4-7 METS)    NYHA Classification: II  Patient on routine beta blocker and Beta blocker given within 24 hours of surgery  Rhythm: regular  Rate: normal    (+) hypertension, dysrhythmias, hyperlipidemia,   (-) past MI, angina, CHF      Neuro/Psych  (+) TIA, headaches,     (-) seizures, CVAPsychiatric history: insomia.  GI/Hepatic/Renal/Endo    (+) obesity,  GERD poorly controlled,  renal disease (bph), diabetes mellitus type 2 well controlled,   (-) morbid obesity    Musculoskeletal     (+) back pain, neck pain,   (-) radiculopathy  Abdominal  - normal exam    Bowel sounds: normal.   Substance History - negative use     OB/GYN negative ob/gyn ROS         Other   (+) arthritis                       Anesthesia Plan    ASA 3     general     intravenous induction   Anesthetic plan, all risks, benefits, and alternatives have been provided, discussed and informed consent has been obtained with: patient.  Use of blood products discussed with patient  Consented to blood products.

## 2019-03-07 NOTE — H&P
Patient Care Team:  Aden Carl APRN as PCP - General  Aden Carl APRN as PCP - Family Medicine    Chief complaint status post colectomy    Subjective     History of Present Illness He is a 54 yo male who had a sigmoid resection about a year ago for a large polyp and rectal prolapse. He has been doing well but needs a follow up colonoscopy.     Review of Systems   Constitutional: Negative for activity change, appetite change, chills, fever and unexpected weight change.   HENT: Negative for congestion, facial swelling and sore throat.    Eyes: Negative for photophobia and visual disturbance.   Respiratory: Negative for chest tightness, shortness of breath and wheezing.    Cardiovascular: Negative for chest pain, palpitations and leg swelling.   Gastrointestinal: Negative for abdominal distention, abdominal pain, anal bleeding, blood in stool, constipation, diarrhea, nausea, rectal pain and vomiting.   Endocrine: Negative for cold intolerance, heat intolerance, polydipsia and polyuria.   Genitourinary: Negative for difficulty urinating, dysuria, flank pain and urgency.   Musculoskeletal: Negative for back pain and myalgias.   Skin: Negative for rash and wound.   Allergic/Immunologic: Negative for immunocompromised state.   Neurological: Negative for dizziness, seizures, syncope, light-headedness, numbness and headaches.   Hematological: Negative for adenopathy. Does not bruise/bleed easily.   Psychiatric/Behavioral: Negative for behavioral problems and confusion. The patient is not nervous/anxious.         Past Medical History:   Diagnosis Date   • Abnormal blood chemistry 03/02/2016    Noted Triglycerides, HDL and LDL   • Allergic    • Cervicalgia    • DM type 2 (diabetes mellitus, type 2) (CMS/[facility])    • Elevated cholesterol    • GERD (gastroesophageal reflux disease)    • Helicobacter pylori (H. pylori) infection    • Hyperlipidemia    • Hypertension    • Insomnia    • Muscle spasm    •  Obesity    • Osteopenia    • Palpitations    • RA (rheumatoid arthritis) (CMS/HCC)    • Stroke (CMS/HCC)    • Tension headache    • Vitamin D deficiency        Objective      Vital Signs       Physical Exam   Constitutional: He is oriented to person, place, and time. He appears well-developed and well-nourished. He does not appear ill. No distress.   HENT:   Head: Normocephalic. Head is without laceration. Hair is normal.   Right Ear: Hearing and ear canal normal.   Left Ear: Hearing and ear canal normal.   Nose: Nose normal. No sinus tenderness. No epistaxis. Right sinus exhibits no maxillary sinus tenderness and no frontal sinus tenderness. Left sinus exhibits no maxillary sinus tenderness and no frontal sinus tenderness.   Eyes: Conjunctivae and lids are normal. Pupils are equal, round, and reactive to light.   Neck: Normal range of motion. No JVD present. No tracheal tenderness present. No tracheal deviation present. No thyroid mass and no thyromegaly present.   Cardiovascular: Normal rate and regular rhythm. Exam reveals no gallop.   No murmur heard.  Pulmonary/Chest: Effort normal and breath sounds normal. No stridor. He has no wheezes. He exhibits no tenderness.   Abdominal: Soft. Bowel sounds are normal. He exhibits no distension, no ascites and no mass. There is no tenderness. There is no rebound and no guarding. No hernia.   Musculoskeletal: He exhibits no edema or deformity.   Lymphadenopathy:     He has no cervical adenopathy.     He has no axillary adenopathy.        Right: No inguinal and no supraclavicular adenopathy present.        Left: No inguinal and no supraclavicular adenopathy present.   Neurological: He is alert and oriented to person, place, and time. He exhibits normal muscle tone.   Skin: Skin is warm, dry and intact. No rash noted. No erythema. No pallor.   Psychiatric: He has a normal mood and affect. His behavior is normal. Thought content normal.   Vitals reviewed.      Results  Review:   I reviewed the patient's new clinical results.  I reviewed the patient's other test results and agree with the interpretation      Assessment/Plan  history of polyp, will need colonoscopy      History of colon cancer      Assessment & Plan     I discussed the patients findings and my recommendations with patient, nursing staff and consulting provider     Ej Tinoco MD  03/07/19  7:21 AM    Time:

## 2019-03-12 DIAGNOSIS — E78.5 HYPERLIPIDEMIA, UNSPECIFIED HYPERLIPIDEMIA TYPE: Chronic | ICD-10-CM

## 2019-03-12 RX ORDER — ATORVASTATIN CALCIUM 10 MG/1
TABLET, FILM COATED ORAL
Qty: 30 TABLET | Refills: 0 | Status: SHIPPED | OUTPATIENT
Start: 2019-03-12 | End: 2019-04-10 | Stop reason: SDUPTHER

## 2019-03-12 RX ORDER — LORATADINE 10 MG/1
TABLET ORAL
Qty: 30 TABLET | Refills: 2 | Status: SHIPPED | OUTPATIENT
Start: 2019-03-12 | End: 2019-04-26 | Stop reason: SDUPTHER

## 2019-03-12 RX ORDER — CYCLOBENZAPRINE HCL 10 MG
TABLET ORAL
Qty: 90 TABLET | Refills: 1 | Status: SHIPPED | OUTPATIENT
Start: 2019-03-12 | End: 2019-04-26 | Stop reason: SDUPTHER

## 2019-04-10 DIAGNOSIS — E78.5 HYPERLIPIDEMIA, UNSPECIFIED HYPERLIPIDEMIA TYPE: Chronic | ICD-10-CM

## 2019-04-10 RX ORDER — ATORVASTATIN CALCIUM 10 MG/1
TABLET, FILM COATED ORAL
Qty: 30 TABLET | Refills: 3 | Status: SHIPPED | OUTPATIENT
Start: 2019-04-10 | End: 2019-04-26 | Stop reason: SDUPTHER

## 2019-04-26 ENCOUNTER — OFFICE VISIT (OUTPATIENT)
Dept: FAMILY MEDICINE CLINIC | Facility: CLINIC | Age: 54
End: 2019-04-26

## 2019-04-26 VITALS
BODY MASS INDEX: 30.31 KG/M2 | OXYGEN SATURATION: 96 % | WEIGHT: 200 LBS | HEIGHT: 68 IN | HEART RATE: 91 BPM | SYSTOLIC BLOOD PRESSURE: 130 MMHG | TEMPERATURE: 98.6 F | DIASTOLIC BLOOD PRESSURE: 76 MMHG

## 2019-04-26 DIAGNOSIS — E55.9 VITAMIN D DEFICIENCY: ICD-10-CM

## 2019-04-26 DIAGNOSIS — K21.9 GASTROESOPHAGEAL REFLUX DISEASE WITHOUT ESOPHAGITIS: ICD-10-CM

## 2019-04-26 DIAGNOSIS — M05.79 RHEUMATOID ARTHRITIS INVOLVING MULTIPLE SITES WITH POSITIVE RHEUMATOID FACTOR (HCC): Chronic | ICD-10-CM

## 2019-04-26 DIAGNOSIS — I10 BENIGN ESSENTIAL HYPERTENSION: ICD-10-CM

## 2019-04-26 DIAGNOSIS — E11.69 CONTROLLED TYPE 2 DIABETES MELLITUS WITH OTHER SPECIFIED COMPLICATION, WITHOUT LONG-TERM CURRENT USE OF INSULIN (HCC): ICD-10-CM

## 2019-04-26 DIAGNOSIS — E78.5 HYPERLIPIDEMIA, UNSPECIFIED HYPERLIPIDEMIA TYPE: Chronic | ICD-10-CM

## 2019-04-26 DIAGNOSIS — E53.8 B12 DEFICIENCY: ICD-10-CM

## 2019-04-26 DIAGNOSIS — J30.1 SEASONAL ALLERGIC RHINITIS DUE TO POLLEN: ICD-10-CM

## 2019-04-26 DIAGNOSIS — F51.04 PSYCHOPHYSIOLOGICAL INSOMNIA: Primary | ICD-10-CM

## 2019-04-26 PROCEDURE — 99214 OFFICE O/P EST MOD 30 MIN: CPT | Performed by: PHYSICIAN ASSISTANT

## 2019-04-26 RX ORDER — RANITIDINE 300 MG/1
300 TABLET ORAL 2 TIMES DAILY
Qty: 60 TABLET | Refills: 5 | Status: SHIPPED | OUTPATIENT
Start: 2019-04-26 | End: 2019-11-04 | Stop reason: SDUPTHER

## 2019-04-26 RX ORDER — LORATADINE 10 MG/1
10 TABLET ORAL DAILY
Qty: 30 TABLET | Refills: 5 | Status: SHIPPED | OUTPATIENT
Start: 2019-04-26 | End: 2019-12-30

## 2019-04-26 RX ORDER — MIRTAZAPINE 15 MG/1
15 TABLET, FILM COATED ORAL NIGHTLY
Qty: 30 TABLET | Refills: 5 | Status: SHIPPED | OUTPATIENT
Start: 2019-04-26 | End: 2019-05-23

## 2019-04-26 RX ORDER — LANOLIN ALCOHOL/MO/W.PET/CERES
1000 CREAM (GRAM) TOPICAL DAILY
Qty: 30 TABLET | Refills: 5 | Status: SHIPPED | OUTPATIENT
Start: 2019-04-26 | End: 2019-12-02 | Stop reason: SDUPTHER

## 2019-04-26 RX ORDER — ERGOCALCIFEROL 1.25 MG/1
50000 CAPSULE ORAL WEEKLY
Qty: 4 CAPSULE | Refills: 5 | Status: SHIPPED | OUTPATIENT
Start: 2019-04-26 | End: 2019-07-17

## 2019-04-26 RX ORDER — ATORVASTATIN CALCIUM 10 MG/1
10 TABLET, FILM COATED ORAL
Qty: 30 TABLET | Refills: 5 | Status: SHIPPED | OUTPATIENT
Start: 2019-04-26 | End: 2020-02-05 | Stop reason: SDUPTHER

## 2019-04-26 RX ORDER — LANSOPRAZOLE 30 MG/1
30 CAPSULE, DELAYED RELEASE ORAL DAILY
Qty: 30 CAPSULE | Refills: 5 | Status: SHIPPED | OUTPATIENT
Start: 2019-04-26 | End: 2019-11-04 | Stop reason: SDUPTHER

## 2019-04-26 RX ORDER — FOLIC ACID 1 MG/1
1 TABLET ORAL DAILY
Qty: 30 TABLET | Refills: 5 | Status: SHIPPED | OUTPATIENT
Start: 2019-04-26 | End: 2019-11-04 | Stop reason: SDUPTHER

## 2019-04-26 RX ORDER — CYCLOBENZAPRINE HCL 10 MG
10 TABLET ORAL 3 TIMES DAILY PRN
Qty: 90 TABLET | Refills: 1 | Status: SHIPPED | OUTPATIENT
Start: 2019-04-26 | End: 2019-07-10 | Stop reason: SDUPTHER

## 2019-04-26 RX ORDER — METOPROLOL SUCCINATE 25 MG/1
25 TABLET, EXTENDED RELEASE ORAL DAILY
Qty: 30 TABLET | Refills: 5 | Status: SHIPPED | OUTPATIENT
Start: 2019-04-26 | End: 2019-05-22 | Stop reason: SDUPTHER

## 2019-04-29 NOTE — PROGRESS NOTES
Subjective   Raimundo Munguia is a 53 y.o. male.       Chief Complaint -trouble sleeping    History of Present Illness -      Trouble sleeping-  He reports trouble sleeping in the form of going to sleep and staying asleep.  Minimal relief with trazodone in the past.  Onset greater than 6 months.    Hyperlipidemia-improved with Lipitor  Lab Results   Component Value Date    CHOL 87 02/19/2019    CHLPL 86 05/02/2016    TRIG 104 02/19/2019    HDL 29 (L) 02/19/2019    LDL 37 02/19/2019     Rheumatoid arthritis- pain controlled with methotrexate and folic acid    Gastroesophageal reflux disease-stable with Prevacid and Zantac    Diabetes mellitus type 2- controlled with metformin and diabetic diet  Lab Results   Component Value Date    HGBA1C 5.80 (H) 02/19/2019     Hypertension-controlled with metoprolol    Seasonal allergic rhinitis-improved with Claritin    Vitamin D deficiency-stable with vitamin D supplementation    B12 deficiency-stable with B12 supplementation    The following portions of the patient's history were reviewed and updated as appropriate: allergies, current medications, past family history, past medical history, past social history, past surgical history and problem list.    Review of Systems   Constitutional: Negative for activity change, appetite change and fever.   HENT: Negative for ear pain, sinus pressure and sore throat.    Eyes: Negative for pain and visual disturbance.   Respiratory: Negative for cough and chest tightness.    Cardiovascular: Negative for chest pain and palpitations.   Gastrointestinal: Negative for abdominal pain, constipation, diarrhea, nausea and vomiting.   Endocrine: Negative for polydipsia and polyuria.   Genitourinary: Negative for dysuria and frequency.   Musculoskeletal: Negative for back pain and myalgias.   Skin: Negative for color change and rash.   Allergic/Immunologic: Negative for food allergies and immunocompromised state.   Neurological: Negative for dizziness,  "syncope and headaches.   Hematological: Negative for adenopathy. Does not bruise/bleed easily.   Psychiatric/Behavioral: Positive for sleep disturbance. Negative for hallucinations and suicidal ideas. The patient is not nervous/anxious.        /76   Pulse 91   Temp 98.6 °F (37 °C) (Oral)   Ht 172.7 cm (67.99\")   Wt 90.7 kg (200 lb)   SpO2 96%   BMI 30.42 kg/m²   Lab on 02/19/2019   Component Date Value Ref Range Status   • Glucose 02/19/2019 100  70 - 110 mg/dL Final   • BUN 02/19/2019 10  7 - 21 mg/dL Final   • Creatinine 02/19/2019 0.84  0.43 - 1.29 mg/dL Final   • Sodium 02/19/2019 139  135 - 153 mmol/L Final   • Potassium 02/19/2019 3.9  3.5 - 5.3 mmol/L Final   • Chloride 02/19/2019 107  99 - 112 mmol/L Final   • CO2 02/19/2019 25.3  24.3 - 31.9 mmol/L Final   • Calcium 02/19/2019 9.2  7.7 - 10.0 mg/dL Final   • Total Protein 02/19/2019 6.8  6.0 - 8.0 g/dL Final   • Albumin 02/19/2019 4.20  3.50 - 5.00 g/dL Final   • ALT (SGPT) 02/19/2019 21  10 - 44 U/L Final   • AST (SGOT) 02/19/2019 19  10 - 34 U/L Final   • Alkaline Phosphatase 02/19/2019 106  40 - 129 U/L Final   • Total Bilirubin 02/19/2019 0.4  0.2 - 1.8 mg/dL Final   • eGFR Non African Amer 02/19/2019 96  >60 mL/min/1.73 Final   • Globulin 02/19/2019 2.6  gm/dL Final   • A/G Ratio 02/19/2019 1.6  1.5 - 2.5 g/dL Final   • BUN/Creatinine Ratio 02/19/2019 11.9  7.0 - 25.0 Final   • Anion Gap 02/19/2019 6.7  3.6 - 11.2 mmol/L Final   • Total Cholesterol 02/19/2019 87  0 - 200 mg/dL Final   • Triglycerides 02/19/2019 104  0 - 150 mg/dL Final   • HDL Cholesterol 02/19/2019 29* 60 - 100 mg/dL Final   • LDL Cholesterol  02/19/2019 37  0 - 100 mg/dL Final   • VLDL Cholesterol 02/19/2019 20.8  mg/dL Final   • LDL/HDL Ratio 02/19/2019 1.28   Final   • TSH 02/19/2019 4.649  0.550 - 4.780 mIU/mL Final   • Hemoglobin A1C 02/19/2019 5.80* 4.50 - 5.70 % Final   • 25 Hydroxy, Vitamin D 02/19/2019 116.0  ng/ml Final   • Vitamin B-12 02/19/2019 1,299* 211 - " 911 pg/mL Final   • Microalbumin, Urine 02/20/2019 14.8  mg/L Final   • WBC 02/19/2019 9.89  4.50 - 12.50 10*3/mm3 Final   • RBC 02/19/2019 5.28  4.70 - 6.10 10*6/mm3 Final   • Hemoglobin 02/19/2019 16.5  14.0 - 18.0 g/dL Final   • Hematocrit 02/19/2019 48.6  42.0 - 52.0 % Final   • MCV 02/19/2019 92.0  80.0 - 94.0 fL Final   • MCH 02/19/2019 31.3  27.0 - 33.0 pg Final   • MCHC 02/19/2019 34.0  33.0 - 37.0 g/dL Final   • RDW 02/19/2019 13.6  11.5 - 14.5 % Final   • RDW-SD 02/19/2019 43.7  37.0 - 54.0 fl Final   • MPV 02/19/2019 10.2* 6.0 - 10.0 fL Final   • Platelets 02/19/2019 318  130 - 400 10*3/mm3 Final   • Neutrophil % 02/19/2019 50.4  30.0 - 70.0 % Final   • Lymphocyte % 02/19/2019 31.3  21.0 - 51.0 % Final   • Monocyte % 02/19/2019 12.7* 0.0 - 10.0 % Final   • Eosinophil % 02/19/2019 4.9  0.0 - 5.0 % Final   • Basophil % 02/19/2019 0.5  0.0 - 2.0 % Final   • Immature Grans % 02/19/2019 0.2  0.0 - 0.5 % Final   • Neutrophils, Absolute 02/19/2019 4.98  1.40 - 6.50 10*3/mm3 Final   • Lymphocytes, Absolute 02/19/2019 3.10* 1.00 - 3.00 10*3/mm3 Final   • Monocytes, Absolute 02/19/2019 1.26* 0.10 - 0.90 10*3/mm3 Final   • Eosinophils, Absolute 02/19/2019 0.48  0.00 - 0.70 10*3/mm3 Final   • Basophils, Absolute 02/19/2019 0.05  0.00 - 0.30 10*3/mm3 Final   • Immature Grans, Absolute 02/19/2019 0.02  0.00 - 0.03 10*3/mm3 Final   • Osmolality Calc 02/19/2019 276.7  273.0 - 305.0 mOsm/kg Final       Physical Exam   Constitutional: He is oriented to person, place, and time. He appears well-developed and well-nourished.   HENT:   Head: Normocephalic and atraumatic.   Nose: Nose normal.   Mouth/Throat: Oropharynx is clear and moist.   Eyes: Conjunctivae and EOM are normal. Pupils are equal, round, and reactive to light.   Neck: Normal range of motion. Neck supple. No tracheal deviation present. No thyromegaly present.   Cardiovascular: Normal rate, regular rhythm, normal heart sounds and intact distal pulses.   No murmur  heard.  Pulmonary/Chest: Effort normal and breath sounds normal. No respiratory distress. He has no wheezes.   Abdominal: Soft. Bowel sounds are normal. There is no tenderness. There is no guarding.   Musculoskeletal: Normal range of motion. He exhibits no edema or tenderness.   Lymphadenopathy:     He has no cervical adenopathy.   Neurological: He is alert and oriented to person, place, and time.   Skin: Skin is warm and dry. No rash noted.   Psychiatric: He has a normal mood and affect. His behavior is normal.   Nursing note and vitals reviewed.      Assessment/Plan     Diagnoses and all orders for this visit:    Psychophysiological insomnia  Comments:  start remeron  Orders:  -     mirtazapine (REMERON) 15 MG tablet; Take 1 tablet by mouth Every Night.    Hyperlipidemia, unspecified hyperlipidemia type  Comments:  Continue Lipitor and low-cholesterol diet  Orders:  -     atorvastatin (LIPITOR) 10 MG tablet; Take 1 tablet by mouth every night at bedtime.    Rheumatoid arthritis involving multiple sites with positive rheumatoid factor (CMS/[facility])  Comments:  Continue methotrexate and folic acid  Orders:  -     folic acid (FOLVITE) 1 MG tablet; Take 1 tablet by mouth Daily.  -     methotrexate 2.5 MG tablet; Take 10 tablets by mouth 1 (One) Time Per Week.    Gastroesophageal reflux disease without esophagitis  Comments:  Continue Prevacid and Zantac  Orders:  -     lansoprazole (PREVACID) 30 MG capsule; Take 1 capsule by mouth Daily.  -     raNITIdine (ZANTAC) 300 MG tablet; Take 1 tablet by mouth 2 (Two) Times a Day.    Controlled type 2 diabetes mellitus with other specified complication, without long-term current use of insulin (CMS/[facility])  Comments:  Continue metformin and diabetic diet  Orders:  -     metFORMIN (GLUCOPHAGE) 500 MG tablet; Take 1 tablet by mouth 2 (Two) Times a Day.    Benign essential hypertension  Comments:  Continue metoprolol  Orders:  -     metoprolol succinate XL (TOPROL-XL) 25 MG 24 hr  tablet; Take 1 tablet by mouth Daily.    Seasonal allergic rhinitis due to pollen  Comments:  Continue Claritin  Orders:  -     loratadine (CLARITIN) 10 MG tablet; Take 1 tablet by mouth Daily.    Vitamin D deficiency  Comments:  Vitamin D supplementation  Orders:  -     vitamin D (ERGOCALCIFEROL) 87694 units capsule capsule; Take 1 capsule by mouth 1 (One) Time Per Week.    B12 deficiency  Comments:  Continue B12 supplementation  Orders:  -     vitamin B-12 (CYANOCOBALAMIN) 1000 MCG tablet; Take 1 tablet by mouth Daily.    Other orders  -     cyclobenzaprine (FLEXERIL) 10 MG tablet; Take 1 tablet by mouth 3 (Three) Times a Day As Needed for Muscle Spasms.                 This document has been electronically signed by:  Cherie Amaro PA-C

## 2019-05-14 ENCOUNTER — LAB (OUTPATIENT)
Dept: FAMILY MEDICINE CLINIC | Facility: CLINIC | Age: 54
End: 2019-05-14

## 2019-05-14 DIAGNOSIS — E11.40 CONTROLLED TYPE 2 DIABETES WITH NEUROPATHY (HCC): Chronic | ICD-10-CM

## 2019-05-14 DIAGNOSIS — K21.9 GASTROESOPHAGEAL REFLUX DISEASE WITHOUT ESOPHAGITIS: ICD-10-CM

## 2019-05-14 DIAGNOSIS — M05.79 RHEUMATOID ARTHRITIS INVOLVING MULTIPLE SITES WITH POSITIVE RHEUMATOID FACTOR (HCC): Chronic | ICD-10-CM

## 2019-05-14 DIAGNOSIS — E55.9 VITAMIN D DEFICIENCY: ICD-10-CM

## 2019-05-14 DIAGNOSIS — E78.2 MIXED HYPERLIPIDEMIA: Chronic | ICD-10-CM

## 2019-05-14 LAB
25(OH)D3 SERPL-MCNC: 26 NG/ML (ref 30–100)
ALBUMIN SERPL-MCNC: 4 G/DL (ref 3.5–5.2)
ALBUMIN/GLOB SERPL: 1.4 G/DL
ALP SERPL-CCNC: 116 U/L (ref 39–117)
ALT SERPL W P-5'-P-CCNC: 25 U/L (ref 1–41)
ANION GAP SERPL CALCULATED.3IONS-SCNC: 13.4 MMOL/L
AST SERPL-CCNC: 22 U/L (ref 1–40)
BASOPHILS # BLD AUTO: 0.08 10*3/MM3 (ref 0–0.2)
BASOPHILS NFR BLD AUTO: 0.9 % (ref 0–1.5)
BILIRUB SERPL-MCNC: 0.2 MG/DL (ref 0.2–1.2)
BUN BLD-MCNC: 16 MG/DL (ref 6–20)
BUN/CREAT SERPL: 18.2 (ref 7–25)
CALCIUM SPEC-SCNC: 9.4 MG/DL (ref 8.6–10.5)
CHLORIDE SERPL-SCNC: 102 MMOL/L (ref 98–107)
CHOLEST SERPL-MCNC: 105 MG/DL (ref 0–200)
CO2 SERPL-SCNC: 24.6 MMOL/L (ref 22–29)
CREAT BLD-MCNC: 0.88 MG/DL (ref 0.76–1.27)
DEPRECATED RDW RBC AUTO: 51.1 FL (ref 37–54)
EOSINOPHIL # BLD AUTO: 0.26 10*3/MM3 (ref 0–0.4)
EOSINOPHIL NFR BLD AUTO: 2.9 % (ref 0.3–6.2)
ERYTHROCYTE [DISTWIDTH] IN BLOOD BY AUTOMATED COUNT: 14.1 % (ref 12.3–15.4)
GFR SERPL CREATININE-BSD FRML MDRD: 91 ML/MIN/1.73
GLOBULIN UR ELPH-MCNC: 2.8 GM/DL
GLUCOSE BLD-MCNC: 117 MG/DL (ref 65–99)
HBA1C MFR BLD: 5.84 % (ref 4.8–5.6)
HCT VFR BLD AUTO: 53.1 % (ref 37.5–51)
HDLC SERPL-MCNC: 28 MG/DL (ref 40–60)
HGB BLD-MCNC: 16.7 G/DL (ref 13–17.7)
IMM GRANULOCYTES # BLD AUTO: 0.02 10*3/MM3 (ref 0–0.05)
IMM GRANULOCYTES NFR BLD AUTO: 0.2 % (ref 0–0.5)
LDLC SERPL CALC-MCNC: 45 MG/DL (ref 0–100)
LDLC/HDLC SERPL: 1.59 {RATIO}
LYMPHOCYTES # BLD AUTO: 3.2 10*3/MM3 (ref 0.7–3.1)
LYMPHOCYTES NFR BLD AUTO: 36 % (ref 19.6–45.3)
MCH RBC QN AUTO: 31.3 PG (ref 26.6–33)
MCHC RBC AUTO-ENTMCNC: 31.5 G/DL (ref 31.5–35.7)
MCV RBC AUTO: 99.6 FL (ref 79–97)
MONOCYTES # BLD AUTO: 1.02 10*3/MM3 (ref 0.1–0.9)
MONOCYTES NFR BLD AUTO: 11.5 % (ref 5–12)
NEUTROPHILS # BLD AUTO: 4.3 10*3/MM3 (ref 1.7–7)
NEUTROPHILS NFR BLD AUTO: 48.5 % (ref 42.7–76)
NRBC BLD AUTO-RTO: 0 /100 WBC (ref 0–0.2)
PLATELET # BLD AUTO: 333 10*3/MM3 (ref 140–450)
PMV BLD AUTO: 10 FL (ref 6–12)
POTASSIUM BLD-SCNC: 4.4 MMOL/L (ref 3.5–5.2)
PROT SERPL-MCNC: 6.8 G/DL (ref 6–8.5)
RBC # BLD AUTO: 5.33 10*6/MM3 (ref 4.14–5.8)
SODIUM BLD-SCNC: 140 MMOL/L (ref 136–145)
TRIGL SERPL-MCNC: 162 MG/DL (ref 0–150)
TSH SERPL DL<=0.05 MIU/L-ACNC: 5.07 MIU/ML (ref 0.27–4.2)
URATE SERPL-MCNC: 3.5 MG/DL (ref 3.4–7)
VIT B12 BLD-MCNC: 1295 PG/ML (ref 211–946)
VLDLC SERPL-MCNC: 32.4 MG/DL (ref 5–40)
WBC NRBC COR # BLD: 8.88 10*3/MM3 (ref 3.4–10.8)

## 2019-05-14 PROCEDURE — 82306 VITAMIN D 25 HYDROXY: CPT | Performed by: NURSE PRACTITIONER

## 2019-05-14 PROCEDURE — 82607 VITAMIN B-12: CPT | Performed by: NURSE PRACTITIONER

## 2019-05-14 PROCEDURE — 80061 LIPID PANEL: CPT | Performed by: NURSE PRACTITIONER

## 2019-05-14 PROCEDURE — 80050 GENERAL HEALTH PANEL: CPT | Performed by: NURSE PRACTITIONER

## 2019-05-14 PROCEDURE — 84550 ASSAY OF BLOOD/URIC ACID: CPT | Performed by: NURSE PRACTITIONER

## 2019-05-14 PROCEDURE — 83036 HEMOGLOBIN GLYCOSYLATED A1C: CPT | Performed by: NURSE PRACTITIONER

## 2019-05-15 PROCEDURE — 82043 UR ALBUMIN QUANTITATIVE: CPT | Performed by: NURSE PRACTITIONER

## 2019-05-16 LAB — ALBUMIN UR-MCNC: <1.2 MG/L

## 2019-05-22 ENCOUNTER — OFFICE VISIT (OUTPATIENT)
Dept: FAMILY MEDICINE CLINIC | Facility: CLINIC | Age: 54
End: 2019-05-22

## 2019-05-22 DIAGNOSIS — M62.838 MUSCLE SPASMS OF BOTH LOWER EXTREMITIES: ICD-10-CM

## 2019-05-22 DIAGNOSIS — F17.200 TOBACCO USE DISORDER: ICD-10-CM

## 2019-05-22 DIAGNOSIS — Z85.038 HISTORY OF COLON CANCER: ICD-10-CM

## 2019-05-22 DIAGNOSIS — K21.9 GASTROESOPHAGEAL REFLUX DISEASE WITHOUT ESOPHAGITIS: ICD-10-CM

## 2019-05-22 DIAGNOSIS — E53.8 VITAMIN B 12 DEFICIENCY: ICD-10-CM

## 2019-05-22 DIAGNOSIS — R06.02 SHORTNESS OF BREATH: ICD-10-CM

## 2019-05-22 DIAGNOSIS — E11.40 CONTROLLED TYPE 2 DIABETES WITH NEUROPATHY (HCC): Primary | Chronic | ICD-10-CM

## 2019-05-22 DIAGNOSIS — F51.04 CHRONIC INSOMNIA: Chronic | ICD-10-CM

## 2019-05-22 DIAGNOSIS — M05.79 RHEUMATOID ARTHRITIS INVOLVING MULTIPLE SITES WITH POSITIVE RHEUMATOID FACTOR (HCC): Chronic | ICD-10-CM

## 2019-05-22 DIAGNOSIS — E66.9 OBESITY (BMI 30-39.9): ICD-10-CM

## 2019-05-22 DIAGNOSIS — E78.2 MIXED HYPERLIPIDEMIA: Chronic | ICD-10-CM

## 2019-05-22 DIAGNOSIS — I10 BENIGN ESSENTIAL HYPERTENSION: Chronic | ICD-10-CM

## 2019-05-22 PROCEDURE — 99214 OFFICE O/P EST MOD 30 MIN: CPT | Performed by: NURSE PRACTITIONER

## 2019-05-22 RX ORDER — ALBUTEROL SULFATE 90 UG/1
2 AEROSOL, METERED RESPIRATORY (INHALATION) EVERY 4 HOURS PRN
Qty: 1 INHALER | Refills: 5 | Status: SHIPPED | OUTPATIENT
Start: 2019-05-22 | End: 2019-08-19 | Stop reason: SDUPTHER

## 2019-05-22 RX ORDER — METOPROLOL SUCCINATE 50 MG/1
50 TABLET, EXTENDED RELEASE ORAL DAILY
Qty: 30 TABLET | Refills: 2 | Status: SHIPPED | OUTPATIENT
Start: 2019-05-22 | End: 2019-08-23 | Stop reason: SDUPTHER

## 2019-05-22 NOTE — PROGRESS NOTES
"  History of Present Illness   Raimundo presents to the clinic today to review his recent lab results pertaining to his DM, type 2, HTN, GERD, Rheumatoid Arthritis and Hyperlipidemia. In addition, Raimundo has a history of Insomnia and Vitamin deficiency  Raimundo reports he had his f/u colonoscopy due to his h/o colon cancer. In addition, he has noted a shortness of breath at times with exertion. He contributes this to being without his allergy medication. He does report as a child he was diagnosed with asthma at one time which he \"outgrew\". His shortness of breath is intermittent and is noted with exertion at times especially when outside.     Hypertension   The current episode started more than 1 year ago. The problem was controlled until recently when he had a sudden elevation of his blood pressure and increased his Metoprolol to 50 mg. This has improved his blood pressure. He could not identify any cause of this sudden BP increase although his wife had been hospitalized for a stroke and aneurysm.   Pertinent negatives include no anxiety, blurred vision, chest pain, headaches, palpitations, peripheral edema, or sweats.      Diabetes   He has type 2 diabetes mellitus. His disease course has been improving. Pertinent negatives for diabetes include no blurred vision, no chest pain, and no polyphagia. He does share he has noticed some intermittent neuropathy on his left lower extremity.  Risk factors for coronary artery disease include dyslipidemia, hypertension, male sex, sedentary lifestyle and tobacco exposure. Current diabetic treatment includes oral agent (monotherapy). He is compliant with medication regimen most of the time. He has had a previous visit with a dietitian. An ACE inhibitor/angiotensin II receptor blocker is not being taken due to S/e. Eye exam is current..     Hyperlipidemia   This is a chronic problem. The current episode started more than 1 year ago. The problem is controlled. Factors aggravating " "his hyperlipidemia include fatty foods. Pertinent negatives include no chest pain or shortness of breath. Current antihyperlipidemic treatment includes statins. The current treatment provides significant improvement of lipids. Compliance problems include adherence to diet and adherence to exercise.  Risk factors for coronary artery disease include diabetes mellitus, dyslipidemia, hypertension, male sex and obesity.     Vitals:    05/22/19 1257 05/22/19 1315   BP: 140/80 130/80   BP Location: Right arm Left arm   Patient Position: Sitting Sitting   Cuff Size: Adult Adult   Pulse: 98 88   Resp: 14 14   Temp: 98 °F (36.7 °C) 98 °F (36.7 °C)   TempSrc: Temporal Temporal   SpO2: 96% 96%   Weight: 93.4 kg (206 lb) 91.2 kg (201 lb)   Height: 172.7 cm (68\") 172.7 cm (68\")      The following portions of the patient's history were reviewed and updated as appropriate: allergies, current medications, past family history, past medical history, past social history, past surgical history and problem list.    Review of Systems   Constitutional: Positive for appetite change. Negative for activity change, chills, fatigue, fever and unexpected weight change.   HENT: Negative.    Eyes: Negative for visual disturbance.   Respiratory: Positive for shortness of breath. Negative for cough, chest tightness and wheezing.    Cardiovascular: Negative for chest pain, palpitations and leg swelling.   Gastrointestinal: Negative for abdominal pain, constipation, diarrhea, nausea and vomiting.   Endocrine: Positive for polydipsia. Negative for cold intolerance, heat intolerance, polyphagia and polyuria.   Skin: Negative for color change and rash.   Neurological: Positive for weakness and numbness. Negative for dizziness, tremors, speech difficulty, light-headedness and headaches.   Hematological: Negative for adenopathy.   Psychiatric/Behavioral: Positive for sleep disturbance. Negative for confusion, decreased concentration and suicidal ideas. The " patient is not nervous/anxious.    All other systems reviewed and are negative.    Physical Exam   Constitutional: He is oriented to person, place, and time. He appears well-developed and well-nourished. No distress.   HENT:   Head: Normocephalic.   Nose: Nose normal.   Mouth/Throat: Oropharynx is clear and moist.   Eyes: Conjunctivae are normal. Pupils are equal, round, and reactive to light. Right eye exhibits no discharge. Left eye exhibits no discharge. No scleral icterus.   Neck: Neck supple. No JVD present. No thyromegaly present.   Cardiovascular: Normal rate, regular rhythm and normal heart sounds. Exam reveals no friction rub.   No murmur heard.  Pulmonary/Chest: Effort normal. No respiratory distress. He has decreased breath sounds. He has no wheezes. He has no rhonchi. He has no rales.   Abdominal: Soft. Bowel sounds are normal. He exhibits no distension. There is no tenderness. There is no rebound and no guarding.   Musculoskeletal: He exhibits no edema.   Lymphadenopathy:     He has no cervical adenopathy.   Neurological: He is alert and oriented to person, place, and time.   Skin: Skin is warm and dry. Capillary refill takes less than 2 seconds. No rash noted. No erythema.   Psychiatric: He has a normal mood and affect. His behavior is normal. Judgment and thought content normal.   Vitals reviewed.    Assessment/Plan   Problems Addressed this Visit        Cardiovascular and Mediastinum    Benign essential hypertension    Relevant Medications    metoprolol succinate XL (TOPROL-XL) 50 MG 24 hr tablet    Hyperlipidemia       Digestive    Gastroesophageal reflux disease without esophagitis    Vitamin B 12 deficiency    Obesity (BMI 30-39.9)       Endocrine    Controlled type 2 diabetes with neuropathy (CMS/HCC) - Primary       Musculoskeletal and Integument    Rheumatoid arthritis with positive rheumatoid factor (CMS/HCC)    Muscle spasms of both lower extremities       Other    Chronic insomnia    Tobacco  use disorder    History of colon cancer      Other Visit Diagnoses     Shortness of breath        Trial of Ventolin Inhaler. Should this worsen or no improvement to seek further medial evaluation.         Results for orders placed or performed in visit on 05/14/19   Comprehensive Metabolic Panel   Result Value Ref Range    Glucose 117 (H) 65 - 99 mg/dL    BUN 16 6 - 20 mg/dL    Creatinine 0.88 0.76 - 1.27 mg/dL    Sodium 140 136 - 145 mmol/L    Potassium 4.4 3.5 - 5.2 mmol/L    Chloride 102 98 - 107 mmol/L    CO2 24.6 22.0 - 29.0 mmol/L    Calcium 9.4 8.6 - 10.5 mg/dL    Total Protein 6.8 6.0 - 8.5 g/dL    Albumin 4.00 3.50 - 5.20 g/dL    ALT (SGPT) 25 1 - 41 U/L    AST (SGOT) 22 1 - 40 U/L    Alkaline Phosphatase 116 39 - 117 U/L    Total Bilirubin 0.2 0.2 - 1.2 mg/dL    eGFR Non African Amer 91 >60 mL/min/1.73    Globulin 2.8 gm/dL    A/G Ratio 1.4 g/dL    BUN/Creatinine Ratio 18.2 7.0 - 25.0    Anion Gap 13.4 mmol/L   Lipid Panel   Result Value Ref Range    Total Cholesterol 105 0 - 200 mg/dL    Triglycerides 162 (H) 0 - 150 mg/dL    HDL Cholesterol 28 (L) 40 - 60 mg/dL    LDL Cholesterol  45 0 - 100 mg/dL    VLDL Cholesterol 32.4 5 - 40 mg/dL    LDL/HDL Ratio 1.59    TSH   Result Value Ref Range    TSH 5.070 (H) 0.270 - 4.200 mIU/mL   Hemoglobin A1c   Result Value Ref Range    Hemoglobin A1C 5.84 (H) 4.80 - 5.60 %   Vitamin D 25 Hydroxy   Result Value Ref Range    25 Hydroxy, Vitamin D 26.0 (L) 30.0 - 100.0 ng/ml   Vitamin B12   Result Value Ref Range    Vitamin B-12 1,295 (H) 211 - 946 pg/mL   MicroAlbumin, Urine, Random - Urine, Clean Catch   Result Value Ref Range    Microalbumin, Urine <1.2 mg/L   Uric Acid   Result Value Ref Range    Uric Acid 3.5 3.4 - 7.0 mg/dL   CBC Auto Differential   Result Value Ref Range    WBC 8.88 3.40 - 10.80 10*3/mm3    RBC 5.33 4.14 - 5.80 10*6/mm3    Hemoglobin 16.7 13.0 - 17.7 g/dL    Hematocrit 53.1 (H) 37.5 - 51.0 %    MCV 99.6 (H) 79.0 - 97.0 fL    MCH 31.3 26.6 - 33.0 pg     MCHC 31.5 31.5 - 35.7 g/dL    RDW 14.1 12.3 - 15.4 %    RDW-SD 51.1 37.0 - 54.0 fl    MPV 10.0 6.0 - 12.0 fL    Platelets 333 140 - 450 10*3/mm3    Neutrophil % 48.5 42.7 - 76.0 %    Lymphocyte % 36.0 19.6 - 45.3 %    Monocyte % 11.5 5.0 - 12.0 %    Eosinophil % 2.9 0.3 - 6.2 %    Basophil % 0.9 0.0 - 1.5 %    Immature Grans % 0.2 0.0 - 0.5 %    Neutrophils, Absolute 4.30 1.70 - 7.00 10*3/mm3    Lymphocytes, Absolute 3.20 (H) 0.70 - 3.10 10*3/mm3    Monocytes, Absolute 1.02 (H) 0.10 - 0.90 10*3/mm3    Eosinophils, Absolute 0.26 0.00 - 0.40 10*3/mm3    Basophils, Absolute 0.08 0.00 - 0.20 10*3/mm3    Immature Grans, Absolute 0.02 0.00 - 0.05 10*3/mm3    nRBC 0.0 0.0 - 0.2 /100 WBC   Findings and recommendations discussed with Raimundo. Reviewed above lab results with him. Reinforced lifestyle modifications including nutrition and exercise. Trial of Ventolin Inhaler prescribed. S/S of concern reviewed and if occur to seek further medical evaluation. He will f/u with me in July; sooner if problems/concerns occur.

## 2019-05-22 NOTE — PATIENT INSTRUCTIONS
"DASH Eating Plan  DASH stands for \"Dietary Approaches to Stop Hypertension.\" The DASH eating plan is a healthy eating plan that has been shown to reduce high blood pressure (hypertension). It may also reduce your risk for type 2 diabetes, heart disease, and stroke. The DASH eating plan may also help with weight loss.  What are tips for following this plan?  General guidelines  · Avoid eating more than 2,300 mg (milligrams) of salt (sodium) a day. If you have hypertension, you may need to reduce your sodium intake to 1,500 mg a day.  · Limit alcohol intake to no more than 1 drink a day for nonpregnant women and 2 drinks a day for men. One drink equals 12 oz of beer, 5 oz of wine, or 1½ oz of hard liquor.  · Work with your health care provider to maintain a healthy body weight or to lose weight. Ask what an ideal weight is for you.  · Get at least 30 minutes of exercise that causes your heart to beat faster (aerobic exercise) most days of the week. Activities may include walking, swimming, or biking.  · Work with your health care provider or diet and nutrition specialist (dietitian) to adjust your eating plan to your individual calorie needs.  Reading food labels  · Check food labels for the amount of sodium per serving. Choose foods with less than 5 percent of the Daily Value of sodium. Generally, foods with less than 300 mg of sodium per serving fit into this eating plan.  · To find whole grains, look for the word \"whole\" as the first word in the ingredient list.  Shopping  · Buy products labeled as \"low-sodium\" or \"no salt added.\"  · Buy fresh foods. Avoid canned foods and premade or frozen meals.  Cooking  · Avoid adding salt when cooking. Use salt-free seasonings or herbs instead of table salt or sea salt. Check with your health care provider or pharmacist before using salt substitutes.  · Do not judd foods. Cook foods using healthy methods such as baking, boiling, grilling, and broiling instead.  · Cook with " heart-healthy oils, such as olive, canola, soybean, or sunflower oil.  Meal planning    · Eat a balanced diet that includes:  ? 5 or more servings of fruits and vegetables each day. At each meal, try to fill half of your plate with fruits and vegetables.  ? Up to 6-8 servings of whole grains each day.  ? Less than 6 oz of lean meat, poultry, or fish each day. A 3-oz serving of meat is about the same size as a deck of cards. One egg equals 1 oz.  ? 2 servings of low-fat dairy each day.  ? A serving of nuts, seeds, or beans 5 times each week.  ? Heart-healthy fats. Healthy fats called Omega-3 fatty acids are found in foods such as flaxseeds and coldwater fish, like sardines, salmon, and mackerel.  · Limit how much you eat of the following:  ? Canned or prepackaged foods.  ? Food that is high in trans fat, such as fried foods.  ? Food that is high in saturated fat, such as fatty meat.  ? Sweets, desserts, sugary drinks, and other foods with added sugar.  ? Full-fat dairy products.  · Do not salt foods before eating.  · Try to eat at least 2 vegetarian meals each week.  · Eat more home-cooked food and less restaurant, buffet, and fast food.  · When eating at a restaurant, ask that your food be prepared with less salt or no salt, if possible.  What foods are recommended?  The items listed may not be a complete list. Talk with your dietitian about what dietary choices are best for you.  Grains  Whole-grain or whole-wheat bread. Whole-grain or whole-wheat pasta. Brown rice. Oatmeal. Quinoa. Bulgur. Whole-grain and low-sodium cereals. Ivana bread. Low-fat, low-sodium crackers. Whole-wheat flour tortillas.  Vegetables  Fresh or frozen vegetables (raw, steamed, roasted, or grilled). Low-sodium or reduced-sodium tomato and vegetable juice. Low-sodium or reduced-sodium tomato sauce and tomato paste. Low-sodium or reduced-sodium canned vegetables.  Fruits  All fresh, dried, or frozen fruit. Canned fruit in natural juice (without  added sugar).  Meat and other protein foods  Skinless chicken or turkey. Ground chicken or turkey. Pork with fat trimmed off. Fish and seafood. Egg whites. Dried beans, peas, or lentils. Unsalted nuts, nut butters, and seeds. Unsalted canned beans. Lean cuts of beef with fat trimmed off. Low-sodium, lean deli meat.  Dairy  Low-fat (1%) or fat-free (skim) milk. Fat-free, low-fat, or reduced-fat cheeses. Nonfat, low-sodium ricotta or cottage cheese. Low-fat or nonfat yogurt. Low-fat, low-sodium cheese.  Fats and oils  Soft margarine without trans fats. Vegetable oil. Low-fat, reduced-fat, or light mayonnaise and salad dressings (reduced-sodium). Canola, safflower, olive, soybean, and sunflower oils. Avocado.  Seasoning and other foods  Herbs. Spices. Seasoning mixes without salt. Unsalted popcorn and pretzels. Fat-free sweets.  What foods are not recommended?  The items listed may not be a complete list. Talk with your dietitian about what dietary choices are best for you.  Grains  Baked goods made with fat, such as croissants, muffins, or some breads. Dry pasta or rice meal packs.  Vegetables  Creamed or fried vegetables. Vegetables in a cheese sauce. Regular canned vegetables (not low-sodium or reduced-sodium). Regular canned tomato sauce and paste (not low-sodium or reduced-sodium). Regular tomato and vegetable juice (not low-sodium or reduced-sodium). Pickles. Olives.  Fruits  Canned fruit in a light or heavy syrup. Fried fruit. Fruit in cream or butter sauce.  Meat and other protein foods  Fatty cuts of meat. Ribs. Fried meat. [place]. Sausage. Bologna and other processed lunch meats. Salami. Fatback. Hotdogs. Bratwurst. Salted nuts and seeds. Canned beans with added salt. Canned or smoked fish. Whole eggs or egg yolks. Chicken or turkey with skin.  Dairy  Whole or 2% milk, cream, and half-and-half. Whole or full-fat cream cheese. Whole-fat or sweetened yogurt. Full-fat cheese. Nondairy creamers. Whipped toppings.  Processed cheese and cheese spreads.  Fats and oils  Butter. Stick margarine. Lard. Shortening. Ghee. [place] fat. Tropical oils, such as coconut, palm kernel, or palm oil.  Seasoning and other foods  Salted popcorn and pretzels. Onion salt, garlic salt, seasoned salt, table salt, and sea salt. Worcestershire sauce. Tartar sauce. Barbecue sauce. Teriyaki sauce. Soy sauce, including reduced-sodium. Steak sauce. Canned and packaged gravies. Fish sauce. Oyster sauce. Cocktail sauce. Horseradish that you find on the shelf. Ketchup. Mustard. Meat flavorings and tenderizers. Bouillon cubes. Hot sauce and Tabasco sauce. Premade or packaged marinades. Premade or packaged taco seasonings. Relishes. Regular salad dressings.  Where to find more information:  · National Heart, Lung, and Blood [place]: www.nhlbi.nih.gov  · American Heart Association: www.heart.org  Summary  · The DASH eating plan is a healthy eating plan that has been shown to reduce high blood pressure (hypertension). It may also reduce your risk for type 2 diabetes, heart disease, and stroke.  · With the DASH eating plan, you should limit salt (sodium) intake to 2,300 mg a day. If you have hypertension, you may need to reduce your sodium intake to 1,500 mg a day.  · When on the DASH eating plan, aim to eat more fresh fruits and vegetables, whole grains, lean proteins, low-fat dairy, and heart-healthy fats.  · Work with your health care provider or diet and nutrition specialist (dietitian) to adjust your eating plan to your individual calorie needs.  This information is not intended to replace advice given to you by your health care provider. Make sure you discuss any questions you have with your health care provider.  Document Released: 12/06/2012 Document Revised: 12/11/2017 Document Reviewed: 12/11/2017  [employer] Interactive Patient Education © 2019 [employer]  Type 2 Diabetes Mellitus, Self Care, Adult  Caring for yourself after you have been diagnosed  with type 2 diabetes (type 2 diabetes mellitus) means keeping your blood sugar (glucose) under control with a balance of:  · Nutrition.  · Exercise.  · Lifestyle changes.  · Medicines or insulin, if necessary.  · Support from your team of health care providers and others.    The following information explains what you need to know to manage your diabetes at home.  What are the risks?  Having diabetes can put you at risk for other long-term (chronic) conditions, such as heart disease and kidney disease. Your health care provider may prescribe medicines to help prevent complications from diabetes. These medicines may include:  · Aspirin.  · Medicine to lower cholesterol.  · Medicine to control blood pressure.    How to monitor blood glucose  · Check your blood glucose every day, as often as told by your health care provider.  · Have your A1c (hemoglobin A1c) level checked two or more times a year, or as often as told by your health care provider.  Your health care provider will set individualized treatment goals for you. Generally, the goal of treatment is to maintain the following blood glucose levels:  · Before meals (preprandial):  mg/dL (4.4-7.2 mmol/L).  · After meals (postprandial): below 180 mg/dL (10 mmol/L).  · A1c level: less than 7%.    How to manage hyperglycemia and hypoglycemia  Hyperglycemia symptoms  Hyperglycemia, also called high blood glucose, occurs when blood glucose is too high. Make sure you know the early signs of hyperglycemia, such as:  · Increased thirst.  · Hunger.  · Feeling very tired.  · Needing to urinate more often than usual.  · Blurry vision.    Hypoglycemia symptoms  Hypoglycemia, also called low blood glucose, occurs with a blood glucose level at or below 70 mg/dL (3.9 mmol/L). The risk for hypoglycemia increases during or after exercise, during sleep, during illness, and when skipping meals or not eating for a long time (fasting).  It is important to know the symptoms of  hypoglycemia and treat it right away. Always have a 15-gram rapid-acting carbohydrate snack with you to treat low blood glucose. Family members and close friends should also know the symptoms and should understand how to treat hypoglycemia, in case you are not able to treat yourself. Symptoms may include:  · Hunger.  · Anxiety.  · Sweating and feeling clammy.  · Confusion.  · Dizziness or feeling light-headed.  · Sleepiness.  · Nausea.  · Increased heart rate.  · Headache.  · Blurry vision.  · Irritability.  · A change in coordination.  · Tingling or numbness around the mouth, lips, or tongue.  · Restless sleep.  · Fainting.  · Seizure.    Treating hypoglycemia  If you are alert and able to swallow safely, follow the 15:15 rule:  · Take 15 grams of a rapid-acting carbohydrate. Rapid-acting options include:  ? 1 tube of glucose gel.  ? 3 glucose pills.  ? 6-8 pieces of hard candy.  ? 4 oz (120 mL) of fruit juice.  ? 4 oz (120 mL) of regular (not diet) soda.  · Check your blood glucose 15 minutes after you take the carbohydrate.  · If the repeat blood glucose level is still at or below 70 mg/dL (3.9 mmol/L), take 15 grams of a carbohydrate again.  · If your blood glucose level does not increase above 70 mg/dL (3.9 mmol/L) after 3 tries, seek emergency medical care.  · After your blood glucose level returns to normal, eat a meal or a snack within 1 hour.    Treating severe hypoglycemia  Severe hypoglycemia is when your blood glucose level is at or below 54 mg/dL (3 mmol/L). Severe hypoglycemia is an emergency. Do not wait to see if the symptoms will go away. Get medical help right away. Call your local emergency services (911 in the [place]  If you have severe hypoglycemia and you cannot eat or drink, you may need an injection of glucagon. A family member or close friend should learn how to check your blood glucose and how to give you a glucagon injection. Ask your health care provider if you need to have an emergency  glucagon injection kit available.  Severe hypoglycemia may need to be treated in a hospital. The treatment may include getting glucose through an IV. You may also need treatment for the cause of your hypoglycemia.  Follow these instructions at home:  Take diabetes medicines as told  · If your health care provider prescribed insulin or diabetes medicines, take them every day.  · Do not run out of insulin or other diabetes medicines that you take. Plan ahead so you always have these available.  · If you use insulin, adjust your dosage based on how physically active you are and what foods you eat. Your health care provider will tell you how to adjust your dosage.  Make healthy food choices  The things that you eat and drink affect your blood glucose and your insulin dosage. Making good choices helps to control your diabetes and prevent other health problems. A healthy meal plan includes eating lean proteins, complex carbohydrates, fresh fruits and vegetables, low-fat dairy products, and healthy fats.  Make an appointment to see a diet and nutrition specialist (registered dietitian) to help you create an eating plan that is right for you. Make sure that you:  · Follow instructions from your health care provider about eating or drinking restrictions.  · Drink enough fluid to keep your urine pale yellow.  · Keep a record of the carbohydrates that you eat. Do this by reading food labels and learning the standard serving sizes of foods.  · Follow your sick day plan whenever you cannot eat or drink as usual. Make this plan in advance with your health care provider.    Stay active  Exercise regularly, as told by your health care provider. This may include:  · Stretching and doing strength exercises, such as yoga or weightlifting, 2 or more times a week.  · Doing 150 minutes or more of moderate-intensity or vigorous-intensity exercise each week. This could be brisk walking, biking, or water aerobics.  ? Spread out your  activity over 3 or more days of the week.  ? Do not go more than 2 days in a row without doing some kind of physical activity.    When you start a new exercise or activity, work with your health care provider to adjust your insulin, medicines, or food intake as needed.  Make healthy lifestyle choices  · Do not use any tobacco products, such as cigarettes, chewing tobacco, and e-cigarettes. If you need help quitting, ask your health care provider.  · If your health care provider says that alcohol is safe for you, limit alcohol intake to no more than 1 drink per day for nonpregnant women and 2 drinks per day for men. One drink equals 12 oz of beer, 5 oz of wine, or 1½ oz of hard liquor.  · Learn to manage stress. If you need help with this, ask your health care provider.  Care for your body  · Keep your immunizations up to date. In addition to getting vaccinations as told by your health care provider, it is recommended that you get vaccinated against the following illnesses:  ? The flu (influenza). Get a flu shot every year.  ? Pneumonia.  ? Hepatitis B.  · Schedule an eye exam soon after your diagnosis, and then one time every year after that.  · Check your skin and feet every day for cuts, bruises, redness, blisters, or sores. Schedule a foot exam with your health care provider once every year.  · Brush your teeth and gums two times a day, and floss one or more times a day. Visit your dentist one or more times every 6 months.  · Maintain a healthy weight.  General instructions  · Take over-the-counter and prescription medicines only as told by your health care provider.  · Share your diabetes management plan with people in your workplace, school, and household.  · Carry a medical alert card or wear medical alert jewelry.  · Keep all follow-up visits as told by your health care provider. This is important.  Questions to ask your health care provider  · Do I need to meet with a diabetes educator?  · Where can I find a  support group for people with diabetes?  Where to find more information  For more information about diabetes, visit:  · American Diabetes Association (ADA): www.diabetes.org  · American Association of Diabetes Educators (AADE): www.diabeteseducator.org    Summary  · Caring for yourself after you have been diagnosed with (type 2 diabetes mellitus) means keeping your blood sugar (glucose) under control with a balance of nutrition, exercise, lifestyle changes, and medicine.  · Check your blood glucose every day, as often as told by your health care provider.  · Having diabetes can put you at risk for other long-term (chronic) conditions, such as heart disease and kidney disease. Your health care provider may prescribe medicines to help prevent complications from diabetes.  · Keep all follow-up visits as told by your health care provider. This is important.  This information is not intended to replace advice given to you by your health care provider. Make sure you discuss any questions you have with your health care provider.  Document Released: 04/10/2017 Document Revised: 07/20/2018 Document Reviewed: 01/20/2017  [employer] Interactive Patient Education © 2019 [employer]

## 2019-05-23 ENCOUNTER — OFFICE VISIT (OUTPATIENT)
Dept: FAMILY MEDICINE CLINIC | Facility: CLINIC | Age: 54
End: 2019-05-23

## 2019-05-23 VITALS
SYSTOLIC BLOOD PRESSURE: 130 MMHG | TEMPERATURE: 98.1 F | OXYGEN SATURATION: 93 % | DIASTOLIC BLOOD PRESSURE: 80 MMHG | BODY MASS INDEX: 31.22 KG/M2 | WEIGHT: 206 LBS | HEART RATE: 96 BPM | HEIGHT: 68 IN

## 2019-05-23 VITALS
HEIGHT: 68 IN | RESPIRATION RATE: 14 BRPM | BODY MASS INDEX: 30.46 KG/M2 | SYSTOLIC BLOOD PRESSURE: 130 MMHG | HEART RATE: 88 BPM | DIASTOLIC BLOOD PRESSURE: 80 MMHG | WEIGHT: 201 LBS | OXYGEN SATURATION: 96 % | TEMPERATURE: 98 F

## 2019-05-23 DIAGNOSIS — E78.2 MIXED HYPERLIPIDEMIA: ICD-10-CM

## 2019-05-23 DIAGNOSIS — F51.04 PSYCHOPHYSIOLOGICAL INSOMNIA: Primary | ICD-10-CM

## 2019-05-23 DIAGNOSIS — I10 BENIGN ESSENTIAL HYPERTENSION: ICD-10-CM

## 2019-05-23 PROCEDURE — 99214 OFFICE O/P EST MOD 30 MIN: CPT | Performed by: PHYSICIAN ASSISTANT

## 2019-05-23 RX ORDER — AMITRIPTYLINE HYDROCHLORIDE 100 MG/1
100 TABLET, FILM COATED ORAL NIGHTLY
Qty: 30 TABLET | Refills: 5 | Status: SHIPPED | OUTPATIENT
Start: 2019-05-23 | End: 2019-11-04 | Stop reason: SDUPTHER

## 2019-05-23 NOTE — PROGRESS NOTES
"Subjective   Raimundo Munguia is a 53 y.o. male.       Chief Complaint -insomnia    History of Present Illness -      Insomnia-  He reports trouble going to sleep initially.  He states minimal relief with trazodone or Remeron stating that it takes him 3 to 4 hours to fall asleep after taking this medication.  Not at goal    Hypertension- controlled with metoprolol    Hyperlipidemia-improved with Lipitor  Lab Results   Component Value Date    CHOL 105 05/14/2019    CHLPL 86 05/02/2016    TRIG 162 (H) 05/14/2019    HDL 28 (L) 05/14/2019    LDL 45 05/14/2019         The following portions of the patient's history were reviewed and updated as appropriate: allergies, current medications, past family history, past medical history, past social history, past surgical history and problem list.    Review of Systems   Constitutional: Negative for activity change, appetite change and fever.   HENT: Negative for ear pain, sinus pressure and sore throat.    Eyes: Negative for pain and visual disturbance.   Respiratory: Negative for cough and chest tightness.    Cardiovascular: Negative for chest pain and palpitations.   Gastrointestinal: Negative for abdominal pain, constipation, diarrhea, nausea and vomiting.   Endocrine: Negative for polydipsia and polyuria.   Genitourinary: Negative for dysuria and frequency.   Musculoskeletal: Negative for back pain and myalgias.   Skin: Negative for color change and rash.   Allergic/Immunologic: Negative for food allergies and immunocompromised state.   Neurological: Negative for dizziness, syncope and headaches.   Hematological: Negative for adenopathy. Does not bruise/bleed easily.   Psychiatric/Behavioral: Positive for sleep disturbance. Negative for hallucinations and suicidal ideas. The patient is not nervous/anxious.        /80   Pulse 96   Temp 98.1 °F (36.7 °C) (Temporal)   Ht 172.7 cm (68\")   Wt 93.4 kg (206 lb)   SpO2 93%   BMI 31.32 kg/m²   Lab on 05/14/2019   Component " Date Value Ref Range Status   • Glucose 05/14/2019 117* 65 - 99 mg/dL Final   • BUN 05/14/2019 16  6 - 20 mg/dL Final   • Creatinine 05/14/2019 0.88  0.76 - 1.27 mg/dL Final   • Sodium 05/14/2019 140  136 - 145 mmol/L Final   • Potassium 05/14/2019 4.4  3.5 - 5.2 mmol/L Final   • Chloride 05/14/2019 102  98 - 107 mmol/L Final   • CO2 05/14/2019 24.6  22.0 - 29.0 mmol/L Final   • Calcium 05/14/2019 9.4  8.6 - 10.5 mg/dL Final   • Total Protein 05/14/2019 6.8  6.0 - 8.5 g/dL Final   • Albumin 05/14/2019 4.00  3.50 - 5.20 g/dL Final   • ALT (SGPT) 05/14/2019 25  1 - 41 U/L Final   • AST (SGOT) 05/14/2019 22  1 - 40 U/L Final   • Alkaline Phosphatase 05/14/2019 116  39 - 117 U/L Final   • Total Bilirubin 05/14/2019 0.2  0.2 - 1.2 mg/dL Final   • eGFR Non  Amer 05/14/2019 91  >60 mL/min/1.73 Final   • Globulin 05/14/2019 2.8  gm/dL Final   • A/G Ratio 05/14/2019 1.4  g/dL Final   • BUN/Creatinine Ratio 05/14/2019 18.2  7.0 - 25.0 Final   • Anion Gap 05/14/2019 13.4  mmol/L Final   • Total Cholesterol 05/14/2019 105  0 - 200 mg/dL Final   • Triglycerides 05/14/2019 162* 0 - 150 mg/dL Final   • HDL Cholesterol 05/14/2019 28* 40 - 60 mg/dL Final   • LDL Cholesterol  05/14/2019 45  0 - 100 mg/dL Final   • VLDL Cholesterol 05/14/2019 32.4  5 - 40 mg/dL Final   • LDL/HDL Ratio 05/14/2019 1.59   Final   • TSH 05/14/2019 5.070* 0.270 - 4.200 mIU/mL Final   • Hemoglobin A1C 05/14/2019 5.84* 4.80 - 5.60 % Final   • 25 Hydroxy, Vitamin D 05/14/2019 26.0* 30.0 - 100.0 ng/ml Final   • Vitamin B-12 05/14/2019 1,295* 211 - 946 pg/mL Final   • Microalbumin, Urine 05/15/2019 <1.2  mg/L Final   • Uric Acid 05/14/2019 3.5  3.4 - 7.0 mg/dL Final   • WBC 05/14/2019 8.88  3.40 - 10.80 10*3/mm3 Final   • RBC 05/14/2019 5.33  4.14 - 5.80 10*6/mm3 Final   • Hemoglobin 05/14/2019 16.7  13.0 - 17.7 g/dL Final   • Hematocrit 05/14/2019 53.1* 37.5 - 51.0 % Final   • MCV 05/14/2019 99.6* 79.0 - 97.0 fL Final   • MCH 05/14/2019 31.3  26.6 -  33.0 pg Final   • MCHC 05/14/2019 31.5  31.5 - 35.7 g/dL Final   • RDW 05/14/2019 14.1  12.3 - 15.4 % Final   • RDW-SD 05/14/2019 51.1  37.0 - 54.0 fl Final   • MPV 05/14/2019 10.0  6.0 - 12.0 fL Final   • Platelets 05/14/2019 333  140 - 450 10*3/mm3 Final   • Neutrophil % 05/14/2019 48.5  42.7 - 76.0 % Final   • Lymphocyte % 05/14/2019 36.0  19.6 - 45.3 % Final   • Monocyte % 05/14/2019 11.5  5.0 - 12.0 % Final   • Eosinophil % 05/14/2019 2.9  0.3 - 6.2 % Final   • Basophil % 05/14/2019 0.9  0.0 - 1.5 % Final   • Immature Grans % 05/14/2019 0.2  0.0 - 0.5 % Final   • Neutrophils, Absolute 05/14/2019 4.30  1.70 - 7.00 10*3/mm3 Final   • Lymphocytes, Absolute 05/14/2019 3.20* 0.70 - 3.10 10*3/mm3 Final   • Monocytes, Absolute 05/14/2019 1.02* 0.10 - 0.90 10*3/mm3 Final   • Eosinophils, Absolute 05/14/2019 0.26  0.00 - 0.40 10*3/mm3 Final   • Basophils, Absolute 05/14/2019 0.08  0.00 - 0.20 10*3/mm3 Final   • Immature Grans, Absolute 05/14/2019 0.02  0.00 - 0.05 10*3/mm3 Final   • nRBC 05/14/2019 0.0  0.0 - 0.2 /100 WBC Final       Physical Exam   Constitutional: He is oriented to person, place, and time. He appears well-developed and well-nourished.   HENT:   Head: Normocephalic and atraumatic.   Nose: Nose normal.   Mouth/Throat: Oropharynx is clear and moist.   Eyes: Conjunctivae and EOM are normal. Pupils are equal, round, and reactive to light.   Neck: Normal range of motion. Neck supple. No tracheal deviation present. No thyromegaly present.   Cardiovascular: Normal rate, regular rhythm, normal heart sounds and intact distal pulses.   No murmur heard.  Pulmonary/Chest: Effort normal and breath sounds normal. No respiratory distress. He has no wheezes.   Abdominal: Soft. Bowel sounds are normal. There is no tenderness. There is no guarding.   Musculoskeletal: Normal range of motion. He exhibits no edema or tenderness.   Lymphadenopathy:     He has no cervical adenopathy.   Neurological: He is alert and oriented  to person, place, and time.   Skin: Skin is warm and dry. No rash noted.   Psychiatric: He has a normal mood and affect. His behavior is normal.   Nursing note and vitals reviewed.      Assessment/Plan     Diagnoses and all orders for this visit:    Psychophysiological insomnia  Comments:  Discontinue Remeron.  Failed trazodone in the past.  Start Elavil nightly  Orders:  -     amitriptyline (ELAVIL) 100 MG tablet; Take 1 tablet by mouth Every Night.    Benign essential hypertension  Comments:  Continue metoprolol    Mixed hyperlipidemia  Comments:  Continue Lipitor and advised low carbohydrate diet due to elevated triglycerides                 This document has been electronically signed by:  Cherie Amaro PA-C

## 2019-06-10 RX ORDER — LANCETS 33 GAUGE
1 EACH MISCELLANEOUS DAILY
Qty: 100 EACH | Refills: 1 | Status: SHIPPED | OUTPATIENT
Start: 2019-06-10 | End: 2019-10-07 | Stop reason: SDUPTHER

## 2019-07-10 RX ORDER — CYCLOBENZAPRINE HCL 10 MG
TABLET ORAL
Qty: 90 TABLET | Refills: 1 | Status: SHIPPED | OUTPATIENT
Start: 2019-07-10 | End: 2019-08-06 | Stop reason: SDUPTHER

## 2019-07-17 ENCOUNTER — OFFICE VISIT (OUTPATIENT)
Dept: FAMILY MEDICINE CLINIC | Facility: CLINIC | Age: 54
End: 2019-07-17

## 2019-07-17 VITALS
OXYGEN SATURATION: 96 % | DIASTOLIC BLOOD PRESSURE: 80 MMHG | SYSTOLIC BLOOD PRESSURE: 120 MMHG | HEART RATE: 97 BPM | WEIGHT: 210 LBS | BODY MASS INDEX: 31.83 KG/M2 | TEMPERATURE: 97.4 F | HEIGHT: 68 IN

## 2019-07-17 DIAGNOSIS — E55.9 VITAMIN D DEFICIENCY: ICD-10-CM

## 2019-07-17 DIAGNOSIS — E78.2 MIXED HYPERLIPIDEMIA: Chronic | ICD-10-CM

## 2019-07-17 DIAGNOSIS — Z72.0 CHEWS TOBACCO: ICD-10-CM

## 2019-07-17 DIAGNOSIS — I10 BENIGN ESSENTIAL HYPERTENSION: Chronic | ICD-10-CM

## 2019-07-17 DIAGNOSIS — F51.04 CHRONIC INSOMNIA: Chronic | ICD-10-CM

## 2019-07-17 DIAGNOSIS — E66.9 OBESITY (BMI 30-39.9): ICD-10-CM

## 2019-07-17 DIAGNOSIS — K21.9 GASTROESOPHAGEAL REFLUX DISEASE WITHOUT ESOPHAGITIS: ICD-10-CM

## 2019-07-17 DIAGNOSIS — M05.79 RHEUMATOID ARTHRITIS INVOLVING MULTIPLE SITES WITH POSITIVE RHEUMATOID FACTOR (HCC): Chronic | ICD-10-CM

## 2019-07-17 DIAGNOSIS — Z87.09 HISTORY OF ASTHMA: ICD-10-CM

## 2019-07-17 DIAGNOSIS — E11.40 CONTROLLED TYPE 2 DIABETES WITH NEUROPATHY (HCC): Primary | Chronic | ICD-10-CM

## 2019-07-17 PROCEDURE — 99214 OFFICE O/P EST MOD 30 MIN: CPT | Performed by: NURSE PRACTITIONER

## 2019-07-17 RX ORDER — MONTELUKAST SODIUM 10 MG/1
10 TABLET ORAL NIGHTLY
Qty: 30 TABLET | Refills: 3 | Status: SHIPPED | OUTPATIENT
Start: 2019-07-17 | End: 2019-11-04 | Stop reason: SDUPTHER

## 2019-07-17 NOTE — PROGRESS NOTES
"  History of Present Illness   Raimundo presents to the clinic today to review his recent lab results pertaining to his DM, type 2, HTN, GERD, Rheumatoid Arthritis and Hyperlipidemia. In addition, Raimundo has a history of Insomnia and Vitamin deficiency  Raimundo reports he had his f/u colonoscopy due to his h/o colon cancer. In addition, he has noted a shortness of breath at times with exertion. He contributes this to being without his allergy medication. He does report as a child he was diagnosed with asthma at one time which he \"outgrew\". His shortness of breath is intermittent and is noted with exertion at times especially when outside.      Hypertension   The current episode started more than 1 year ago. His hypertensive medication was increased to Metoprolol to 50 mg which has improved his blood pressure. Pertinent negatives include no anxiety, blurred vision, chest pain, headaches, palpitations, peripheral edema, or sweats.      Lab Results   Component Value Date    CREATININE 0.88 05/14/2019     Diabetes   He has type 2 diabetes mellitus. His disease course has been improving. Pertinent negatives for diabetes include no blurred vision, no chest pain, and no polyphagia. He does share he has noticed some intermittent neuropathy on his left lower extremity.  Risk factors for coronary artery disease include dyslipidemia, hypertension, male sex, sedentary lifestyle and tobacco exposure. Current diabetic treatment includes oral agent (monotherapy). He is compliant with medication regimen most of the time. He has had a previous visit with a dietitian. An ACE inhibitor/angiotensin II receptor blocker is not being taken due to S/e. Eye exam is current..     Lab Results   Component Value Date    HGBA1C 5.84 (H) 05/14/2019     Hyperlipidemia   This is a chronic problem. The current episode started more than 1 year ago. The problem is controlled. Factors aggravating his hyperlipidemia include fatty foods. Pertinent " "negatives include no chest pain or shortness of breath. Current antihyperlipidemic treatment includes statins. The current treatment provides significant improvement of lipids. Compliance problems include adherence to diet and adherence to exercise.  Risk factors for coronary artery disease include diabetes mellitus, dyslipidemia, hypertension, male sex and obesity.   Lab Results   Component Value Date    CHOL 105 05/14/2019    CHOL 87 02/19/2019    CHOL 82 11/13/2018    CHLPL 86 05/02/2016    CHLPL 114 01/08/2016    CHLPL 117 08/20/2015     Lab Results   Component Value Date    TRIG 162 (H) 05/14/2019    TRIG 104 02/19/2019    TRIG 101 11/13/2018     Lab Results   Component Value Date    HDL 28 (L) 05/14/2019    HDL 29 (L) 02/19/2019    HDL 26 (L) 11/13/2018     Lab Results   Component Value Date    LDL 45 05/14/2019    LDL 37 02/19/2019    LDL 36 11/13/2018     Vitals:    07/17/19 1506   BP: 120/80   Pulse: 97   Temp: 97.4 °F (36.3 °C)   TempSrc: Temporal   SpO2: 96%   Weight: 95.3 kg (210 lb)   Height: 172.7 cm (67.99\")      The following portions of the patient's history were reviewed and updated as appropriate: allergies, current medications, past family history, past medical history, past social history, past surgical history and problem list.    Review of Systems   Constitutional: Positive for appetite change and fever. Negative for activity change, chills, fatigue and unexpected weight change.   HENT: Negative.    Eyes: Negative for visual disturbance.   Respiratory: Positive for shortness of breath. Negative for cough, chest tightness and wheezing.    Cardiovascular: Negative for chest pain, palpitations and leg swelling.   Gastrointestinal: Negative for abdominal pain, constipation, diarrhea, nausea and vomiting.   Endocrine: Positive for polydipsia. Negative for cold intolerance, heat intolerance, polyphagia and polyuria.   Musculoskeletal: Positive for arthralgias.   Skin: Negative for color change and " rash.   Neurological: Positive for numbness. Negative for dizziness, tremors, speech difficulty, weakness, light-headedness and headaches.   Hematological: Negative for adenopathy. Bruises/bleeds easily.   Psychiatric/Behavioral: Positive for sleep disturbance. Negative for confusion, decreased concentration and suicidal ideas. The patient is not nervous/anxious.    All other systems reviewed and are negative.    Physical Exam   Constitutional: He is oriented to person, place, and time. He appears well-developed and well-nourished. No distress.   HENT:   Head: Normocephalic.   Nose: Nose normal.   Mouth/Throat: Oropharynx is clear and moist. No oropharyngeal exudate.   Eyes: Conjunctivae are normal. Pupils are equal, round, and reactive to light. Right eye exhibits no discharge. Left eye exhibits no discharge. No scleral icterus.   Neck: Neck supple. No JVD present. No thyromegaly present.   Cardiovascular: Normal rate, regular rhythm and normal heart sounds. Exam reveals no friction rub.   No murmur heard.  Pulmonary/Chest: Effort normal. No respiratory distress. He has decreased breath sounds (Poor exhalation). He has no wheezes. He has no rhonchi. He has no rales.   Abdominal: Soft. Bowel sounds are normal. He exhibits no distension. There is no tenderness. There is no rebound and no guarding.   Musculoskeletal: He exhibits no edema or tenderness.    Raimundo had a diabetic foot exam performed today.  Vascular Status -  His right foot exhibits normal foot vasculature  and no edema. His left foot exhibits normal foot vasculature  and no edema.  Skin Integrity  -  His right foot skin is intact.His left foot skin is intact..  Lymphadenopathy:     He has no cervical adenopathy.   Neurological: He is alert and oriented to person, place, and time.   Skin: Skin is warm and dry. No rash noted. No erythema.   Psychiatric: He has a normal mood and affect. His behavior is normal. Judgment and thought content normal.   Vitals  reviewed.    Assessment/Plan     Patient's Body mass index is 31.94 kg/m². BMI is above normal parameters. Recommendations include: educational material.   (Normal BMI:  18.5-24.9, OW 25-29.9, Obesity 30 or greater)    Raimundo Munguia is a current smokeless tobacco user.  He currently chews 1 can per two to three days for a duration of 35 years. I have educated him on the risk of diseases from using tobacco products such as cancer, COPD, heart diease and Oral cancer.   I advised him to quit and he is not willing to quit.  I spent less than 3  minutes counseling the patient.    Fall Risk Assessment was completed, and Raimundo Munguia is at moderate risk for falls.    Assessment/Plan (cont)  Problems Addressed this Visit        Cardiovascular and Mediastinum    Benign essential hypertension    Hyperlipidemia    Relevant Orders    Lipid Panel       Digestive    Vitamin D deficiency    Relevant Orders    Vitamin D 25 Hydroxy    Gastroesophageal reflux disease without esophagitis    Relevant Orders    CBC & Differential    Obesity (BMI 30-39.9)       Endocrine    Controlled type 2 diabetes with neuropathy (CMS/HCC) - Primary    Relevant Orders    Comprehensive Metabolic Panel    TSH    Hemoglobin A1c    Vitamin B12       Musculoskeletal and Integument    Rheumatoid arthritis with positive rheumatoid factor (CMS/HCC)       Other    Chews tobacco    Chronic insomnia    History of asthma    Relevant Medications    montelukast (SINGULAIR) 10 MG tablet    Other Relevant Orders    Ambulatory Referral to Pulmonology        Results for orders placed or performed in visit on 05/14/19   Comprehensive Metabolic Panel   Result Value Ref Range    Glucose 117 (H) 65 - 99 mg/dL    BUN 16 6 - 20 mg/dL    Creatinine 0.88 0.76 - 1.27 mg/dL    Sodium 140 136 - 145 mmol/L    Potassium 4.4 3.5 - 5.2 mmol/L    Chloride 102 98 - 107 mmol/L    CO2 24.6 22.0 - 29.0 mmol/L    Calcium 9.4 8.6 - 10.5 mg/dL    Total Protein 6.8 6.0 - 8.5 g/dL     Albumin 4.00 3.50 - 5.20 g/dL    ALT (SGPT) 25 1 - 41 U/L    AST (SGOT) 22 1 - 40 U/L    Alkaline Phosphatase 116 39 - 117 U/L    Total Bilirubin 0.2 0.2 - 1.2 mg/dL    eGFR Non African Amer 91 >60 mL/min/1.73    Globulin 2.8 gm/dL    A/G Ratio 1.4 g/dL    BUN/Creatinine Ratio 18.2 7.0 - 25.0    Anion Gap 13.4 mmol/L   Lipid Panel   Result Value Ref Range    Total Cholesterol 105 0 - 200 mg/dL    Triglycerides 162 (H) 0 - 150 mg/dL    HDL Cholesterol 28 (L) 40 - 60 mg/dL    LDL Cholesterol  45 0 - 100 mg/dL    VLDL Cholesterol 32.4 5 - 40 mg/dL    LDL/HDL Ratio 1.59    TSH   Result Value Ref Range    TSH 5.070 (H) 0.270 - 4.200 mIU/mL   Hemoglobin A1c   Result Value Ref Range    Hemoglobin A1C 5.84 (H) 4.80 - 5.60 %   Vitamin D 25 Hydroxy   Result Value Ref Range    25 Hydroxy, Vitamin D 26.0 (L) 30.0 - 100.0 ng/ml   Vitamin B12   Result Value Ref Range    Vitamin B-12 1,295 (H) 211 - 946 pg/mL   MicroAlbumin, Urine, Random - Urine, Clean Catch   Result Value Ref Range    Microalbumin, Urine <1.2 mg/L   Uric Acid   Result Value Ref Range    Uric Acid 3.5 3.4 - 7.0 mg/dL   CBC Auto Differential   Result Value Ref Range    WBC 8.88 3.40 - 10.80 10*3/mm3    RBC 5.33 4.14 - 5.80 10*6/mm3    Hemoglobin 16.7 13.0 - 17.7 g/dL    Hematocrit 53.1 (H) 37.5 - 51.0 %    MCV 99.6 (H) 79.0 - 97.0 fL    MCH 31.3 26.6 - 33.0 pg    MCHC 31.5 31.5 - 35.7 g/dL    RDW 14.1 12.3 - 15.4 %    RDW-SD 51.1 37.0 - 54.0 fl    MPV 10.0 6.0 - 12.0 fL    Platelets 333 140 - 450 10*3/mm3    Neutrophil % 48.5 42.7 - 76.0 %    Lymphocyte % 36.0 19.6 - 45.3 %    Monocyte % 11.5 5.0 - 12.0 %    Eosinophil % 2.9 0.3 - 6.2 %    Basophil % 0.9 0.0 - 1.5 %    Immature Grans % 0.2 0.0 - 0.5 %    Neutrophils, Absolute 4.30 1.70 - 7.00 10*3/mm3    Lymphocytes, Absolute 3.20 (H) 0.70 - 3.10 10*3/mm3    Monocytes, Absolute 1.02 (H) 0.10 - 0.90 10*3/mm3    Eosinophils, Absolute 0.26 0.00 - 0.40 10*3/mm3    Basophils, Absolute 0.08 0.00 - 0.20 10*3/mm3     Immature Grans, Absolute 0.02 0.00 - 0.05 10*3/mm3    nRBC 0.0 0.0 - 0.2 /100 WBC       Findings and recommendations discussed with Raimundo. Reviewed labs with him which included an A1C of 5.84. Praise given and encouragement. He will schedule a TONI prior to his next appointment.   Discussed a Rheumatology referral which he will consider in regards to his Rheumatoid Arthritis.   Continue Metoprolol 50 mg which he is tolerating. Will consider adding ACEI/ARB in future.   Raimundo reports Inhaler is helping his shortness of breath. Added Singulair and referral to Pulmonology  Tobacco cessation discussed and at this time Raimundo has no desire to quit. He understands the consequences.   Raimundo will f/u with me in October with routine labs to be completed prior to his appointment; sooner if problems/concerns occur.     This document has been electronically signed by ABDIRAHMAN Braswell, RICCO-BC, HÉCTOR

## 2019-07-17 NOTE — PATIENT INSTRUCTIONS
Fall Prevention in the Home, Adult  Falls can cause injuries. They can happen to people of all ages. There are many things you can do to make your home safe and to help prevent falls. Ask for help when making these changes, if needed.  What actions can I take to prevent falls?  General Instructions  · Use good lighting in all rooms. Replace any light bulbs that burn out.  · Turn on the lights when you go into a dark area. Use night-lights.  · Keep items that you use often in easy-to-reach places. Lower the shelves around your home if necessary.  · Set up your furniture so you have a clear path. Avoid moving your furniture around.  · Do not have throw rugs and other things on the floor that can make you trip.  · Avoid walking on wet floors.  · If any of your floors are uneven, fix them.  · Add color or contrast paint or tape to clearly shamar and help you see:  ? Any grab bars or handrails.  ? First and last steps of stairways.  ? Where the edge of each step is.  · If you use a stepladder:  ? Make sure that it is fully opened. Do not climb a closed stepladder.  ? Make sure that both sides of the stepladder are locked into place.  ? Ask someone to hold the stepladder for you while you use it.  · If there are any pets around you, be aware of where they are.  What can I do in the bathroom?  · Keep the floor dry. Clean up any water that spills onto the floor as soon as it happens.  · Remove soap buildup in the tub or shower regularly.  · Use non-skid mats or decals on the floor of the tub or shower.  · Attach bath mats securely with double-sided, non-slip rug tape.  · If you need to sit down in the shower, use a plastic, non-slip stool.  · Install grab bars by the toilet and in the tub and shower. Do not use towel bars as grab bars.  What can I do in the bedroom?  · Make sure that you have a light by your bed that is easy to reach.  · Do not use any sheets or blankets that are too big for your bed. They should not hang  down onto the floor.  · Have a firm chair that has side arms. You can use this for support while you get dressed.  What can I do in the kitchen?  · Clean up any spills right away.  · If you need to reach something above you, use a strong step stool that has a grab bar.  · Keep electrical cords out of the way.  · Do not use floor polish or wax that makes floors slippery. If you must use wax, use non-skid floor wax.  What can I do with my stairs?  · Do not leave any items on the stairs.  · Make sure that you have a light switch at the top of the stairs and the bottom of the stairs. If you do not have them, ask someone to add them for you.  · Make sure that there are handrails on both sides of the stairs, and use them. Fix handrails that are broken or loose. Make sure that handrails are as long as the stairways.  · Install non-slip stair treads on all stairs in your home.  · Avoid having throw rugs at the top or bottom of the stairs. If you do have throw rugs, attach them to the floor with carpet tape.  · Choose a carpet that does not hide the edge of the steps on the stairway.  · Check any carpeting to make sure that it is firmly attached to the stairs. Fix any carpet that is loose or worn.  What can I do on the outside of my home?  · Use bright outdoor lighting.  · Regularly fix the edges of walkways and driveways and fix any cracks.  · Remove anything that might make you trip as you walk through a door, such as a raised step or threshold.  · Trim any bushes or trees on the path to your home.  · Regularly check to see if handrails are loose or broken. Make sure that both sides of any steps have handrails.  · Install guardrails along the edges of any raised decks and porches.  · Clear walking paths of anything that might make someone trip, such as tools or rocks.  · Have any leaves, snow, or ice cleared regularly.  · Use sand or salt on walking paths during winter.  · Clean up any spills in your garage right away.  This includes grease or oil spills.  What other actions can I take?  · Wear shoes that:  ? Have a low heel. Do not wear high heels.  ? Have rubber bottoms.  ? Are comfortable and fit you well.  ? Are closed at the toe. Do not wear open-toe sandals.  · Use tools that help you move around (mobility aids) if they are needed. These include:  ? Canes.  ? Walkers.  ? Scooters.  ? Crutches.  · Review your medicines with your doctor. Some medicines can make you feel dizzy. This can increase your chance of falling.  Ask your doctor what other things you can do to help prevent falls.  Where to find more information  · Centers for Disease Control and Prevention, [employer]: https://cdc.gov  · National Pilot on Aging: https://za6orkm.shannon.nih.gov  Contact a doctor if:  · You are afraid of falling at home.  · You feel weak, drowsy, or dizzy at home.  · You fall at home.  Summary  · There are many simple things that you can do to make your home safe and to help prevent falls.  · Ways to make your home safe include removing tripping hazards and installing grab bars in the bathroom.  · Ask for help when making these changes in your home.  This information is not intended to replace advice given to you by your health care provider. Make sure you discuss any questions you have with your health care provider.  Document Released: 10/14/2010 Document Revised: 08/02/2018 Document Reviewed: 08/02/2018  [employer] Interactive Patient Education © 2019 [employer]  Smokeless Tobacco Information, Adult  Tobacco use is one of the leading causes of cancer and other chronic health problems. Smokeless tobacco is tobacco that is put directly into the mouth instead of being smoked. It may also be called chewing tobacco or snuff. Smokeless tobacco is made from the leaves of tobacco plants and it comes in several forms:  · Loose, dry leaves, plugs, or twists.  · Moist pouches.  · Dissolving lozenges or strips.    Chewing, sucking, or holding the  tobacco in your mouth causes your mouth to make more saliva. The saliva mixes with the tobacco to make “tobacco juice” that is swallowed or spit out.  How can smokeless tobacco affect me?  Using smokeless tobacco:  · Increases your risk of developing cancer. Smokeless tobacco contains at least 28 different types of cancer-causing chemicals (carcinogens).  · Increases your chances of developing other long-term health problems, including high blood pressure, heart disease, stroke, and dental problems.  · Can make you become addicted. Nicotine is one of the chemicals in tobacco. When you chew tobacco, you absorb nicotine from the tobacco juice. This can make you feel more alert than usual.  · Can cause problems with pregnancy. Pregnant women who use smokeless tobacco are more likely to miscarry or deliver a baby too early (premature delivery).  · Can affect the appearance and health of your mouth. Using smokeless tobacco may cause bad breath, yellow-brown teeth, mouth sores, cracking and bleeding lips, gum recession, and lesions on the soft tissues of your mouth (leukoplakia).    What are the benefits of not using smokeless tobacco?  The benefits of not using smokeless tobacco include:  · A healthy mind because:  ? You avoid addiction.  · A healthy body because:  ? You avoid dental problems.  ? You promote healthy pregnancy.  ? You avoid long-term health problems.  · A healthy wallet because:  ? You avoid costs of buying tobacco.  ? You avoid health care costs in the future.  · A healthy family because:  ? You avoid accidental poisoning of children in your household.    What can happen if I continue to use smokeless tobacco?  If you continue to use smokeless tobacco, you will increase your risk for developing certain cancers. These include:  · Tongue.  · Lips, mouth, and gums.  · Throat (esophagus) and voice box (larynx).  · Stomach.  · Pancreas.  · Bladder.  · Colon.    Long-term use of smokeless tobacco can also lead  to:  · High blood pressure, heart disease, and stroke.  · Gum disease, gum recession, and bone loss around the teeth.  · Tooth decay.    How do I quit using smokeless tobacco?  Quitting the use of smokeless tobacco can be hard, but it can be done. Follow these steps:  · Pick a date to quit. Set a date within the next two weeks. This gives you time to prepare.  · Write down the reasons why you are quitting. Keep this list in places where you will see it often, such as on your bathroom mirror or in your car or wallet.  · Identify the people, places, things, and activities that make you want to use tobacco (triggers) and avoid them.  · Get rid of any tobacco you have and remove any tobacco smells. To do this:  ? Throw away all containers of tobacco at home, at work, and in your car.  ? Throw away any other items that you use regularly when you chew tobacco.  ? Clean your car and make sure to remove all tobacco-related items.  ? Clean your home, including curtains and carpets.  · Tell your family, friends, and coworkers that you are quitting. This can make quitting easier.  · Ask your health care provider for help quitting smokeless tobacco. This may involve treatment. Find out what treatment options are covered by your health insurance.  · Keep track of how many days have passed since you quit. Remembering how long and hard you have worked to quit can help you avoid using tobacco again.    Where can I get support?  Ask your health care provider if there is a local support group for quitting smokeless tobacco.  Where can I get more information?  You can learn more about the risks of using smokeless tobacco and the benefits of quitting from these sources:  · National Cancer [place]: www.cancer.gov  · American Cancer Society: www.cancer.org    When should I seek medical care?  Seek medical care if you have:  · White or other discolored patches in your mouth.  · Difficulty swallowing.  · A change in your  voice.  · Unexplained weight loss.  · Stomach pain, nausea, or vomiting.    Summary  · Smokeless tobacco contains at least 28 different chemicals that are known to cause cancer (carcinogen).  · Nicotine is an addictive chemical in smokeless tobacco.  · When you quit using smokeless tobacco, you lower your risk of developing cancer.  This information is not intended to replace advice given to you by your health care provider. Make sure you discuss any questions you have with your health care provider.  Document Released: 05/21/2012 Document Revised: 08/03/2018 Document Reviewed: 07/29/2016  [employer] Interactive Patient Education © 2019 [employer]    Rheumatoid Arthritis  Rheumatoid arthritis (RA) is a long-term (chronic) disease. RA causes inflammation in your joints. Your joints may feel painful, stiff, swollen, warm, or tender. RA may start slowly. Usually, it affects the small joints of the hands and feet. It can also affect other parts of the body, even the heart, eyes, or lungs. Symptoms of RA often come and go. Sometimes, symptoms get worse for a while. These are called flares.  There is no cure for RA, but your doctor will work with you to find the best treatment option for you. This will depend on how the disease is changing in your body.  Follow these instructions at home:  · Take over-the-counter and prescription medicines only as told by your doctor. Your doctor may change (adjust) your medicines every 3 months.  · Start an exercise program as told by your doctor.  · Rest when you have a flare.  · Return to your normal activities as told by your doctor. Ask your doctor what activities are safe for you.  · Keep all follow-up visits as told by your doctor. This is important.  Contact a doctor if:  · You have a flare.  · You have a fever.  · You have problems (side effects) because of your medicines.  Get help right away if:  · You have chest pain.  · You have trouble breathing.  · You have a hot, painful  joint all of a sudden, and it is worse than your usual joint aches.  This information is not intended to replace advice given to you by your health care provider. Make sure you discuss any questions you have with your health care provider.  Document Released: 03/11/2013 Document Revised: 05/25/2017 Document Reviewed: 09/29/2016  [employer] Interactive Patient Education © 2019 [employer]    Asthma, Adult  Asthma is a long-term (chronic) condition in which the airways get tight and narrow. The airways are the breathing passages that lead from the nose and mouth down into the lungs. A person with asthma will have times when symptoms get worse. These are called asthma attacks. They can cause coughing, whistling sounds when you breathe (wheezing), shortness of breath, and chest pain. They can make it hard to breathe. There is no cure for asthma, but medicines and lifestyle changes can help control it.  There are many things that can bring on an asthma attack or make asthma symptoms worse (triggers). Common triggers include:  · Mold.  · Dust.  · Cigarette smoke.  · Cockroaches.  · Things that can cause allergy symptoms (allergens). These include animal skin flakes (dander) and pollen from trees or grass.  · Things that pollute the air. These may include household , wood smoke, smog, or chemical odors.  · Cold air, weather changes, and wind.  · Crying or laughing hard.  · Stress.  · Certain medicines or drugs.  · Certain foods such as dried fruit, potato chips, and grape juice.  · Infections, such as a cold or the flu.  · Certain medical conditions or diseases.  · Exercise or tiring activities.    Asthma may be treated with medicines and by staying away from the things that cause asthma attacks. Types of medicines may include:  · Controller medicines. These help prevent asthma symptoms. They are usually taken every day.  · Fast-acting reliever or rescue medicines. These quickly relieve asthma symptoms. They are  used as needed and provide short-term relief.  · Allergy medicines if your attacks are brought on by allergens.  · Medicines to help control the body's defense (immune) system.    Follow these instructions at home:  Avoiding triggers in your home  · Change your heating and air conditioning filter often.  · Limit your use of fireplaces and wood stoves.  · Get rid of pests (such as roaches and mice) and their droppings.  · Throw away plants if you see mold on them.  · Clean your floors. Dust regularly. Use cleaning products that do not smell.  · Have someone vacuum when you are not home. Use a vacuum  with a HEPA filter if possible.  · Replace carpet with wood, tile, or vinyl mile. Carpet can trap animal skin flakes and dust.  · Use allergy-proof pillows, mattress covers, and box spring covers.  · Wash bed sheets and blankets every week in hot water. Dry them in a dryer.  · Keep your bedroom free of any triggers.   · Avoid pets and keep windows closed when things that cause allergy symptoms are in the air.  · Use blankets that are made of polyester or cotton.  · Clean bathrooms and jonathan with bleach. If possible, have someone repaint the walls in these rooms with mold-resistant paint. Keep out of the rooms that are being cleaned and painted.  · Wash your hands often with soap and water. If soap and water are not available, use hand .  · Do not allow anyone to smoke in your home.  General instructions  · Take over-the-counter and prescription medicines only as told by your doctor.  ? Talk with your doctor if you have questions about how or when to take your medicines.  ? Make note if you need to use your medicines more often than usual.  · Do not use any products that contain nicotine or tobacco, such as cigarettes and e-cigarettes. If you need help quitting, ask your doctor.  · Stay away from secondhand smoke.  · Avoid doing things outdoors when allergen counts are high and when air quality is  low.  · Wear a ski mask when doing outdoor activities in the winter. The mask should cover your nose and mouth. Exercise indoors on cold days if you can.  · Warm up before you exercise. Take time to cool down after exercise.  · Use a peak flow meter as told by your doctor. A peak flow meter is a tool that measures how well the lungs are working.  · Keep track of the peak flow meter's readings. Write them down.  · Follow your asthma action plan. This is a written plan for taking care of your asthma and treating your attacks.  · Make sure you get all the shots (vaccines) that your doctor recommends. Ask your doctor about a flu shot and a pneumonia shot.  · Keep all follow-up visits as told by your doctor. This is important.  Contact a doctor if:  · You have wheezing, shortness of breath, or a cough even while taking medicine to prevent attacks.  · The mucus you cough up (sputum) is thicker than usual.  · The mucus you cough up changes from clear or white to yellow, green, gray, or bloody.  · You have problems from the medicine you are taking, such as:  ? A rash.  ? Itching.  ? Swelling.  ? Trouble breathing.  · You need reliever medicines more than 2-3 times a week.  · Your peak flow reading is still at 50-79% of your personal best after following the action plan for 1 hour.  · You have a fever.  Get help right away if:  · You seem to be worse and are not responding to medicine during an asthma attack.  · You are short of breath even at rest.  · You get short of breath when doing very little activity.  · You have trouble eating, drinking, or talking.  · You have chest pain or tightness.  · You have a fast heartbeat.  · Your lips or fingernails start to turn blue.  · You are light-headed or dizzy, or you faint.  · Your peak flow is less than 50% of your personal best.  · You feel too tired to breathe normally.  Summary  · Asthma is a long-term (chronic) condition in which the airways get tight and narrow. An asthma  attack can make it hard to breathe.  · Asthma cannot be cured, but medicines and lifestyle changes can help control it.  · Make sure you understand how to avoid triggers and how and when to use your medicines.  This information is not intended to replace advice given to you by your health care provider. Make sure you discuss any questions you have with your health care provider.  Document Released: 06/05/2009 Document Revised: 01/22/2018 Document Reviewed: 01/22/2018  [employer] Interactive Patient Education © 2019 [employer]    Type 2 Diabetes Mellitus, Self Care, Adult  When you have type 2 diabetes (type 2 diabetes mellitus), you must make sure your blood sugar (glucose) stays in a healthy range. You can do this with:  · Nutrition.  · Exercise.  · Lifestyle changes.  · Medicines or insulin, if needed.  · Support from your doctors and others.    How to stay aware of blood sugar  · Check your blood sugar level every day, as often as told.  · Have your A1c (hemoglobin A1c) level checked two or more times a year. Have it checked more often if your doctor tells you to.  Your doctor will set personal treatment goals for you. Generally, you should have these blood sugar levels:  · Before meals (preprandial):  mg/dL (4.4-7.2 mmol/L).  · After meals (postprandial): below 180 mg/dL (10 mmol/L).  · A1c level: less than 7%.    How to manage high and low blood sugar  Signs of high blood sugar  High blood sugar is called hyperglycemia. Know the signs of high blood sugar. Signs may include:  · Feeling:  ? Thirsty.  ? Hungry.  ? Very tired.  · Needing to pee (urinate) more than usual.  · Blurry vision.    Signs of low blood sugar  Low blood sugar is called hypoglycemia. This is when blood sugar is at or below 70 mg/dL (3.9 mmol/L). Signs may include:  · Feeling:  ? Hungry.  ? Worried or nervous (anxious).  ? Sweaty and clammy.  ? Confused.  ? Dizzy.  ? Sleepy.  ? Sick to your stomach (nauseous).  · Having:  ? A fast  heartbeat.  ? A headache.  ? A change in your vision.  ? Jerky movements that you cannot control (seizure).  ? Tingling or no feeling (numbness) around your mouth, lips, or tongue.  · Having trouble with:  ? Moving (coordination).  ? Sleeping.  ? Passing out (fainting).  ? Getting upset easily (irritability).    Treating low blood sugar  To treat low blood sugar, eat or drink something sugary right away. If you can think clearly and swallow safely, follow the 15:15 rule:  · Take 15 grams of a fast-acting carb (carbohydrate). Some fast-acting carbs are:  ? 1 tube of glucose gel.  ? 3 sugar tablets (glucose pills).  ? 6-8 pieces of hard candy.  ? 4 oz (120 mL) of fruit juice.  ? 4 oz (120 mL) regular (not diet) soda.  · Check your blood sugar 15 minutes after you take the carb.  · If your blood sugar is still at or below 70 mg/dL (3.9 mmol/L), take 15 grams of a carb again.  · If your blood sugar does not go above 70 mg/dL (3.9 mmol/L) after 3 tries, get help right away.  · After your blood sugar goes back to normal, eat a meal or a snack within 1 hour.    Treating very low blood sugar  If your blood sugar is at or below 54 mg/dL (3 mmol/L), you have very low blood sugar (severe hypoglycemia). This is an emergency. Do not wait to see if the symptoms will go away. Get medical help right away. Call your local emergency services (911 in the [place]  If you have very low blood sugar and you cannot eat or drink, you may need a glucagon shot (injection). A family member or friend should learn how to check your blood sugar and how to give you a glucagon shot. Ask your doctor if you need to have a glucagon shot kit at home.  Follow these instructions at home:  Medicine  · Take insulin and diabetes medicines as told.  · If your doctor says you should take more or less insulin and medicines, do this exactly as told.  · Do not run out of insulin or medicines.  Having diabetes can raise your risk for other long-term conditions.  These include heart disease and kidney disease. Your doctor may prescribe medicines to help you not have these problems.  Food  · Make healthy food choices. These include:  ? Chicken, fish, egg whites, and beans.  ? Oats, whole wheat, bulgur, brown rice, quinoa, and millet.  ? Fresh fruits and vegetables.  ? Low-fat dairy products.  ? Nuts, avocado, olive oil, and canola oil.  · Meet with a  (dietitian). He or she can help you make an eating plan that is right for you.  · Follow instructions from your doctor about what you cannot eat or drink.  · Drink enough fluid to keep your pee (urine) pale yellow.  · Keep track of carbs that you eat. Do this by reading food labels and learning food serving sizes.  · Follow your sick day plan when you cannot eat or drink normally. Make this plan with your doctor so it is ready to use.  Activity  · Exercise 3 or more times a week.  · Do not go more than 2 days without exercising.  · Talk with your doctor before you start a new exercise. Your doctor may need to tell you to change:  ? How much insulin or medicines you take.  ? How much food you eat.  Lifestyle  · Do not use any tobacco products. These include cigarettes, chewing tobacco, and e-cigarettes. If you need help quitting, ask your doctor.  · Ask your doctor how much alcohol is safe for you.  · Learn to deal with stress. If you need help with this, ask your doctor.  Body care  · Stay up to date with your shots (immunizations).  · Have your eyes and feet checked by a doctor as often as told.  · Check your skin and feet every day. Check for cuts, bruises, redness, blisters, or sores.  · Brush your teeth and gums two times a day. Floss one or more times a day.  · Go to the dentist one or more times every 6 months.  · Stay at a healthy weight.  General instructions  · Take over-the-counter and prescription medicines only as told by your doctor.  · Share your diabetes care plan with:  ? Your work or  school.  ? People you live with.  · Carry a card or wear jewelry that says you have diabetes.  · Keep all follow-up visits as told by your doctor. This is important.  Questions to ask your doctor  · Do I need to meet with a diabetes educator?  · Where can I find a support group for people with diabetes?  Where to find more information  To learn more about diabetes, visit:  · American Diabetes Association: www.diabetes.org  · American Association of Diabetes Educators: www.diabeteseducator.org    Summary  · When you have type 2 diabetes, you must make sure your blood sugar (glucose) stays in a healthy range.  · Check your blood sugar every day, as often as told.  · Having diabetes can raise your risk for other conditions. Your doctor may prescribe medicines to help you not have these problems.  · Keep all follow-up visits as told by your doctor. This is important.  This information is not intended to replace advice given to you by your health care provider. Make sure you discuss any questions you have with your health care provider.  Document Released: 04/10/2017 Document Revised: 07/20/2018 Document Reviewed: 01/20/2017  [employer] Interactive Patient Education © 2019 [employer]

## 2019-08-05 PROCEDURE — 99284 EMERGENCY DEPT VISIT MOD MDM: CPT

## 2019-08-06 ENCOUNTER — HOSPITAL ENCOUNTER (EMERGENCY)
Facility: HOSPITAL | Age: 54
Discharge: HOME | End: 2019-08-06
Admitting: EMERGENCY MEDICINE

## 2019-08-06 DIAGNOSIS — S61.217A LACERATION OF LEFT LITTLE FINGER WITHOUT FOREIGN BODY WITHOUT DAMAGE TO NAIL, INITIAL ENCOUNTER: Primary | ICD-10-CM

## 2019-08-06 PROCEDURE — 90471 IMMUNIZATION ADMIN: CPT | Performed by: NURSE PRACTITIONER

## 2019-08-06 PROCEDURE — 90715 TDAP VACCINE 7 YRS/> IM: CPT | Performed by: NURSE PRACTITIONER

## 2019-08-06 PROCEDURE — 25010000002 TDAP 5-2.5-18.5 LF-MCG/0.5 SUSPENSION: Performed by: NURSE PRACTITIONER

## 2019-08-06 RX ORDER — AMOXICILLIN AND CLAVULANATE POTASSIUM 875; 125 MG/1; MG/1
1 TABLET, FILM COATED ORAL 2 TIMES DAILY
Qty: 20 TABLET | Refills: 0 | Status: SHIPPED | OUTPATIENT
Start: 2019-08-06 | End: 2019-08-16

## 2019-08-06 RX ORDER — AMOXICILLIN AND CLAVULANATE POTASSIUM 875; 125 MG/1; MG/1
1 TABLET, FILM COATED ORAL 2 TIMES DAILY
Qty: 20 TABLET | Refills: 0 | Status: SHIPPED | OUTPATIENT
Start: 2019-08-06 | End: 2019-08-06 | Stop reason: SDUPTHER

## 2019-08-06 RX ORDER — CYCLOBENZAPRINE HCL 10 MG
TABLET ORAL
Qty: 90 TABLET | Refills: 1 | Status: SHIPPED | OUTPATIENT
Start: 2019-08-06 | End: 2019-11-04 | Stop reason: SDUPTHER

## 2019-08-06 RX ADMIN — TETANUS TOXOID, REDUCED DIPHTHERIA TOXOID AND ACELLULAR PERTUSSIS VACCINE, ADSORBED 0.5 ML: 5; 2.5; 8; 8; 2.5 SUSPENSION INTRAMUSCULAR at 01:41

## 2019-08-06 NOTE — ED PROVIDER NOTES
Subjective     Laceration   Location:  Finger  Finger laceration location:  L little finger  Length:  0.25  Depth:  Cutaneous  Quality: avulsion    Laceration mechanism:  Metal edge  Pain details:     Quality:  Aching    Severity:  No pain    Progression:  Unchanged  Foreign body present:  No foreign bodies  Relieved by:  None tried  Worsened by:  Nothing  Ineffective treatments:  None tried  Tetanus status:  Unknown  Associated symptoms: no fever        Review of Systems   Constitutional: Negative.  Negative for fever.   HENT: Negative.    Respiratory: Negative.    Cardiovascular: Negative.  Negative for chest pain.   Gastrointestinal: Negative.  Negative for abdominal pain.   Endocrine: Negative.    Genitourinary: Negative.  Negative for dysuria.   Skin: Negative.    Neurological: Negative.    Psychiatric/Behavioral: Negative.    All other systems reviewed and are negative.      Past Medical History:   Diagnosis Date   • Abnormal blood chemistry    • Allergic    • Cervicalgia    • DM type 2 (diabetes mellitus, type 2) (CMS/HCC)    • Elevated cholesterol    • GERD (gastroesophageal reflux disease)    • Helicobacter pylori (H. pylori) infection    • Hyperlipidemia    • Hypertension    • Insomnia    • Muscle spasm    • Obesity    • Osteopenia    • Palpitations    • RA (rheumatoid arthritis) (CMS/HCC)    • Stroke (CMS/HCC)    • Tension headache    • Vitamin D deficiency        No Known Allergies    Past Surgical History:   Procedure Laterality Date   • ABDOMINAL SURGERY     • COLON RESECTION N/A 2/22/2018    Procedure: COLON RESECTION SIGMOID;  Surgeon: Ej Tinoco MD;  Location: [facility];  Service:    • COLONOSCOPY N/A 2/7/2018    Procedure: COLONOSCOPY CPT CODE: 20801;  Surgeon: Teodoro Garg III, MD;  Location: [facility];  Service:    • COLONOSCOPY N/A 3/7/2019    Procedure: COLONOSCOPY FOR SCREENING CPT CODE: ;  Surgeon: Ej Tinoco MD;  Location: [facility];  Service: Gastroenterology   •  ENDOSCOPY N/A 2/7/2018    Procedure: ESOPHAGOGASTRODUODENOSCOPY WITH DILATATION CPT CODE: 42755;  Surgeon: Teodoro Garg III, MD;  Location: [facility];  Service:        Family History   Problem Relation Age of Onset   • Diabetes Mother    • Hypertension Mother    • Hypertension Father    • Hyperlipidemia Father    • Diabetes Sister    • Hyperlipidemia Sister    • Hypertension Sister    • Hypertension Brother    • Hyperlipidemia Brother        Social History     Socioeconomic History   • Marital status: Legally      Spouse name: tess   • Number of children: 3   • Years of education: 9   • Highest education level: Not on file   Occupational History   • Occupation: disabled   Social Needs   • Financial resource strain: Very hard   • Food insecurity:     Worry: Never true     Inability: Never true   • Transportation needs:     Medical: No     Non-medical: No   Tobacco Use   • Smoking status: Never Smoker   • Smokeless tobacco: Current User     Types: Snuff   • Tobacco comment: Encouraged tobacco cessation   Substance and Sexual Activity   • Alcohol use: No   • Drug use: No   • Sexual activity: Yes     Partners: Female   Lifestyle   • Physical activity:     Days per week: 0 days     Minutes per session: 0 min   • Stress: Very much           Objective   Physical Exam   Constitutional: He is oriented to person, place, and time. He appears well-developed and well-nourished. No distress.   HENT:   Head: Normocephalic and atraumatic.   Right Ear: External ear normal.   Left Ear: External ear normal.   Nose: Nose normal.   Eyes: Conjunctivae and EOM are normal. Pupils are equal, round, and reactive to light.   Neck: Normal range of motion. Neck supple. No JVD present. No tracheal deviation present.   Cardiovascular: Normal rate, regular rhythm and normal heart sounds.   No murmur heard.  Pulmonary/Chest: Effort normal and breath sounds normal. No respiratory distress. He has no wheezes.   Abdominal: Soft. Bowel  sounds are normal. There is no tenderness.   Musculoskeletal: Normal range of motion. He exhibits no edema or deformity.   Neurological: He is alert and oriented to person, place, and time. No cranial nerve deficit.   Skin: Skin is warm and dry. Laceration noted. No rash noted. He is not diaphoretic. No erythema. No pallor.   Psychiatric: He has a normal mood and affect. His behavior is normal. Thought content normal.   Nursing note and vitals reviewed.      Laceration Repair  Date/Time: 8/17/2019 10:04 PM  Performed by: Orquidea Villalobos APRN  Authorized by: Orquidea Villalobos APRN     Consent:     Consent obtained:  Verbal    Consent given by:  Patient    Risks discussed:  Infection, poor cosmetic result and pain    Alternatives discussed:  No treatment  Anesthesia (see MAR for exact dosages):     Anesthesia method:  None  Laceration details:     Location:  Finger    Finger location:  L small finger    Length (cm):  0.3  Repair type:     Repair type:  Simple  Treatment:     Area cleansed with:  Hibiclens    Amount of cleaning:  Standard  Skin repair:     Repair method:  Tissue adhesive  Approximation:     Approximation:  Close    Vermilion border: well-aligned    Post-procedure details:     Dressing:  Bulky dressing               ED Course                  MDM      Final diagnoses:   Laceration of left little finger without foreign body without damage to nail, initial encounter            Orquidea Villalobos APRN  08/06/19 0142       Orquidea Villalobos APRN  08/17/19 [id number]

## 2019-08-06 NOTE — ED NOTES
Pt wound soaked in and cleaned with Hibiclens and warm water, pt tolerated well     Daisy Trevino  08/06/19 0137

## 2019-08-08 VITALS
SYSTOLIC BLOOD PRESSURE: 136 MMHG | BODY MASS INDEX: 31.25 KG/M2 | WEIGHT: 211 LBS | OXYGEN SATURATION: 98 % | TEMPERATURE: 98.3 F | RESPIRATION RATE: 18 BRPM | DIASTOLIC BLOOD PRESSURE: 80 MMHG | HEIGHT: 69 IN | HEART RATE: 85 BPM

## 2019-08-19 RX ORDER — ALBUTEROL SULFATE 90 UG/1
AEROSOL, METERED RESPIRATORY (INHALATION)
Qty: 18 G | Refills: 5 | Status: SHIPPED | OUTPATIENT
Start: 2019-08-19 | End: 2020-02-05 | Stop reason: SDUPTHER

## 2019-08-23 DIAGNOSIS — I10 BENIGN ESSENTIAL HYPERTENSION: Chronic | ICD-10-CM

## 2019-08-23 RX ORDER — METOPROLOL SUCCINATE 50 MG/1
50 TABLET, EXTENDED RELEASE ORAL DAILY
Qty: 30 TABLET | Refills: 2 | Status: SHIPPED | OUTPATIENT
Start: 2019-08-23 | End: 2019-11-04 | Stop reason: SDUPTHER

## 2019-10-07 RX ORDER — LANCETS 33 GAUGE
EACH MISCELLANEOUS
Qty: 100 EACH | Refills: 1 | Status: SHIPPED | OUTPATIENT
Start: 2019-10-07 | End: 2019-12-30

## 2019-10-17 ENCOUNTER — OFFICE VISIT (OUTPATIENT)
Dept: FAMILY MEDICINE CLINIC | Facility: CLINIC | Age: 54
End: 2019-10-17

## 2019-10-17 DIAGNOSIS — J30.9 CHRONIC ALLERGIC RHINITIS: Chronic | ICD-10-CM

## 2019-10-17 DIAGNOSIS — I10 BENIGN ESSENTIAL HYPERTENSION: Chronic | ICD-10-CM

## 2019-10-17 DIAGNOSIS — E11.40 CONTROLLED TYPE 2 DIABETES WITH NEUROPATHY (HCC): Primary | Chronic | ICD-10-CM

## 2019-10-17 DIAGNOSIS — E78.2 MIXED HYPERLIPIDEMIA: Chronic | ICD-10-CM

## 2019-10-17 DIAGNOSIS — E66.9 CLASS 1 OBESITY WITH SERIOUS COMORBIDITY AND BODY MASS INDEX (BMI) OF 32.0 TO 32.9 IN ADULT, UNSPECIFIED OBESITY TYPE: ICD-10-CM

## 2019-10-17 DIAGNOSIS — R06.00 DYSPNEA, UNSPECIFIED TYPE: ICD-10-CM

## 2019-10-17 DIAGNOSIS — M05.79 RHEUMATOID ARTHRITIS INVOLVING MULTIPLE SITES WITH POSITIVE RHEUMATOID FACTOR (HCC): Chronic | ICD-10-CM

## 2019-10-17 PROCEDURE — 99214 OFFICE O/P EST MOD 30 MIN: CPT | Performed by: NURSE PRACTITIONER

## 2019-10-17 RX ORDER — FLUTICASONE PROPIONATE 50 MCG
1 SPRAY, SUSPENSION (ML) NASAL 2 TIMES DAILY
Qty: 1 BOTTLE | Refills: 5 | Status: SHIPPED | OUTPATIENT
Start: 2019-10-17 | End: 2020-02-05 | Stop reason: SDUPTHER

## 2019-10-17 NOTE — PROGRESS NOTES
"  History of Present Illness   Raimundo is a 55 yo male who presents to the clinic pertaining to his DM, type 2, HTN, GERD, Rheumatoid Arthritis and Hyperlipidemia. In addition, Raimundo has Insomnia and Vitamin deficiency. He does report shortness of breath. As a child he was diagnosed with asthma which he \"outgrew\". His shortness of breath is intermittent and is noted with exertion at times especially when outside but does happen at rest. He is currently using his Inhaler daily.  He did not have his labs prior to this visit.     Hypertension   The current episode started more than 1 year ago.His BP is adequately controlled. His hypertensive medication is Metoprolol  50 mg which has improved his blood pressure. Pertinent negatives include no anxiety, blurred vision, chest pain, headaches, palpitations, peripheral edema, or sweats.      Lab Results   Component Value Date    CREATININE 0.88 05/14/2019     Diabetes   He has type 2 diabetes mellitus. His disease course has been improving. Pertinent negatives for diabetes include no blurred vision, no chest pain, and no polyphagia. He does share he has noticed some intermittent neuropathy in his left lower extremity.  Risk factors for coronary artery disease include dyslipidemia, hypertension, male sex, sedentary lifestyle and tobacco exposure. Current diabetic treatment includes oral agent (monotherapy). He is compliant with medication regimen most of the time. He has had a previous visit with a dietitian. An ACE inhibitor/angiotensin II receptor blocker is not being taken due to S/e. Eye exam is current..     Lab Results   Component Value Date    HGBA1C 5.84 (H) 05/14/2019     Hyperlipidemia   This is a chronic problem. The current episode started more than 1 year ago. The problem is controlled. Factors aggravating his hyperlipidemia include fatty foods. Current antihyperlipidemic treatment includes statins. The current treatment provides significant improvement of " "lipids. Compliance problems include adherence to diet and adherence to exercise.  Risk factors for coronary artery disease include diabetes mellitus, dyslipidemia, hypertension, male sex and obesity.   Lab Results   Component Value Date    CHOL 105 05/14/2019    CHLPL 86 05/02/2016    TRIG 162 (H) 05/14/2019    HDL 28 (L) 05/14/2019    LDL 45 05/14/2019       Vitals:    10/17/19 1403   BP: 122/80   BP Location: Right arm   Patient Position: Sitting   Cuff Size: Adult   Pulse: 92   Resp: 14   Temp: 97.6 °F (36.4 °C)   TempSrc: Temporal   SpO2: 93%   Weight: 100 kg (220 lb 12.8 oz)   Height: 175.3 cm (69\")      The following portions of the patient's history were reviewed and updated as appropriate: allergies, current medications, past family history, past medical history, past social history, past surgical history and problem list.    Review of Systems   Constitutional: Negative for activity change, appetite change, chills, fatigue, fever and unexpected weight change.   HENT: Negative.    Eyes: Negative for visual disturbance.   Respiratory: Positive for shortness of breath and wheezing. Negative for cough and chest tightness.    Cardiovascular: Negative for chest pain, palpitations and leg swelling.   Gastrointestinal: Negative for abdominal pain, constipation, diarrhea, nausea and vomiting.   Endocrine: Negative for cold intolerance, heat intolerance, polydipsia, polyphagia and polyuria.   Skin: Negative for color change and rash.   Neurological: Negative for dizziness, tremors, speech difficulty, weakness, light-headedness and headaches.   Hematological: Negative for adenopathy.   Psychiatric/Behavioral: Negative for confusion, decreased concentration and suicidal ideas. The patient is not nervous/anxious.    All other systems reviewed and are negative.    Physical Exam   Constitutional: He is oriented to person, place, and time. He appears well-developed and well-nourished. No distress.   HENT:   Head: " Normocephalic.   Nose: Nose normal.   Mouth/Throat: Oropharynx is clear and moist. No oropharyngeal exudate.   Eyes: Conjunctivae and EOM are normal. Pupils are equal, round, and reactive to light. Right eye exhibits no discharge. Left eye exhibits no discharge. No scleral icterus.   Neck: Normal range of motion. Neck supple. No JVD present. No thyromegaly present.   Cardiovascular: Normal rate, regular rhythm and normal heart sounds. Exam reveals no friction rub.   No murmur heard.  Pulmonary/Chest: Effort normal. No respiratory distress. He has decreased breath sounds. He has no wheezes. He has no rhonchi. He has no rales.   Abdominal: Soft. Bowel sounds are normal. He exhibits no distension. There is no tenderness. There is no rebound and no guarding.   Musculoskeletal: He exhibits no edema.   Lymphadenopathy:     He has no cervical adenopathy.   Neurological: He is alert and oriented to person, place, and time.   Skin: Skin is warm and dry. Capillary refill takes less than 2 seconds. No rash noted. No erythema.   Psychiatric: He has a normal mood and affect. His behavior is normal. Judgment and thought content normal.   Vitals reviewed.    Assessment/Plan   Patient's Body mass index is 32.61 kg/m². BMI is above normal parameters. Recommendations include: educational material, exercise counseling and nutrition counseling.   (Normal BMI:  18.5-24.9, OW 25-29.9, Obesity 30 or greater)      Problems Addressed this Visit        Cardiovascular and Mediastinum    Benign essential hypertension    Hyperlipidemia       Respiratory    Chronic allergic rhinitis    Relevant Medications    fluticasone (FLONASE) 50 MCG/ACT nasal spray       Digestive    Class 1 obesity with serious comorbidity and body mass index (BMI) of 32.0 to 32.9 in adult       Endocrine    Controlled type 2 diabetes with neuropathy (CMS/HCC) - Primary       Musculoskeletal and Integument    Rheumatoid arthritis with positive rheumatoid factor (CMS/[facility])       Other Visit Diagnoses     Dyspnea, unspecified type        Pending Pulmonology evaluation. Will order  an ECHO and Chest Xray    Relevant Orders    Adult Transthoracic Echo Complete W/ Cont if Necessary Per Protocol    XR Chest 2 View      Findings and recommendations discussed with Raimundo. He will be scheduled for routine labs which have been previously ordered. He is to continue Metformin and lifestyle modifications for his diabetes. Continue Atorvastatin   and Metoprolol for cardiovascular issues. Discussed referral to Rheumatology again in regards Rheumatoid Arthritis. He will consider. Continue Singulair and Flonase for Allergic Rhinitis and Asthma. Albuterol Inhaler if needed.Lifestyle modifications including nutrition and exercise recommendations reinforced. He will f/u with me in January with routine labs prior; sooner if problems/concerns occur.     This document has been electronically signed by ABDIRAHMAN Braswell, RICCO-BC, HÉCTOR

## 2019-10-17 NOTE — PATIENT INSTRUCTIONS
Rheumatoid Arthritis    Rheumatoid arthritis (RA) is a long-term (chronic) disease. RA causes inflammation in your joints. Your joints may feel painful, stiff, swollen, warm, or tender. RA may start slowly. Usually, it affects the small joints of the hands and feet. It can also affect other parts of the body, even the heart, eyes, or lungs. Symptoms of RA often come and go. Sometimes, symptoms get worse for a while. These are called flares.  There is no cure for RA, but your doctor will work with you to find the best treatment option for you. This will depend on how the disease is changing in your body.  Follow these instructions at home:  · Take over-the-counter and prescription medicines only as told by your doctor. Your doctor may change (adjust) your medicines every 3 months.  · Start an exercise program as told by your doctor.  · Rest when you have a flare.  · Return to your normal activities as told by your doctor. Ask your doctor what activities are safe for you.  · Keep all follow-up visits as told by your doctor. This is important.  Contact a doctor if:  · You have a flare.  · You have a fever.  · You have problems (side effects) because of your medicines.  Get help right away if:  · You have chest pain.  · You have trouble breathing.  · You have a hot, painful joint all of a sudden, and it is worse than your usual joint aches.  This information is not intended to replace advice given to you by your health care provider. Make sure you discuss any questions you have with your health care provider.  Document Released: 03/11/2013 Document Revised: 05/25/2017 Document Reviewed: 09/29/2016  [employer] Interactive Patient Education © 2019 [employer]  Type 2 Diabetes Mellitus, Self Care, Adult  When you have type 2 diabetes (type 2 diabetes mellitus), you must make sure your blood sugar (glucose) stays in a healthy range. You can do this with:  · Nutrition.  · Exercise.  · Lifestyle changes.  · Medicines or  insulin, if needed.  · Support from your doctors and others.  How to stay aware of blood sugar    · Check your blood sugar level every day, as often as told.  · Have your A1c (hemoglobin A1c) level checked two or more times a year. Have it checked more often if your doctor tells you to.  Your doctor will set personal treatment goals for you. Generally, you should have these blood sugar levels:  · Before meals (preprandial):  mg/dL (4.4-7.2 mmol/L).  · After meals (postprandial): below 180 mg/dL (10 mmol/L).  · A1c level: less than 7%.  How to manage high and low blood sugar  Signs of high blood sugar  High blood sugar is called hyperglycemia. Know the signs of high blood sugar. Signs may include:  · Feeling:  ? Thirsty.  ? Hungry.  ? Very tired.  · Needing to pee (urinate) more than usual.  · Blurry vision.  Signs of low blood sugar  Low blood sugar is called hypoglycemia. This is when blood sugar is at or below 70 mg/dL (3.9 mmol/L). Signs may include:  · Feeling:  ? Hungry.  ? Worried or nervous (anxious).  ? Sweaty and clammy.  ? Confused.  ? Dizzy.  ? Sleepy.  ? Sick to your stomach (nauseous).  · Having:  ? A fast heartbeat.  ? A headache.  ? A change in your vision.  ? Jerky movements that you cannot control (seizure).  ? Tingling or no feeling (numbness) around your mouth, lips, or tongue.  · Having trouble with:  ? Moving (coordination).  ? Sleeping.  ? Passing out (fainting).  ? Getting upset easily (irritability).  Treating low blood sugar  To treat low blood sugar, eat or drink something sugary right away. If you can think clearly and swallow safely, follow the 15:15 rule:  · Take 15 grams of a fast-acting carb (carbohydrate). Some fast-acting carbs are:  ? 1 tube of glucose gel.  ? 3 sugar tablets (glucose pills).  ? 6-8 pieces of hard candy.  ? 4 oz (120 mL) of fruit juice.  ? 4 oz (120 mL) regular (not diet) soda.  · Check your blood sugar 15 minutes after you take the carb.  · If your blood  sugar is still at or below 70 mg/dL (3.9 mmol/L), take 15 grams of a carb again.  · If your blood sugar does not go above 70 mg/dL (3.9 mmol/L) after 3 tries, get help right away.  · After your blood sugar goes back to normal, eat a meal or a snack within 1 hour.  Treating very low blood sugar  If your blood sugar is at or below 54 mg/dL (3 mmol/L), you have very low blood sugar (severe hypoglycemia). This is an emergency. Do not wait to see if the symptoms will go away. Get medical help right away. Call your local emergency services (911 in the [place]  If you have very low blood sugar and you cannot eat or drink, you may need a glucagon shot (injection). A family member or friend should learn how to check your blood sugar and how to give you a glucagon shot. Ask your doctor if you need to have a glucagon shot kit at home.  Follow these instructions at home:  Medicine  · Take insulin and diabetes medicines as told.  · If your doctor says you should take more or less insulin and medicines, do this exactly as told.  · Do not run out of insulin or medicines.  Having diabetes can raise your risk for other long-term conditions. These include heart disease and kidney disease. Your doctor may prescribe medicines to help you not have these problems.  Food    · Make healthy food choices. These include:  ? Chicken, fish, egg whites, and beans.  ? Oats, whole wheat, bulgur, brown rice, quinoa, and millet.  ? Fresh fruits and vegetables.  ? Low-fat dairy products.  ? Nuts, avocado, olive oil, and canola oil.  · Meet with a  (dietitian). He or she can help you make an eating plan that is right for you.  · Follow instructions from your doctor about what you cannot eat or drink.  · Drink enough fluid to keep your pee (urine) pale yellow.  · Keep track of carbs that you eat. Do this by reading food labels and learning food serving sizes.  · Follow your sick day plan when you cannot eat or drink normally. Make this  plan with your doctor so it is ready to use.  Activity  · Exercise 3 or more times a week.  · Do not go more than 2 days without exercising.  · Talk with your doctor before you start a new exercise. Your doctor may need to tell you to change:  ? How much insulin or medicines you take.  ? How much food you eat.  Lifestyle  · Do not use any tobacco products. These include cigarettes, chewing tobacco, and e-cigarettes. If you need help quitting, ask your doctor.  · Ask your doctor how much alcohol is safe for you.  · Learn to deal with stress. If you need help with this, ask your doctor.  Body care    · Stay up to date with your shots (immunizations).  · Have your eyes and feet checked by a doctor as often as told.  · Check your skin and feet every day. Check for cuts, bruises, redness, blisters, or sores.  · Brush your teeth and gums two times a day. Floss one or more times a day.  · Go to the dentist one or more times every 6 months.  · Stay at a healthy weight.  General instructions  · Take over-the-counter and prescription medicines only as told by your doctor.  · Share your diabetes care plan with:  ? Your work or school.  ? People you live with.  · Carry a card or wear jewelry that says you have diabetes.  · Keep all follow-up visits as told by your doctor. This is important.  Questions to ask your doctor  · Do I need to meet with a diabetes educator?  · Where can I find a support group for people with diabetes?  Where to find more information  To learn more about diabetes, visit:  · American Diabetes Association: www.diabetes.org  · American Association of Diabetes Educators: www.diabeteseducator.org  Summary  · When you have type 2 diabetes, you must make sure your blood sugar (glucose) stays in a healthy range.  · Check your blood sugar every day, as often as told.  · Having diabetes can raise your risk for other conditions. Your doctor may prescribe medicines to help you not have these problems.  · Keep all  follow-up visits as told by your doctor. This is important.  This information is not intended to replace advice given to you by your health care provider. Make sure you discuss any questions you have with your health care provider.  Document Released: 04/10/2017 Document Revised: 07/20/2018 Document Reviewed: 01/20/2017  "[employer] Interactive Patient Education © 2019 [employer]  Smokeless Tobacco Information, Adult  Tobacco use is one of the leading causes of cancer and other chronic health problems. Smokeless tobacco is tobacco that is put directly into the mouth instead of being smoked. It may also be called chewing tobacco or snuff. Smokeless tobacco is made from the leaves of tobacco plants and it comes in several forms:  · Loose, dry leaves, plugs, or twists.  · Moist pouches.  · Dissolving lozenges or strips.  Chewing, sucking, or holding the tobacco in your mouth causes your mouth to make more saliva. The saliva mixes with the tobacco to make “tobacco juice” that is swallowed or spit out.  How can smokeless tobacco affect me?  Using smokeless tobacco:  · Increases your risk of developing cancer. Smokeless tobacco contains at least 28 different types of cancer-causing chemicals (carcinogens).  · Increases your chances of developing other long-term health problems, including high blood pressure, heart disease, stroke, and dental problems.  · Can make you become addicted. Nicotine is one of the chemicals in tobacco. When you chew tobacco, you absorb nicotine from the tobacco juice. This can make you feel more alert than usual.  · Can cause problems with pregnancy. Pregnant women who use smokeless tobacco are more likely to miscarry or deliver a baby too early (premature delivery).  · Can affect the appearance and health of your mouth. Using smokeless tobacco may cause bad breath, yellow-brown teeth, mouth sores, cracking and bleeding lips, gum recession, and lesions on the soft tissues of your mouth  (leukoplakia).  What are the benefits of not using smokeless tobacco?  The benefits of not using smokeless tobacco include:  · A healthy mind because:  ? You avoid addiction.  · A healthy body because:  ? You avoid dental problems.  ? You promote healthy pregnancy.  ? You avoid long-term health problems.  · A healthy wallet because:  ? You avoid costs of buying tobacco.  ? You avoid health care costs in the future.  · A healthy family because:  ? You avoid accidental poisoning of children in your household.  What can happen if I continue to use smokeless tobacco?  If you continue to use smokeless tobacco, you will increase your risk for developing certain cancers. These include:  · Tongue.  · Lips, mouth, and gums.  · Throat (esophagus) and voice box (larynx).  · Stomach.  · Pancreas.  · Bladder.  · Colon.  Long-term use of smokeless tobacco can also lead to:  · High blood pressure, heart disease, and stroke.  · Gum disease, gum recession, and bone loss around the teeth.  · Tooth decay.  How do I quit using smokeless tobacco?  Quitting the use of smokeless tobacco can be hard, but it can be done. Follow these steps:  · Pick a date to quit. Set a date within the next two weeks. This gives you time to prepare.  · Write down the reasons why you are quitting. Keep this list in places where you will see it often, such as on your bathroom mirror or in your car or wallet.  · Identify the people, places, things, and activities that make you want to use tobacco (triggers) and avoid them.  · Get rid of any tobacco you have and remove any tobacco smells. To do this:  ? Throw away all containers of tobacco at home, at work, and in your car.  ? Throw away any other items that you use regularly when you chew tobacco.  ? Clean your car and make sure to remove all tobacco-related items.  ? Clean your home, including curtains and carpets.  · Tell your family, friends, and coworkers that you are quitting. This can make quitting  easier.  · Ask your health care provider for help quitting smokeless tobacco. This may involve treatment. Find out what treatment options are covered by your health insurance.  · Keep track of how many days have passed since you quit. Remembering how long and hard you have worked to quit can help you avoid using tobacco again.  Where can I get support?  Ask your health care provider if there is a local support group for quitting smokeless tobacco.  Where can I get more information?  You can learn more about the risks of using smokeless tobacco and the benefits of quitting from these sources:  · National Cancer [place]: www.cancer.gov  · American Cancer Society: www.cancer.org  When should I seek medical care?  Seek medical care if you have:  · White or other discolored patches in your mouth.  · Difficulty swallowing.  · A change in your voice.  · Unexplained weight loss.  · Stomach pain, nausea, or vomiting.  Summary  · Smokeless tobacco contains at least 28 different chemicals that are known to cause cancer (carcinogen).  · Nicotine is an addictive chemical in smokeless tobacco.  · When you quit using smokeless tobacco, you lower your risk of developing cancer.  This information is not intended to replace advice given to you by your health care provider. Make sure you discuss any questions you have with your health care provider.  Document Released: 05/21/2012 Document Revised: 08/03/2018 Document Reviewed: 07/29/2016  [employer] Interactive Patient Education © 2019 [employer]

## 2019-10-20 VITALS
OXYGEN SATURATION: 93 % | BODY MASS INDEX: 32.7 KG/M2 | TEMPERATURE: 97.6 F | WEIGHT: 220.8 LBS | RESPIRATION RATE: 14 BRPM | DIASTOLIC BLOOD PRESSURE: 80 MMHG | HEART RATE: 92 BPM | SYSTOLIC BLOOD PRESSURE: 122 MMHG | HEIGHT: 69 IN

## 2019-10-20 PROBLEM — E66.811 CLASS 1 OBESITY WITH SERIOUS COMORBIDITY AND BODY MASS INDEX (BMI) OF 32.0 TO 32.9 IN ADULT: Status: ACTIVE | Noted: 2018-02-12

## 2019-10-21 ENCOUNTER — TELEPHONE (OUTPATIENT)
Dept: FAMILY MEDICINE CLINIC | Facility: CLINIC | Age: 54
End: 2019-10-21

## 2019-10-21 NOTE — TELEPHONE ENCOUNTER
----- Message from ABDIRAHMAN Coughlin sent at 10/20/2019  4:55 PM EDT -----  Regarding: Needs a lab appt soon and then one scheduled prior to his Jan appt  Would schedule him for a fasting lab appt which he did not have prior to his visit with me and then another prior to his January appt..Please

## 2019-10-28 ENCOUNTER — OFFICE VISIT (OUTPATIENT)
Dept: PULMONOLOGY | Facility: CLINIC | Age: 54
End: 2019-10-28

## 2019-10-28 VITALS
DIASTOLIC BLOOD PRESSURE: 80 MMHG | HEART RATE: 94 BPM | BODY MASS INDEX: 32.58 KG/M2 | HEIGHT: 69 IN | SYSTOLIC BLOOD PRESSURE: 128 MMHG | OXYGEN SATURATION: 97 % | WEIGHT: 220 LBS | TEMPERATURE: 98 F

## 2019-10-28 DIAGNOSIS — R06.02 SHORTNESS OF BREATH: Primary | ICD-10-CM

## 2019-10-28 DIAGNOSIS — E66.09 CLASS 1 OBESITY DUE TO EXCESS CALORIES WITH SERIOUS COMORBIDITY AND BODY MASS INDEX (BMI) OF 32.0 TO 32.9 IN ADULT: ICD-10-CM

## 2019-10-28 PROCEDURE — 99214 OFFICE O/P EST MOD 30 MIN: CPT | Performed by: NURSE PRACTITIONER

## 2019-10-28 NOTE — PROGRESS NOTES
Were you born premature?  No     Any Childhood infections? No       Breathing problems when you were a child? Yes     Any childhood allergies?   No           At what age did you begin smoking? Never     Smoking marijuana? No     Any IV drugs? No     How many packs per day? NA    Lung Function Test? no  Chest X-Ray? yes    CT Chest? no Allergy Test? no    Family hx of Lung disease or Lung Cancer? Yes     If FHx is posivitive for lung cancer, what is the relationship of the family member? Mom     Any hospitalization in the last year? No     How far can you walk without getting short of breath? Not far     Any coughing? Yes     Any wheezing? Yes     Acid Reflux? Yes     Do you snore? Yes     Daytime Fatigue? Yes     Any pets? Yes    Any pet allergies? No     Occupation?     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 Albuterol PRN    Have you had the Influenza Vaccine? no   Would you like to receive this Vaccine today? no going in the morning    Have you had the Pneumonia Vaccine?  no  Would you like to receive this Vaccine today? no      Subjective    Raimundo Munguia presents for the following Asthma  .    History of Present Illness     Mr. Munguia is a  54 year old male with a medical history significant for GERD, hyperlipidemia, hypertension, obesity, and RA.     He presents today for evaluation of shortness of breath.  He states that his shortness of breath started over the summer.  He states that his shortness of breath is worse with exertion  He also reports a dry cough.  He states that he does take Claritin for allergies.  He also tells me that he had asthma as a child.  He currently does not use any inhalers and has never been a smoker.      Review of Systems   Respiratory: Positive for cough, shortness of breath and wheezing. Negative for chest tightness.    Cardiovascular: Negative for chest pain and palpitations.   Neurological: Negative for  dizziness, light-headedness and headaches.       Active Problems:  Problem List Items Addressed This Visit        Respiratory    Shortness of breath - Primary       Digestive    Class 1 obesity with serious comorbidity and body mass index (BMI) of 32.0 to 32.9 in adult          Past Medical History:  Past Medical History:   Diagnosis Date   • Abnormal blood chemistry    • Allergic    • Cervicalgia    • DM type 2 (diabetes mellitus, type 2) (CMS/[facility]ly [facility])    • Elevated cholesterol    • GERD (gastroesophageal reflux disease)    • Helicobacter pylori (H. pylori) infection    • Hyperlipidemia    • Hypertension    • Insomnia    • Muscle spasm    • Obesity    • Osteopenia    • Palpitations    • RA (rheumatoid arthritis) (CMS/[facility])    • Stroke (CMS/[facility])    • Tension headache    • Vitamin D deficiency        Family History:  Family History   Problem Relation Age of Onset   • Diabetes Mother    • Hypertension Mother    • Hypertension Father    • Hyperlipidemia Father    • Diabetes Sister    • Hyperlipidemia Sister    • Hypertension Sister    • Hypertension Brother    • Hyperlipidemia Brother        Social History:  Social History     Tobacco Use   • Smoking status: Never Smoker   • Smokeless tobacco: Current User     Types: Snuff   • Tobacco comment: Encouraged tobacco cessation   Substance Use Topics   • Alcohol use: No       Current Medications:  Current Outpatient Medications   Medication Sig Dispense Refill   • ALBUTEROL SULFATE  (90 Base) MCG/ACT inhaler INHALE 2 PUFFS BY MOUTH EVERY 4 HOURS AS NEEDED FOR SHORTNESS OF AIR. 18 g 5   • amitriptyline (ELAVIL) 100 MG tablet Take 1 tablet by mouth Every Night. 30 tablet 5   • atorvastatin (LIPITOR) 10 MG tablet Take 1 tablet by mouth every night at bedtime. 30 tablet 5   • cyclobenzaprine (FLEXERIL) 10 MG tablet TAKE ONE TABLET BY MOUTH THREE TIMES A DAY AS NEEDED FOR MUSCLE SPASMS 90 tablet 1   • fluticasone (FLONASE) 50 MCG/ACT nasal spray 1 spray into the nostril(s) as  "directed by provider 2 (Two) Times a Day. 1 bottle 5   • folic acid (FOLVITE) 1 MG tablet Take 1 tablet by mouth Daily. 30 tablet 5   • glucose blood (ONE TOUCH ULTRA TEST) test strip Monitor blood sugars daily for DM, type 2 50 each 6   • glucose blood test strip Test tid 100 each 12   • Lancets (ONETOUCH DELICA PLUS [id number]) misc USE TO TEST BLOOD GLUCOSE DAILY 100 each 1   • lansoprazole (PREVACID) 30 MG capsule Take 1 capsule by mouth Daily. 30 capsule 5   • loratadine (CLARITIN) 10 MG tablet Take 1 tablet by mouth Daily. 30 tablet 5   • metFORMIN (GLUCOPHAGE) 500 MG tablet Take 1 tablet by mouth 2 (Two) Times a Day. 60 tablet 5   • methotrexate 2.5 MG tablet Take 10 tablets by mouth 1 (One) Time Per Week. 40 tablet 5   • metoprolol succinate XL (TOPROL-XL) 50 MG 24 hr tablet TAKE 1 TABLET BY MOUTH DAILY. 30 tablet 2   • montelukast (SINGULAIR) 10 MG tablet Take 1 tablet by mouth Every Night. 30 tablet 3   • raNITIdine (ZANTAC) 300 MG tablet Take 1 tablet by mouth 2 (Two) Times a Day. 60 tablet 5   • vitamin B-12 (CYANOCOBALAMIN) 1000 MCG tablet Take 1 tablet by mouth Daily. 30 tablet 5     No current facility-administered medications for this visit.        Allergies:  No Known Allergies    Vitals:  /80   Pulse 94   Temp 98 °F (36.7 °C)   Ht 175.3 cm (69.02\")   Wt 99.8 kg (220 lb)   SpO2 97%   BMI 32.47 kg/m²     Imaging:    Imaging Results (most recent)     None          Pulmonary Functions Testing Results:    No results found for: FEV1, FVC, [id number], TLC, DLCO    Results for orders placed or performed in visit on 05/14/19   Comprehensive Metabolic Panel   Result Value Ref Range    Glucose 117 (H) 65 - 99 mg/dL    BUN 16 6 - 20 mg/dL    Creatinine 0.88 0.76 - 1.27 mg/dL    Sodium 140 136 - 145 mmol/L    Potassium 4.4 3.5 - 5.2 mmol/L    Chloride 102 98 - 107 mmol/L    CO2 24.6 22.0 - 29.0 mmol/L    Calcium 9.4 8.6 - 10.5 mg/dL    Total Protein 6.8 6.0 - 8.5 g/dL    Albumin 4.00 3.50 - 5.20 g/dL    " ALT (SGPT) 25 1 - 41 U/L    AST (SGOT) 22 1 - 40 U/L    Alkaline Phosphatase 116 39 - 117 U/L    Total Bilirubin 0.2 0.2 - 1.2 mg/dL    eGFR Non African Amer 91 >60 mL/min/1.73    Globulin 2.8 gm/dL    A/G Ratio 1.4 g/dL    BUN/Creatinine Ratio 18.2 7.0 - 25.0    Anion Gap 13.4 mmol/L   Lipid Panel   Result Value Ref Range    Total Cholesterol 105 0 - 200 mg/dL    Triglycerides 162 (H) 0 - 150 mg/dL    HDL Cholesterol 28 (L) 40 - 60 mg/dL    LDL Cholesterol  45 0 - 100 mg/dL    VLDL Cholesterol 32.4 5 - 40 mg/dL    LDL/HDL Ratio 1.59    TSH   Result Value Ref Range    TSH 5.070 (H) 0.270 - 4.200 mIU/mL   Hemoglobin A1c   Result Value Ref Range    Hemoglobin A1C 5.84 (H) 4.80 - 5.60 %   Vitamin D 25 Hydroxy   Result Value Ref Range    25 Hydroxy, Vitamin D 26.0 (L) 30.0 - 100.0 ng/ml   Vitamin B12   Result Value Ref Range    Vitamin B-12 1,295 (H) 211 - 946 pg/mL   MicroAlbumin, Urine, Random - Urine, Clean Catch   Result Value Ref Range    Microalbumin, Urine <1.2 mg/L   Uric Acid   Result Value Ref Range    Uric Acid 3.5 3.4 - 7.0 mg/dL   CBC Auto Differential   Result Value Ref Range    WBC 8.88 3.40 - 10.80 10*3/mm3    RBC 5.33 4.14 - 5.80 10*6/mm3    Hemoglobin 16.7 13.0 - 17.7 g/dL    Hematocrit 53.1 (H) 37.5 - 51.0 %    MCV 99.6 (H) 79.0 - 97.0 fL    MCH 31.3 26.6 - 33.0 pg    MCHC 31.5 31.5 - 35.7 g/dL    RDW 14.1 12.3 - 15.4 %    RDW-SD 51.1 37.0 - 54.0 fl    MPV 10.0 6.0 - 12.0 fL    Platelets 333 140 - 450 10*3/mm3    Neutrophil % 48.5 42.7 - 76.0 %    Lymphocyte % 36.0 19.6 - 45.3 %    Monocyte % 11.5 5.0 - 12.0 %    Eosinophil % 2.9 0.3 - 6.2 %    Basophil % 0.9 0.0 - 1.5 %    Immature Grans % 0.2 0.0 - 0.5 %    Neutrophils, Absolute 4.30 1.70 - 7.00 10*3/mm3    Lymphocytes, Absolute 3.20 (H) 0.70 - 3.10 10*3/mm3    Monocytes, Absolute 1.02 (H) 0.10 - 0.90 10*3/mm3    Eosinophils, Absolute 0.26 0.00 - 0.40 10*3/mm3    Basophils, Absolute 0.08 0.00 - 0.20 10*3/mm3    Immature Grans, Absolute 0.02 0.00 -  0.05 10*3/mm3    nRBC 0.0 0.0 - 0.2 /100 WBC       Objective   Physical Exam     GENERAL APPEARANCE: Well developed, well nourished, alert and cooperative, and appears to be in no acute distress.    HEAD: normocephalic. Atraumatic.    EYES: PERRL, EOMI. vision is grossly intact.    THROAT: Oral cavity and pharynx normal. No inflammation, swelling, exudate, or lesions.     NECK: Neck supple.  No thyromegaly.    CARDIAC: Normal S1 and S2. No S3, S4 or murmurs. Rhythm is regular. There is no peripheral edema, cyanosis or pallor. Extremities are warm and well perfused. Capillary refill is less than 2 seconds. No carotid bruits.    RESPIRATORY:Bilateral air entry positive. Bilateral diminished breath sounds. No wheezing, crackles or rhonchi noted.    GI: Positive bowel sounds. Soft, nondistended, nontender.     MUSCULOSKELETAL: No significant deformity or joint abnormality. No edema. Peripheral pulses intact. No varicosities.    NEUROLOGICAL: Strength and sensation symmetric and intact throughout.     PSYCHIATRIC: The mental examination revealed the patient was oriented to person, place, and time.       Assessment/Plan      Shortness of breath:  -Will order a PFT will assess lung function.  -will order an echo to assess LV function.  -Will prescribe inhalers according to PFT results.  -Patient's Body mass index is 32.47 kg/m². BMI is above normal parameters. Recommendations include: exercise counseling and nutrition counseling.        ICD-10-CM ICD-9-CM   1. Shortness of breath R06.02 786.05   2. Class 1 obesity due to excess calories with serious comorbidity and body mass index (BMI) of 32.0 to 32.9 in adult E66.09 278.00    Z68.32 V85.32       Return in about 3 months (around 1/28/2020).

## 2019-10-29 ENCOUNTER — LAB (OUTPATIENT)
Dept: FAMILY MEDICINE CLINIC | Facility: CLINIC | Age: 54
End: 2019-10-29

## 2019-10-29 DIAGNOSIS — E55.9 VITAMIN D DEFICIENCY: ICD-10-CM

## 2019-10-29 DIAGNOSIS — K21.9 GASTROESOPHAGEAL REFLUX DISEASE WITHOUT ESOPHAGITIS: ICD-10-CM

## 2019-10-29 DIAGNOSIS — E11.40 CONTROLLED TYPE 2 DIABETES WITH NEUROPATHY (HCC): Chronic | ICD-10-CM

## 2019-10-29 DIAGNOSIS — E78.2 MIXED HYPERLIPIDEMIA: Chronic | ICD-10-CM

## 2019-10-29 LAB
25(OH)D3 SERPL-MCNC: 22.1 NG/ML (ref 30–100)
ALBUMIN SERPL-MCNC: 3.9 G/DL (ref 3.5–5.2)
ALBUMIN/GLOB SERPL: 1.3 G/DL
ALP SERPL-CCNC: 123 U/L (ref 39–117)
ALT SERPL W P-5'-P-CCNC: 25 U/L (ref 1–41)
ANION GAP SERPL CALCULATED.3IONS-SCNC: 10.9 MMOL/L (ref 5–15)
AST SERPL-CCNC: 18 U/L (ref 1–40)
BASOPHILS # BLD AUTO: 0.08 10*3/MM3 (ref 0–0.2)
BASOPHILS NFR BLD AUTO: 1.1 % (ref 0–1.5)
BILIRUB SERPL-MCNC: 0.2 MG/DL (ref 0.2–1.2)
BUN BLD-MCNC: 14 MG/DL (ref 6–20)
BUN/CREAT SERPL: 14.3 (ref 7–25)
CALCIUM SPEC-SCNC: 9.4 MG/DL (ref 8.6–10.5)
CHLORIDE SERPL-SCNC: 104 MMOL/L (ref 98–107)
CHOLEST SERPL-MCNC: 90 MG/DL (ref 0–200)
CO2 SERPL-SCNC: 25.1 MMOL/L (ref 22–29)
CREAT BLD-MCNC: 0.98 MG/DL (ref 0.76–1.27)
DEPRECATED RDW RBC AUTO: 41.9 FL (ref 37–54)
EOSINOPHIL # BLD AUTO: 0.31 10*3/MM3 (ref 0–0.4)
EOSINOPHIL NFR BLD AUTO: 4.2 % (ref 0.3–6.2)
ERYTHROCYTE [DISTWIDTH] IN BLOOD BY AUTOMATED COUNT: 12.6 % (ref 12.3–15.4)
GFR SERPL CREATININE-BSD FRML MDRD: 80 ML/MIN/1.73
GLOBULIN UR ELPH-MCNC: 2.9 GM/DL
GLUCOSE BLD-MCNC: 140 MG/DL (ref 65–99)
HBA1C MFR BLD: 6.4 % (ref 4.8–5.6)
HCT VFR BLD AUTO: 46.1 % (ref 37.5–51)
HDLC SERPL-MCNC: 27 MG/DL (ref 40–60)
HGB BLD-MCNC: 16.1 G/DL (ref 13–17.7)
IMM GRANULOCYTES # BLD AUTO: 0.03 10*3/MM3 (ref 0–0.05)
IMM GRANULOCYTES NFR BLD AUTO: 0.4 % (ref 0–0.5)
LDLC SERPL CALC-MCNC: 40 MG/DL (ref 0–100)
LDLC/HDLC SERPL: 1.5 {RATIO}
LYMPHOCYTES # BLD AUTO: 2.55 10*3/MM3 (ref 0.7–3.1)
LYMPHOCYTES NFR BLD AUTO: 34.3 % (ref 19.6–45.3)
MCH RBC QN AUTO: 32.3 PG (ref 26.6–33)
MCHC RBC AUTO-ENTMCNC: 34.9 G/DL (ref 31.5–35.7)
MCV RBC AUTO: 92.6 FL (ref 79–97)
MONOCYTES # BLD AUTO: 0.81 10*3/MM3 (ref 0.1–0.9)
MONOCYTES NFR BLD AUTO: 10.9 % (ref 5–12)
NEUTROPHILS # BLD AUTO: 3.66 10*3/MM3 (ref 1.7–7)
NEUTROPHILS NFR BLD AUTO: 49.1 % (ref 42.7–76)
NRBC BLD AUTO-RTO: 0 /100 WBC (ref 0–0.2)
PLATELET # BLD AUTO: 314 10*3/MM3 (ref 140–450)
PMV BLD AUTO: 10 FL (ref 6–12)
POTASSIUM BLD-SCNC: 4.2 MMOL/L (ref 3.5–5.2)
PROT SERPL-MCNC: 6.8 G/DL (ref 6–8.5)
RBC # BLD AUTO: 4.98 10*6/MM3 (ref 4.14–5.8)
SODIUM BLD-SCNC: 140 MMOL/L (ref 136–145)
TRIGL SERPL-MCNC: 113 MG/DL (ref 0–150)
TSH SERPL DL<=0.05 MIU/L-ACNC: 3.54 UIU/ML (ref 0.27–4.2)
VIT B12 BLD-MCNC: 906 PG/ML (ref 211–946)
VLDLC SERPL-MCNC: 22.6 MG/DL (ref 5–40)
WBC NRBC COR # BLD: 7.44 10*3/MM3 (ref 3.4–10.8)

## 2019-10-29 PROCEDURE — 82607 VITAMIN B-12: CPT | Performed by: NURSE PRACTITIONER

## 2019-10-29 PROCEDURE — 82306 VITAMIN D 25 HYDROXY: CPT | Performed by: NURSE PRACTITIONER

## 2019-10-29 PROCEDURE — 80050 GENERAL HEALTH PANEL: CPT | Performed by: NURSE PRACTITIONER

## 2019-10-29 PROCEDURE — 80061 LIPID PANEL: CPT | Performed by: NURSE PRACTITIONER

## 2019-10-29 PROCEDURE — 83036 HEMOGLOBIN GLYCOSYLATED A1C: CPT | Performed by: NURSE PRACTITIONER

## 2019-11-04 DIAGNOSIS — I10 BENIGN ESSENTIAL HYPERTENSION: Chronic | ICD-10-CM

## 2019-11-04 DIAGNOSIS — K21.9 GASTROESOPHAGEAL REFLUX DISEASE WITHOUT ESOPHAGITIS: ICD-10-CM

## 2019-11-04 DIAGNOSIS — Z87.09 HISTORY OF ASTHMA: ICD-10-CM

## 2019-11-04 DIAGNOSIS — M05.79 RHEUMATOID ARTHRITIS INVOLVING MULTIPLE SITES WITH POSITIVE RHEUMATOID FACTOR (HCC): Chronic | ICD-10-CM

## 2019-11-04 DIAGNOSIS — F51.04 PSYCHOPHYSIOLOGICAL INSOMNIA: ICD-10-CM

## 2019-11-04 DIAGNOSIS — E11.69 CONTROLLED TYPE 2 DIABETES MELLITUS WITH OTHER SPECIFIED COMPLICATION, WITHOUT LONG-TERM CURRENT USE OF INSULIN (HCC): ICD-10-CM

## 2019-11-04 DIAGNOSIS — E55.9 VITAMIN D DEFICIENCY: ICD-10-CM

## 2019-11-06 RX ORDER — METOPROLOL SUCCINATE 50 MG/1
50 TABLET, EXTENDED RELEASE ORAL DAILY
Qty: 30 TABLET | Refills: 2 | Status: SHIPPED | OUTPATIENT
Start: 2019-11-06 | End: 2020-02-05 | Stop reason: SDUPTHER

## 2019-11-06 RX ORDER — MONTELUKAST SODIUM 10 MG/1
10 TABLET ORAL NIGHTLY
Qty: 30 TABLET | Refills: 3 | Status: SHIPPED | OUTPATIENT
Start: 2019-11-06 | End: 2020-02-05 | Stop reason: SDUPTHER

## 2019-11-06 RX ORDER — CYCLOBENZAPRINE HCL 10 MG
TABLET ORAL
Qty: 90 TABLET | Refills: 1 | Status: SHIPPED | OUTPATIENT
Start: 2019-11-06 | End: 2019-12-30

## 2019-11-07 DIAGNOSIS — F51.04 PSYCHOPHYSIOLOGICAL INSOMNIA: ICD-10-CM

## 2019-11-07 DIAGNOSIS — E55.9 VITAMIN D DEFICIENCY: ICD-10-CM

## 2019-11-07 RX ORDER — ERGOCALCIFEROL 1.25 MG/1
CAPSULE ORAL
Qty: 4 CAPSULE | Refills: 5 | OUTPATIENT
Start: 2019-11-07

## 2019-11-07 RX ORDER — RANITIDINE 300 MG/1
TABLET ORAL
Qty: 60 TABLET | Refills: 5 | Status: SHIPPED | OUTPATIENT
Start: 2019-11-07 | End: 2020-02-05 | Stop reason: SDUPTHER

## 2019-11-07 RX ORDER — MIRTAZAPINE 15 MG/1
TABLET, FILM COATED ORAL
Qty: 30 TABLET | Refills: 5 | OUTPATIENT
Start: 2019-11-07

## 2019-11-07 RX ORDER — AMITRIPTYLINE HYDROCHLORIDE 100 MG/1
100 TABLET, FILM COATED ORAL NIGHTLY
Qty: 30 TABLET | Refills: 5 | Status: SHIPPED | OUTPATIENT
Start: 2019-11-07 | End: 2020-04-28

## 2019-11-07 RX ORDER — FOLIC ACID 1 MG/1
TABLET ORAL
Qty: 30 TABLET | Refills: 5 | Status: SHIPPED | OUTPATIENT
Start: 2019-11-07 | End: 2020-02-05 | Stop reason: SDUPTHER

## 2019-11-07 RX ORDER — LANSOPRAZOLE 30 MG/1
30 CAPSULE, DELAYED RELEASE ORAL DAILY
Qty: 30 CAPSULE | Refills: 5 | Status: SHIPPED | OUTPATIENT
Start: 2019-11-07 | End: 2020-02-05 | Stop reason: SDUPTHER

## 2019-11-26 ENCOUNTER — HOSPITAL ENCOUNTER (OUTPATIENT)
Dept: CARDIOLOGY | Facility: HOSPITAL | Age: 54
Discharge: HOME | End: 2019-11-26

## 2019-11-26 ENCOUNTER — HOSPITAL ENCOUNTER (OUTPATIENT)
Dept: RESPIRATORY THERAPY | Facility: HOSPITAL | Age: 54
Discharge: HOME | End: 2019-11-26
Admitting: NURSE PRACTITIONER

## 2019-11-26 DIAGNOSIS — R06.02 SHORTNESS OF BREATH: ICD-10-CM

## 2019-11-26 DIAGNOSIS — R06.00 DYSPNEA, UNSPECIFIED TYPE: ICD-10-CM

## 2019-11-26 PROCEDURE — 93306 TTE W/DOPPLER COMPLETE: CPT | Performed by: INTERNAL MEDICINE

## 2019-11-26 PROCEDURE — 93306 TTE W/DOPPLER COMPLETE: CPT

## 2019-11-26 PROCEDURE — 94727 GAS DIL/WSHOT DETER LNG VOL: CPT | Performed by: INTERNAL MEDICINE

## 2019-11-26 PROCEDURE — 94727 GAS DIL/WSHOT DETER LNG VOL: CPT

## 2019-11-26 PROCEDURE — 94060 EVALUATION OF WHEEZING: CPT | Performed by: INTERNAL MEDICINE

## 2019-11-26 PROCEDURE — 94060 EVALUATION OF WHEEZING: CPT

## 2019-11-26 PROCEDURE — 94729 DIFFUSING CAPACITY: CPT | Performed by: INTERNAL MEDICINE

## 2019-11-26 PROCEDURE — 94729 DIFFUSING CAPACITY: CPT

## 2019-11-27 LAB
BH CV ECHO MEAS - ACS: 2.2 CM
BH CV ECHO MEAS - AO ROOT AREA (BSA CORRECTED): 1.4
BH CV ECHO MEAS - AO ROOT AREA: 6.8 CM^2
BH CV ECHO MEAS - AO ROOT DIAM: 3 CM
BH CV ECHO MEAS - BSA(HAYCOCK): 2.2 M^2
BH CV ECHO MEAS - BSA: 2.2 M^2
BH CV ECHO MEAS - BZI_BMI: 32.5 KILOGRAMS/M^2
BH CV ECHO MEAS - BZI_METRIC_HEIGHT: 175.3 CM
BH CV ECHO MEAS - BZI_METRIC_WEIGHT: 99.8 KG
BH CV ECHO MEAS - EDV(CUBED): 49.4 ML
BH CV ECHO MEAS - EDV(MOD-SP4): 34 ML
BH CV ECHO MEAS - EDV(TEICH): 57 ML
BH CV ECHO MEAS - EF(CUBED): 35.6 %
BH CV ECHO MEAS - EF(MOD-SP4): 64.7 %
BH CV ECHO MEAS - EF(TEICH): 29.8 %
BH CV ECHO MEAS - ESV(CUBED): 31.9 ML
BH CV ECHO MEAS - ESV(MOD-SP4): 12 ML
BH CV ECHO MEAS - ESV(TEICH): 40 ML
BH CV ECHO MEAS - FS: 13.6 %
BH CV ECHO MEAS - IVS/LVPW: 1.2
BH CV ECHO MEAS - IVSD: 1.4 CM
BH CV ECHO MEAS - LA DIMENSION: 4.3 CM
BH CV ECHO MEAS - LA/AO: 1.4
BH CV ECHO MEAS - LV DIASTOLIC VOL/BSA (35-75): 15.8 ML/M^2
BH CV ECHO MEAS - LV MASS(C)D: 160.7 GRAMS
BH CV ECHO MEAS - LV MASS(C)DI: 74.7 GRAMS/M^2
BH CV ECHO MEAS - LV SYSTOLIC VOL/BSA (12-30): 5.6 ML/M^2
BH CV ECHO MEAS - LVIDD: 3.7 CM
BH CV ECHO MEAS - LVIDS: 3.2 CM
BH CV ECHO MEAS - LVLD AP4: 6.1 CM
BH CV ECHO MEAS - LVLS AP4: 5.8 CM
BH CV ECHO MEAS - LVOT AREA (M): 2 CM^2
BH CV ECHO MEAS - LVOT AREA: 2 CM^2
BH CV ECHO MEAS - LVOT DIAM: 1.6 CM
BH CV ECHO MEAS - LVPWD: 1.2 CM
BH CV ECHO MEAS - MV A MAX VEL: 64.2 CM/SEC
BH CV ECHO MEAS - MV E MAX VEL: 71.6 CM/SEC
BH CV ECHO MEAS - MV E/A: 1.1
BH CV ECHO MEAS - PA ACC SLOPE: 1295 CM/SEC^2
BH CV ECHO MEAS - PA ACC TIME: 0.09 SEC
BH CV ECHO MEAS - PA PR(ACCEL): 39.4 MMHG
BH CV ECHO MEAS - RVDD: 2.8 CM
BH CV ECHO MEAS - SI(CUBED): 8.2 ML/M^2
BH CV ECHO MEAS - SI(MOD-SP4): 10.2 ML/M^2
BH CV ECHO MEAS - SI(TEICH): 7.9 ML/M^2
BH CV ECHO MEAS - SV(CUBED): 17.6 ML
BH CV ECHO MEAS - SV(MOD-SP4): 22 ML
BH CV ECHO MEAS - SV(TEICH): 17 ML
MAXIMAL PREDICTED HEART RATE: 167 BPM
STRESS TARGET HR: 142 BPM

## 2019-12-02 DIAGNOSIS — E53.8 B12 DEFICIENCY: ICD-10-CM

## 2019-12-03 DIAGNOSIS — E53.8 B12 DEFICIENCY: ICD-10-CM

## 2019-12-03 DIAGNOSIS — E55.9 VITAMIN D DEFICIENCY: ICD-10-CM

## 2019-12-03 DIAGNOSIS — F51.04 PSYCHOPHYSIOLOGICAL INSOMNIA: ICD-10-CM

## 2019-12-03 RX ORDER — LANOLIN ALCOHOL/MO/W.PET/CERES
1000 CREAM (GRAM) TOPICAL DAILY
Qty: 30 TABLET | Refills: 5 | Status: SHIPPED | OUTPATIENT
Start: 2019-12-03 | End: 2020-02-24

## 2019-12-03 RX ORDER — LANOLIN ALCOHOL/MO/W.PET/CERES
1000 CREAM (GRAM) TOPICAL DAILY
Qty: 30 TABLET | Refills: 5 | Status: SHIPPED | OUTPATIENT
Start: 2019-12-03 | End: 2020-09-16 | Stop reason: SDUPTHER

## 2019-12-04 RX ORDER — ERGOCALCIFEROL 1.25 MG/1
CAPSULE ORAL
Qty: 4 CAPSULE | Refills: 5 | OUTPATIENT
Start: 2019-12-04

## 2019-12-04 RX ORDER — MIRTAZAPINE 15 MG/1
TABLET, FILM COATED ORAL
Qty: 30 TABLET | Refills: 5 | OUTPATIENT
Start: 2019-12-04

## 2019-12-06 DIAGNOSIS — E55.9 VITAMIN D DEFICIENCY: ICD-10-CM

## 2019-12-06 DIAGNOSIS — F51.04 PSYCHOPHYSIOLOGICAL INSOMNIA: ICD-10-CM

## 2019-12-09 RX ORDER — ERGOCALCIFEROL 1.25 MG/1
CAPSULE ORAL
Qty: 4 CAPSULE | Refills: 5 | Status: SHIPPED | OUTPATIENT
Start: 2019-12-09 | End: 2020-02-05

## 2019-12-09 RX ORDER — MIRTAZAPINE 15 MG/1
TABLET, FILM COATED ORAL
Qty: 30 TABLET | Refills: 5 | Status: SHIPPED | OUTPATIENT
Start: 2019-12-09 | End: 2020-09-16

## 2019-12-10 DIAGNOSIS — J98.4 RESTRICTIVE LUNG DISEASE: Primary | ICD-10-CM

## 2019-12-18 ENCOUNTER — DOCUMENTATION (OUTPATIENT)
Dept: PULMONOLOGY | Facility: CLINIC | Age: 54
End: 2019-12-18

## 2019-12-30 DIAGNOSIS — M05.79 RHEUMATOID ARTHRITIS INVOLVING MULTIPLE SITES WITH POSITIVE RHEUMATOID FACTOR (HCC): Chronic | ICD-10-CM

## 2019-12-30 DIAGNOSIS — J30.1 SEASONAL ALLERGIC RHINITIS DUE TO POLLEN: ICD-10-CM

## 2019-12-30 RX ORDER — LANCETS 33 GAUGE
EACH MISCELLANEOUS
Qty: 100 EACH | Refills: 1 | Status: SHIPPED | OUTPATIENT
Start: 2019-12-30 | End: 2020-02-05 | Stop reason: SDUPTHER

## 2019-12-30 RX ORDER — LORATADINE 10 MG/1
10 TABLET ORAL DAILY
Qty: 30 TABLET | Refills: 5 | Status: SHIPPED | OUTPATIENT
Start: 2019-12-30 | End: 2020-02-05 | Stop reason: SDUPTHER

## 2019-12-30 RX ORDER — CYCLOBENZAPRINE HCL 10 MG
TABLET ORAL
Qty: 90 TABLET | Refills: 1 | Status: SHIPPED | OUTPATIENT
Start: 2019-12-30 | End: 2020-03-03

## 2020-01-10 DIAGNOSIS — I10 BENIGN ESSENTIAL HYPERTENSION: Primary | ICD-10-CM

## 2020-01-10 DIAGNOSIS — E78.2 MIXED HYPERLIPIDEMIA: ICD-10-CM

## 2020-01-10 DIAGNOSIS — E55.9 VITAMIN D DEFICIENCY: ICD-10-CM

## 2020-01-10 DIAGNOSIS — K21.9 GASTROESOPHAGEAL REFLUX DISEASE WITHOUT ESOPHAGITIS: ICD-10-CM

## 2020-01-10 DIAGNOSIS — E11.40 CONTROLLED TYPE 2 DIABETES WITH NEUROPATHY (HCC): ICD-10-CM

## 2020-01-31 ENCOUNTER — LAB (OUTPATIENT)
Dept: FAMILY MEDICINE CLINIC | Facility: CLINIC | Age: 55
End: 2020-01-31

## 2020-01-31 DIAGNOSIS — E55.9 VITAMIN D DEFICIENCY: ICD-10-CM

## 2020-01-31 DIAGNOSIS — E11.40 CONTROLLED TYPE 2 DIABETES WITH NEUROPATHY (HCC): ICD-10-CM

## 2020-01-31 DIAGNOSIS — I10 BENIGN ESSENTIAL HYPERTENSION: ICD-10-CM

## 2020-01-31 DIAGNOSIS — E78.2 MIXED HYPERLIPIDEMIA: ICD-10-CM

## 2020-01-31 DIAGNOSIS — K21.9 GASTROESOPHAGEAL REFLUX DISEASE WITHOUT ESOPHAGITIS: ICD-10-CM

## 2020-01-31 PROCEDURE — 80061 LIPID PANEL: CPT | Performed by: NURSE PRACTITIONER

## 2020-01-31 PROCEDURE — 82306 VITAMIN D 25 HYDROXY: CPT | Performed by: NURSE PRACTITIONER

## 2020-01-31 PROCEDURE — 82607 VITAMIN B-12: CPT | Performed by: NURSE PRACTITIONER

## 2020-01-31 PROCEDURE — 80050 GENERAL HEALTH PANEL: CPT | Performed by: NURSE PRACTITIONER

## 2020-01-31 PROCEDURE — 36415 COLL VENOUS BLD VENIPUNCTURE: CPT | Performed by: NURSE PRACTITIONER

## 2020-01-31 PROCEDURE — 83036 HEMOGLOBIN GLYCOSYLATED A1C: CPT | Performed by: NURSE PRACTITIONER

## 2020-02-01 LAB
25(OH)D3 SERPL-MCNC: 19.2 NG/ML (ref 30–100)
ALBUMIN SERPL-MCNC: 3.9 G/DL (ref 3.5–5.2)
ALBUMIN/GLOB SERPL: 1.6 G/DL
ALP SERPL-CCNC: 127 U/L (ref 39–117)
ALT SERPL W P-5'-P-CCNC: 18 U/L (ref 1–41)
ANION GAP SERPL CALCULATED.3IONS-SCNC: 13.5 MMOL/L (ref 5–15)
AST SERPL-CCNC: 17 U/L (ref 1–40)
BASOPHILS # BLD AUTO: 0.09 10*3/MM3 (ref 0–0.2)
BASOPHILS NFR BLD AUTO: 1 % (ref 0–1.5)
BILIRUB SERPL-MCNC: 0.2 MG/DL (ref 0.2–1.2)
BUN BLD-MCNC: 12 MG/DL (ref 6–20)
BUN/CREAT SERPL: 16.2 (ref 7–25)
CALCIUM SPEC-SCNC: 9 MG/DL (ref 8.6–10.5)
CHLORIDE SERPL-SCNC: 104 MMOL/L (ref 98–107)
CHOLEST SERPL-MCNC: 99 MG/DL (ref 0–200)
CO2 SERPL-SCNC: 22.5 MMOL/L (ref 22–29)
CREAT BLD-MCNC: 0.74 MG/DL (ref 0.76–1.27)
DEPRECATED RDW RBC AUTO: 40.4 FL (ref 37–54)
EOSINOPHIL # BLD AUTO: 0.3 10*3/MM3 (ref 0–0.4)
EOSINOPHIL NFR BLD AUTO: 3.2 % (ref 0.3–6.2)
ERYTHROCYTE [DISTWIDTH] IN BLOOD BY AUTOMATED COUNT: 12.4 % (ref 12.3–15.4)
GFR SERPL CREATININE-BSD FRML MDRD: 111 ML/MIN/1.73
GLOBULIN UR ELPH-MCNC: 2.5 GM/DL
GLUCOSE BLD-MCNC: 178 MG/DL (ref 65–99)
HBA1C MFR BLD: 7 % (ref 4.8–5.6)
HCT VFR BLD AUTO: 46.5 % (ref 37.5–51)
HDLC SERPL-MCNC: 28 MG/DL (ref 40–60)
HGB BLD-MCNC: 16.4 G/DL (ref 13–17.7)
IMM GRANULOCYTES # BLD AUTO: 0.03 10*3/MM3 (ref 0–0.05)
IMM GRANULOCYTES NFR BLD AUTO: 0.3 % (ref 0–0.5)
LDLC SERPL CALC-MCNC: 30 MG/DL (ref 0–100)
LDLC/HDLC SERPL: 1.06 {RATIO}
LYMPHOCYTES # BLD AUTO: 2.7 10*3/MM3 (ref 0.7–3.1)
LYMPHOCYTES NFR BLD AUTO: 29.1 % (ref 19.6–45.3)
MCH RBC QN AUTO: 32.2 PG (ref 26.6–33)
MCHC RBC AUTO-ENTMCNC: 35.3 G/DL (ref 31.5–35.7)
MCV RBC AUTO: 91.4 FL (ref 79–97)
MONOCYTES # BLD AUTO: 1.21 10*3/MM3 (ref 0.1–0.9)
MONOCYTES NFR BLD AUTO: 13 % (ref 5–12)
NEUTROPHILS # BLD AUTO: 4.96 10*3/MM3 (ref 1.7–7)
NEUTROPHILS NFR BLD AUTO: 53.4 % (ref 42.7–76)
NRBC BLD AUTO-RTO: 0 /100 WBC (ref 0–0.2)
PLATELET # BLD AUTO: 284 10*3/MM3 (ref 140–450)
PMV BLD AUTO: 9.9 FL (ref 6–12)
POTASSIUM BLD-SCNC: 4.5 MMOL/L (ref 3.5–5.2)
PROT SERPL-MCNC: 6.4 G/DL (ref 6–8.5)
RBC # BLD AUTO: 5.09 10*6/MM3 (ref 4.14–5.8)
SODIUM BLD-SCNC: 140 MMOL/L (ref 136–145)
TRIGL SERPL-MCNC: 206 MG/DL (ref 0–150)
TSH SERPL DL<=0.05 MIU/L-ACNC: 4.53 UIU/ML (ref 0.27–4.2)
VIT B12 BLD-MCNC: 1158 PG/ML (ref 211–946)
VLDLC SERPL-MCNC: 41.2 MG/DL (ref 5–40)
WBC NRBC COR # BLD: 9.29 10*3/MM3 (ref 3.4–10.8)

## 2020-02-05 ENCOUNTER — OFFICE VISIT (OUTPATIENT)
Dept: FAMILY MEDICINE CLINIC | Facility: CLINIC | Age: 55
End: 2020-02-05

## 2020-02-05 VITALS
OXYGEN SATURATION: 95 % | DIASTOLIC BLOOD PRESSURE: 78 MMHG | WEIGHT: 228.8 LBS | HEART RATE: 103 BPM | HEIGHT: 69 IN | TEMPERATURE: 97.4 F | SYSTOLIC BLOOD PRESSURE: 120 MMHG | BODY MASS INDEX: 33.89 KG/M2

## 2020-02-05 DIAGNOSIS — Z20.828 EXPOSURE TO INFLUENZA: ICD-10-CM

## 2020-02-05 DIAGNOSIS — E78.5 HYPERLIPIDEMIA, UNSPECIFIED HYPERLIPIDEMIA TYPE: Chronic | ICD-10-CM

## 2020-02-05 DIAGNOSIS — E11.69 CONTROLLED TYPE 2 DIABETES MELLITUS WITH OTHER SPECIFIED COMPLICATION, WITHOUT LONG-TERM CURRENT USE OF INSULIN (HCC): Primary | Chronic | ICD-10-CM

## 2020-02-05 DIAGNOSIS — J30.9 CHRONIC ALLERGIC RHINITIS: Chronic | ICD-10-CM

## 2020-02-05 DIAGNOSIS — K21.9 GASTROESOPHAGEAL REFLUX DISEASE WITHOUT ESOPHAGITIS: ICD-10-CM

## 2020-02-05 DIAGNOSIS — Z87.09 HISTORY OF ASTHMA: ICD-10-CM

## 2020-02-05 DIAGNOSIS — M05.79 RHEUMATOID ARTHRITIS INVOLVING MULTIPLE SITES WITH POSITIVE RHEUMATOID FACTOR (HCC): Chronic | ICD-10-CM

## 2020-02-05 DIAGNOSIS — E55.9 VITAMIN D DEFICIENCY: ICD-10-CM

## 2020-02-05 DIAGNOSIS — I10 BENIGN ESSENTIAL HYPERTENSION: Chronic | ICD-10-CM

## 2020-02-05 PROCEDURE — 99214 OFFICE O/P EST MOD 30 MIN: CPT | Performed by: NURSE PRACTITIONER

## 2020-02-05 RX ORDER — ATORVASTATIN CALCIUM 10 MG/1
10 TABLET, FILM COATED ORAL
Qty: 30 TABLET | Refills: 4 | Status: SHIPPED | OUTPATIENT
Start: 2020-02-05 | End: 2020-08-04

## 2020-02-05 RX ORDER — MONTELUKAST SODIUM 10 MG/1
10 TABLET ORAL NIGHTLY
Qty: 30 TABLET | Refills: 3 | OUTPATIENT
Start: 2020-02-05

## 2020-02-05 RX ORDER — LORATADINE 10 MG/1
10 TABLET ORAL DAILY
Qty: 30 TABLET | Refills: 4 | Status: SHIPPED | OUTPATIENT
Start: 2020-02-05 | End: 2020-09-16 | Stop reason: SDUPTHER

## 2020-02-05 RX ORDER — ALBUTEROL SULFATE 90 UG/1
AEROSOL, METERED RESPIRATORY (INHALATION)
Qty: 18 G | Refills: 5 | OUTPATIENT
Start: 2020-02-05

## 2020-02-05 RX ORDER — RANITIDINE 300 MG/1
300 TABLET ORAL DAILY
Qty: 30 TABLET | Refills: 4 | Status: SHIPPED | OUTPATIENT
Start: 2020-02-05 | End: 2020-03-06

## 2020-02-05 RX ORDER — LANCETS 33 GAUGE
1 EACH MISCELLANEOUS DAILY
Qty: 100 EACH | Refills: 1 | Status: SHIPPED | OUTPATIENT
Start: 2020-02-05 | End: 2020-03-31 | Stop reason: SDUPTHER

## 2020-02-05 RX ORDER — OSELTAMIVIR PHOSPHATE 75 MG/1
75 CAPSULE ORAL DAILY
Qty: 19 CAPSULE | Refills: 0 | Status: SHIPPED | OUTPATIENT
Start: 2020-02-05 | End: 2020-02-24

## 2020-02-05 RX ORDER — CYCLOBENZAPRINE HCL 10 MG
TABLET ORAL
Qty: 90 TABLET | Refills: 1 | OUTPATIENT
Start: 2020-02-05

## 2020-02-05 RX ORDER — LANSOPRAZOLE 30 MG/1
30 CAPSULE, DELAYED RELEASE ORAL DAILY
Qty: 30 CAPSULE | Refills: 4 | Status: SHIPPED | OUTPATIENT
Start: 2020-02-05 | End: 2020-09-16 | Stop reason: SDUPTHER

## 2020-02-05 RX ORDER — FLUTICASONE PROPIONATE 50 MCG
1 SPRAY, SUSPENSION (ML) NASAL 2 TIMES DAILY
Qty: 1 BOTTLE | Refills: 4 | Status: SHIPPED | OUTPATIENT
Start: 2020-02-05 | End: 2020-09-16 | Stop reason: SDUPTHER

## 2020-02-05 RX ORDER — MONTELUKAST SODIUM 10 MG/1
10 TABLET ORAL NIGHTLY
Qty: 30 TABLET | Refills: 4 | Status: SHIPPED | OUTPATIENT
Start: 2020-02-05 | End: 2020-08-04

## 2020-02-05 RX ORDER — ATORVASTATIN CALCIUM 10 MG/1
TABLET, FILM COATED ORAL
Qty: 30 TABLET | Refills: 3 | OUTPATIENT
Start: 2020-02-05

## 2020-02-05 RX ORDER — FOLIC ACID 1 MG/1
1000 TABLET ORAL DAILY
Qty: 30 TABLET | Refills: 4 | Status: SHIPPED | OUTPATIENT
Start: 2020-02-05 | End: 2020-09-16 | Stop reason: SDUPTHER

## 2020-02-05 RX ORDER — ALBUTEROL SULFATE 90 UG/1
2 AEROSOL, METERED RESPIRATORY (INHALATION) EVERY 4 HOURS PRN
Qty: 18 G | Refills: 4 | Status: SHIPPED | OUTPATIENT
Start: 2020-02-05 | End: 2020-08-04

## 2020-02-05 RX ORDER — METOPROLOL SUCCINATE 50 MG/1
50 TABLET, EXTENDED RELEASE ORAL DAILY
Qty: 30 TABLET | Refills: 2 | OUTPATIENT
Start: 2020-02-05

## 2020-02-05 RX ORDER — METOPROLOL SUCCINATE 50 MG/1
50 TABLET, EXTENDED RELEASE ORAL DAILY
Qty: 30 TABLET | Refills: 4 | Status: SHIPPED | OUTPATIENT
Start: 2020-02-05 | End: 2020-06-04 | Stop reason: SDUPTHER

## 2020-02-05 NOTE — PROGRESS NOTES
History of Present Illness   Raimundo Munguia is a 53 yo male who presents to the clinic for follow up and to review recent lab results pertaining to his DM, type 2, HTN, GERD, Rheumatoid Arthritis and Hyperlipidemia. In addition, Raimundo has Insomnia and Vitamin deficiency. He is being evaluated by Pulmonology currently for Restrictive Lung Disease. Raimundo is concerned because his wife has been diagnosed with the Flu.     Hypertension   The current episode started more than 1 year ago.His BP is adequately controlled. His hypertensive medication is Metoprolol  50 mg which has improved his blood pressure. Pertinent negatives include no anxiety, blurred vision, chest pain, headaches, palpitations, peripheral edema, or sweats.      Lab Results   Component Value Date    CREATININE 0.74 (L) 01/31/2020     Diabetes   He has type 2 diabetes mellitus. His disease course has been fluctuating. Pertinent negatives for diabetes include no blurred vision, no chest pain, and no polyphagia. He does share he has noticed some intermittent neuropathy in his left lower extremity.  Risk factors for coronary artery disease include dyslipidemia, hypertension, male sex, sedentary lifestyle and tobacco exposure. Current diabetic treatment includes oral agent (monotherapy). He is compliant with medication regimen most of the time. He has had a previous visit with a dietitian. An ACE inhibitor/angiotensin II receptor blocker is not being taken due to S/e. Eye exam is scheduled for March 2020. .     Lab Results   Component Value Date    HGBA1C 7.00 (H) 01/31/2020     Hyperlipidemia   This is a chronic problem. The current episode started more than 1 year ago. The problem is controlled. Factors aggravating his hyperlipidemia include fatty foods. Current antihyperlipidemic treatment includes statins. The current treatment provides significant improvement of lipids. Compliance problems include adherence to diet and adherence to exercise.  Risk  "factors for coronary artery disease include diabetes mellitus, dyslipidemia, hypertension, male sex and obesity.     Lab Results   Component Value Date    CHOL 99 01/31/2020    TRIG 206 (H) 01/31/2020    HDL 28 (L) 01/31/2020    LDL 30 01/31/2020     Vitals:    02/05/20 1416   BP: 120/78   Pulse: 103   Temp: 97.4 °F (36.3 °C)   TempSrc: Temporal   SpO2: 95%   Weight: 104 kg (228 lb 12.8 oz)   Height: 175.3 cm (69.02\")        The following portions of the patient's history were reviewed and updated as appropriate: allergies, current medications, past family history, past medical history, past social history, past surgical history and problem list.    Review of Systems   Constitutional: Negative for activity change, appetite change, chills, fatigue, fever and unexpected weight change.   Eyes: Negative for visual disturbance.   Respiratory: Negative for cough, chest tightness, shortness of breath and wheezing.    Cardiovascular: Positive for leg swelling. Negative for chest pain and palpitations.   Gastrointestinal: Negative for abdominal pain, constipation, diarrhea, nausea and vomiting.   Endocrine: Negative for cold intolerance, heat intolerance, polydipsia, polyphagia and polyuria.   Skin: Negative for color change and rash.   Neurological: Positive for numbness and headaches. Negative for dizziness, tremors, speech difficulty, weakness and light-headedness.   Hematological: Negative for adenopathy. Bruises/bleeds easily.   Psychiatric/Behavioral: Positive for sleep disturbance. Negative for confusion, decreased concentration and suicidal ideas. The patient is not nervous/anxious.    All other systems reviewed and are negative.    Physical Exam   Constitutional: He is oriented to person, place, and time. He appears well-developed and well-nourished. No distress.   HENT:   Head: Normocephalic.   Nose: Nose normal.   Mouth/Throat: Oropharynx is clear and moist and mucous membranes are normal. Dental caries present. " No oropharyngeal exudate.   Eyes: Pupils are equal, round, and reactive to light. Conjunctivae are normal. Right eye exhibits no discharge. Left eye exhibits no discharge. No scleral icterus.   Neck: Neck supple. No JVD present. No thyromegaly present.   Cardiovascular: Normal rate, regular rhythm, normal heart sounds and intact distal pulses. Exam reveals no friction rub.   No murmur heard.  Pulmonary/Chest: Effort normal. No respiratory distress. He has decreased breath sounds. He has no wheezes. He has no rhonchi. He has no rales.   Abdominal: Soft. Bowel sounds are normal. There is no tenderness. There is no rebound and no guarding.   Musculoskeletal: He exhibits no edema.   Lymphadenopathy:     He has no cervical adenopathy.   Neurological: He is alert and oriented to person, place, and time.   Skin: Skin is warm and dry. Capillary refill takes less than 2 seconds. No rash noted. No erythema.   Psychiatric: He has a normal mood and affect. His behavior is normal. Judgment and thought content normal.   Nursing note and vitals reviewed.    Results for orders placed or performed in visit on 01/31/20   Comprehensive Metabolic Panel   Result Value Ref Range    Glucose 178 (H) 65 - 99 mg/dL    BUN 12 6 - 20 mg/dL    Creatinine 0.74 (L) 0.76 - 1.27 mg/dL    Sodium 140 136 - 145 mmol/L    Potassium 4.5 3.5 - 5.2 mmol/L    Chloride 104 98 - 107 mmol/L    CO2 22.5 22.0 - 29.0 mmol/L    Calcium 9.0 8.6 - 10.5 mg/dL    Total Protein 6.4 6.0 - 8.5 g/dL    Albumin 3.90 3.50 - 5.20 g/dL    ALT (SGPT) 18 1 - 41 U/L    AST (SGOT) 17 1 - 40 U/L    Alkaline Phosphatase 127 (H) 39 - 117 U/L    Total Bilirubin 0.2 0.2 - 1.2 mg/dL    eGFR Non African Amer 111 >60 mL/min/1.73    Globulin 2.5 gm/dL    A/G Ratio 1.6 g/dL    BUN/Creatinine Ratio 16.2 7.0 - 25.0    Anion Gap 13.5 5.0 - 15.0 mmol/L   TSH   Result Value Ref Range    TSH 4.530 (H) 0.270 - 4.200 uIU/mL   Hemoglobin A1c   Result Value Ref Range    Hemoglobin A1C 7.00 (H) 4.80  - 5.60 %   Vitamin B12   Result Value Ref Range    Vitamin B-12 1,158 (H) 211 - 946 pg/mL   Lipid Panel   Result Value Ref Range    Total Cholesterol 99 0 - 200 mg/dL    Triglycerides 206 (H) 0 - 150 mg/dL    HDL Cholesterol 28 (L) 40 - 60 mg/dL    LDL Cholesterol  30 0 - 100 mg/dL    VLDL Cholesterol 41.2 (H) 5 - 40 mg/dL    LDL/HDL Ratio 1.06    Vitamin D 25 Hydroxy   Result Value Ref Range    25 Hydroxy, Vitamin D 19.2 (L) 30.0 - 100.0 ng/ml   CBC Auto Differential   Result Value Ref Range    WBC 9.29 3.40 - 10.80 10*3/mm3    RBC 5.09 4.14 - 5.80 10*6/mm3    Hemoglobin 16.4 13.0 - 17.7 g/dL    Hematocrit 46.5 37.5 - 51.0 %    MCV 91.4 79.0 - 97.0 fL    MCH 32.2 26.6 - 33.0 pg    MCHC 35.3 31.5 - 35.7 g/dL    RDW 12.4 12.3 - 15.4 %    RDW-SD 40.4 37.0 - 54.0 fl    MPV 9.9 6.0 - 12.0 fL    Platelets 284 140 - 450 10*3/mm3    Neutrophil % 53.4 42.7 - 76.0 %    Lymphocyte % 29.1 19.6 - 45.3 %    Monocyte % 13.0 (H) 5.0 - 12.0 %    Eosinophil % 3.2 0.3 - 6.2 %    Basophil % 1.0 0.0 - 1.5 %    Immature Grans % 0.3 0.0 - 0.5 %    Neutrophils, Absolute 4.96 1.70 - 7.00 10*3/mm3    Lymphocytes, Absolute 2.70 0.70 - 3.10 10*3/mm3    Monocytes, Absolute 1.21 (H) 0.10 - 0.90 10*3/mm3    Eosinophils, Absolute 0.30 0.00 - 0.40 10*3/mm3    Basophils, Absolute 0.09 0.00 - 0.20 10*3/mm3    Immature Grans, Absolute 0.03 0.00 - 0.05 10*3/mm3    nRBC 0.0 0.0 - 0.2 /100 WBC     Assessment/Plan     Patient's Body mass index is 33.77 kg/m². BMI is above normal parameters. Recommendations include: educational material, exercise counseling and nutrition counseling.   (Normal BMI:  18.5-24.9, OW 25-29.9, Obesity 30 or greater)    Problems Addressed this Visit        Cardiovascular and Mediastinum    Benign essential hypertension    Relevant Medications    metoprolol succinate XL (TOPROL-XL) 50 MG 24 hr tablet    Other Relevant Orders    Comprehensive Metabolic Panel    Hyperlipidemia    Relevant Medications    atorvastatin (LIPITOR) 10 MG  tablet    Other Relevant Orders    Lipid Panel       Respiratory    Chronic allergic rhinitis    Relevant Medications    loratadine (CLARITIN) 10 MG tablet    fluticasone (FLONASE) 50 MCG/ACT nasal spray       Digestive    Vitamin D deficiency    Relevant Orders    Vitamin D 25 Hydroxy    Gastroesophageal reflux disease without esophagitis    Relevant Medications    raNITIdine (ZANTAC) 300 MG tablet    lansoprazole (PREVACID) 30 MG capsule    Other Relevant Orders    CBC & Differential       Musculoskeletal and Integument    Rheumatoid arthritis with positive rheumatoid factor (CMS/HCC)    Relevant Medications    folic acid (FOLVITE) 1 MG tablet       Other    History of asthma    Relevant Medications    montelukast (SINGULAIR) 10 MG tablet      Other Visit Diagnoses     Controlled type 2 diabetes mellitus with other specified complication, without long-term current use of insulin (CMS/[facility])  (Chronic)   -  Primary    Findings and recommendations discussed with Raimundo. Reviewed above lab results which included an A1C of 7.    Relevant Medications    metFORMIN (GLUCOPHAGE) 500 MG tablet    glucose blood test strip    Lancets (ONETOUCH DELICA PLUS [id number]) misc    Other Relevant Orders    Comprehensive Metabolic Panel    Lipid Panel    TSH    Hemoglobin A1c    Vitamin B12    MicroAlbumin, Urine, Random - Urine, Clean Catch    Exposure to influenza        Tamiflu prescribed daily for ten days    Relevant Medications    oseltamivir (TAMIFLU) 75 MG capsule      Findings and recommendations discussed with Raimundo. Reviewed above lab results which included an A1C of 7. He will continue Metoprolol 50 mg for his HTN. Will research a Rheumatologist to refer him to for his Rheumatoid Arthritis. Encouraged to f/u with Pulmonology for Restrictive Lung disease and asthma. Lipid panel reviewed with him with noted elevated Triglycerides. He will f/u with me in May with routine labs prior to his appointment sooner if  problems/concerns occur.         This document has been electronically signed by ABDIRAHMAN Braswell, LEEANNA, HÉCTOR  February 8, 2020 9:07 AM

## 2020-02-05 NOTE — PATIENT INSTRUCTIONS
Smokeless Tobacco Information, Adult  Tobacco use is one of the leading causes of cancer and other chronic health problems. Smokeless tobacco is tobacco that is put directly into the mouth instead of being smoked. It may also be called chewing tobacco or snuff. Smokeless tobacco is made from the leaves of tobacco plants and it comes in several forms:  · Loose, dry leaves, plugs, or twists.  · Moist pouches.  · Dissolving lozenges or strips.  Chewing, sucking, or holding the tobacco in your mouth causes your mouth to make more saliva. The saliva mixes with the tobacco to make “tobacco juice” that is swallowed or spit out.  How can smokeless tobacco affect me?  Using smokeless tobacco:  · Increases your risk of developing cancer. Smokeless tobacco contains at least 28 different types of cancer-causing chemicals (carcinogens).  · Increases your chances of developing other long-term health problems, including high blood pressure, heart disease, stroke, and dental problems.  · Can make you become addicted. Nicotine is one of the chemicals in tobacco. When you chew tobacco, you absorb nicotine from the tobacco juice. This can make you feel more alert than usual.  · Can cause problems with pregnancy. Pregnant women who use smokeless tobacco are more likely to miscarry or deliver a baby too early (premature delivery).  · Can affect the appearance and health of your mouth. Using smokeless tobacco may cause bad breath, yellow-brown teeth, mouth sores, cracking and bleeding lips, gum recession, and lesions on the soft tissues of your mouth (leukoplakia).  What are the benefits of not using smokeless tobacco?  The benefits of not using smokeless tobacco include:  · A healthy mind because:  ? You avoid addiction.  · A healthy body because:  ? You avoid dental problems.  ? You promote healthy pregnancy.  ? You avoid long-term health problems.  · A healthy wallet because:  ? You avoid costs of buying tobacco.  ? You avoid health  care costs in the future.  · A healthy family because:  ? You avoid accidental poisoning of children in your household.  What can happen if I continue to use smokeless tobacco?  If you continue to use smokeless tobacco, you will increase your risk for developing certain cancers. These include:  · Tongue.  · Lips, mouth, and gums.  · Throat (esophagus) and voice box (larynx).  · Stomach.  · Pancreas.  · Bladder.  · Colon.  Long-term use of smokeless tobacco can also lead to:  · High blood pressure, heart disease, and stroke.  · Gum disease, gum recession, and bone loss around the teeth.  · Tooth decay.  How do I quit using smokeless tobacco?  Quitting the use of smokeless tobacco can be hard, but it can be done. Follow these steps:  · Pick a date to quit. Set a date within the next two weeks. This gives you time to prepare.  · Write down the reasons why you are quitting. Keep this list in places where you will see it often, such as on your bathroom mirror or in your car or wallet.  · Identify the people, places, things, and activities that make you want to use tobacco (triggers) and avoid them.  · Get rid of any tobacco you have and remove any tobacco smells. To do this:  ? Throw away all containers of tobacco at home, at work, and in your car.  ? Throw away any other items that you use regularly when you chew tobacco.  ? Clean your car and make sure to remove all tobacco-related items.  ? Clean your home, including curtains and carpets.  · Tell your family, friends, and coworkers that you are quitting. This can make quitting easier.  · Ask your health care provider for help quitting smokeless tobacco. This may involve treatment. Find out what treatment options are covered by your health insurance.  · Keep track of how many days have passed since you quit. Remembering how long and hard you have worked to quit can help you avoid using tobacco again.  Where can I get support?  Ask your health care provider if there is  "a local support group for quitting smokeless tobacco.  Where can I get more information?  You can learn more about the risks of using smokeless tobacco and the benefits of quitting from these sources:  · National Cancer [place]: www.cancer.gov  · American Cancer Society: www.cancer.org  When should I seek medical care?  Seek medical care if you have:  · White or other discolored patches in your mouth.  · Difficulty swallowing.  · A change in your voice.  · Unexplained weight loss.  · Stomach pain, nausea, or vomiting.  Summary  · Smokeless tobacco contains at least 28 different chemicals that are known to cause cancer (carcinogen).  · Nicotine is an addictive chemical in smokeless tobacco.  · When you quit using smokeless tobacco, you lower your risk of developing cancer.  This information is not intended to replace advice given to you by your health care provider. Make sure you discuss any questions you have with your health care provider.  Document Released: 05/21/2012 Document Revised: 08/03/2018 Document Reviewed: 07/29/2016  [employer] Interactive Patient Education © 2019 [employer]  DASH Eating Plan  DASH stands for \"Dietary Approaches to Stop Hypertension.\" The DASH eating plan is a healthy eating plan that has been shown to reduce high blood pressure (hypertension). It may also reduce your risk for type 2 diabetes, heart disease, and stroke. The DASH eating plan may also help with weight loss.  What are tips for following this plan?    General guidelines  · Avoid eating more than 2,300 mg (milligrams) of salt (sodium) a day. If you have hypertension, you may need to reduce your sodium intake to 1,500 mg a day.  · Limit alcohol intake to no more than 1 drink a day for nonpregnant women and 2 drinks a day for men. One drink equals 12 oz of beer, 5 oz of wine, or 1½ oz of hard liquor.  · Work with your health care provider to maintain a healthy body weight or to lose weight. Ask what an ideal weight is for " "you.  · Get at least 30 minutes of exercise that causes your heart to beat faster (aerobic exercise) most days of the week. Activities may include walking, swimming, or biking.  · Work with your health care provider or diet and nutrition specialist (dietitian) to adjust your eating plan to your individual calorie needs.  Reading food labels    · Check food labels for the amount of sodium per serving. Choose foods with less than 5 percent of the Daily Value of sodium. Generally, foods with less than 300 mg of sodium per serving fit into this eating plan.  · To find whole grains, look for the word \"whole\" as the first word in the ingredient list.  Shopping  · Buy products labeled as \"low-sodium\" or \"no salt added.\"  · Buy fresh foods. Avoid canned foods and premade or frozen meals.  Cooking  · Avoid adding salt when cooking. Use salt-free seasonings or herbs instead of table salt or sea salt. Check with your health care provider or pharmacist before using salt substitutes.  · Do not judd foods. Cook foods using healthy methods such as baking, boiling, grilling, and broiling instead.  · Cook with heart-healthy oils, such as olive, canola, soybean, or sunflower oil.  Meal planning  · Eat a balanced diet that includes:  ? 5 or more servings of fruits and vegetables each day. At each meal, try to fill half of your plate with fruits and vegetables.  ? Up to 6-8 servings of whole grains each day.  ? Less than 6 oz of lean meat, poultry, or fish each day. A 3-oz serving of meat is about the same size as a deck of cards. One egg equals 1 oz.  ? 2 servings of low-fat dairy each day.  ? A serving of nuts, seeds, or beans 5 times each week.  ? Heart-healthy fats. Healthy fats called Omega-3 fatty acids are found in foods such as flaxseeds and coldwater fish, like sardines, salmon, and mackerel.  · Limit how much you eat of the following:  ? Canned or prepackaged foods.  ? Food that is high in trans fat, such as fried " foods.  ? Food that is high in saturated fat, such as fatty meat.  ? Sweets, desserts, sugary drinks, and other foods with added sugar.  ? Full-fat dairy products.  · Do not salt foods before eating.  · Try to eat at least 2 vegetarian meals each week.  · Eat more home-cooked food and less restaurant, buffet, and fast food.  · When eating at a restaurant, ask that your food be prepared with less salt or no salt, if possible.  What foods are recommended?  The items listed may not be a complete list. Talk with your dietitian about what dietary choices are best for you.  Grains  Whole-grain or whole-wheat bread. Whole-grain or whole-wheat pasta. Brown rice. Oatmeal. Quinoa. Bulgur. Whole-grain and low-sodium cereals. [place] bread. Low-fat, low-sodium crackers. Whole-wheat flour tortillas.  Vegetables  Fresh or frozen vegetables (raw, steamed, roasted, or grilled). Low-sodium or reduced-sodium tomato and vegetable juice. Low-sodium or reduced-sodium tomato sauce and tomato paste. Low-sodium or reduced-sodium canned vegetables.  Fruits  All fresh, dried, or frozen fruit. Canned fruit in natural juice (without added sugar).  Meat and other protein foods  Skinless chicken or turkey. Ground chicken or turkey. Pork with fat trimmed off. Fish and seafood. Egg whites. Dried beans, peas, or lentils. Unsalted nuts, nut butters, and seeds. Unsalted canned beans. Lean cuts of beef with fat trimmed off. Low-sodium, lean deli meat.  Dairy  Low-fat (1%) or fat-free (skim) milk. Fat-free, low-fat, or reduced-fat cheeses. Nonfat, low-sodium ricotta or cottage cheese. Low-fat or nonfat yogurt. Low-fat, low-sodium cheese.  Fats and oils  Soft margarine without trans fats. Vegetable oil. Low-fat, reduced-fat, or light mayonnaise and salad dressings (reduced-sodium). Canola, safflower, olive, soybean, and sunflower oils. Avocado.  Seasoning and other foods  Herbs. Spices. Seasoning mixes without salt. Unsalted popcorn and pretzels. Fat-free  sweets.  What foods are not recommended?  The items listed may not be a complete list. Talk with your dietitian about what dietary choices are best for you.  Grains  Baked goods made with fat, such as croissants, muffins, or some breads. Dry pasta or rice meal packs.  Vegetables  Creamed or fried vegetables. Vegetables in a cheese sauce. Regular canned vegetables (not low-sodium or reduced-sodium). Regular canned tomato sauce and paste (not low-sodium or reduced-sodium). Regular tomato and vegetable juice (not low-sodium or reduced-sodium). Pickles. Olives.  Fruits  Canned fruit in a light or heavy syrup. Fried fruit. Fruit in cream or butter sauce.  Meat and other protein foods  Fatty cuts of meat. Ribs. Fried meat. [place]. Sausage. Bologna and other processed lunch meats. Salami. Fatback. Hotdogs. Bratwurst. Salted nuts and seeds. Canned beans with added salt. Canned or smoked fish. Whole eggs or egg yolks. Chicken or turkey with skin.  Dairy  Whole or 2% milk, cream, and half-and-half. Whole or full-fat cream cheese. Whole-fat or sweetened yogurt. Full-fat cheese. Nondairy creamers. Whipped toppings. Processed cheese and cheese spreads.  Fats and oils  Butter. Stick margarine. Lard. Shortening. Ghee. [place] fat. Tropical oils, such as coconut, palm kernel, or palm oil.  Seasoning and other foods  Salted popcorn and pretzels. Onion salt, garlic salt, seasoned salt, table salt, and sea salt. Worcestershire sauce. Tartar sauce. Barbecue sauce. Teriyaki sauce. Soy sauce, including reduced-sodium. Steak sauce. Canned and packaged gravies. Fish sauce. Oyster sauce. Cocktail sauce. Horseradish that you find on the shelf. Ketchup. Mustard. Meat flavorings and tenderizers. Bouillon cubes. Hot sauce and Tabasco sauce. Premade or packaged marinades. Premade or packaged taco seasonings. Relishes. Regular salad dressings.  Where to find more information:  · National Heart, Lung, and Blood [place]:  www.nhlbi.nih.gov  · American Heart Association: www.heart.org  Summary  · The DASH eating plan is a healthy eating plan that has been shown to reduce high blood pressure (hypertension). It may also reduce your risk for type 2 diabetes, heart disease, and stroke.  · With the DASH eating plan, you should limit salt (sodium) intake to 2,300 mg a day. If you have hypertension, you may need to reduce your sodium intake to 1,500 mg a day.  · When on the DASH eating plan, aim to eat more fresh fruits and vegetables, whole grains, lean proteins, low-fat dairy, and heart-healthy fats.  · Work with your health care provider or diet and nutrition specialist (dietitian) to adjust your eating plan to your individual calorie needs.  This information is not intended to replace advice given to you by your health care provider. Make sure you discuss any questions you have with your health care provider.  Document Released: 12/06/2012 Document Revised: 12/11/2017 Document Reviewed: 12/11/2017  [employer] Interactive Patient Education © 2019 [employer]  Type 2 Diabetes Mellitus, Self Care, Adult  When you have type 2 diabetes (type 2 diabetes mellitus), you must make sure your blood sugar (glucose) stays in a healthy range. You can do this with:  · Nutrition.  · Exercise.  · Lifestyle changes.  · Medicines or insulin, if needed.  · Support from your doctors and others.  How to stay aware of blood sugar    · Check your blood sugar level every day, as often as told.  · Have your A1c (hemoglobin A1c) level checked two or more times a year. Have it checked more often if your doctor tells you to.  Your doctor will set personal treatment goals for you. Generally, you should have these blood sugar levels:  · Before meals (preprandial):  mg/dL (4.4-7.2 mmol/L).  · After meals (postprandial): below 180 mg/dL (10 mmol/L).  · A1c level: less than 7%.  How to manage high and low blood sugar  Signs of high blood sugar  High blood sugar is  called hyperglycemia. Know the signs of high blood sugar. Signs may include:  · Feeling:  ? Thirsty.  ? Hungry.  ? Very tired.  · Needing to pee (urinate) more than usual.  · Blurry vision.  Signs of low blood sugar  Low blood sugar is called hypoglycemia. This is when blood sugar is at or below 70 mg/dL (3.9 mmol/L). Signs may include:  · Feeling:  ? Hungry.  ? Worried or nervous (anxious).  ? Sweaty and clammy.  ? Confused.  ? Dizzy.  ? Sleepy.  ? Sick to your stomach (nauseous).  · Having:  ? A fast heartbeat.  ? A headache.  ? A change in your vision.  ? Jerky movements that you cannot control (seizure).  ? Tingling or no feeling (numbness) around your mouth, lips, or tongue.  · Having trouble with:  ? Moving (coordination).  ? Sleeping.  ? Passing out (fainting).  ? Getting upset easily (irritability).  Treating low blood sugar  To treat low blood sugar, eat or drink something sugary right away. If you can think clearly and swallow safely, follow the 15:15 rule:  · Take 15 grams of a fast-acting carb (carbohydrate). Talk with your doctor about how much you should take.  · Some fast-acting carbs are:  ? Sugar tablets (glucose pills). Take 3-4 pills.  ? 6-8 pieces of hard candy.  ? 4-6 oz (120-150 mL) of fruit juice.  ? 4-6 oz (120-150 mL) of regular (not diet) soda.  ? 1 Tbsp (15 mL) honey or sugar.  · Check your blood sugar 15 minutes after you take the carb.  · If your blood sugar is still at or below 70 mg/dL (3.9 mmol/L), take 15 grams of a carb again.  · If your blood sugar does not go above 70 mg/dL (3.9 mmol/L) after 3 tries, get help right away.  · After your blood sugar goes back to normal, eat a meal or a snack within 1 hour.  Treating very low blood sugar  If your blood sugar is at or below 54 mg/dL (3 mmol/L), you have very low blood sugar (severe hypoglycemia). This is an emergency. Do not wait to see if the symptoms will go away. Get medical help right away. Call your local emergency services (064  in the [place]  If you have very low blood sugar and you cannot eat or drink, you may need a glucagon shot (injection). A family member or friend should learn how to check your blood sugar and how to give you a glucagon shot. Ask your doctor if you need to have a glucagon shot kit at home.  Follow these instructions at home:  Medicine  · Take insulin and diabetes medicines as told.  · If your doctor says you should take more or less insulin and medicines, do this exactly as told.  · Do not run out of insulin or medicines.  Having diabetes can raise your risk for other long-term conditions. These include heart disease and kidney disease. Your doctor may prescribe medicines to help you not have these problems.  Food    · Make healthy food choices. These include:  ? Chicken, fish, egg whites, and beans.  ? Oats, whole wheat, bulgur, brown rice, quinoa, and millet.  ? Fresh fruits and vegetables.  ? Low-fat dairy products.  ? Nuts, avocado, olive oil, and canola oil.  · Meet with a  (dietitian). He or she can help you make an eating plan that is right for you.  · Follow instructions from your doctor about what you cannot eat or drink.  · Drink enough fluid to keep your pee (urine) pale yellow.  · Keep track of carbs that you eat. Do this by reading food labels and learning food serving sizes.  · Follow your sick day plan when you cannot eat or drink normally. Make this plan with your doctor so it is ready to use.  Activity  · Exercise 3 or more times a week.  · Do not go more than 2 days without exercising.  · Talk with your doctor before you start a new exercise. Your doctor may need to tell you to change:  ? How much insulin or medicines you take.  ? How much food you eat.  Lifestyle  · Do not use any tobacco products. These include cigarettes, chewing tobacco, and e-cigarettes. If you need help quitting, ask your doctor.  · Ask your doctor how much alcohol is safe for you.  · Learn to deal with stress.  If you need help with this, ask your doctor.  Body care    · Stay up to date with your shots (immunizations).  · Have your eyes and feet checked by a doctor as often as told.  · Check your skin and feet every day. Check for cuts, bruises, redness, blisters, or sores.  · Brush your teeth and gums two times a day. Floss one or more times a day.  · Go to the dentist one or more times every 6 months.  · Stay at a healthy weight.  General instructions  · Take over-the-counter and prescription medicines only as told by your doctor.  · Share your diabetes care plan with:  ? Your work or school.  ? People you live with.  · Carry a card or wear jewelry that says you have diabetes.  · Keep all follow-up visits as told by your doctor. This is important.  Questions to ask your doctor  · Do I need to meet with a diabetes educator?  · Where can I find a support group for people with diabetes?  Where to find more information  To learn more about diabetes, visit:  · American Diabetes Association: www.diabetes.org  · American Association of Diabetes Educators: www.diabeteseducator.org  Summary  · When you have type 2 diabetes, you must make sure your blood sugar (glucose) stays in a healthy range.  · Check your blood sugar every day, as often as told.  · Having diabetes can raise your risk for other conditions. Your doctor may prescribe medicines to help you not have these problems.  · Keep all follow-up visits as told by your doctor. This is important.  This information is not intended to replace advice given to you by your health care provider. Make sure you discuss any questions you have with your health care provider.  Document Released: 04/10/2017 Document Revised: 06/10/2019 Document Reviewed: 01/20/2017  [employer] Interactive Patient Education © 2019 [employer]

## 2020-02-19 ENCOUNTER — OFFICE VISIT (OUTPATIENT)
Dept: PULMONOLOGY | Facility: CLINIC | Age: 55
End: 2020-02-19

## 2020-02-19 VITALS
HEIGHT: 68 IN | DIASTOLIC BLOOD PRESSURE: 84 MMHG | HEART RATE: 117 BPM | WEIGHT: 228 LBS | BODY MASS INDEX: 34.56 KG/M2 | TEMPERATURE: 98 F | SYSTOLIC BLOOD PRESSURE: 132 MMHG | OXYGEN SATURATION: 94 %

## 2020-02-19 DIAGNOSIS — G47.33 OSA (OBSTRUCTIVE SLEEP APNEA): ICD-10-CM

## 2020-02-19 DIAGNOSIS — J98.4 RESTRICTIVE LUNG DISEASE: Primary | ICD-10-CM

## 2020-02-19 DIAGNOSIS — E66.09 CLASS 1 OBESITY DUE TO EXCESS CALORIES WITH SERIOUS COMORBIDITY AND BODY MASS INDEX (BMI) OF 32.0 TO 32.9 IN ADULT: ICD-10-CM

## 2020-02-19 PROCEDURE — 99214 OFFICE O/P EST MOD 30 MIN: CPT | Performed by: NURSE PRACTITIONER

## 2020-02-19 NOTE — PROGRESS NOTES
"Have you had the Influenza Vaccine? yes    Would you like to receive this Vaccine today? no    Have you had the Pneumonia Vaccine?  yes   Would you like to receive this Vaccine today? no    Are you a current smoker? no  Never smoker     Subjective    Raimundo Munguia presents for the following Shortness of Breath      History of Present Illness     Mr. Munguia is a 54 year old male with medical history significant for diabetes, GERD, hyperlipidemia, hypertension, obesity, RA, and stroke.    He is here today for a routine follow up on shortness of breath.  He states that he continues to have shortness of breath, but that it has been at baseline since his last visit.  He tells me that he tried to do the home sleep study but was unable to \"deal with the hoses.\"  PFT was reviewed with patient.  HRCT was also denies by insurance.      Review of Systems   Constitutional: Negative for activity change, fatigue and unexpected weight change.   HENT: Negative for congestion, postnasal drip and rhinorrhea.    Respiratory: Positive for shortness of breath. Negative for apnea, cough, chest tightness and wheezing.    Cardiovascular: Negative for chest pain and palpitations.   Gastrointestinal: Negative for nausea.   Allergic/Immunologic: Negative for environmental allergies.   Psychiatric/Behavioral: Negative for agitation and confusion.       Active Problems:  Problem List Items Addressed This Visit        Digestive    Class 1 obesity with serious comorbidity and body mass index (BMI) of 32.0 to 32.9 in adult      Other Visit Diagnoses     Restrictive lung disease    -  Primary    Relevant Orders    CT chest hi resolution    Overnight Sleep Oximetry Study    LINDA (obstructive sleep apnea)              Past Medical History:  Past Medical History:   Diagnosis Date   • Abnormal blood chemistry    • Allergic    • Cervicalgia    • DM type 2 (diabetes mellitus, type 2) (CMS/[facility])    • Elevated cholesterol    • GERD (gastroesophageal reflux " disease)    • Helicobacter pylori (H. pylori) infection    • Hyperlipidemia    • Hypertension    • Insomnia    • Muscle spasm    • Obesity    • Osteopenia    • Palpitations    • RA (rheumatoid arthritis) (CMS/HCC)    • Stroke (CMS/HCC)    • Tension headache    • Vitamin D deficiency        Family History:  Family History   Problem Relation Age of Onset   • Diabetes Mother    • Hypertension Mother    • Hypertension Father    • Hyperlipidemia Father    • Diabetes Sister    • Hyperlipidemia Sister    • Hypertension Sister    • Hypertension Brother    • Hyperlipidemia Brother        Social History:  Social History     Tobacco Use   • Smoking status: Never Smoker   • Smokeless tobacco: Current User     Types: Snuff   • Tobacco comment: Encouraged tobacco cessation   Substance Use Topics   • Alcohol use: No       Current Medications:  Current Outpatient Medications   Medication Sig Dispense Refill   • albuterol sulfate  (90 Base) MCG/ACT inhaler Inhale 2 puffs Every 4 (Four) Hours As Needed for Shortness of Air. 18 g 4   • amitriptyline (ELAVIL) 100 MG tablet TAKE 1 TABLET BY MOUTH EVERY NIGHT. 30 tablet 5   • atorvastatin (LIPITOR) 10 MG tablet Take 1 tablet by mouth every night at bedtime. 30 tablet 4   • cyclobenzaprine (FLEXERIL) 10 MG tablet TAKE ONE TABLET BY MOUTH THREE TIMES A DAY AS NEEDED FOR MUSCLE SPASMS 90 tablet 1   • fluticasone (FLONASE) 50 MCG/ACT nasal spray 1 spray into the nostril(s) as directed by provider 2 (Two) Times a Day. 1 bottle 4   • folic acid (FOLVITE) 1 MG tablet Take 1 tablet by mouth Daily. 30 tablet 4   • glucose blood (ONE TOUCH ULTRA TEST) test strip Monitor blood sugars daily for DM, type 2 50 each 6   • glucose blood test strip Test tid 100 each 4   • Lancets (ONETOUCH DELICA PLUS QKHOWN13C) misc 1 Device by Other route Daily. 100 each 1   • lansoprazole (PREVACID) 30 MG capsule Take 1 capsule by mouth Daily. 30 capsule 4   • loratadine (CLARITIN) 10 MG tablet Take 1 tablet by  "mouth Daily. 30 tablet 4   • metFORMIN (GLUCOPHAGE) 500 MG tablet Take 2 tablets by mouth 2 (Two) Times a Day. 120 tablet 4   • methotrexate 2.5 MG tablet TAKE 10 TABLETS BY MOUTH ONCE WEEKLY 40 tablet 5   • metoprolol succinate XL (TOPROL-XL) 50 MG 24 hr tablet Take 1 tablet by mouth Daily. 30 tablet 4   • mirtazapine (REMERON) 15 MG tablet TAKE ONE TABLET BY MOUTH EVERY NIGHT 30 tablet 5   • montelukast (SINGULAIR) 10 MG tablet Take 1 tablet by mouth Every Night. 30 tablet 4   • raNITIdine (ZANTAC) 300 MG tablet Take 1 tablet by mouth Daily for 30 days. 30 tablet 4   • vitamin B-12 (CYANOCOBALAMIN) 1000 MCG tablet TAKE 1 TABLET BY MOUTH DAILY. 30 tablet 5   • oseltamivir (TAMIFLU) 75 MG capsule Take 1 capsule by mouth Daily. 19 capsule 0   • vitamin B-12 (CYANOCOBALAMIN) 1000 MCG tablet TAKE 1 TABLET BY MOUTH DAILY. 30 tablet 5     No current facility-administered medications for this visit.        Allergies:  No Known Allergies    Vitals:  /84   Pulse 117   Temp 98 °F (36.7 °C) (Oral)   Ht 172.7 cm (68\")   Wt 103 kg (228 lb)   SpO2 94%   BMI 34.67 kg/m²     Imaging:    Imaging Results (Most Recent)     None          Pulmonary Functions Testing Results:    No results found for: FEV1, FVC, [id number], TLC, DLCO    Results for orders placed or performed in visit on 01/31/20   Comprehensive Metabolic Panel   Result Value Ref Range    Glucose 178 (H) 65 - 99 mg/dL    BUN 12 6 - 20 mg/dL    Creatinine 0.74 (L) 0.76 - 1.27 mg/dL    Sodium 140 136 - 145 mmol/L    Potassium 4.5 3.5 - 5.2 mmol/L    Chloride 104 98 - 107 mmol/L    CO2 22.5 22.0 - 29.0 mmol/L    Calcium 9.0 8.6 - 10.5 mg/dL    Total Protein 6.4 6.0 - 8.5 g/dL    Albumin 3.90 3.50 - 5.20 g/dL    ALT (SGPT) 18 1 - 41 U/L    AST (SGOT) 17 1 - 40 U/L    Alkaline Phosphatase 127 (H) 39 - 117 U/L    Total Bilirubin 0.2 0.2 - 1.2 mg/dL    eGFR Non African Amer 111 >60 mL/min/1.73    Globulin 2.5 gm/dL    A/G Ratio 1.6 g/dL    BUN/Creatinine Ratio 16.2 7.0 " - 25.0    Anion Gap 13.5 5.0 - 15.0 mmol/L   TSH   Result Value Ref Range    TSH 4.530 (H) 0.270 - 4.200 uIU/mL   Hemoglobin A1c   Result Value Ref Range    Hemoglobin A1C 7.00 (H) 4.80 - 5.60 %   Vitamin B12   Result Value Ref Range    Vitamin B-12 1,158 (H) 211 - 946 pg/mL   Lipid Panel   Result Value Ref Range    Total Cholesterol 99 0 - 200 mg/dL    Triglycerides 206 (H) 0 - 150 mg/dL    HDL Cholesterol 28 (L) 40 - 60 mg/dL    LDL Cholesterol  30 0 - 100 mg/dL    VLDL Cholesterol 41.2 (H) 5 - 40 mg/dL    LDL/HDL Ratio 1.06    Vitamin D 25 Hydroxy   Result Value Ref Range    25 Hydroxy, Vitamin D 19.2 (L) 30.0 - 100.0 ng/ml   CBC Auto Differential   Result Value Ref Range    WBC 9.29 3.40 - 10.80 10*3/mm3    RBC 5.09 4.14 - 5.80 10*6/mm3    Hemoglobin 16.4 13.0 - 17.7 g/dL    Hematocrit 46.5 37.5 - 51.0 %    MCV 91.4 79.0 - 97.0 fL    MCH 32.2 26.6 - 33.0 pg    MCHC 35.3 31.5 - 35.7 g/dL    RDW 12.4 12.3 - 15.4 %    RDW-SD 40.4 37.0 - 54.0 fl    MPV 9.9 6.0 - 12.0 fL    Platelets 284 140 - 450 10*3/mm3    Neutrophil % 53.4 42.7 - 76.0 %    Lymphocyte % 29.1 19.6 - 45.3 %    Monocyte % 13.0 (H) 5.0 - 12.0 %    Eosinophil % 3.2 0.3 - 6.2 %    Basophil % 1.0 0.0 - 1.5 %    Immature Grans % 0.3 0.0 - 0.5 %    Neutrophils, Absolute 4.96 1.70 - 7.00 10*3/mm3    Lymphocytes, Absolute 2.70 0.70 - 3.10 10*3/mm3    Monocytes, Absolute 1.21 (H) 0.10 - 0.90 10*3/mm3    Eosinophils, Absolute 0.30 0.00 - 0.40 10*3/mm3    Basophils, Absolute 0.09 0.00 - 0.20 10*3/mm3    Immature Grans, Absolute 0.03 0.00 - 0.05 10*3/mm3    nRBC 0.0 0.0 - 0.2 /100 WBC       Objective   Physical Exam     GENERAL APPEARANCE: Well developed, well nourished, alert and cooperative, and appears to be in no acute distress.    HEAD: normocephalic. Atraumatic.    EYES: PERRL, EOMI. Vision is grossly intact.    THROAT: Oral cavity and pharynx normal. No inflammation, swelling, exudate, or lesions.     NECK: Neck supple.  No thyromegaly.    CARDIAC: Normal  "S1 and S2. No S3, S4 or murmurs. Rhythm is regular. There is no peripheral edema, cyanosis or pallor. Extremities are warm and well perfused. Capillary refill is less than 2 seconds. No carotid bruits.    RESPIRATORY:Bilateral air entry positive. Bilateral diminished breath sounds. No wheezing, crackles or rhonchi noted.    GI: Positive bowel sounds. Soft, nondistended, nontender.     MUSCULOSKELETAL: No significant deformity or joint abnormality. No edema. Peripheral pulses intact. No varicosities.    NEUROLOGICAL: Strength and sensation symmetric and intact throughout.     PSYCHIATRIC: The mental examination revealed the patient was oriented to person, place, and time.       Assessment/Plan      Restrictive lung diesease:  -PFT was completed and reviewed:  PFT shows no obstruction with no bronchodilator response.  Moderate restrictive ventilatory defect noted.  DLCO is normal.  -HRCT was initially denied.  Will retry CT hi resolution chest to further evaluate for interstitial lung disease.  -Echo was reviewed:    Results for orders placed during the hospital encounter of 11/26/19   Adult Transthoracic Echo Complete W/ Cont if Necessary Per Protocol    Narrative · Normal left ventricular cavity size and wall thickness noted.  · Left ventricular systolic function is normal. Estimated EF appears to be   in the range of 61 - 65%.  · Left ventricular diastolic function is normal.  · No significant valvular heart disease  · Left atrial cavity size is borderline dilated.  · There is no evidence of pericardial effusion.            LINDA:   -Patient was unable to do home sleep study.  He states that he was unable to \"deal with the hoses.\"  -will order overnight pulse oximetry study.  -Patient's Body mass index is 34.67 kg/m². BMI is above normal parameters. Recommendations include: exercise counseling and nutrition counseling.  - Patient was educated on positive airway pressure treatment.  As per CMS guidelines, more than 4 " hours on 70% of observed nights is considered adherence. Patient was strongly encouraged to use CPAP as much as possible during sleep as more CPAP use is equal to more benefit. Use of heated humidification in positive airway pressure treatment to improve the adherence to the device.  In case of claustrophobia, we will provide the patient cognitive behavioral therapy and desensitization. Oral appliances use will be discussed with the patient in case of mild to moderate sleep apnea or if the patient with severe disease fail positive airway pressure treatment.       The patient was extensively educated on the consequences of untreated obstructive sleep apnea namely cardiovascular/metabolic disorder, neurocognitive deficit, daytime sleepiness, motor vehicle accidents, depression, mood disorders and reduced quality of life.  At the end of conversation, the patient voices understanding of the disease process and treatment modality.  Patient also understands the risk of untreated obstructive sleep apnea and benefit benefits of the treatment.    Counseling time was greater than 10 minutes.          ICD-10-CM ICD-9-CM   1. Restrictive lung disease J98.4 518.89   2. Class 1 obesity due to excess calories with serious comorbidity and body mass index (BMI) of 32.0 to 32.9 in adult E66.09 278.00    Z68.32 V85.32   3. LINDA (obstructive sleep apnea) G47.33 327.23       Return in about 6 months (around 8/19/2020).

## 2020-03-02 ENCOUNTER — HOSPITAL ENCOUNTER (OUTPATIENT)
Dept: CT IMAGING | Facility: HOSPITAL | Age: 55
Discharge: HOME | End: 2020-03-02
Admitting: NURSE PRACTITIONER

## 2020-03-02 DIAGNOSIS — J98.4 RESTRICTIVE LUNG DISEASE: ICD-10-CM

## 2020-03-02 PROCEDURE — 71250 CT THORAX DX C-: CPT

## 2020-03-02 PROCEDURE — 71250 CT THORAX DX C-: CPT | Performed by: RADIOLOGY

## 2020-03-03 RX ORDER — CYCLOBENZAPRINE HCL 10 MG
TABLET ORAL
Qty: 90 TABLET | Refills: 1 | Status: SHIPPED | OUTPATIENT
Start: 2020-03-03 | End: 2020-05-28

## 2020-03-09 ENCOUNTER — DOCUMENTATION (OUTPATIENT)
Dept: PULMONOLOGY | Facility: CLINIC | Age: 55
End: 2020-03-09

## 2020-03-31 DIAGNOSIS — E11.69 CONTROLLED TYPE 2 DIABETES MELLITUS WITH OTHER SPECIFIED COMPLICATION, WITHOUT LONG-TERM CURRENT USE OF INSULIN (HCC): Chronic | ICD-10-CM

## 2020-03-31 RX ORDER — LANCETS 33 GAUGE
EACH MISCELLANEOUS
Qty: 100 EACH | Refills: 0 | Status: SHIPPED | OUTPATIENT
Start: 2020-03-31 | End: 2020-05-28

## 2020-04-28 DIAGNOSIS — F51.04 PSYCHOPHYSIOLOGICAL INSOMNIA: ICD-10-CM

## 2020-04-28 RX ORDER — AMITRIPTYLINE HYDROCHLORIDE 100 MG/1
100 TABLET, FILM COATED ORAL NIGHTLY
Qty: 30 TABLET | Refills: 5 | Status: SHIPPED | OUTPATIENT
Start: 2020-04-28 | End: 2020-09-16 | Stop reason: SDUPTHER

## 2020-05-28 DIAGNOSIS — E11.69 CONTROLLED TYPE 2 DIABETES MELLITUS WITH OTHER SPECIFIED COMPLICATION, WITHOUT LONG-TERM CURRENT USE OF INSULIN (HCC): Chronic | ICD-10-CM

## 2020-05-28 RX ORDER — LANCETS 33 GAUGE
EACH MISCELLANEOUS
Qty: 100 EACH | Refills: 0 | Status: SHIPPED | OUTPATIENT
Start: 2020-05-28 | End: 2020-08-04

## 2020-05-28 RX ORDER — CYCLOBENZAPRINE HCL 10 MG
TABLET ORAL
Qty: 30 TABLET | Refills: 0 | Status: SHIPPED | OUTPATIENT
Start: 2020-05-28 | End: 2020-06-04 | Stop reason: SDUPTHER

## 2020-06-04 ENCOUNTER — OFFICE VISIT (OUTPATIENT)
Dept: FAMILY MEDICINE CLINIC | Facility: CLINIC | Age: 55
End: 2020-06-04

## 2020-06-04 VITALS
WEIGHT: 223 LBS | HEART RATE: 114 BPM | SYSTOLIC BLOOD PRESSURE: 112 MMHG | RESPIRATION RATE: 16 BRPM | DIASTOLIC BLOOD PRESSURE: 82 MMHG | TEMPERATURE: 97.9 F | HEIGHT: 68 IN | OXYGEN SATURATION: 94 % | BODY MASS INDEX: 33.8 KG/M2

## 2020-06-04 DIAGNOSIS — M05.79 RHEUMATOID ARTHRITIS INVOLVING MULTIPLE SITES WITH POSITIVE RHEUMATOID FACTOR (HCC): Chronic | ICD-10-CM

## 2020-06-04 DIAGNOSIS — E53.8 VITAMIN B 12 DEFICIENCY: ICD-10-CM

## 2020-06-04 DIAGNOSIS — J30.9 CHRONIC ALLERGIC RHINITIS: Chronic | ICD-10-CM

## 2020-06-04 DIAGNOSIS — F51.04 CHRONIC INSOMNIA: Chronic | ICD-10-CM

## 2020-06-04 DIAGNOSIS — R91.1 PULMONARY NODULE, RIGHT: ICD-10-CM

## 2020-06-04 DIAGNOSIS — E55.9 VITAMIN D DEFICIENCY: ICD-10-CM

## 2020-06-04 DIAGNOSIS — E11.69 CONTROLLED TYPE 2 DIABETES MELLITUS WITH OTHER SPECIFIED COMPLICATION, WITHOUT LONG-TERM CURRENT USE OF INSULIN (HCC): Primary | Chronic | ICD-10-CM

## 2020-06-04 DIAGNOSIS — I10 BENIGN ESSENTIAL HYPERTENSION: Chronic | ICD-10-CM

## 2020-06-04 DIAGNOSIS — J98.4 RESTRICTIVE LUNG DISEASE: Chronic | ICD-10-CM

## 2020-06-04 DIAGNOSIS — K21.9 GASTROESOPHAGEAL REFLUX DISEASE WITHOUT ESOPHAGITIS: Chronic | ICD-10-CM

## 2020-06-04 DIAGNOSIS — M62.838 MUSCLE SPASMS OF BOTH LOWER EXTREMITIES: Chronic | ICD-10-CM

## 2020-06-04 DIAGNOSIS — H91.92 HEARING LOSS OF LEFT EAR, UNSPECIFIED HEARING LOSS TYPE: ICD-10-CM

## 2020-06-04 DIAGNOSIS — Z72.0 CHEWS TOBACCO: ICD-10-CM

## 2020-06-04 DIAGNOSIS — E78.5 HYPERLIPIDEMIA, UNSPECIFIED HYPERLIPIDEMIA TYPE: Chronic | ICD-10-CM

## 2020-06-04 PROCEDURE — 80050 GENERAL HEALTH PANEL: CPT | Performed by: NURSE PRACTITIONER

## 2020-06-04 PROCEDURE — 82607 VITAMIN B-12: CPT | Performed by: NURSE PRACTITIONER

## 2020-06-04 PROCEDURE — 80061 LIPID PANEL: CPT | Performed by: NURSE PRACTITIONER

## 2020-06-04 PROCEDURE — 82306 VITAMIN D 25 HYDROXY: CPT | Performed by: NURSE PRACTITIONER

## 2020-06-04 PROCEDURE — 99214 OFFICE O/P EST MOD 30 MIN: CPT | Performed by: NURSE PRACTITIONER

## 2020-06-04 PROCEDURE — 83036 HEMOGLOBIN GLYCOSYLATED A1C: CPT | Performed by: NURSE PRACTITIONER

## 2020-06-04 RX ORDER — METOPROLOL SUCCINATE 50 MG/1
50 TABLET, EXTENDED RELEASE ORAL 2 TIMES DAILY
Qty: 60 TABLET | Refills: 0 | Status: SHIPPED | OUTPATIENT
Start: 2020-06-04 | End: 2020-07-09

## 2020-06-04 RX ORDER — CYCLOBENZAPRINE HCL 10 MG
10 TABLET ORAL 2 TIMES DAILY PRN
Qty: 60 TABLET | Refills: 3 | Status: SHIPPED | OUTPATIENT
Start: 2020-06-04 | End: 2020-09-16 | Stop reason: SDUPTHER

## 2020-06-04 NOTE — PROGRESS NOTES
"  History of Present Illness   Raimundo Munguia is a 53 yo male who presents for follow up pertaining to his DM, type 2, HTN, GERD, Rheumatoid Arthritis and Hyperlipidemia. In addition, Raimundo has Insomnia and Vitamin deficiency. He is being followed by Pulmonology currently for Restrictive Lung Disease.He had a CT of the Chest in February with the Impression:4.3 MM PULMONARY NODULE RIGHT UPPER LOBE POSTERIOR AND MEDIAL IN LOCATION FOR WHICH FOLLOW-UP CT RECOMMENDEDIN 12 MONTHS. NO AIR TRAPPING IDENTIFIED. 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     Hypertension   The current episode started more than 1 year ago.His BP is adequately controlled. His hypertensive medication is Metoprolol  50 mg which has improved his blood pressure. Pertinent negatives include no anxiety, blurred vision, chest pain, headaches, palpitations, peripheral edema, or sweats.   He does report \"heart beating fast\".     Lab Results   Component Value Date    CREATININE 0.80 06/04/2020     Diabetes   He has type 2 diabetes mellitus. His disease course has been fluctuating. Pertinent negatives for diabetes include no blurred vision, no chest pain, and no polyphagia. He does share he has noticed some intermittent neuropathy in his left lower extremity.  Risk factors for coronary artery disease include dyslipidemia, hypertension, male sex, sedentary lifestyle and tobacco exposure. Current diabetic treatment includes oral agent (monotherapy). He is compliant with medication regimen most of the time. He has had a previous visit with a dietitian. An ACE inhibitor/angiotensin II receptor blocker is not being taken due to S/e. Eye exam is scheduled for March 2020. .     Lab Results   Component Value Date    HGBA1C 7.30 (H) 06/04/2020   Hyperlipidemia   This is a chronic problem. The current episode started more than 1 year ago. The problem is controlled. Factors aggravating " "his hyperlipidemia include fatty foods. Current antihyperlipidemic treatment includes statins. The current treatment provides significant improvement of lipids. Compliance problems include adherence to diet and adherence to exercise.  Risk factors for coronary artery disease include diabetes mellitus, dyslipidemia, hypertension, male sex and obesity.   Lab Results   Component Value Date    CHOL 85 06/04/2020    TRIG 172 (H) 06/04/2020    HDL 23 (L) 06/04/2020    LDL 28 06/04/2020     Vitals:    06/04/20 1220 06/04/20 1238   BP: 112/82    BP Location: Right arm    Patient Position: Sitting    Pulse: 116 114   Resp: 16    Temp: 97.9 °F (36.6 °C)    TempSrc: Temporal    SpO2: 94%    Weight: 101 kg (223 lb)    Height: 172.7 cm (68\")         The following portions of the patient's history were reviewed and updated as appropriate: allergies, current medications, past family history, past medical history, past social history, past surgical history and problem list.    Review of Systems   Constitutional: Negative for activity change, appetite change, fatigue, fever and unexpected weight change.   HENT: Positive for ear pain and hearing loss. Negative for congestion, rhinorrhea, sinus pressure, sinus pain and tinnitus.    Eyes: Negative for visual disturbance.   Respiratory: Negative for cough, chest tightness, shortness of breath and wheezing.    Cardiovascular: Negative for chest pain, palpitations and leg swelling.   Gastrointestinal: Negative for abdominal pain, constipation, diarrhea, nausea and vomiting.   Endocrine: Negative for cold intolerance, heat intolerance, polydipsia, polyphagia and polyuria.   Skin: Negative for color change and rash.   Neurological: Positive for dizziness and numbness. Negative for tremors, speech difficulty, weakness, light-headedness and headaches.   Hematological: Negative for adenopathy.   Psychiatric/Behavioral: Positive for sleep disturbance. Negative for confusion, decreased " concentration and suicidal ideas. The patient is not nervous/anxious.    All other systems reviewed and are negative.    Physical Exam   Constitutional: He is oriented to person, place, and time. He appears well-developed and well-nourished. No distress.   HENT:   Head: Normocephalic.   Right Ear: Tympanic membrane is not erythematous. No middle ear effusion. No decreased hearing is noted.   Left Ear: No drainage, swelling or tenderness. Tympanic membrane is not erythematous and not bulging. A middle ear effusion is present. Decreased hearing is noted.   Nose: Nose normal.   Mouth/Throat: Oropharynx is clear and moist. No oropharyngeal exudate.   Eyes: Pupils are equal, round, and reactive to light. Conjunctivae are normal. Right eye exhibits no discharge. Left eye exhibits no discharge. No scleral icterus.   Neck: Neck supple. No JVD present. No thyromegaly present.   Cardiovascular: Regular rhythm and normal heart sounds. Tachycardia present. Exam reveals no friction rub.   No murmur heard.  Pulmonary/Chest: Effort normal. No respiratory distress. He has decreased breath sounds. He has no wheezes. He has no rhonchi. He has no rales.   Abdominal: Soft. Bowel sounds are normal. He exhibits no distension. There is no tenderness. There is no rebound and no guarding.   Musculoskeletal: He exhibits no edema.   Lymphadenopathy:     He has no cervical adenopathy.   Neurological: He is alert and oriented to person, place, and time.   Skin: Skin is warm and dry. Capillary refill takes less than 2 seconds. No rash noted. No erythema.   Psychiatric: He has a normal mood and affect. His behavior is normal. Judgment and thought content normal.   Nursing note and vitals reviewed.    Assessment/Plan     Patient's Body mass index is 33.91 kg/m². BMI is above normal parameters. Recommendations include: educational material, exercise counseling and nutrition counseling.   (Normal BMI:  18.5-24.9, OW 25-29.9, Obesity 30 or  greater)    Problems Addressed this Visit        Cardiovascular and Mediastinum    Benign essential hypertension    Relevant Medications    metoprolol succinate XL (TOPROL-XL) 50 MG 24 hr tablet    Hyperlipidemia       Respiratory    Chronic allergic rhinitis    Restrictive lung disease       Digestive    Vitamin D deficiency    Gastroesophageal reflux disease without esophagitis    Relevant Orders    CBC Auto Differential (Completed)    Vitamin B 12 deficiency       Musculoskeletal and Integument    Rheumatoid arthritis with positive rheumatoid factor (CMS/HCC)    Muscle spasms of both lower extremities    Relevant Medications    cyclobenzaprine (FLEXERIL) 10 MG tablet       Other    Chews tobacco    Chronic insomnia      Other Visit Diagnoses     Controlled type 2 diabetes mellitus with other specified complication, without long-term current use of insulin (CMS/HCC)  (Chronic)   -  Primary    Findings and recommendations discussed with Raimundo.  A1C of 7.3 is slightly incrreased from previous.     Hearing loss of left ear, unspecified hearing loss type        Referral to ENT for evaluation    Relevant Orders    Ambulatory Referral to ENT (Otolaryngology)    Pulmonary nodule, right        4.3 MM PULMONARY NODULE RIGHT UPPER LOBE POSTERIOR AND MEDIAL IN LOCATION FOR WHICH FOLLOW-UP CT RECOMMENDED  IN 12 MONTHS.      Findings and recommendations discussed with Raimundo. He had not had his routine labs done prior to his visit but came prepared to have them done. These were resulted and reviewed with him by phone.   A1C of 7.3 is slightly incrreased from previous. Well controlled blood pressure but will increase his Metoprolol 50 mg  to bid and he will f/u in four weeks due to this change.Continue Lipitor and low-cholesterol diet  Continue Singulair and Claritin for Chronic Allergic Rhinitis.  Follow up with Pulmonologist for his Restrictive Lung Disease and Pulmonary Nodule.  Prevacid is working well which he will  continue.   Referral to ENT for evaluation for his hearing loss.Tobacco cessation encouraged. Follow up in four weeks; sooner if problems/concerns occur.        This document has been electronically signed by ABDIRAHMAN Braswell, RICCO-BC, HÉCTOR  June 6, 2020 08:19

## 2020-06-04 NOTE — PATIENT INSTRUCTIONS
"DASH Eating Plan  DASH stands for \"Dietary Approaches to Stop Hypertension.\" The DASH eating plan is a healthy eating plan that has been shown to reduce high blood pressure (hypertension). It may also reduce your risk for type 2 diabetes, heart disease, and stroke. The DASH eating plan may also help with weight loss.  What are tips for following this plan?    General guidelines  · Avoid eating more than 2,300 mg (milligrams) of salt (sodium) a day. If you have hypertension, you may need to reduce your sodium intake to 1,500 mg a day.  · Limit alcohol intake to no more than 1 drink a day for nonpregnant women and 2 drinks a day for men. One drink equals 12 oz of beer, 5 oz of wine, or 1½ oz of hard liquor.  · Work with your health care provider to maintain a healthy body weight or to lose weight. Ask what an ideal weight is for you.  · Get at least 30 minutes of exercise that causes your heart to beat faster (aerobic exercise) most days of the week. Activities may include walking, swimming, or biking.  · Work with your health care provider or diet and nutrition specialist (dietitian) to adjust your eating plan to your individual calorie needs.  Reading food labels    · Check food labels for the amount of sodium per serving. Choose foods with less than 5 percent of the Daily Value of sodium. Generally, foods with less than 300 mg of sodium per serving fit into this eating plan.  · To find whole grains, look for the word \"whole\" as the first word in the ingredient list.  Shopping  · Buy products labeled as \"low-sodium\" or \"no salt added.\"  · Buy fresh foods. Avoid canned foods and premade or frozen meals.  Cooking  · Avoid adding salt when cooking. Use salt-free seasonings or herbs instead of table salt or sea salt. Check with your health care provider or pharmacist before using salt substitutes.  · Do not judd foods. Cook foods using healthy methods such as baking, boiling, grilling, and broiling instead.  · Cook with " heart-healthy oils, such as olive, canola, soybean, or sunflower oil.  Meal planning  · Eat a balanced diet that includes:  ? 5 or more servings of fruits and vegetables each day. At each meal, try to fill half of your plate with fruits and vegetables.  ? Up to 6-8 servings of whole grains each day.  ? Less than 6 oz of lean meat, poultry, or fish each day. A 3-oz serving of meat is about the same size as a deck of cards. One egg equals 1 oz.  ? 2 servings of low-fat dairy each day.  ? A serving of nuts, seeds, or beans 5 times each week.  ? Heart-healthy fats. Healthy fats called Omega-3 fatty acids are found in foods such as flaxseeds and coldwater fish, like sardines, salmon, and mackerel.  · Limit how much you eat of the following:  ? Canned or prepackaged foods.  ? Food that is high in trans fat, such as fried foods.  ? Food that is high in saturated fat, such as fatty meat.  ? Sweets, desserts, sugary drinks, and other foods with added sugar.  ? Full-fat dairy products.  · Do not salt foods before eating.  · Try to eat at least 2 vegetarian meals each week.  · Eat more home-cooked food and less restaurant, buffet, and fast food.  · When eating at a restaurant, ask that your food be prepared with less salt or no salt, if possible.  What foods are recommended?  The items listed may not be a complete list. Talk with your dietitian about what dietary choices are best for you.  Grains  Whole-grain or whole-wheat bread. Whole-grain or whole-wheat pasta. Brown rice. Oatmeal. Quinoa. Bulgur. Whole-grain and low-sodium cereals. [place]ana bread. Low-fat, low-sodium crackers. Whole-wheat flour tortillas.  Vegetables  Fresh or frozen vegetables (raw, steamed, roasted, or grilled). Low-sodium or reduced-sodium tomato and vegetable juice. Low-sodium or reduced-sodium tomato sauce and tomato paste. Low-sodium or reduced-sodium canned vegetables.  Fruits  All fresh, dried, or frozen fruit. Canned fruit in natural juice (without  added sugar).  Meat and other protein foods  Skinless chicken or turkey. Ground chicken or turkey. Pork with fat trimmed off. Fish and seafood. Egg whites. Dried beans, peas, or lentils. Unsalted nuts, nut butters, and seeds. Unsalted canned beans. Lean cuts of beef with fat trimmed off. Low-sodium, lean deli meat.  Dairy  Low-fat (1%) or fat-free (skim) milk. Fat-free, low-fat, or reduced-fat cheeses. Nonfat, low-sodium ricotta or cottage cheese. Low-fat or nonfat yogurt. Low-fat, low-sodium cheese.  Fats and oils  Soft margarine without trans fats. Vegetable oil. Low-fat, reduced-fat, or light mayonnaise and salad dressings (reduced-sodium). Canola, safflower, olive, soybean, and sunflower oils. Avocado.  Seasoning and other foods  Herbs. Spices. Seasoning mixes without salt. Unsalted popcorn and pretzels. Fat-free sweets.  What foods are not recommended?  The items listed may not be a complete list. Talk with your dietitian about what dietary choices are best for you.  Grains  Baked goods made with fat, such as croissants, muffins, or some breads. Dry pasta or rice meal packs.  Vegetables  Creamed or fried vegetables. Vegetables in a cheese sauce. Regular canned vegetables (not low-sodium or reduced-sodium). Regular canned tomato sauce and paste (not low-sodium or reduced-sodium). Regular tomato and vegetable juice (not low-sodium or reduced-sodium). Pickles. Olives.  Fruits  Canned fruit in a light or heavy syrup. Fried fruit. Fruit in cream or butter sauce.  Meat and other protein foods  Fatty cuts of meat. Ribs. Fried meat. [place]. Sausage. Bologna and other processed lunch meats. Salami. Fatback. Hotdogs. Bratwurst. Salted nuts and seeds. Canned beans with added salt. Canned or smoked fish. Whole eggs or egg yolks. Chicken or turkey with skin.  Dairy  Whole or 2% milk, cream, and half-and-half. Whole or full-fat cream cheese. Whole-fat or sweetened yogurt. Full-fat cheese. Nondairy creamers. Whipped toppings.  Processed cheese and cheese spreads.  Fats and oils  Butter. Stick margarine. Lard. Shortening. Ghee. [place] fat. Tropical oils, such as coconut, palm kernel, or palm oil.  Seasoning and other foods  Salted popcorn and pretzels. Onion salt, garlic salt, seasoned salt, table salt, and sea salt. Worcestershire sauce. Tartar sauce. Barbecue sauce. Teriyaki sauce. Soy sauce, including reduced-sodium. Steak sauce. Canned and packaged gravies. Fish sauce. Oyster sauce. Cocktail sauce. Horseradish that you find on the shelf. Ketchup. Mustard. Meat flavorings and tenderizers. Bouillon cubes. Hot sauce and Tabasco sauce. Premade or packaged marinades. Premade or packaged taco seasonings. Relishes. Regular salad dressings.  Where to find more information:  · National [employer], [employer] [facility] [place]: www.nhlbi.nih.gov  · American Heart Association: www.heart.org  Summary  · The DASH eating plan is a healthy eating plan that has been shown to reduce high blood pressure (hypertension). It may also reduce your risk for type 2 diabetes, heart disease, and stroke.  · With the DASH eating plan, you should limit salt (sodium) intake to 2,300 mg a day. If you have hypertension, you may need to reduce your sodium intake to 1,500 mg a day.  · When on the DASH eating plan, aim to eat more fresh fruits and vegetables, whole grains, lean proteins, low-fat dairy, and heart-healthy fats.  · Work with your health care provider or diet and nutrition specialist (dietitian) to adjust your eating plan to your individual calorie needs.  This information is not intended to replace advice given to you by your health care provider. Make sure you discuss any questions you have with your health care provider.  Document Released: 12/06/2012 Document Revised: 11/30/2018 Document Reviewed: 12/11/2017  Elsevier Patient Education © 2020 [employer]  Type 2 Diabetes Mellitus, Self Care, Adult  Caring for yourself after you have been diagnosed with type 2  diabetes (type 2 diabetes mellitus) means keeping your blood sugar (glucose) under control with a balance of:  · Nutrition.  · Exercise.  · Lifestyle changes.  · Medicines or insulin, if necessary.  · Support from your team of health care providers and others.  The following information explains what you need to know to manage your diabetes at home.  What are the risks?  Having diabetes can put you at risk for other long-term (chronic) conditions, such as heart disease and kidney disease. Your health care provider may prescribe medicines to help prevent complications from diabetes. These medicines may include:  · Aspirin.  · Medicine to lower cholesterol.  · Medicine to control blood pressure.  How to monitor blood glucose    · Check your blood glucose every day, as often as told by your health care provider.  · Have your A1c (hemoglobin A1c) level checked two or more times a year, or as often as told by your health care provider.  Your health care provider will set individualized treatment goals for you. Generally, the goal of treatment is to maintain the following blood glucose levels:  · Before meals (preprandial):  mg/dL (4.4-7.2 mmol/L).  · After meals (postprandial): below 180 mg/dL (10 mmol/L).  · A1c level: less than 7%.  How to manage hyperglycemia and hypoglycemia  Hyperglycemia symptoms  Hyperglycemia, also called high blood glucose, occurs when blood glucose is too high. Make sure you know the early signs of hyperglycemia, such as:  · Increased thirst.  · Hunger.  · Feeling very tired.  · Needing to urinate more often than usual.  · Blurry vision.  Hypoglycemia symptoms  Hypoglycemia, also called low blood glucose, occurs with a blood glucose level at or below 70 mg/dL (3.9 mmol/L). The risk for hypoglycemia increases during or after exercise, during sleep, during illness, and when skipping meals or not eating for a long time (fasting).  It is important to know the symptoms of hypoglycemia and  treat it right away. Always have a 15-gram rapid-acting carbohydrate snack with you to treat low blood glucose. Family members and close friends should also know the symptoms and should understand how to treat hypoglycemia, in case you are not able to treat yourself. Symptoms may include:  · Hunger.  · Anxiety.  · Sweating and feeling clammy.  · Confusion.  · Dizziness or feeling light-headed.  · Sleepiness.  · Nausea.  · Increased heart rate.  · Headache.  · Blurry vision.  · Irritability.  · A change in coordination.  · Tingling or numbness around the mouth, lips, or tongue.  · Restless sleep.  · Fainting.  · Seizure.  Treating hypoglycemia  If you are alert and able to swallow safely, follow the 15:15 rule:  · Take 15 grams of a rapid-acting carbohydrate. Talk with your health care provider about how much you should take.  · Rapid-acting options include:  ? Glucose pills (take 15 grams).  ? 6-8 pieces of hard candy.  ? 4-6 oz (120-150 mL) of fruit juice.  ? 4-6 oz (120-150 mL) of regular (not diet) soda.  ? 1 Tbsp (15 mL) honey or sugar.  · Check your blood glucose 15 minutes after you take the carbohydrate.  · If the repeat blood glucose level is still at or below 70 mg/dL (3.9 mmol/L), take 15 grams of a carbohydrate again.  · If your blood glucose level does not increase above 70 mg/dL (3.9 mmol/L) after 3 tries, seek emergency medical care.  · After your blood glucose level returns to normal, eat a meal or a snack within 1 hour.  Treating severe hypoglycemia  Severe hypoglycemia is when your blood glucose level is at or below 54 mg/dL (3 mmol/L). Severe hypoglycemia is an emergency. Do not wait to see if the symptoms will go away. Get medical help right away. Call your local emergency services (911 in the [place]  If you have severe hypoglycemia and you cannot eat or drink, you may need an injection of glucagon. A family member or close friend should learn how to check your blood glucose and how to give you a  glucagon injection. Ask your health care provider if you need to have an emergency glucagon injection kit available.  Severe hypoglycemia may need to be treated in a hospital. The treatment may include getting glucose through an IV. You may also need treatment for the cause of your hypoglycemia.  Follow these instructions at home:  Take diabetes medicines as told  · If your health care provider prescribed insulin or diabetes medicines, take them every day.  · Do not run out of insulin or other diabetes medicines that you take. Plan ahead so you always have these available.  · If you use insulin, adjust your dosage based on how physically active you are and what foods you eat. Your health care provider will tell you how to adjust your dosage.  Make healthy food choices    The things that you eat and drink affect your blood glucose and your insulin dosage. Making good choices helps to control your diabetes and prevent other health problems. A healthy meal plan includes eating lean proteins, complex carbohydrates, fresh fruits and vegetables, low-fat dairy products, and healthy fats.  Make an appointment to see a diet and nutrition specialist (registered dietitian) to help you create an eating plan that is right for you. Make sure that you:  · Follow instructions from your health care provider about eating or drinking restrictions.  · Drink enough fluid to keep your urine pale yellow.  · Keep a record of the carbohydrates that you eat. Do this by reading food labels and learning the standard serving sizes of foods.  · Follow your sick day plan whenever you cannot eat or drink as usual. Make this plan in advance with your health care provider.    Stay active  Exercise regularly, as told by your health care provider. This may include:  · Stretching and doing strength exercises, such as yoga or weightlifting, 2 or more times a week.  · Doing 150 minutes or more of moderate-intensity or vigorous-intensity exercise each  week. This could be brisk walking, biking, or water aerobics.  ? Spread out your activity over 3 or more days of the week.  ? Do not go more than 2 days in a row without doing some kind of physical activity.  When you start a new exercise or activity, work with your health care provider to adjust your insulin, medicines, or food intake as needed.  Make healthy lifestyle choices  · Do not use any tobacco products, such as cigarettes, chewing tobacco, and e-cigarettes. If you need help quitting, ask your health care provider.  · If your health care provider says that alcohol is safe for you, limit alcohol intake to no more than 1 drink per day for nonpregnant women and 2 drinks per day for men. One drink equals 12 oz of beer (355 mL), 5 oz of wine (148 mL), or 1½ oz of hard liquor (44 mL).  · Learn to manage stress. If you need help with this, ask your health care provider.  Care for your body    · Keep your immunizations up to date. In addition to getting vaccinations as told by your health care provider, it is recommended that you get vaccinated against the following illnesses:  ? The flu (influenza). Get a flu shot every year.  ? Pneumonia.  ? Hepatitis B.  · Schedule an eye exam soon after your diagnosis, and then one time every year after that.  · Check your skin and feet every day for cuts, bruises, redness, blisters, or sores. Schedule a foot exam with your health care provider once every year.  · Brush your teeth and gums two times a day, and floss one or more times a day. Visit your dentist one or more times every 6 months.  · Maintain a healthy weight.  General instructions  · Take over-the-counter and prescription medicines only as told by your health care provider.  · Share your diabetes management plan with people in your workplace, school, and household.  · Carry a medical alert card or wear medical alert jewelry.  · Keep all follow-up visits as told by your health care provider. This is  important.  Questions to ask your health care provider  · Do I need to meet with a diabetes educator?  · Where can I find a support group for people with diabetes?  Where to find more information  For more information about diabetes, visit:  · American Diabetes Association (ADA): www.diabetes.org  · American Association of Diabetes Educators (AADE): www.diabeteseducator.org  Summary  · Caring for yourself after you have been diagnosed with (type 2 diabetes mellitus) means keeping your blood sugar (glucose) under control with a balance of nutrition, exercise, lifestyle changes, and medicine.  · Check your blood glucose every day, as often as told by your health care provider.  · Having diabetes can put you at risk for other long-term (chronic) conditions, such as heart disease and kidney disease. Your health care provider may prescribe medicines to help prevent complications from diabetes.  · Keep all follow-up visits as told by your health care provider. This is important.  This information is not intended to replace advice given to you by your health care provider. Make sure you discuss any questions you have with your health care provider.  Document Released: 04/10/2017 Document Revised: 06/10/2019 Document Reviewed: 01/20/2017  [employer]vier Patient Education © 2020 [employer]

## 2020-06-05 LAB
25(OH)D3 SERPL-MCNC: 21.5 NG/ML (ref 30–100)
ALBUMIN SERPL-MCNC: 4.1 G/DL (ref 3.5–5.2)
ALBUMIN/GLOB SERPL: 1.5 G/DL
ALP SERPL-CCNC: 131 U/L (ref 39–117)
ALT SERPL W P-5'-P-CCNC: 27 U/L (ref 1–41)
ANION GAP SERPL CALCULATED.3IONS-SCNC: 13.4 MMOL/L (ref 5–15)
AST SERPL-CCNC: 24 U/L (ref 1–40)
BASOPHILS # BLD AUTO: 0.08 10*3/MM3 (ref 0–0.2)
BASOPHILS NFR BLD AUTO: 1 % (ref 0–1.5)
BILIRUB SERPL-MCNC: 0.3 MG/DL (ref 0.2–1.2)
BUN BLD-MCNC: 15 MG/DL (ref 6–20)
BUN/CREAT SERPL: 18.8 (ref 7–25)
CALCIUM SPEC-SCNC: 9 MG/DL (ref 8.6–10.5)
CHLORIDE SERPL-SCNC: 105 MMOL/L (ref 98–107)
CHOLEST SERPL-MCNC: 85 MG/DL (ref 0–200)
CO2 SERPL-SCNC: 20.6 MMOL/L (ref 22–29)
CREAT BLD-MCNC: 0.8 MG/DL (ref 0.76–1.27)
DEPRECATED RDW RBC AUTO: 43.9 FL (ref 37–54)
EOSINOPHIL # BLD AUTO: 0.3 10*3/MM3 (ref 0–0.4)
EOSINOPHIL NFR BLD AUTO: 3.9 % (ref 0.3–6.2)
ERYTHROCYTE [DISTWIDTH] IN BLOOD BY AUTOMATED COUNT: 13.2 % (ref 12.3–15.4)
GFR SERPL CREATININE-BSD FRML MDRD: 101 ML/MIN/1.73
GLOBULIN UR ELPH-MCNC: 2.7 GM/DL
GLUCOSE BLD-MCNC: 150 MG/DL (ref 65–99)
HBA1C MFR BLD: 7.3 % (ref 4.8–5.6)
HCT VFR BLD AUTO: 50.2 % (ref 37.5–51)
HDLC SERPL-MCNC: 23 MG/DL (ref 40–60)
HGB BLD-MCNC: 17 G/DL (ref 13–17.7)
IMM GRANULOCYTES # BLD AUTO: 0.02 10*3/MM3 (ref 0–0.05)
IMM GRANULOCYTES NFR BLD AUTO: 0.3 % (ref 0–0.5)
LDLC SERPL CALC-MCNC: 28 MG/DL (ref 0–100)
LDLC/HDLC SERPL: 1.2 {RATIO}
LYMPHOCYTES # BLD AUTO: 2.18 10*3/MM3 (ref 0.7–3.1)
LYMPHOCYTES NFR BLD AUTO: 28.1 % (ref 19.6–45.3)
MCH RBC QN AUTO: 31.1 PG (ref 26.6–33)
MCHC RBC AUTO-ENTMCNC: 33.9 G/DL (ref 31.5–35.7)
MCV RBC AUTO: 91.9 FL (ref 79–97)
MONOCYTES # BLD AUTO: 0.96 10*3/MM3 (ref 0.1–0.9)
MONOCYTES NFR BLD AUTO: 12.4 % (ref 5–12)
NEUTROPHILS # BLD AUTO: 4.22 10*3/MM3 (ref 1.7–7)
NEUTROPHILS NFR BLD AUTO: 54.3 % (ref 42.7–76)
NRBC BLD AUTO-RTO: 0 /100 WBC (ref 0–0.2)
PLATELET # BLD AUTO: 297 10*3/MM3 (ref 140–450)
PMV BLD AUTO: 10.2 FL (ref 6–12)
POTASSIUM BLD-SCNC: 4.3 MMOL/L (ref 3.5–5.2)
PROT SERPL-MCNC: 6.8 G/DL (ref 6–8.5)
RBC # BLD AUTO: 5.46 10*6/MM3 (ref 4.14–5.8)
SODIUM BLD-SCNC: 139 MMOL/L (ref 136–145)
TRIGL SERPL-MCNC: 172 MG/DL (ref 0–150)
TSH SERPL DL<=0.05 MIU/L-ACNC: 2.84 UIU/ML (ref 0.27–4.2)
VIT B12 BLD-MCNC: 1402 PG/ML (ref 211–946)
VLDLC SERPL-MCNC: 34.4 MG/DL (ref 5–40)
WBC NRBC COR # BLD: 7.76 10*3/MM3 (ref 3.4–10.8)

## 2020-06-06 PROBLEM — J98.4 RESTRICTIVE LUNG DISEASE: Status: ACTIVE | Noted: 2019-10-28

## 2020-06-25 DIAGNOSIS — M05.79 RHEUMATOID ARTHRITIS INVOLVING MULTIPLE SITES WITH POSITIVE RHEUMATOID FACTOR (HCC): Chronic | ICD-10-CM

## 2020-07-09 ENCOUNTER — OFFICE VISIT (OUTPATIENT)
Dept: FAMILY MEDICINE CLINIC | Facility: CLINIC | Age: 55
End: 2020-07-09

## 2020-07-09 VITALS
DIASTOLIC BLOOD PRESSURE: 78 MMHG | OXYGEN SATURATION: 95 % | WEIGHT: 228.6 LBS | BODY MASS INDEX: 34.65 KG/M2 | RESPIRATION RATE: 14 BRPM | HEIGHT: 68 IN | SYSTOLIC BLOOD PRESSURE: 120 MMHG | TEMPERATURE: 97.1 F | HEART RATE: 92 BPM

## 2020-07-09 DIAGNOSIS — J98.4 RESTRICTIVE LUNG DISEASE: Chronic | ICD-10-CM

## 2020-07-09 DIAGNOSIS — H91.93 BILATERAL HEARING LOSS, UNSPECIFIED HEARING LOSS TYPE: ICD-10-CM

## 2020-07-09 DIAGNOSIS — E66.09 CLASS 1 OBESITY DUE TO EXCESS CALORIES WITH SERIOUS COMORBIDITY AND BODY MASS INDEX (BMI) OF 32.0 TO 32.9 IN ADULT: ICD-10-CM

## 2020-07-09 DIAGNOSIS — I10 BENIGN ESSENTIAL HYPERTENSION: Chronic | ICD-10-CM

## 2020-07-09 DIAGNOSIS — E78.2 MIXED HYPERLIPIDEMIA: ICD-10-CM

## 2020-07-09 DIAGNOSIS — M05.79 RHEUMATOID ARTHRITIS INVOLVING MULTIPLE SITES WITH POSITIVE RHEUMATOID FACTOR (HCC): Chronic | ICD-10-CM

## 2020-07-09 DIAGNOSIS — E11.40 CONTROLLED TYPE 2 DIABETES WITH NEUROPATHY (HCC): Chronic | ICD-10-CM

## 2020-07-09 DIAGNOSIS — R00.2 PALPITATIONS: Primary | ICD-10-CM

## 2020-07-09 PROCEDURE — 99214 OFFICE O/P EST MOD 30 MIN: CPT | Performed by: NURSE PRACTITIONER

## 2020-07-09 RX ORDER — METOPROLOL SUCCINATE 100 MG/1
TABLET, EXTENDED RELEASE ORAL
COMMUNITY
Start: 2020-06-25 | End: 2020-08-04

## 2020-07-09 NOTE — PATIENT INSTRUCTIONS
Diabetes Mellitus and Nutrition  Palpitations  Palpitations are feelings that your heartbeat is not normal. Your heartbeat may feel like it is:  · Uneven.  · Faster than normal.  · Fluttering.  · Skipping a beat.  This is usually not a serious problem. In some cases, you may need tests to rule out any serious problems.  Follow these instructions at home:  Pay attention to any changes in your condition. Take these actions to help manage your symptoms:  Eating and drinking  · Avoid:  ? Coffee, tea, soft drinks, and energy drinks.  ? Chocolate.  ? Alcohol.  ? Diet pills.  Lifestyle    · Try to lower your stress. These things can help you relax:  ? Yoga.  ? Deep breathing and meditation.  ? Exercise.  ? Using words and images to create positive thoughts (guided imagery).  ? Using your mind to control things in your body (biofeedback).  · Do not use drugs.  · Get plenty of rest and sleep. Keep a regular bed time.  General instructions    · Take over-the-counter and prescription medicines only as told by your doctor.  · Do not use any products that contain nicotine or tobacco, such as cigarettes and e-cigarettes. If you need help quitting, ask your doctor.  · Keep all follow-up visits as told by your doctor. This is important. You may need more tests if palpitations do not go away or get worse.  Contact a doctor if:  · Your symptoms last more than 24 hours.  · Your symptoms occur more often.  Get help right away if you:  · Have chest pain.  · Feel short of breath.  · Have a very bad headache.  · Feel dizzy.  · Pass out (faint).  Summary  · Palpitations are feelings that your heartbeat is uneven or faster than normal. It may feel like your heart is fluttering or skipping a beat.  · Avoid food and drinks that may cause palpitations. These include caffeine, chocolate, and alcohol.  · Try to lower your stress. Do not smoke or use drugs.  · Get help right away if you faint or have chest pain, shortness of breath, a severe  headache, or dizziness.  This information is not intended to replace advice given to you by your health care provider. Make sure you discuss any questions you have with your health care provider.  Document Released: 09/26/2009 Document Revised: 01/30/2019 Document Reviewed: 01/30/2019  [employer] Patient Education © 2020 [employer]    When you have diabetes (diabetes mellitus), it is very important to have healthy eating habits because your blood sugar (glucose) levels are greatly affected by what you eat and drink. Eating healthy foods in the appropriate amounts, at about the same times every day, can help you:  · Control your blood glucose.  · Lower your risk of heart disease.  · Improve your blood pressure.  · Reach or maintain a healthy weight.  Every person with diabetes is different, and each person has different needs for a meal plan. Your health care provider may recommend that you work with a diet and nutrition specialist (dietitian) to make a meal plan that is best for you. Your meal plan may vary depending on factors such as:  · The calories you need.  · The medicines you take.  · Your weight.  · Your blood glucose, blood pressure, and cholesterol levels.  · Your activity level.  · Other health conditions you have, such as heart or kidney disease.  How do carbohydrates affect me?  Carbohydrates, also called carbs, affect your blood glucose level more than any other type of food. Eating carbs naturally raises the amount of glucose in your blood. Carb counting is a method for keeping track of how many carbs you eat. Counting carbs is important to keep your blood glucose at a healthy level, especially if you use insulin or take certain oral diabetes medicines.  It is important to know how many carbs you can safely have in each meal. This is different for every person. Your dietitian can help you calculate how many carbs you should have at each meal and for each snack.  Foods that contain carbs  "include:  · Bread, cereal, rice, pasta, and crackers.  · Potatoes and corn.  · Peas, beans, and lentils.  · Milk and yogurt.  · Fruit and juice.  · Desserts, such as cakes, cookies, ice cream, and candy.  How does alcohol affect me?  Alcohol can cause a sudden decrease in blood glucose (hypoglycemia), especially if you use insulin or take certain oral diabetes medicines. Hypoglycemia can be a life-threatening condition. Symptoms of hypoglycemia (sleepiness, dizziness, and confusion) are similar to symptoms of having too much alcohol.  If your health care provider says that alcohol is safe for you, follow these guidelines:  · Limit alcohol intake to no more than 1 drink per day for nonpregnant women and 2 drinks per day for men. One drink equals 12 oz of beer, 5 oz of wine, or 1½ oz of hard liquor.  · Do not drink on an empty stomach.  · Keep yourself hydrated with water, diet soda, or unsweetened iced tea.  · Keep in mind that regular soda, juice, and other mixers may contain a lot of sugar and must be counted as carbs.  What are tips for following this plan?    Reading food labels  · Start by checking the serving size on the \"Nutrition Facts\" label of packaged foods and drinks. The amount of calories, carbs, fats, and other nutrients listed on the label is based on one serving of the item. Many items contain more than one serving per package.  · Check the total grams (g) of carbs in one serving. You can calculate the number of servings of carbs in one serving by dividing the total carbs by 15. For example, if a food has 30 g of total carbs, it would be equal to 2 servings of carbs.  · Check the number of grams (g) of saturated and trans fats in one serving. Choose foods that have low or no amount of these fats.  · Check the number of milligrams (mg) of salt (sodium) in one serving. Most people should limit total sodium intake to less than 2,300 mg per day.  · Always check the nutrition information of foods labeled " "as \"low-fat\" or \"nonfat\". These foods may be higher in added sugar or refined carbs and should be avoided.  · Talk to your dietitian to identify your daily goals for nutrients listed on the label.  Shopping  · Avoid buying canned, premade, or processed foods. These foods tend to be high in fat, sodium, and added sugar.  · Shop around the outside edge of the grocery store. This includes fresh fruits and vegetables, bulk grains, fresh meats, and fresh dairy.  Cooking  · Use low-heat cooking methods, such as baking, instead of high-heat cooking methods like deep frying.  · Cook using healthy oils, such as olive, canola, or sunflower oil.  · Avoid cooking with butter, cream, or high-fat meats.  Meal planning  · Eat meals and snacks regularly, preferably at the same times every day. Avoid going long periods of time without eating.  · Eat foods high in fiber, such as fresh fruits, vegetables, beans, and whole grains. Talk to your dietitian about how many servings of carbs you can eat at each meal.  · Eat 4-6 ounces (oz) of lean protein each day, such as lean meat, chicken, fish, eggs, or tofu. One oz of lean protein is equal to:  ? 1 oz of meat, chicken, or fish.  ? 1 egg.  ? ¼ cup of tofu.  · Eat some foods each day that contain healthy fats, such as avocado, nuts, seeds, and fish.  Lifestyle  · Check your blood glucose regularly.  · Exercise regularly as told by your health care provider. This may include:  ? 150 minutes of moderate-intensity or vigorous-intensity exercise each week. This could be brisk walking, biking, or water aerobics.  ? Stretching and doing strength exercises, such as yoga or weightlifting, at least 2 times a week.  · Take medicines as told by your health care provider.  · Do not use any products that contain nicotine or tobacco, such as cigarettes and e-cigarettes. If you need help quitting, ask your health care provider.  · Work with a counselor or diabetes educator to identify strategies to " manage stress and any emotional and social challenges.  Questions to ask a health care provider  · Do I need to meet with a diabetes educator?  · Do I need to meet with a dietitian?  · What number can I call if I have questions?  · When are the best times to check my blood glucose?  Where to find more information:  · American Diabetes Association: diabetes.org  · Academy of Nutrition and Dietetics: www.eatright.org  · National [place] of Diabetes and Digestive and Kidney Diseases (NIH): www.niddk.nih.gov  Summary  · A healthy meal plan will help you control your blood glucose and maintain a healthy lifestyle.  · Working with a diet and nutrition specialist (dietitian) can help you make a meal plan that is best for you.  · Keep in mind that carbohydrates (carbs) and alcohol have immediate effects on your blood glucose levels. It is important to count carbs and to use alcohol carefully.  This information is not intended to replace advice given to you by your health care provider. Make sure you discuss any questions you have with your health care provider.  Document Released: 09/14/2006 Document Revised: 11/30/2018 Document Reviewed: 01/22/2018  [employer] Patient Education © 2020 [employer]  Metoprolol tablets  What is this medicine?  METOPROLOL (me TOE proe lole) is a beta-blocker. Beta-blockers reduce the workload on the heart and help it to beat more regularly. This medicine is used to treat high blood pressure and to prevent chest pain. It is also used to after a heart attack and to prevent an additional heart attack from occurring.  This medicine may be used for other purposes; ask your health care provider or pharmacist if you have questions.  COMMON BRAND NAME(S): Lopressor  What should I tell my health care provider before I take this medicine?  They need to know if you have any of these conditions:  · diabetes  · heart or vessel disease like slow heart rate, worsening heart failure, heart block, sick sinus  syndrome or Raynaud's disease  · kidney disease  · liver disease  · lung or breathing disease, like asthma or emphysema  · pheochromocytoma  · thyroid disease  · an unusual or allergic reaction to metoprolol, other beta-blockers, medicines, foods, dyes, or preservatives  · pregnant or trying to get pregnant  · breast-feeding  How should I use this medicine?  Take this medicine by mouth with a drink of water. Follow the directions on the prescription label. Take this medicine immediately after meals. Take your doses at regular intervals. Do not take more medicine than directed. Do not stop taking this medicine suddenly. This could lead to serious heart-related effects.  Talk to your pediatrician regarding the use of this medicine in children. Special care may be needed.  Overdosage: If you think you have taken too much of this medicine contact a poison control center or emergency room at once.  NOTE: This medicine is only for you. Do not share this medicine with others.  What if I miss a dose?  If you miss a dose, take it as soon as you can. If it is almost time for your next dose, take only that dose. Do not take double or extra doses.  What may interact with this medicine?  This medicine may interact with the following medications:  · certain medicines for blood pressure, heart disease, irregular heart beat  · certain medicines for depression like monoamine oxidase (MAO) inhibitors, fluoxetine, or paroxetine  · clonidine  · dobutamine  · epinephrine  · isoproterenol  · reserpine  This list may not describe all possible interactions. Give your health care provider a list of all the medicines, herbs, non-prescription drugs, or dietary supplements you use. Also tell them if you smoke, drink alcohol, or use illegal drugs. Some items may interact with your medicine.  What should I watch for while using this medicine?  Visit your doctor or health care professional for regular check ups. Contact your doctor right away if  your symptoms worsen. Check your blood pressure and pulse rate regularly. Ask your health care professional what your blood pressure and pulse rate should be, and when you should contact them.  You may get drowsy or dizzy. Do not drive, use machinery, or do anything that needs mental alertness until you know how this medicine affects you. Do not sit or stand up quickly, especially if you are an older patient. This reduces the risk of dizzy or fainting spells. Contact your doctor if these symptoms continue. Alcohol may interfere with the effect of this medicine. Avoid alcoholic drinks.  This medicine may increase blood sugar. Ask your healthcare provider if changes in diet or medicines are needed if you have diabetes.  What side effects may I notice from receiving this medicine?  Side effects that you should report to your doctor or health care professional as soon as possible:  · allergic reactions like skin rash, itching or hives  · cold or numb hands or feet  · depression  · difficulty breathing  · faint  · fever with sore throat  · irregular heartbeat, chest pain  · rapid weight gain  ·   signs and symptoms of high blood sugar such as being more thirsty or hungry or having to urinate more than normal. You may also feel very tired or have blurry vision.  · swollen legs or ankles  Side effects that usually do not require medical attention (report to your doctor or health care professional if they continue or are bothersome):  · anxiety or nervousness  · change in sex drive or performance  · dry skin  · headache  · nightmares or trouble sleeping  · short term memory loss  · stomach upset or diarrhea  This list may not describe all possible side effects. Call your doctor for medical advice about side effects. You may report side effects to FDA at 5-108-FDA-3082.  Where should I keep my medicine?  Keep out of the reach of children.  Store at room temperature between 15 and 30 degrees C (59 and 86 degrees F). Throw  away any unused medicine after the expiration date.  NOTE: This sheet is a summary. It may not cover all possible information. If you have questions about this medicine, talk to your doctor, pharmacist, or health care provider.  © 2020 [employer] (2019-10-08 11:15:23)

## 2020-07-30 ENCOUNTER — OFFICE VISIT (OUTPATIENT)
Dept: FAMILY MEDICINE CLINIC | Facility: CLINIC | Age: 55
End: 2020-07-30

## 2020-07-30 VITALS
BODY MASS INDEX: 34.56 KG/M2 | HEIGHT: 68 IN | SYSTOLIC BLOOD PRESSURE: 128 MMHG | TEMPERATURE: 97.8 F | WEIGHT: 228 LBS | DIASTOLIC BLOOD PRESSURE: 88 MMHG | OXYGEN SATURATION: 93 % | HEART RATE: 98 BPM

## 2020-07-30 DIAGNOSIS — Z72.0 CHEWS TOBACCO: ICD-10-CM

## 2020-07-30 DIAGNOSIS — E55.9 VITAMIN D DEFICIENCY: ICD-10-CM

## 2020-07-30 DIAGNOSIS — J98.4 RESTRICTIVE LUNG DISEASE: Chronic | ICD-10-CM

## 2020-07-30 DIAGNOSIS — R00.0 TACHYCARDIA: ICD-10-CM

## 2020-07-30 DIAGNOSIS — E11.40 CONTROLLED TYPE 2 DIABETES WITH NEUROPATHY (HCC): Primary | Chronic | ICD-10-CM

## 2020-07-30 DIAGNOSIS — K21.9 GASTROESOPHAGEAL REFLUX DISEASE WITHOUT ESOPHAGITIS: Chronic | ICD-10-CM

## 2020-07-30 DIAGNOSIS — M05.79 RHEUMATOID ARTHRITIS INVOLVING MULTIPLE SITES WITH POSITIVE RHEUMATOID FACTOR (HCC): Chronic | ICD-10-CM

## 2020-07-30 DIAGNOSIS — E78.2 MIXED HYPERLIPIDEMIA: Chronic | ICD-10-CM

## 2020-07-30 DIAGNOSIS — F51.04 CHRONIC INSOMNIA: Chronic | ICD-10-CM

## 2020-07-30 DIAGNOSIS — I10 BENIGN ESSENTIAL HYPERTENSION: Chronic | ICD-10-CM

## 2020-07-30 PROCEDURE — 99214 OFFICE O/P EST MOD 30 MIN: CPT | Performed by: NURSE PRACTITIONER

## 2020-08-01 PROBLEM — R00.0 TACHYCARDIA: Status: ACTIVE | Noted: 2020-08-01

## 2020-08-04 DIAGNOSIS — Z87.09 HISTORY OF ASTHMA: ICD-10-CM

## 2020-08-04 DIAGNOSIS — E78.5 HYPERLIPIDEMIA, UNSPECIFIED HYPERLIPIDEMIA TYPE: Chronic | ICD-10-CM

## 2020-08-04 DIAGNOSIS — F51.04 PSYCHOPHYSIOLOGICAL INSOMNIA: ICD-10-CM

## 2020-08-04 DIAGNOSIS — E55.9 VITAMIN D DEFICIENCY: ICD-10-CM

## 2020-08-04 DIAGNOSIS — E11.69 CONTROLLED TYPE 2 DIABETES MELLITUS WITH OTHER SPECIFIED COMPLICATION, WITHOUT LONG-TERM CURRENT USE OF INSULIN (HCC): Chronic | ICD-10-CM

## 2020-08-04 RX ORDER — ATORVASTATIN CALCIUM 10 MG/1
TABLET, FILM COATED ORAL
Qty: 30 TABLET | Refills: 4 | Status: SHIPPED | OUTPATIENT
Start: 2020-08-04 | End: 2020-09-16 | Stop reason: SDUPTHER

## 2020-08-04 RX ORDER — ALBUTEROL SULFATE 90 UG/1
AEROSOL, METERED RESPIRATORY (INHALATION)
Qty: 18 G | Refills: 4 | Status: SHIPPED | OUTPATIENT
Start: 2020-08-04 | End: 2020-09-16 | Stop reason: SDUPTHER

## 2020-08-04 RX ORDER — LANCETS 30 GAUGE
EACH MISCELLANEOUS
Qty: 100 EACH | Refills: 0 | Status: SHIPPED | OUTPATIENT
Start: 2020-08-04 | End: 2020-09-16 | Stop reason: SDUPTHER

## 2020-08-04 RX ORDER — MONTELUKAST SODIUM 10 MG/1
TABLET ORAL
Qty: 30 TABLET | Refills: 4 | Status: SHIPPED | OUTPATIENT
Start: 2020-08-04 | End: 2021-01-28

## 2020-08-04 RX ORDER — METOPROLOL SUCCINATE 100 MG/1
TABLET, EXTENDED RELEASE ORAL
Qty: 30 TABLET | Refills: 0 | Status: SHIPPED | OUTPATIENT
Start: 2020-08-04 | End: 2020-09-16 | Stop reason: SDUPTHER

## 2020-08-06 RX ORDER — MIRTAZAPINE 15 MG/1
TABLET, FILM COATED ORAL
Qty: 30 TABLET | Refills: 5 | OUTPATIENT
Start: 2020-08-06

## 2020-08-06 RX ORDER — ERGOCALCIFEROL 1.25 MG/1
CAPSULE ORAL
Qty: 4 CAPSULE | Refills: 5 | OUTPATIENT
Start: 2020-08-06

## 2020-08-18 ENCOUNTER — OFFICE VISIT (OUTPATIENT)
Dept: PULMONOLOGY | Facility: CLINIC | Age: 55
End: 2020-08-18

## 2020-08-18 VITALS
HEART RATE: 92 BPM | BODY MASS INDEX: 34.86 KG/M2 | WEIGHT: 230 LBS | SYSTOLIC BLOOD PRESSURE: 128 MMHG | DIASTOLIC BLOOD PRESSURE: 78 MMHG | TEMPERATURE: 97.1 F | HEIGHT: 68 IN | OXYGEN SATURATION: 96 %

## 2020-08-18 DIAGNOSIS — R91.1 PULMONARY NODULE: Primary | ICD-10-CM

## 2020-08-18 DIAGNOSIS — G47.34 NOCTURNAL OXYGEN DESATURATION: ICD-10-CM

## 2020-08-18 PROCEDURE — 99212 OFFICE O/P EST SF 10 MIN: CPT | Performed by: INTERNAL MEDICINE

## 2020-08-18 NOTE — PROGRESS NOTES
Subjective    Raimundo Munguia presents for the following right upper lobe nodule.    History of Present Illness   Mr. Munguia is a 55-year-old gentleman with a past medical history of right upper lobe nodule, restrictive lung disease due to obesity, nocturnal desaturation for which he is on 2 L a minute of supplemental oxygen.  He presents to pulmonary patient clinic as a regular follow-up.  He reports no shortness of breath at rest on exertion.  He denies any chest pain, wheezing or coughing.  He denies any fever or chills.  He denies night sweats or weight loss.  He uses 2 L monitor supplemental oxygen at bedtime.  Uses rescue inhaler as needed             Review of system  ROS  General: Negative for fatigue or fever  Psychological: Negative for anxiety or depression  Ophthalmic: Negative for blurry vision or double vision  ENT: Negative for epistaxis or hearing changes  Allergy and immunology: Negative for hives or nasal congestion  Hematological and lymphatic: Negative for jaundice or night sweats  Endocrine: Negative for lethargy, temperature intolerance  Respiratory: Negative for shortness of breath.  Negative for cough.  Negative for wheezing.  Cardiovascular: Negative for chest pain.  Negative for dyspnea on exertion  Gastrointestinal: No abdominal pain, no change in bowel habits  Musculoskeletal: Negative for joint swelling and joint pain  Neurological: No TIA or stroke symptoms  Dermatological: Negative for acne or eczema      Past Medical History:  Past Medical History:   Diagnosis Date   • Abnormal blood chemistry    • Allergic    • Cervicalgia    • DM type 2 (diabetes mellitus, type 2) (CMS/[facility])    • Elevated cholesterol    • GERD (gastroesophageal reflux disease)    • Helicobacter pylori (H. pylori) infection    • Hyperlipidemia    • Hypertension    • Insomnia    • Muscle spasm    • Obesity    • Osteopenia    • Palpitations    • RA (rheumatoid arthritis) (CMS/[facility])    • Stroke (CMS/[facility])    • Tension  headache    • Vitamin D deficiency        Family History:  Family History   Problem Relation Age of Onset   • Diabetes Mother    • Hypertension Mother    • Hypertension Father    • Hyperlipidemia Father    • Diabetes Sister    • Hyperlipidemia Sister    • Hypertension Sister    • Hypertension Brother    • Hyperlipidemia Brother        Social History:  Social History     Tobacco Use   • Smoking status: Never Smoker   • Smokeless tobacco: Current User     Types: Snuff   • Tobacco comment: Encouraged tobacco cessation   Substance Use Topics   • Alcohol use: No       Current Medications:  Current Outpatient Medications   Medication Sig Dispense Refill   • ALBUTEROL SULFATE  (90 Base) MCG/ACT inhaler INHALE 2 PUFFS BY MOUTH EVERY 4 HOURS AS NEEDED FOR SHORTNESS OF AIR. 18 g 4   • amitriptyline (ELAVIL) 100 MG tablet TAKE 1 TABLET BY MOUTH EVERY NIGHT. 30 tablet 5   • atorvastatin (LIPITOR) 10 MG tablet TAKE ONE TABLET BY MOUTH EVERY NIGHT AT BEDTIME 30 tablet 4   • cyclobenzaprine (FLEXERIL) 10 MG tablet Take 1 tablet by mouth 2 (Two) Times a Day As Needed for Muscle Spasms. 60 tablet 3   • fluticasone (FLONASE) 50 MCG/ACT nasal spray 1 spray into the nostril(s) as directed by provider 2 (Two) Times a Day. 1 bottle 4   • folic acid (FOLVITE) 1 MG tablet Take 1 tablet by mouth Daily. 30 tablet 4   • glucose blood (ONE TOUCH ULTRA TEST) test strip Monitor blood sugars daily for DM, type 2 50 each 6   • glucose blood test strip Test tid 100 each 4   • Lancets (ONETOUCH DELICA PLUS DTUXCN29H) misc USE TO CHECK BLOOD GLUCOSE THREE TIMES DAILY 100 each 0   • lansoprazole (PREVACID) 30 MG capsule Take 1 capsule by mouth Daily. 30 capsule 4   • loratadine (CLARITIN) 10 MG tablet Take 1 tablet by mouth Daily. 30 tablet 4   • metFORMIN (GLUCOPHAGE) 500 MG tablet Take 2 tablets by mouth 2 (Two) Times a Day. 120 tablet 4   • methotrexate 2.5 MG tablet TAKE 10 TABLETS BY MOUTH ONCE WEEKLY 40 tablet 5   • metoprolol succinate  "XL (TOPROL-XL) 100 MG 24 hr tablet TAKE 1/2 TABLET BY MOUTH TWO TIMES A DAY 30 tablet 0   • mirtazapine (REMERON) 15 MG tablet TAKE ONE TABLET BY MOUTH EVERY NIGHT 30 tablet 5   • montelukast (SINGULAIR) 10 MG tablet TAKE ONE TABLET BY MOUTH EVERY NIGHT 30 tablet 4   • vitamin B-12 (CYANOCOBALAMIN) 1000 MCG tablet TAKE 1 TABLET BY MOUTH DAILY. 30 tablet 5     No current facility-administered medications for this visit.        Allergies:  No Known Allergies    Vitals:  /78   Pulse 92   Temp 97.1 °F (36.2 °C) (Temporal)   Ht 172.7 cm (68\")   Wt 104 kg (230 lb)   SpO2 96%   BMI 34.97 kg/m²     Results for orders placed or performed in visit on 06/04/20   Comprehensive Metabolic Panel   Result Value Ref Range    Glucose 150 (H) 65 - 99 mg/dL    BUN 15 6 - 20 mg/dL    Creatinine 0.80 0.76 - 1.27 mg/dL    Sodium 139 136 - 145 mmol/L    Potassium 4.3 3.5 - 5.2 mmol/L    Chloride 105 98 - 107 mmol/L    CO2 20.6 (L) 22.0 - 29.0 mmol/L    Calcium 9.0 8.6 - 10.5 mg/dL    Total Protein 6.8 6.0 - 8.5 g/dL    Albumin 4.10 3.50 - 5.20 g/dL    ALT (SGPT) 27 1 - 41 U/L    AST (SGOT) 24 1 - 40 U/L    Alkaline Phosphatase 131 (H) 39 - 117 U/L    Total Bilirubin 0.3 0.2 - 1.2 mg/dL    eGFR Non African Amer 101 >60 mL/min/1.73    Globulin 2.7 gm/dL    A/G Ratio 1.5 g/dL    BUN/Creatinine Ratio 18.8 7.0 - 25.0    Anion Gap 13.4 5.0 - 15.0 mmol/L   Lipid Panel   Result Value Ref Range    Total Cholesterol 85 0 - 200 mg/dL    Triglycerides 172 (H) 0 - 150 mg/dL    HDL Cholesterol 23 (L) 40 - 60 mg/dL    LDL Cholesterol  28 0 - 100 mg/dL    VLDL Cholesterol 34.4 5 - 40 mg/dL    LDL/HDL Ratio 1.20    TSH   Result Value Ref Range    TSH 2.840 0.270 - 4.200 uIU/mL   Hemoglobin A1c   Result Value Ref Range    Hemoglobin A1C 7.30 (H) 4.80 - 5.60 %   Vitamin D 25 Hydroxy   Result Value Ref Range    25 Hydroxy, Vitamin D 21.5 (L) 30.0 - 100.0 ng/ml   Vitamin B12   Result Value Ref Range    Vitamin B-12 1,402 (H) 211 - 946 pg/mL   CBC " Auto Differential   Result Value Ref Range    WBC 7.76 3.40 - 10.80 10*3/mm3    RBC 5.46 4.14 - 5.80 10*6/mm3    Hemoglobin 17.0 13.0 - 17.7 g/dL    Hematocrit 50.2 37.5 - 51.0 %    MCV 91.9 79.0 - 97.0 fL    MCH 31.1 26.6 - 33.0 pg    MCHC 33.9 31.5 - 35.7 g/dL    RDW 13.2 12.3 - 15.4 %    RDW-SD 43.9 37.0 - 54.0 fl    MPV 10.2 6.0 - 12.0 fL    Platelets 297 140 - 450 10*3/mm3    Neutrophil % 54.3 42.7 - 76.0 %    Lymphocyte % 28.1 19.6 - 45.3 %    Monocyte % 12.4 (H) 5.0 - 12.0 %    Eosinophil % 3.9 0.3 - 6.2 %    Basophil % 1.0 0.0 - 1.5 %    Immature Grans % 0.3 0.0 - 0.5 %    Neutrophils, Absolute 4.22 1.70 - 7.00 10*3/mm3    Lymphocytes, Absolute 2.18 0.70 - 3.10 10*3/mm3    Monocytes, Absolute 0.96 (H) 0.10 - 0.90 10*3/mm3    Eosinophils, Absolute 0.30 0.00 - 0.40 10*3/mm3    Basophils, Absolute 0.08 0.00 - 0.20 10*3/mm3    Immature Grans, Absolute 0.02 0.00 - 0.05 10*3/mm3    nRBC 0.0 0.0 - 0.2 /100 WBC       Objective   Physical Exam  General Appearance:  In no acute distress and comfortable.    HEENT: Normocephalic, atraumatic, normal right and the left external ear.  Normal nose.  Lungs:  Normal effort and normal respiratory rate.  Negative for rales.  Negative for wheezes.  Negative for rhonchi.    Heart: Normal rate.  Regular rhythm.  S1 normal and S2 normal.    Abdomen: Abdomen is soft.  Bowel sounds are normal.   There is no abdominal tenderness.     Extremities: Normal range of motion.  Negative for dependent edema    Pulses: Distal pulses are intact.  There are no decreased pulses.    Neurological: Patient is alert and oriented to person, place and time.  Normal strength.    Pupils:  Pupils are equal, round, and reactive to light.    Skin:  Warm and dry.        I have reviewed the past medical history, past surgical history, family history and social history of the patient.        Labs:  No results found for: PHART, KSK4VXS, PO2ART, KRB3MID, BASEEXCESS, [id number]  Lab Results   Component Value Date     GLUCOSE 150 (H) 06/04/2020    CALCIUM 9.0 06/04/2020     06/04/2020    K 4.3 06/04/2020    CO2 20.6 (L) 06/04/2020     06/04/2020    BUN 15 06/04/2020    CREATININE 0.80 06/04/2020    EGFRIFAFRI  09/13/2016      Comment:      <15 Indicative of kidney failure.    EGFRIFNONA 101 06/04/2020    BCR 18.8 06/04/2020    ANIONGAP 13.4 06/04/2020     Lab Results   Component Value Date    WBC 7.76 06/04/2020    HGB 17.0 06/04/2020    HCT 50.2 06/04/2020    MCV 91.9 06/04/2020     06/04/2020         Imaging  No Images in the past 120 days found..      Assessment/Plan    Diagnosis Plan   1. Pulmonary nodule   ordered CT scan of the chest for March 2021   2. Nocturnal oxygen desaturation   continue with reported supplemental oxygen at bedtime          Follow up in 6 months and as needed.

## 2020-09-01 DIAGNOSIS — F51.04 PSYCHOPHYSIOLOGICAL INSOMNIA: ICD-10-CM

## 2020-09-01 RX ORDER — MIRTAZAPINE 15 MG/1
TABLET, FILM COATED ORAL
Qty: 30 TABLET | Refills: 0 | OUTPATIENT
Start: 2020-09-01

## 2020-09-08 ENCOUNTER — LAB (OUTPATIENT)
Dept: FAMILY MEDICINE CLINIC | Facility: CLINIC | Age: 55
End: 2020-09-08

## 2020-09-08 DIAGNOSIS — E55.9 VITAMIN D DEFICIENCY: ICD-10-CM

## 2020-09-08 DIAGNOSIS — M05.79 RHEUMATOID ARTHRITIS INVOLVING MULTIPLE SITES WITH POSITIVE RHEUMATOID FACTOR (HCC): Chronic | ICD-10-CM

## 2020-09-08 DIAGNOSIS — I10 BENIGN ESSENTIAL HYPERTENSION: Chronic | ICD-10-CM

## 2020-09-08 DIAGNOSIS — R00.0 TACHYCARDIA: ICD-10-CM

## 2020-09-08 DIAGNOSIS — E78.2 MIXED HYPERLIPIDEMIA: Chronic | ICD-10-CM

## 2020-09-08 DIAGNOSIS — K21.9 GASTROESOPHAGEAL REFLUX DISEASE WITHOUT ESOPHAGITIS: ICD-10-CM

## 2020-09-08 DIAGNOSIS — E11.40 CONTROLLED TYPE 2 DIABETES WITH NEUROPATHY (HCC): Chronic | ICD-10-CM

## 2020-09-08 DIAGNOSIS — J98.4 RESTRICTIVE LUNG DISEASE: ICD-10-CM

## 2020-09-08 PROCEDURE — 82607 VITAMIN B-12: CPT | Performed by: NURSE PRACTITIONER

## 2020-09-08 PROCEDURE — 36415 COLL VENOUS BLD VENIPUNCTURE: CPT | Performed by: NURSE PRACTITIONER

## 2020-09-08 PROCEDURE — 83036 HEMOGLOBIN GLYCOSYLATED A1C: CPT | Performed by: NURSE PRACTITIONER

## 2020-09-08 PROCEDURE — 80061 LIPID PANEL: CPT | Performed by: NURSE PRACTITIONER

## 2020-09-08 PROCEDURE — 80050 GENERAL HEALTH PANEL: CPT | Performed by: NURSE PRACTITIONER

## 2020-09-08 PROCEDURE — 84550 ASSAY OF BLOOD/URIC ACID: CPT | Performed by: NURSE PRACTITIONER

## 2020-09-08 PROCEDURE — 82306 VITAMIN D 25 HYDROXY: CPT | Performed by: NURSE PRACTITIONER

## 2020-09-09 LAB
25(OH)D3 SERPL-MCNC: 22.1 NG/ML (ref 30–100)
ALBUMIN SERPL-MCNC: 4.1 G/DL (ref 3.5–5.2)
ALBUMIN/GLOB SERPL: 1.6 G/DL
ALP SERPL-CCNC: 144 U/L (ref 39–117)
ALT SERPL W P-5'-P-CCNC: 21 U/L (ref 1–41)
ANION GAP SERPL CALCULATED.3IONS-SCNC: 12.5 MMOL/L (ref 5–15)
AST SERPL-CCNC: 16 U/L (ref 1–40)
BASOPHILS # BLD AUTO: 0.08 10*3/MM3 (ref 0–0.2)
BASOPHILS NFR BLD AUTO: 1 % (ref 0–1.5)
BILIRUB SERPL-MCNC: 0.2 MG/DL (ref 0–1.2)
BUN SERPL-MCNC: 15 MG/DL (ref 6–20)
BUN/CREAT SERPL: 22.1 (ref 7–25)
CALCIUM SPEC-SCNC: 9.2 MG/DL (ref 8.6–10.5)
CHLORIDE SERPL-SCNC: 102 MMOL/L (ref 98–107)
CHOLEST SERPL-MCNC: 99 MG/DL (ref 0–200)
CO2 SERPL-SCNC: 21.5 MMOL/L (ref 22–29)
CREAT SERPL-MCNC: 0.68 MG/DL (ref 0.76–1.27)
DEPRECATED RDW RBC AUTO: 39.4 FL (ref 37–54)
EOSINOPHIL # BLD AUTO: 0.24 10*3/MM3 (ref 0–0.4)
EOSINOPHIL NFR BLD AUTO: 3.1 % (ref 0.3–6.2)
ERYTHROCYTE [DISTWIDTH] IN BLOOD BY AUTOMATED COUNT: 11.9 % (ref 12.3–15.4)
GFR SERPL CREATININE-BSD FRML MDRD: 122 ML/MIN/1.73
GLOBULIN UR ELPH-MCNC: 2.5 GM/DL
GLUCOSE SERPL-MCNC: 175 MG/DL (ref 65–99)
HBA1C MFR BLD: 7.5 % (ref 4.8–5.6)
HCT VFR BLD AUTO: 46.6 % (ref 37.5–51)
HDLC SERPL-MCNC: 22 MG/DL (ref 40–60)
HGB BLD-MCNC: 16.2 G/DL (ref 13–17.7)
IMM GRANULOCYTES # BLD AUTO: 0.03 10*3/MM3 (ref 0–0.05)
IMM GRANULOCYTES NFR BLD AUTO: 0.4 % (ref 0–0.5)
LDLC SERPL CALC-MCNC: 24 MG/DL (ref 0–100)
LDLC/HDLC SERPL: 1.1 {RATIO}
LYMPHOCYTES # BLD AUTO: 2.6 10*3/MM3 (ref 0.7–3.1)
LYMPHOCYTES NFR BLD AUTO: 34.1 % (ref 19.6–45.3)
MCH RBC QN AUTO: 31.5 PG (ref 26.6–33)
MCHC RBC AUTO-ENTMCNC: 34.8 G/DL (ref 31.5–35.7)
MCV RBC AUTO: 90.5 FL (ref 79–97)
MONOCYTES # BLD AUTO: 0.89 10*3/MM3 (ref 0.1–0.9)
MONOCYTES NFR BLD AUTO: 11.7 % (ref 5–12)
NEUTROPHILS NFR BLD AUTO: 3.79 10*3/MM3 (ref 1.7–7)
NEUTROPHILS NFR BLD AUTO: 49.7 % (ref 42.7–76)
NRBC BLD AUTO-RTO: 0 /100 WBC (ref 0–0.2)
PLATELET # BLD AUTO: 316 10*3/MM3 (ref 140–450)
PMV BLD AUTO: 10.2 FL (ref 6–12)
POTASSIUM SERPL-SCNC: 4.1 MMOL/L (ref 3.5–5.2)
PROT SERPL-MCNC: 6.6 G/DL (ref 6–8.5)
RBC # BLD AUTO: 5.15 10*6/MM3 (ref 4.14–5.8)
SODIUM SERPL-SCNC: 136 MMOL/L (ref 136–145)
TRIGL SERPL-MCNC: 264 MG/DL (ref 0–150)
TSH SERPL DL<=0.05 MIU/L-ACNC: 4.82 UIU/ML (ref 0.27–4.2)
URATE SERPL-MCNC: 3.3 MG/DL (ref 3.4–7)
VIT B12 BLD-MCNC: 532 PG/ML (ref 211–946)
VLDLC SERPL-MCNC: 52.8 MG/DL (ref 5–40)
WBC # BLD AUTO: 7.63 10*3/MM3 (ref 3.4–10.8)

## 2020-09-16 ENCOUNTER — OFFICE VISIT (OUTPATIENT)
Dept: FAMILY MEDICINE CLINIC | Facility: CLINIC | Age: 55
End: 2020-09-16

## 2020-09-16 ENCOUNTER — HOSPITAL ENCOUNTER (OUTPATIENT)
Dept: RESPIRATORY THERAPY | Facility: HOSPITAL | Age: 55
Discharge: HOME | End: 2020-09-16
Admitting: NURSE PRACTITIONER

## 2020-09-16 DIAGNOSIS — F51.04 PSYCHOPHYSIOLOGICAL INSOMNIA: Chronic | ICD-10-CM

## 2020-09-16 DIAGNOSIS — K21.9 GASTROESOPHAGEAL REFLUX DISEASE WITHOUT ESOPHAGITIS: Chronic | ICD-10-CM

## 2020-09-16 DIAGNOSIS — R00.2 PALPITATIONS: ICD-10-CM

## 2020-09-16 DIAGNOSIS — E53.8 B12 DEFICIENCY: ICD-10-CM

## 2020-09-16 DIAGNOSIS — J30.9 CHRONIC ALLERGIC RHINITIS: Chronic | ICD-10-CM

## 2020-09-16 DIAGNOSIS — Z23 ENCOUNTER FOR IMMUNIZATION: ICD-10-CM

## 2020-09-16 DIAGNOSIS — E78.5 HYPERLIPIDEMIA, UNSPECIFIED HYPERLIPIDEMIA TYPE: Chronic | ICD-10-CM

## 2020-09-16 DIAGNOSIS — E11.69 CONTROLLED TYPE 2 DIABETES MELLITUS WITH OTHER SPECIFIED COMPLICATION, WITHOUT LONG-TERM CURRENT USE OF INSULIN (HCC): Primary | Chronic | ICD-10-CM

## 2020-09-16 DIAGNOSIS — I10 ESSENTIAL HYPERTENSION: Chronic | ICD-10-CM

## 2020-09-16 DIAGNOSIS — M62.838 MUSCLE SPASMS OF BOTH LOWER EXTREMITIES: Chronic | ICD-10-CM

## 2020-09-16 DIAGNOSIS — M05.79 RHEUMATOID ARTHRITIS INVOLVING MULTIPLE SITES WITH POSITIVE RHEUMATOID FACTOR (HCC): Chronic | ICD-10-CM

## 2020-09-16 PROCEDURE — 93225 XTRNL ECG REC<48 HRS REC: CPT

## 2020-09-16 PROCEDURE — 93226 XTRNL ECG REC<48 HR SCAN A/R: CPT

## 2020-09-16 PROCEDURE — 99214 OFFICE O/P EST MOD 30 MIN: CPT | Performed by: NURSE PRACTITIONER

## 2020-09-16 PROCEDURE — 90686 IIV4 VACC NO PRSV 0.5 ML IM: CPT | Performed by: NURSE PRACTITIONER

## 2020-09-16 PROCEDURE — 90471 IMMUNIZATION ADMIN: CPT | Performed by: NURSE PRACTITIONER

## 2020-09-16 RX ORDER — LANSOPRAZOLE 30 MG/1
30 CAPSULE, DELAYED RELEASE ORAL DAILY
Qty: 30 CAPSULE | Refills: 4 | Status: SHIPPED | OUTPATIENT
Start: 2020-09-16 | End: 2021-02-25

## 2020-09-16 RX ORDER — FLUTICASONE PROPIONATE 50 MCG
1 SPRAY, SUSPENSION (ML) NASAL 2 TIMES DAILY
Qty: 1 BOTTLE | Refills: 4 | Status: SHIPPED | OUTPATIENT
Start: 2020-09-16 | End: 2021-02-25

## 2020-09-16 RX ORDER — METOPROLOL SUCCINATE 100 MG/1
100 TABLET, EXTENDED RELEASE ORAL DAILY
Qty: 30 TABLET | Refills: 4 | Status: SHIPPED | OUTPATIENT
Start: 2020-09-16 | End: 2021-01-28

## 2020-09-16 RX ORDER — ATORVASTATIN CALCIUM 10 MG/1
10 TABLET, FILM COATED ORAL
Qty: 30 TABLET | Refills: 4 | Status: SHIPPED | OUTPATIENT
Start: 2020-09-16 | End: 2021-03-23

## 2020-09-16 RX ORDER — ALBUTEROL SULFATE 90 UG/1
2 AEROSOL, METERED RESPIRATORY (INHALATION) EVERY 4 HOURS PRN
Qty: 18 G | Refills: 4 | Status: SHIPPED | OUTPATIENT
Start: 2020-09-16 | End: 2021-06-17

## 2020-09-16 RX ORDER — CYCLOBENZAPRINE HCL 10 MG
10 TABLET ORAL 2 TIMES DAILY PRN
Qty: 60 TABLET | Refills: 4 | Status: SHIPPED | OUTPATIENT
Start: 2020-09-16 | End: 2021-03-24

## 2020-09-16 RX ORDER — LANOLIN ALCOHOL/MO/W.PET/CERES
1000 CREAM (GRAM) TOPICAL DAILY
Qty: 30 TABLET | Refills: 4 | Status: SHIPPED | OUTPATIENT
Start: 2020-09-16 | End: 2021-03-24

## 2020-09-16 RX ORDER — LANCETS 30 GAUGE
1 EACH MISCELLANEOUS 3 TIMES DAILY
Qty: 100 EACH | Refills: 4 | Status: SHIPPED | OUTPATIENT
Start: 2020-09-16 | End: 2021-02-25

## 2020-09-16 RX ORDER — FOLIC ACID 1 MG/1
1000 TABLET ORAL DAILY
Qty: 30 TABLET | Refills: 4 | Status: SHIPPED | OUTPATIENT
Start: 2020-09-16 | End: 2021-02-25

## 2020-09-16 RX ORDER — AMITRIPTYLINE HYDROCHLORIDE 100 MG/1
100 TABLET, FILM COATED ORAL NIGHTLY
Qty: 30 TABLET | Refills: 4 | Status: SHIPPED | OUTPATIENT
Start: 2020-09-16 | End: 2021-03-24

## 2020-09-16 RX ORDER — LORATADINE 10 MG/1
10 TABLET ORAL DAILY
Qty: 30 TABLET | Refills: 4 | Status: SHIPPED | OUTPATIENT
Start: 2020-09-16 | End: 2021-03-24

## 2020-09-16 NOTE — PATIENT INSTRUCTIONS
Dr Rodriguez's number  [phone]    High Triglycerides Eating Plan  Triglycerides are a type of fat in the blood. High levels of triglycerides can increase your risk of heart disease and stroke. If your triglyceride levels are high, choosing the right foods can help lower your triglycerides and keep your heart healthy. Work with your health care provider or a diet and nutrition specialist (dietitian) to develop an eating plan that is right for you.  What are tips for following this plan?  General guidelines    · Lose weight, if you are overweight. For most people, losing 5-10 lbs (2-5 kg) helps lower triglyceride levels. A weight-loss plan may include.  ? 30 minutes of exercise at least 5 days a week.  ? Reducing the amount of calories, sugar, and fat you eat.  · Eat a wide variety of fresh fruits, vegetables, and whole grains. These foods are high in fiber.  · Eat foods that contain healthy fats, such as fatty fish, nuts, seeds, and olive oil.  · Avoid foods that are high in added sugar, added salt (sodium), saturated fat, and trans fat.  · Avoid low-fiber, refined carbohydrates such as white bread, crackers, noodles, and white rice.  · Avoid foods with partially hydrogenated oils (trans fats), such as fried foods or stick margarine.  · Limit alcohol intake to no more than 1 drink a day for nonpregnant women and 2 drinks a day for men. One drink equals 12 oz of beer, 5 oz of wine, or 1½ oz of hard liquor. Your health care provider may recommend that you drink less depending on your overall health.  Reading food labels  · Check food labels for the amount of saturated fat. Choose foods with no or very little saturated fat.  · Check food labels for the amount of trans fat. Choose foods with no trans fat.  · Check food labels for the amount of cholesterol. Choose foods low in cholesterol. Ask your dietitian how much cholesterol you should have each day.  · Check food labels for the amount of sodium. Choose foods with  less than 140 milligrams (mg) per serving.  Shopping  · Buy dairy products labeled as nonfat (skim) or low-fat (1%).  · Avoid buying processed or prepackaged foods. These are often high in added sugar, sodium, and fat.  Cooking  · Choose healthy fats when cooking, such as olive oil or canola oil.  · Cook foods using lower fat methods, such as baking, broiling, boiling, or grilling.  · Make your own sauces, dressings, and marinades when possible, instead of buying them. Store-bought sauces, dressings, and marinades are often high in sodium and sugar.  Meal planning  · Eat more home-cooked food and less restaurant, buffet, and fast food.  · Eat fatty fish at least 2 times each week. Examples of fatty fish include salmon, trout, mackerel, tuna, and herring.  · If you eat whole eggs, do not eat more than 3 egg yolks per week.  What foods are recommended?  The items listed may not be a complete list. Talk with your dietitian about what dietary choices are best for you.  Grains  Whole wheat or whole grain breads, crackers, cereals, and pasta. Unsweetened oatmeal. Bulgur. Barley. Quinoa. Brown rice. Whole wheat flour tortillas.  Vegetables  Fresh or frozen vegetables. Low-sodium canned vegetables.  Fruits  All fresh, canned (in natural juice), or frozen fruits.  Meats and other protein foods  Skinless chicken or turkey. Ground chicken or turkey. Lean cuts of pork, trimmed of fat. Fish and seafood, especially salmon, trout, and herring. Egg whites. Dried beans, peas, or lentils. Unsalted nuts or seeds. Unsalted canned beans. Natural peanut or almond butter.  Dairy  Low-fat dairy products. Skim or low-fat (1%) milk. Reduced fat (2%) and low-sodium cheese. Low-fat ricotta cheese. Low-fat cottage cheese. Plain, low-fat yogurt.  Fats and oils  Tub margarine without trans fats. Light or reduced-fat mayonnaise. Light or reduced-fat salad dressings. Avocado. Safflower, olive, sunflower, soybean, and canola oils.  What foods are  not recommended?  The items listed may not be a complete list. Talk with your dietitian about what dietary choices are best for you.  Grains  White bread. White (regular) pasta. White rice. Cornbread. Bagels. Pastries. Crackers that contain trans fat.  Vegetables  Creamed or fried vegetables. Vegetables in a cheese sauce.  Fruits  Sweetened dried fruit. Canned fruit in syrup. Fruit juice.  Meats and other protein foods  Fatty cuts of meat. Ribs. Chicken wings. [place]. Sausage. Bologna. Salami. Chitterlings. Fatback. Hot dogs. Bratwurst. Packaged lunch meats.  Dairy  Whole or reduced-fat (2%) milk. Half-and-half. Cream cheese. Full-fat or sweetened yogurt. Full-fat cheese. Nondairy creamers. Whipped toppings. Processed cheese or cheese spreads. Cheese curds.  Beverages  Alcohol. Sweetened drinks, such as soda, lemonade, fruit drinks, or punches.  Fats and oils  Butter. Stick margarine. Lard. Shortening. Ghee. [place] fat. Tropical oils, such as coconut, palm kernel, or palm oils.  Sweets and desserts  Corn syrup. Sugars. Honey. Molasses. Candy. Jam and jelly. Syrup. Sweetened cereals. Cookies. Pies. Cakes. Donuts. Muffins. Ice cream.  Condiments  Store-bought sauces, dressings, and marinades that are high in sugar, such as ketchup and barbecue sauce.  Summary  · High levels of triglycerides can increase the risk of heart disease and stroke. Choosing the right foods can help lower your triglycerides.  · Eat plenty of fresh fruits, vegetables, and whole grains. Choose low-fat dairy and lean meats. Eat fatty fish at least twice a week.  · Avoid processed and prepackaged foods with added sugar, sodium, saturated fat, and trans fat.  · If you need suggestions or have questions about what types of food are good for you, talk with your health care provider or a dietitian.  This information is not intended to replace advice given to you by your health care provider. Make sure you discuss any questions you have with your health  care provider.  Document Released: 10/05/2005 Document Revised: 11/30/2018 Document Reviewed: 02/20/2018  Elsevier Patient Education © 2020 [employer]

## 2020-09-16 NOTE — PROGRESS NOTES
"  History of Present Illness   Raimundo Munguia is a 54 yo male who presents for follow up to review recent lab results pertaining to his DM, type 2, HTN, GERD, Rheumatoid Arthritis and Dyslipidemia. In addition, Raimundo has Insomnia and Vitamin deficiency.    Hypertension   The current episode started more than 1 year ago.His BP is adequately controlled. His hypertensive medication is Metoprolol  50 mg which has improved his blood pressure. Pertinent negatives include no anxiety, blurred vision, chest pain, headaches, palpitations, peripheral edema, or sweats. He does report continued \"heart beating fast\" intermittently even without activity. He did drink two cups of coffee and one Mt Dew daily but he has reduced these.      Lab Results   Component Value Date    CREATININE 0.80 06/04/2020     Lab Results   Component Value Date    CREATININE 0.68 (L) 09/08/2020     Diabetes   He has type 2 diabetes mellitus. His disease course has been fluctuating. Pertinent negatives for diabetes include no blurred vision, no chest pain, and no polyphagia. He does share he has noticed some intermittent neuropathy in his left lower extremity.  Risk factors for coronary artery disease include dyslipidemia, hypertension, male sex, sedentary lifestyle and tobacco exposure. Current diabetic treatment includes oral agent (monotherapy). He is compliant with medication regimen most of the time. He has had a previous visit with a dietitian. An ACE inhibitor/angiotensin II receptor blocker is not being taken due to S/e. Eye exam is scheduled for October 2020. .     Lab Results   Component Value Date    HGBA1C 7.50 (H) 09/08/2020     Lab Results   Component Value Date    HGBA1C 7.30 (H) 06/04/2020     Dyslipidemia   This is a chronic problem. The current episode started more than 1 year ago. The problem is controlled. Factors aggravating his hyperlipidemia include fatty foods. Current antihyperlipidemic treatment includes statins. The current " "treatment provides significant improvement of lipids. Compliance problems include adherence to diet and adherence to exercise.  Risk factors for coronary artery disease include diabetes mellitus, dyslipidemia, hypertension, male sex and obesity.   Lab Results   Component Value Date    CHOL 85 06/04/2020    TRIG 172 (H) 06/04/2020    HDL 23 (L) 06/04/2020    LDL 28 06/04/2020     Lab Results   Component Value Date    CHOL 99 09/08/2020    TRIG 264 (H) 09/08/2020    HDL 22 (L) 09/08/2020    LDL 24 09/08/2020     Vitals:    09/16/20 1335   BP: 120/78   BP Location: Left arm   Patient Position: Sitting   Cuff Size: Adult   Pulse: 98   Resp: 14   Temp: 97.3 °F (36.3 °C)   TempSrc: Temporal   SpO2: 93%   Weight: 106 kg (233 lb)   Height: 172.7 cm (67.99\")      The following portions of the patient's history were reviewed and updated as appropriate: allergies, current medications, past family history, past medical history, past social history, past surgical history and problem list.    Review of Systems   Constitutional: Negative for activity change, appetite change, chills, fatigue, fever and unexpected weight change.   Eyes: Negative for visual disturbance.   Respiratory: Negative for cough, shortness of breath and wheezing.    Cardiovascular: Positive for palpitations. Negative for chest pain and leg swelling.   Gastrointestinal: Negative for abdominal pain, constipation, diarrhea, nausea and vomiting.   Endocrine: Negative for cold intolerance, heat intolerance, polydipsia, polyphagia and polyuria.   Musculoskeletal: Positive for arthralgias.   Skin: Negative for color change and rash.   Allergic/Immunologic: Positive for environmental allergies.   Neurological: Positive for headaches. Negative for dizziness, tremors, speech difficulty, weakness and light-headedness.   Hematological: Negative for adenopathy.   Psychiatric/Behavioral: Positive for sleep disturbance. Negative for confusion, decreased concentration and " suicidal ideas. The patient is not nervous/anxious.    All other systems reviewed and are negative.    Physical Exam  Vitals signs reviewed.   Constitutional:       General: He is not in acute distress.     Appearance: He is well-developed.      Comments: Pleasant; Wearing appropriate face mask   HENT:      Head: Normocephalic.      Nose: Nose normal.   Eyes:      General:         Right eye: No discharge.         Left eye: No discharge.      Conjunctiva/sclera: Conjunctivae normal.      Pupils: Pupils are equal, round, and reactive to light.   Neck:      Musculoskeletal: Neck supple.      Thyroid: No thyromegaly.      Vascular: No JVD.   Cardiovascular:      Rate and Rhythm: Normal rate and regular rhythm.      Heart sounds: Normal heart sounds. No murmur. No friction rub.   Pulmonary:      Effort: Pulmonary effort is normal. No respiratory distress.      Breath sounds: Normal breath sounds. No wheezing or rales.   Abdominal:      General: Bowel sounds are normal. There is no distension.      Palpations: Abdomen is soft.      Tenderness: There is no abdominal tenderness. There is no guarding or rebound.   Musculoskeletal:         General: No tenderness.      Right lower leg: No edema.      Left lower leg: No edema.      Comments: Generalized joint pain which is chronic and stable   Lymphadenopathy:      Cervical: No cervical adenopathy.   Skin:     General: Skin is warm and dry.      Capillary Refill: Capillary refill takes less than 2 seconds.      Findings: No erythema or rash.   Neurological:      Mental Status: He is alert and oriented to person, place, and time.   Psychiatric:         Mood and Affect: Mood normal.         Speech: Speech normal.         Behavior: Behavior normal.         Thought Content: Thought content normal.         Cognition and Memory: Cognition normal.         Judgment: Judgment normal.       Results for orders placed or performed in visit on 09/08/20   Comprehensive Metabolic Panel     Specimen: Arm, Left; Blood   Result Value Ref Range    Glucose 175 (H) 65 - 99 mg/dL    BUN 15 6 - 20 mg/dL    Creatinine 0.68 (L) 0.76 - 1.27 mg/dL    Sodium 136 136 - 145 mmol/L    Potassium 4.1 3.5 - 5.2 mmol/L    Chloride 102 98 - 107 mmol/L    CO2 21.5 (L) 22.0 - 29.0 mmol/L    Calcium 9.2 8.6 - 10.5 mg/dL    Total Protein 6.6 6.0 - 8.5 g/dL    Albumin 4.10 3.50 - 5.20 g/dL    ALT (SGPT) 21 1 - 41 U/L    AST (SGOT) 16 1 - 40 U/L    Alkaline Phosphatase 144 (H) 39 - 117 U/L    Total Bilirubin 0.2 0.0 - 1.2 mg/dL    eGFR Non African Amer 122 >60 mL/min/1.73    Globulin 2.5 gm/dL    A/G Ratio 1.6 g/dL    BUN/Creatinine Ratio 22.1 7.0 - 25.0    Anion Gap 12.5 5.0 - 15.0 mmol/L   TSH    Specimen: Arm, Left; Blood   Result Value Ref Range    TSH 4.820 (H) 0.270 - 4.200 uIU/mL   Lipid Panel    Specimen: Arm, Left; Blood   Result Value Ref Range    Total Cholesterol 99 0 - 200 mg/dL    Triglycerides 264 (H) 0 - 150 mg/dL    HDL Cholesterol 22 (L) 40 - 60 mg/dL    LDL Cholesterol  24 0 - 100 mg/dL    VLDL Cholesterol 52.8 (H) 5 - 40 mg/dL    LDL/HDL Ratio 1.10    Hemoglobin A1c    Specimen: Arm, Left; Blood   Result Value Ref Range    Hemoglobin A1C 7.50 (H) 4.80 - 5.60 %   Vitamin D 25 Hydroxy    Specimen: Arm, Left; Blood   Result Value Ref Range    25 Hydroxy, Vitamin D 22.1 (L) 30.0 - 100.0 ng/ml   Uric Acid    Specimen: Arm, Left; Blood   Result Value Ref Range    Uric Acid 3.3 (L) 3.4 - 7.0 mg/dL   Vitamin B12    Specimen: Arm, Left; Blood   Result Value Ref Range    Vitamin B-12 532 211 - 946 pg/mL   CBC Auto Differential    Specimen: Arm, Left; Blood   Result Value Ref Range    WBC 7.63 3.40 - 10.80 10*3/mm3    RBC 5.15 4.14 - 5.80 10*6/mm3    Hemoglobin 16.2 13.0 - 17.7 g/dL    Hematocrit 46.6 37.5 - 51.0 %    MCV 90.5 79.0 - 97.0 fL    MCH 31.5 26.6 - 33.0 pg    MCHC 34.8 31.5 - 35.7 g/dL    RDW 11.9 (L) 12.3 - 15.4 %    RDW-SD 39.4 37.0 - 54.0 fl    MPV 10.2 6.0 - 12.0 fL    Platelets 316 140 - 450 10*3/mm3       Neutrophil % 49.7 42.7 - 76.0 %    Lymphocyte % 34.1 19.6 - 45.3 %    Monocyte % 11.7 5.0 - 12.0 %    Eosinophil % 3.1 0.3 - 6.2 %    Basophil % 1.0 0.0 - 1.5 %    Immature Grans % 0.4 0.0 - 0.5 %    Neutrophils, Absolute 3.79 1.70 - 7.00 10*3/mm3    Lymphocytes, Absolute 2.60 0.70 - 3.10 10*3/mm3    Monocytes, Absolute 0.89 0.10 - 0.90 10*3/mm3    Eosinophils, Absolute 0.24 0.00 - 0.40 10*3/mm3    Basophils, Absolute 0.08 0.00 - 0.20 10*3/mm3    Immature Grans, Absolute 0.03 0.00 - 0.05 10*3/mm3    nRBC 0.0 0.0 - 0.2 /100 WBC     Assessment/Plan   Patient's Body mass index is 35.44 kg/m². BMI is above normal parameters. Recommendations include: exercise counseling and nutrition counseling.   (Normal BMI:  18.5-24.9, OW 25-29.9, Obesity 30 or greater)    Problems Addressed this Visit        Cardiovascular and Mediastinum    Hyperlipidemia    Relevant Medications    atorvastatin (LIPITOR) 10 MG tablet    Palpitations    Relevant Medications    metoprolol succinate XL (TOPROL-XL) 100 MG 24 hr tablet    Hypertension    Relevant Medications    metoprolol succinate XL (TOPROL-XL) 100 MG 24 hr tablet       Respiratory    Chronic allergic rhinitis    Relevant Medications    loratadine (CLARITIN) 10 MG tablet    fluticasone (Flonase) 50 MCG/ACT nasal spray       Digestive    Gastroesophageal reflux disease without esophagitis    Relevant Medications    lansoprazole (PREVACID) 30 MG capsule       Musculoskeletal and Integument    Rheumatoid arthritis with positive rheumatoid factor (CMS/[facility])    Relevant Medications    methotrexate 2.5 MG tablet    folic acid (FOLVITE) 1 MG tablet    Muscle spasms of both lower extremities    Relevant Medications    cyclobenzaprine (FLEXERIL) 10 MG tablet      Other Visit Diagnoses     Controlled type 2 diabetes mellitus with other specified complication, without long-term current use of insulin (CMS/[facility])  (Chronic)   -  Primary     DM, type 2 is in acceptable range. Encouraged him to  "monitor closer.    Relevant Medications    glucose blood test strip    metFORMIN (GLUCOPHAGE) 500 MG tablet    Lancets (OneTouch Delica Plus [id number]) misc    Psychophysiological insomnia  (Chronic)       Elavil nightly    Relevant Medications    amitriptyline (ELAVIL) 100 MG tablet    B12 deficiency        Continue B12 supplementation    Relevant Medications    vitamin B-12 (CYANOCOBALAMIN) 1000 MCG tablet    Encounter for immunization        Influenza vaccination given    Relevant Orders    FluLaval Quad >6 Months ([id number]) (Completed)      Findings and recommendations discussed with Raimundo. Reviewed above lab results with him. DM, type 2 is in acceptable range. Encouraged him to monitor closer. Blood pressure well controlled. Continue Lipitor for Cardiovascular protection. He feels his current regimen for his Rheumatoid Arthritis is working..\"only thing that has worked\". Elavil is improving his Chronic Insomnia. Daily Vit B12. Discussed his Vitamin D level. He has Vitamin D 17875 U which he will take monthly. He does follow with Pulmonology for his Restrictive Lung Disease. He was given a sample of an Inhaler which he is almost out of and will f/u with them in their Pulmonary Clinic. Influenza vaccination given today.48 Hour Holter ordered for his Palpitations and Tachycardia. He will return in four weeks to review results.         This document has been electronically signed by ABDIRAHMAN Braswell, HÉCTOR DURÁN  September 16, 2020 13:31 EDT                     "

## 2020-09-18 VITALS
TEMPERATURE: 97.3 F | HEART RATE: 98 BPM | HEIGHT: 68 IN | RESPIRATION RATE: 14 BRPM | SYSTOLIC BLOOD PRESSURE: 120 MMHG | BODY MASS INDEX: 35.31 KG/M2 | WEIGHT: 233 LBS | OXYGEN SATURATION: 93 % | DIASTOLIC BLOOD PRESSURE: 78 MMHG

## 2020-09-22 PROCEDURE — 93227 XTRNL ECG REC<48 HR R&I: CPT | Performed by: INTERNAL MEDICINE

## 2020-10-15 ENCOUNTER — OFFICE VISIT (OUTPATIENT)
Dept: FAMILY MEDICINE CLINIC | Facility: CLINIC | Age: 55
End: 2020-10-15

## 2020-10-15 DIAGNOSIS — R00.2 PALPITATIONS: ICD-10-CM

## 2020-10-15 DIAGNOSIS — E78.2 MIXED HYPERLIPIDEMIA: Chronic | ICD-10-CM

## 2020-10-15 DIAGNOSIS — M05.79 RHEUMATOID ARTHRITIS INVOLVING MULTIPLE SITES WITH POSITIVE RHEUMATOID FACTOR (HCC): Chronic | ICD-10-CM

## 2020-10-15 DIAGNOSIS — E11.40 CONTROLLED TYPE 2 DIABETES WITH NEUROPATHY (HCC): Primary | Chronic | ICD-10-CM

## 2020-10-15 DIAGNOSIS — F51.04 CHRONIC INSOMNIA: Chronic | ICD-10-CM

## 2020-10-15 DIAGNOSIS — K21.9 GASTROESOPHAGEAL REFLUX DISEASE WITHOUT ESOPHAGITIS: Chronic | ICD-10-CM

## 2020-10-15 DIAGNOSIS — J30.9 CHRONIC ALLERGIC RHINITIS: Chronic | ICD-10-CM

## 2020-10-15 DIAGNOSIS — J98.4 RESTRICTIVE LUNG DISEASE: Chronic | ICD-10-CM

## 2020-10-15 DIAGNOSIS — H91.92 HEARING LOSS OF LEFT EAR, UNSPECIFIED HEARING LOSS TYPE: ICD-10-CM

## 2020-10-15 DIAGNOSIS — I10 BENIGN ESSENTIAL HYPERTENSION: Chronic | ICD-10-CM

## 2020-10-15 DIAGNOSIS — E55.9 VITAMIN D DEFICIENCY: ICD-10-CM

## 2020-10-15 PROCEDURE — 99214 OFFICE O/P EST MOD 30 MIN: CPT | Performed by: NURSE PRACTITIONER

## 2020-10-15 NOTE — PATIENT INSTRUCTIONS
Diabetes Mellitus and Sick Day Management  Blood sugar (glucose) can be difficult to control when you are sick. Common illnesses that can cause problems for people with diabetes (diabetes mellitus) include colds, fever, flu (influenza), nausea, vomiting, and diarrhea. These illnesses can cause stress and loss of body fluids (dehydration), and those issues can cause blood glucose levels to increase. Because of this, it is very important to take your insulin and diabetes medicines and eat some form of carbohydrate when you are sick.  You should make a plan for days when you are sick (sick day plan) as part of your diabetes management plan. You and your health care provider should make this plan in advance. The following guidelines are intended to help you manage an illness that lasts for about 24 hours or less. Your health care provider may also give you more specific instructions.  What do I need to do to manage my blood glucose?    · Check your blood glucose every 2-4 hours, or as often as told by your health care provider.  · Know your sick day treatment goals. Your target blood glucose levels may be different when you are sick.  · If you use insulin, take your usual dose.  ? If your blood glucose continues to be too high, you may need to take an additional insulin dose as told by your health care provider.  · If you use oral diabetes medicine, you may need to stop taking it if you are not able to eat or drink normally. Ask your health care provider about whether you need to stop taking these medicines while you are sick.  · If you use injectable hormone medicines other than insulin to control your diabetes, ask your health care provider about whether you need to stop taking these medicines while you are sick.  What else can I do to manage my diabetes when I am sick?  Check your ketones  · If you have type 1 diabetes, check your urine ketones every 4 hours.  · If you have type 2 diabetes, check your urine ketones  as often as told by your health care provider.  Drink fluids  · Drink enough fluid to keep your urine clear or pale yellow. This is especially important if you have a fever, vomiting, or diarrhea. Those symptoms can lead to dehydration.  · Follow any instructions from your health care provider about beverages to avoid.  ? Do not drink alcohol, caffeine, or drinks that contain a lot of sugar.  Take medicines as directed  · Take-over-the-counter and prescription medicines only as told by your health care provider.  · Check medicine labels for added sugars. Some medicines may contain sugar or types of sugars that can raise your blood glucose level.  What foods can I eat when I am sick?    You need to eat some form of carbohydrates when you are sick. You should eat 45-50 grams (45-50 g) of carbohydrates every 3-4 hours until you feel better.  All of the food choices below contain about 15 g of carbohydrates. Plan ahead and keep some of these foods around so you have them if you get sick.  · 4-6 oz (120-177 mL) carbonated beverage that contains sugar, such as regular (not diet) soda. You may be able to drink carbonated beverages more easily if you open the beverage and let it sit at room temperature for a few minutes before drinking.  · ½ of a twin frozen ice pop.  · 4 oz (120 g) regular gelatin.  · 4 oz (120 mL) fruit juice.  · 4 oz (120 g) ice cream or frozen yogurt.  · 2 oz (60 g) sherbet.  · 8 oz (240 mL) clear broth or soup.  · 4 oz (120 g) regular custard.  · 4 oz (120 g) regular pudding.  · 8 oz (240 g) plain yogurt.  · 1 slice bread or toast.  · 6 saltine crackers.  · 5 vanilla wafers.  Questions to ask your health care provider  Consider asking the following questions so you know what to do on days when you are sick:  · Should I adjust my diabetes medicines?  · How often do I need to check my blood glucose?  · What supplies do I need to manage my diabetes at home when I am sick?  · What number can I call if I  have questions?  · What foods and drinks should I avoid?  Contact a health care provider if:  · You develop symptoms of diabetic ketoacidosis, such as:  ? Fatigue.  ? Weight loss.  ? Excessive thirst.  ? Light-headedness.  ? Fruity or sweet-smelling breath.  ? Excessive urination.  ? Vision changes.  ? Confusion or irritability.  ? Nausea.  ? Vomiting.  ? Rapid breathing.  ? Pain in the abdomen.  ? Feeling flushed.  · You are unable to drink fluids without vomiting.  · You have any of the following for more than 6 hours:  ? Nausea.  ? Vomiting.  ? Diarrhea.  · Your blood glucose is at or above 240 mg/dL (13.3 mmol/L), even after you take an additional insulin dose.  · You have a change in how you think, feel, or act (mental status).  · You develop another serious illness.  · You have been sick or have had a fever for 2 days or longer and you are not getting better.  Get help right away if:  · Your blood glucose is lower than 54 mg/dL (3.0 mmol/L).  · You have difficulty breathing.  · You have moderate or high ketone levels in your urine.  · You used emergency glucagon to treat low blood glucose.  Summary  · Blood sugar (glucose) can be difficult to control when you are sick. Common illnesses that can cause problems for people with diabetes (diabetes mellitus) include colds, fever, flu (influenza), nausea, vomiting, and diarrhea.  · Illnesses can cause stress and loss of body fluids (dehydration), and those issues can cause blood glucose levels to increase.  · Make a plan for days when you are sick (sick day plan) as part of your diabetes management plan. You and your health care provider should make this plan in advance.  · It is very important to take your insulin and diabetes medicines and to eat some form of carbohydrate when you are sick.  · Contact your health care provider if have problems managing your blood glucose levels when you are sick, or if you have been sick or had a fever for 2 days or longer and are  not getting better.  This information is not intended to replace advice given to you by your health care provider. Make sure you discuss any questions you have with your health care provider.  Document Released: 12/20/2004 Document Revised: 09/15/2017 Document Reviewed: 09/15/2017  Elsevier Patient Education © 2020 [employer]

## 2020-10-15 NOTE — PROGRESS NOTES
History of Present Illness   Raimundo Munguia is a 54 yo male who presents for follow up to review results of his 48 Hour Holter monitor he recently had done due to palpitations and reported episodes of tachycardia. He does have DM, type 2, HTN, GERD, Rheumatoid Arthritis and Dyslipidemia. In addition, Raimundo has Insomnia and Vitamin deficiency.    Hypertension   The current episode started more than 1 year ago.His BP is adequately controlled. His hypertensive medication is Metoprolol  50 mg which has improved his blood pressure and fast heart rate. Pertinent negatives include no anxiety, blurred vision, chest pain, headaches,  peripheral edema, or sweats. He has reduced the amount of caffeine he consumes on a daily basis.  Lab Results   Component Value Date    CREATININE 0.68 (L) 09/08/2020     Diabetes   He has type 2 diabetes mellitus. His disease course has been fluctuating. Pertinent negatives for diabetes include no blurred vision, no chest pain, and no polyphagia. He does share he has noticed some intermittent neuropathy in his left lower extremity.  Risk factors for coronary artery disease include dyslipidemia, hypertension, male sex, sedentary lifestyle and tobacco exposure. Current diabetic treatment includes oral agent (monotherapy). He is compliant with medication regimen most of the time. He has had a previous visit with a dietitian. An ACE inhibitor/angiotensin II receptor blocker is not being taken due to S/e. Eye exam is scheduled for October 2020. .     Lab Results   Component Value Date    HGBA1C 7.50 (H) 09/08/2020     Lab Results   Component Value Date    HGBA1C 7.30 (H) 06/04/2020     Dyslipidemia   This is a chronic problem. The current episode started more than 1 year ago. The problem is controlled. Factors aggravating his hyperlipidemia include fatty foods. Current antihyperlipidemic treatment includes statins. The current treatment provides significant improvement of lipids. Compliance  "problems include adherence to diet and adherence to exercise.  Risk factors for coronary artery disease include diabetes mellitus, dyslipidemia, hypertension, male sex and obesity.   Lab Results   Component Value Date    CHOL 85 06/04/2020    TRIG 172 (H) 06/04/2020    HDL 23 (L) 06/04/2020    LDL 28 06/04/2020     Lab Results   Component Value Date    CHOL 99 09/08/2020    TRIG 264 (H) 09/08/2020    HDL 22 (L) 09/08/2020    LDL 24 09/08/2020     Vitals:    10/15/20 1431   BP: 108/70   BP Location: Left arm   Patient Position: Sitting   Cuff Size: Adult   Pulse: 97   Resp: 14   Temp: 97.1 °F (36.2 °C)   TempSrc: Temporal   SpO2: 94%   Weight: 105 kg (231 lb)   Height: 172.7 cm (67.99\")      The following portions of the patient's history were reviewed and updated as appropriate: allergies, current medications, past family history, past medical history, past social history, past surgical history and problem list.    Review of Systems   Constitutional: Negative for activity change, appetite change, chills, fatigue and fever.   HENT: Positive for hearing loss (Chronic in left ear).    Eyes: Negative for visual disturbance.   Respiratory: Negative for cough, shortness of breath and wheezing.    Cardiovascular: Positive for leg swelling (Occasional ). Negative for chest pain and palpitations.   Gastrointestinal: Negative for constipation, diarrhea, nausea and vomiting.   Musculoskeletal: Positive for arthralgias (Shoulders at this time).   Skin: Negative for color change and rash.   Neurological: Negative for weakness, light-headedness, headache and confusion.   Hematological: Negative for adenopathy.   Psychiatric/Behavioral: Positive for sleep disturbance. Negative for decreased concentration and depressed mood.      Physical Exam  Vitals signs reviewed.   Constitutional:       General: He is not in acute distress.     Appearance: He is well-developed.      Comments: Pleasant; wearing appropriate facial covering   "   HENT:      Head: Normocephalic.      Nose: Nose normal.   Eyes:      General: No scleral icterus.        Right eye: No discharge.         Left eye: No discharge.      Conjunctiva/sclera: Conjunctivae normal.      Comments: Wearing glasses   Neck:      Musculoskeletal: Neck supple.      Vascular: No JVD.   Cardiovascular:      Rate and Rhythm: Normal rate and regular rhythm.      Heart sounds: Normal heart sounds. No murmur. No friction rub.   Pulmonary:      Breath sounds: Decreased breath sounds present. No wheezing, rhonchi or rales.   Abdominal:      General: Bowel sounds are normal. There is no distension.      Palpations: Abdomen is soft.      Tenderness: There is no abdominal tenderness.   Musculoskeletal:      Right lower leg: No edema.      Left lower leg: No edema.   Lymphadenopathy:      Cervical: No cervical adenopathy.   Skin:     General: Skin is warm.      Capillary Refill: Capillary refill takes less than 2 seconds.      Findings: No erythema or rash.   Neurological:      Mental Status: He is alert and oriented to person, place, and time.      Cranial Nerves: No cranial nerve deficit.   Psychiatric:         Mood and Affect: Mood normal.         Speech: Speech normal.         Behavior: Behavior is cooperative.         Thought Content: Thought content normal.         Cognition and Memory: Cognition normal.         Judgment: Judgment normal.       Assessment/Plan     Problems Addressed this Visit        Cardiovascular and Mediastinum    Benign essential hypertension    Relevant Orders    Comprehensive Metabolic Panel    Hyperlipidemia    Relevant Orders    Lipid Panel    Palpitations    Relevant Orders    TSH       Respiratory    Chronic allergic rhinitis    Restrictive lung disease       Digestive    Vitamin D deficiency    Relevant Orders    Vitamin D 25 Hydroxy    Gastroesophageal reflux disease without esophagitis    Relevant Orders    CBC & Differential       Endocrine    Controlled type 2  diabetes with neuropathy (CMS/[facility]) - Primary    Relevant Orders    Comprehensive Metabolic Panel    TSH    Lipid Panel    Vitamin B12    MicroAlbumin, Urine, Random - Urine, Clean Catch    Hemoglobin A1c       Musculoskeletal and Integument    Rheumatoid arthritis with positive rheumatoid factor (CMS/[facility])    Relevant Medications    diclofenac (VOLTAREN) 1 % gel gel    Other Relevant Orders    Uric Acid       Other    Chronic insomnia      Other Visit Diagnoses     Hearing loss of left ear, unspecified hearing loss type        Referral to ENT for hearing devices    Relevant Orders    Ambulatory Referral to ENT (Otolaryngology)      Diagnoses       Codes Comments    Controlled type 2 diabetes with neuropathy (CMS/[facility])    -  Primary ICD-10-CM: E11.40  ICD-9-CM: 250.60, 357.2 Encouraged continued glycemic control through diabetes self management skills and medication    Benign essential hypertension     ICD-10-CM: I10  ICD-9-CM: 401.1 Well controlled    Mixed hyperlipidemia     ICD-10-CM: E78.2  ICD-9-CM: 272.2 Continued Atorvastatin    Rheumatoid arthritis involving multiple sites with positive rheumatoid factor (CMS/[facility])     ICD-10-CM: M05.79  ICD-9-CM: 714.0 Continue to be as active as possible. Have previously referred to Rheumatologist without any change in medication     Gastroesophageal reflux disease without esophagitis     ICD-10-CM: K21.9  ICD-9-CM: 530.81 Stable. Continue lifestyle modifications including weight reduction    Restrictive lung disease     ICD-10-CM: J98.4  ICD-9-CM: 518.89 Continue to f/u with Pulmonology    Chronic allergic rhinitis     ICD-10-CM: J30.9  ICD-9-CM: 477.9 Continue Singlulair and Claritin    Chronic insomnia     ICD-10-CM: F51.04  ICD-9-CM: 780.52 Continue Elavil    Palpitations     ICD-10-CM: R00.2  ICD-9-CM: 785.1 Reviewed results of Holter    Hearing loss of left ear, unspecified hearing loss type     ICD-10-CM: H91.92  ICD-9-CM: 389.9 Referral to ENT for hearing devices     Vitamin D deficiency     ICD-10-CM: E55.9  ICD-9-CM: 268.9       Findings and recommendations discussed with Raimundo. Reviewed results of his Holter monitor with the following impression:   Patient was monitored for 47 hours and 59 minutes. Indications for this exam include palpitations. Total beats: [id number]. Average HR: 95. Min HR: 60. Max HR: 130 ( Sinus tachycardia) A normal monitor study. Recommended he continue to reduce caffeine and avoid other stimulants.   He will f/u with me in December; with labs prior sooner if problems/concerns occur.       This document has been electronically signed by ABDIRAHMAN Braswell, RICCO-BC, HÉCTOR  October 15, 2020 14:35 EDT

## 2020-10-18 VITALS
HEART RATE: 97 BPM | WEIGHT: 231 LBS | DIASTOLIC BLOOD PRESSURE: 70 MMHG | RESPIRATION RATE: 14 BRPM | SYSTOLIC BLOOD PRESSURE: 108 MMHG | BODY MASS INDEX: 35.01 KG/M2 | TEMPERATURE: 97.1 F | OXYGEN SATURATION: 94 % | HEIGHT: 68 IN

## 2020-12-10 ENCOUNTER — LAB (OUTPATIENT)
Dept: FAMILY MEDICINE CLINIC | Facility: CLINIC | Age: 55
End: 2020-12-10

## 2020-12-10 DIAGNOSIS — E55.9 VITAMIN D DEFICIENCY: ICD-10-CM

## 2020-12-10 DIAGNOSIS — I10 BENIGN ESSENTIAL HYPERTENSION: Chronic | ICD-10-CM

## 2020-12-10 DIAGNOSIS — R00.2 PALPITATIONS: ICD-10-CM

## 2020-12-10 DIAGNOSIS — M05.79 RHEUMATOID ARTHRITIS INVOLVING MULTIPLE SITES WITH POSITIVE RHEUMATOID FACTOR (HCC): Chronic | ICD-10-CM

## 2020-12-10 DIAGNOSIS — E78.2 MIXED HYPERLIPIDEMIA: Chronic | ICD-10-CM

## 2020-12-10 DIAGNOSIS — E11.40 CONTROLLED TYPE 2 DIABETES WITH NEUROPATHY (HCC): Chronic | ICD-10-CM

## 2020-12-10 DIAGNOSIS — K21.9 GASTROESOPHAGEAL REFLUX DISEASE WITHOUT ESOPHAGITIS: ICD-10-CM

## 2020-12-10 LAB
25(OH)D3 SERPL-MCNC: 24.4 NG/ML (ref 30–100)
ALBUMIN SERPL-MCNC: 4 G/DL (ref 3.5–5.2)
ALBUMIN/GLOB SERPL: 1.2 G/DL
ALP SERPL-CCNC: 152 U/L (ref 39–117)
ALT SERPL W P-5'-P-CCNC: 22 U/L (ref 1–41)
ANION GAP SERPL CALCULATED.3IONS-SCNC: 11.3 MMOL/L (ref 5–15)
AST SERPL-CCNC: 19 U/L (ref 1–40)
BASOPHILS # BLD AUTO: 0.08 10*3/MM3 (ref 0–0.2)
BASOPHILS NFR BLD AUTO: 0.9 % (ref 0–1.5)
BILIRUB SERPL-MCNC: 0.3 MG/DL (ref 0–1.2)
BUN SERPL-MCNC: 13 MG/DL (ref 6–20)
BUN/CREAT SERPL: 15.7 (ref 7–25)
CALCIUM SPEC-SCNC: 9.3 MG/DL (ref 8.6–10.5)
CHLORIDE SERPL-SCNC: 103 MMOL/L (ref 98–107)
CHOLEST SERPL-MCNC: 107 MG/DL (ref 0–200)
CO2 SERPL-SCNC: 23.7 MMOL/L (ref 22–29)
CREAT SERPL-MCNC: 0.83 MG/DL (ref 0.76–1.27)
DEPRECATED RDW RBC AUTO: 40.9 FL (ref 37–54)
EOSINOPHIL # BLD AUTO: 0.25 10*3/MM3 (ref 0–0.4)
EOSINOPHIL NFR BLD AUTO: 2.8 % (ref 0.3–6.2)
ERYTHROCYTE [DISTWIDTH] IN BLOOD BY AUTOMATED COUNT: 12.4 % (ref 12.3–15.4)
GFR SERPL CREATININE-BSD FRML MDRD: 97 ML/MIN/1.73
GLOBULIN UR ELPH-MCNC: 3.3 GM/DL
GLUCOSE SERPL-MCNC: 225 MG/DL (ref 65–99)
HBA1C MFR BLD: 8.83 % (ref 4.8–5.6)
HCT VFR BLD AUTO: 49 % (ref 37.5–51)
HDLC SERPL-MCNC: 26 MG/DL (ref 40–60)
HGB BLD-MCNC: 16.7 G/DL (ref 13–17.7)
IMM GRANULOCYTES # BLD AUTO: 0.02 10*3/MM3 (ref 0–0.05)
IMM GRANULOCYTES NFR BLD AUTO: 0.2 % (ref 0–0.5)
LDLC SERPL CALC-MCNC: 42 MG/DL (ref 0–100)
LDLC/HDLC SERPL: 1.18 {RATIO}
LYMPHOCYTES # BLD AUTO: 2.52 10*3/MM3 (ref 0.7–3.1)
LYMPHOCYTES NFR BLD AUTO: 28.5 % (ref 19.6–45.3)
MCH RBC QN AUTO: 31.3 PG (ref 26.6–33)
MCHC RBC AUTO-ENTMCNC: 34.1 G/DL (ref 31.5–35.7)
MCV RBC AUTO: 91.8 FL (ref 79–97)
MONOCYTES # BLD AUTO: 1.22 10*3/MM3 (ref 0.1–0.9)
MONOCYTES NFR BLD AUTO: 13.8 % (ref 5–12)
NEUTROPHILS NFR BLD AUTO: 4.76 10*3/MM3 (ref 1.7–7)
NEUTROPHILS NFR BLD AUTO: 53.8 % (ref 42.7–76)
NRBC BLD AUTO-RTO: 0 /100 WBC (ref 0–0.2)
PLATELET # BLD AUTO: 312 10*3/MM3 (ref 140–450)
PMV BLD AUTO: 10.3 FL (ref 6–12)
POTASSIUM SERPL-SCNC: 4.3 MMOL/L (ref 3.5–5.2)
PROT SERPL-MCNC: 7.3 G/DL (ref 6–8.5)
RBC # BLD AUTO: 5.34 10*6/MM3 (ref 4.14–5.8)
SODIUM SERPL-SCNC: 138 MMOL/L (ref 136–145)
TRIGL SERPL-MCNC: 252 MG/DL (ref 0–150)
TSH SERPL DL<=0.05 MIU/L-ACNC: 5.42 UIU/ML (ref 0.27–4.2)
URATE SERPL-MCNC: 4.4 MG/DL (ref 3.4–7)
VIT B12 BLD-MCNC: 1108 PG/ML (ref 211–946)
VLDLC SERPL-MCNC: 39 MG/DL (ref 5–40)
WBC # BLD AUTO: 8.85 10*3/MM3 (ref 3.4–10.8)

## 2020-12-10 PROCEDURE — 82306 VITAMIN D 25 HYDROXY: CPT | Performed by: NURSE PRACTITIONER

## 2020-12-10 PROCEDURE — 80050 GENERAL HEALTH PANEL: CPT | Performed by: NURSE PRACTITIONER

## 2020-12-10 PROCEDURE — 80061 LIPID PANEL: CPT | Performed by: NURSE PRACTITIONER

## 2020-12-10 PROCEDURE — 84550 ASSAY OF BLOOD/URIC ACID: CPT | Performed by: NURSE PRACTITIONER

## 2020-12-10 PROCEDURE — 83036 HEMOGLOBIN GLYCOSYLATED A1C: CPT | Performed by: NURSE PRACTITIONER

## 2020-12-10 PROCEDURE — 82607 VITAMIN B-12: CPT | Performed by: NURSE PRACTITIONER

## 2020-12-10 PROCEDURE — 82043 UR ALBUMIN QUANTITATIVE: CPT | Performed by: NURSE PRACTITIONER

## 2020-12-11 LAB — ALBUMIN UR-MCNC: <1.2 MG/DL

## 2020-12-15 ENCOUNTER — OFFICE VISIT (OUTPATIENT)
Dept: FAMILY MEDICINE CLINIC | Facility: CLINIC | Age: 55
End: 2020-12-15

## 2020-12-15 VITALS
OXYGEN SATURATION: 95 % | BODY MASS INDEX: 35.46 KG/M2 | WEIGHT: 234 LBS | DIASTOLIC BLOOD PRESSURE: 78 MMHG | HEART RATE: 89 BPM | HEIGHT: 68 IN | SYSTOLIC BLOOD PRESSURE: 100 MMHG | TEMPERATURE: 97.5 F

## 2020-12-15 DIAGNOSIS — J30.9 CHRONIC ALLERGIC RHINITIS: Chronic | ICD-10-CM

## 2020-12-15 DIAGNOSIS — E55.9 VITAMIN D DEFICIENCY: ICD-10-CM

## 2020-12-15 DIAGNOSIS — I10 BENIGN ESSENTIAL HYPERTENSION: Chronic | ICD-10-CM

## 2020-12-15 DIAGNOSIS — E78.2 MIXED HYPERLIPIDEMIA: Chronic | ICD-10-CM

## 2020-12-15 DIAGNOSIS — M05.79 RHEUMATOID ARTHRITIS INVOLVING MULTIPLE SITES WITH POSITIVE RHEUMATOID FACTOR (HCC): Chronic | ICD-10-CM

## 2020-12-15 DIAGNOSIS — J98.4 RESTRICTIVE LUNG DISEASE: Chronic | ICD-10-CM

## 2020-12-15 DIAGNOSIS — E53.8 VITAMIN B 12 DEFICIENCY: ICD-10-CM

## 2020-12-15 DIAGNOSIS — K21.9 GASTROESOPHAGEAL REFLUX DISEASE WITHOUT ESOPHAGITIS: Chronic | ICD-10-CM

## 2020-12-15 DIAGNOSIS — E11.65 UNCONTROLLED TYPE 2 DIABETES MELLITUS WITH HYPERGLYCEMIA (HCC): Primary | Chronic | ICD-10-CM

## 2020-12-15 PROCEDURE — 95251 CONT GLUC MNTR ANALYSIS I&R: CPT | Performed by: NURSE PRACTITIONER

## 2020-12-15 PROCEDURE — 99214 OFFICE O/P EST MOD 30 MIN: CPT | Performed by: NURSE PRACTITIONER

## 2020-12-15 RX ORDER — EXENATIDE 2 MG/.85ML
2 INJECTION, SUSPENSION, EXTENDED RELEASE SUBCUTANEOUS WEEKLY
Qty: 4 PEN | Refills: 2 | Status: SHIPPED | OUTPATIENT
Start: 2020-12-15 | End: 2021-01-27

## 2020-12-15 NOTE — PROGRESS NOTES
History of Present Illness   Raimundo Munguia is a 56 yo male who presents for follow up to review results of his recent labs pertaining to his DM, type 2, HTN, GERD, Rheumatoid Arthritis and Dyslipidemia. In addition, Raimundo has Insomnia and Vitamin deficiency.    Hypertension   The current episode started more than 1 year ago.His BP is adequately controlled. His hypertensive medication is Metoprolol  50 mg which has improved his blood pressure and fast heart rate. Pertinent negatives include no anxiety, blurred vision, chest pain, headaches,  peripheral edema, or sweats. He has reduced the amount of caffeine he consumes on a daily basis.  Lab Results   Component Value Date    CREATININE 0.68 (L) 09/08/2020     Lab Results   Component Value Date    CREATININE 0.83 12/10/2020     Diabetes   He has type 2 diabetes mellitus. His disease course has been worsening.  He does share he has noticed some intermittent neuropathy in his left lower extremity.  Risk factors for coronary artery disease include dyslipidemia, hypertension, male sex, sedentary lifestyle and tobacco exposure. Current diabetic treatment includes oral agent (monotherapy). He is compliant with medication regimen most of the time. He has had a previous visit with a dietitian. An ACE inhibitor/angiotensin II receptor blocker is not being taken due to S/e. Eye exam is scheduled for January 2021.      Lab Results   Component Value Date    HGBA1C 7.50 (H) 09/08/2020       Lab Results   Component Value Date    HGBA1C 8.83 (H) 12/10/2020     Dyslipidemia   This is a chronic problem. The current episode started more than 1 year ago. The problem is controlled. Factors aggravating his hyperlipidemia include fatty foods. Current antihyperlipidemic treatment includes statins. The current treatment provides significant improvement of lipids. Compliance problems include adherence to diet and adherence to exercise.  Risk factors for coronary artery disease include  "diabetes mellitus, dyslipidemia, hypertension, male sex and obesity.     Lab Results   Component Value Date    CHOL 99 09/08/2020    TRIG 264 (H) 09/08/2020    HDL 22 (L) 09/08/2020    LDL 24 09/08/2020     Lab Results   Component Value Date    CHOL 107 12/10/2020    CHLPL 86 05/02/2016    TRIG 252 (H) 12/10/2020    HDL 26 (L) 12/10/2020    LDL 42 12/10/2020     Vitals:    12/15/20 1116   BP: 100/78   BP Location: Left arm   Patient Position: Sitting   Cuff Size: Adult   Pulse: 89   Temp: 97.5 °F (36.4 °C)   TempSrc: Temporal   SpO2: 95%   Weight: 106 kg (234 lb)   Height: 172.7 cm (67.99\")        The following portions of the patient's history were reviewed and updated as appropriate: allergies, current medications, past family history, past medical history, past social history, past surgical history and problem list.    Review of Systems   Constitutional: Positive for activity change (Decreased due to weather). Negative for appetite change, chills, fatigue and fever.   Eyes: Positive for visual disturbance (Wears glasses). Negative for pain, discharge, redness and itching.   Respiratory: Positive for cough and shortness of breath. Negative for wheezing.         Restrictive lung disease   Cardiovascular: Negative for chest pain, palpitations and leg swelling.   Gastrointestinal: Negative for constipation, diarrhea, nausea and vomiting.   Endocrine: Negative for cold intolerance, heat intolerance, polydipsia, polyphagia and polyuria.   Musculoskeletal: Positive for arthralgias and joint swelling.   Skin: Negative for color change and rash.   Allergic/Immunologic: Positive for environmental allergies.   Neurological: Positive for numbness (Intermittent). Negative for weakness, light-headedness, headache and confusion.   Hematological: Negative for adenopathy.   Psychiatric/Behavioral: Positive for sleep disturbance. Negative for decreased concentration, depressed mood and stress. The patient is not nervous/anxious.   "          Physical Exam  Vitals signs reviewed.   Constitutional:       General: He is not in acute distress.     Appearance: He is well-developed.      Comments: Pleasant; wearing appropriate face covering   HENT:      Nose: Nose normal.   Eyes:      General: No scleral icterus.        Right eye: No discharge.         Left eye: No discharge.      Conjunctiva/sclera: Conjunctivae normal.      Pupils: Pupils are equal, round, and reactive to light.   Neck:      Musculoskeletal: Neck supple.      Thyroid: No thyromegaly.      Vascular: No JVD.   Cardiovascular:      Rate and Rhythm: Normal rate and regular rhythm.      Heart sounds: Normal heart sounds. No murmur. No friction rub.   Pulmonary:      Effort: Pulmonary effort is normal. No respiratory distress.      Breath sounds: Decreased breath sounds present. No wheezing, rhonchi or rales.   Abdominal:      General: Bowel sounds are normal.      Palpations: Abdomen is soft.      Tenderness: There is no abdominal tenderness. There is no guarding or rebound.   Musculoskeletal:      Right lower leg: No edema.      Left lower leg: No edema.   Lymphadenopathy:      Cervical: No cervical adenopathy.   Skin:     General: Skin is warm and dry.      Capillary Refill: Capillary refill takes less than 2 seconds.      Findings: No erythema or rash.   Neurological:      Mental Status: He is alert and oriented to person, place, and time.   Psychiatric:         Mood and Affect: Mood normal.         Behavior: Behavior normal.         Thought Content: Thought content normal.         Judgment: Judgment normal.       Results for orders placed or performed in visit on 12/10/20   Comprehensive Metabolic Panel    Specimen: Arm, Right; Blood   Result Value Ref Range    Glucose 225 (H) 65 - 99 mg/dL    BUN 13 6 - 20 mg/dL    Creatinine 0.83 0.76 - 1.27 mg/dL    Sodium 138 136 - 145 mmol/L    Potassium 4.3 3.5 - 5.2 mmol/L    Chloride 103 98 - 107 mmol/L    CO2 23.7 22.0 - 29.0 mmol/L     Calcium 9.3 8.6 - 10.5 mg/dL    Total Protein 7.3 6.0 - 8.5 g/dL    Albumin 4.00 3.50 - 5.20 g/dL    ALT (SGPT) 22 1 - 41 U/L    AST (SGOT) 19 1 - 40 U/L    Alkaline Phosphatase 152 (H) 39 - 117 U/L    Total Bilirubin 0.3 0.0 - 1.2 mg/dL    eGFR Non African Amer 97 >60 mL/min/1.73    Globulin 3.3 gm/dL    A/G Ratio 1.2 g/dL    BUN/Creatinine Ratio 15.7 7.0 - 25.0    Anion Gap 11.3 5.0 - 15.0 mmol/L   TSH    Specimen: Arm, Right; Blood   Result Value Ref Range    TSH 5.420 (H) 0.270 - 4.200 uIU/mL   Lipid Panel    Specimen: Arm, Right; Blood   Result Value Ref Range    Total Cholesterol 107 0 - 200 mg/dL    Triglycerides 252 (H) 0 - 150 mg/dL    HDL Cholesterol 26 (L) 40 - 60 mg/dL    LDL Cholesterol  42 0 - 100 mg/dL    VLDL Cholesterol 39 5 - 40 mg/dL    LDL/HDL Ratio 1.18    Vitamin D 25 Hydroxy    Specimen: Arm, Right; Blood   Result Value Ref Range    25 Hydroxy, Vitamin D 24.4 (L) 30.0 - 100.0 ng/ml   Vitamin B12    Specimen: Arm, Right; Blood   Result Value Ref Range    Vitamin B-12 1,108 (H) 211 - 946 pg/mL   Uric Acid    Specimen: Arm, Right; Blood   Result Value Ref Range    Uric Acid 4.4 3.4 - 7.0 mg/dL   MicroAlbumin, Urine, Random - Urine, Clean Catch    Specimen: Urine, Clean Catch   Result Value Ref Range    Microalbumin, Urine <1.2 mg/dL   Hemoglobin A1c    Specimen: Arm, Right; Blood   Result Value Ref Range    Hemoglobin A1C 8.83 (H) 4.80 - 5.60 %   CBC Auto Differential    Specimen: Arm, Right; Blood   Result Value Ref Range    WBC 8.85 3.40 - 10.80 10*3/mm3    RBC 5.34 4.14 - 5.80 10*6/mm3    Hemoglobin 16.7 13.0 - 17.7 g/dL    Hematocrit 49.0 37.5 - 51.0 %    MCV 91.8 79.0 - 97.0 fL    MCH 31.3 26.6 - 33.0 pg    MCHC 34.1 31.5 - 35.7 g/dL    RDW 12.4 12.3 - 15.4 %    RDW-SD 40.9 37.0 - 54.0 fl    MPV 10.3 6.0 - 12.0 fL    Platelets 312 140 - 450 10*3/mm3    Neutrophil % 53.8 42.7 - 76.0 %    Lymphocyte % 28.5 19.6 - 45.3 %    Monocyte % 13.8 (H) 5.0 - 12.0 %    Eosinophil % 2.8 0.3 - 6.2 %     Basophil % 0.9 0.0 - 1.5 %    Immature Grans % 0.2 0.0 - 0.5 %    Neutrophils, Absolute 4.76 1.70 - 7.00 10*3/mm3    Lymphocytes, Absolute 2.52 0.70 - 3.10 10*3/mm3    Monocytes, Absolute 1.22 (H) 0.10 - 0.90 10*3/mm3    Eosinophils, Absolute 0.25 0.00 - 0.40 10*3/mm3    Basophils, Absolute 0.08 0.00 - 0.20 10*3/mm3    Immature Grans, Absolute 0.02 0.00 - 0.05 10*3/mm3    nRBC 0.0 0.0 - 0.2 /100 WBC     Assessment/Plan     Problems Addressed this Visit        Cardiovascular and Mediastinum    Benign essential hypertension    Hyperlipidemia       Respiratory    Chronic allergic rhinitis    Restrictive lung disease       Digestive    Vitamin D deficiency    Gastroesophageal reflux disease without esophagitis    Vitamin B 12 deficiency       Musculoskeletal and Integument    Rheumatoid arthritis with positive rheumatoid factor (CMS/HCC)      Other Visit Diagnoses     Uncontrolled type 2 diabetes mellitus with hyperglycemia (CMS/HCC)  (Chronic)   -  Primary    Findings and recommendations discussed with Raimundo. Will try adding a GLP-1 Agonist    Relevant Medications    exenatide er (Bydureon BCise) 2 MG/0.85ML auto-injector injection      Diagnoses       Codes Comments    Uncontrolled type 2 diabetes mellitus with hyperglycemia (CMS/HCC)    -  Primary ICD-10-CM: E11.65  ICD-9-CM: 250.02 Findings and recommendations discussed with Raimundo. Will try adding a GLP-1 Agonist    Benign essential hypertension     ICD-10-CM: I10  ICD-9-CM: 401.1 Well controlled    Mixed hyperlipidemia     ICD-10-CM: E78.2  ICD-9-CM: 272.2 Lipid panel reviewed. Reinforced cardiovascular risk reduction modifications    Rheumatoid arthritis involving multiple sites with positive rheumatoid factor (CMS/HCC)     ICD-10-CM: M05.79  ICD-9-CM: 714.0 Stable    Chronic allergic rhinitis     ICD-10-CM: J30.9  ICD-9-CM: 477.9 Continue Singulair and Flonase    Restrictive lung disease     ICD-10-CM: J98.4  ICD-9-CM: 518.89 F/U with Pulmonologist     Gastroesophageal reflux disease without esophagitis     ICD-10-CM: K21.9  ICD-9-CM: 530.81 Stable with Prevacid    Vitamin B 12 deficiency     ICD-10-CM: E53.8  ICD-9-CM: 266.2 Daily Vit B12    Vitamin D deficiency     ICD-10-CM: E55.9  ICD-9-CM: 268.9 Vit D daily supplement      Findings and recommendations discussed with Raimundo. Treatment options reviewed. He is receptive to adding a GLP-1 Agonist. Hopefully, his insurance will cover. Diabetes self management skills reinforced. He will f/u with me in four weeks to assess response to GLP-1 Agonist; sooner if problems/concerns occur.     This document has been electronically signed by ABDIRAHMAN Braswell, RICCO-BC, MAXWELLE  December 15, 2020 18:14 EST

## 2020-12-15 NOTE — PATIENT INSTRUCTIONS
High Triglycerides Eating Plan  Triglycerides are a type of fat in the blood. High levels of triglycerides can increase your risk of heart disease and stroke. If your triglyceride levels are high, choosing the right foods can help lower your triglycerides and keep your heart healthy. Work with your health care provider or a diet and nutrition specialist (dietitian) to develop an eating plan that is right for you.  What are tips for following this plan?  General guidelines    · Lose weight, if you are overweight. For most people, losing 5-10 lbs (2-5 kg) helps lower triglyceride levels. A weight-loss plan may include.  ? 30 minutes of exercise at least 5 days a week.  ? Reducing the amount of calories, sugar, and fat you eat.  · Eat a wide variety of fresh fruits, vegetables, and whole grains. These foods are high in fiber.  · Eat foods that contain healthy fats, such as fatty fish, nuts, seeds, and olive oil.  · Avoid foods that are high in added sugar, added salt (sodium), saturated fat, and trans fat.  · Avoid low-fiber, refined carbohydrates such as white bread, crackers, noodles, and white rice.  · Avoid foods with partially hydrogenated oils (trans fats), such as fried foods or stick margarine.  · Limit alcohol intake to no more than 1 drink a day for nonpregnant women and 2 drinks a day for men. One drink equals 12 oz of beer, 5 oz of wine, or 1½ oz of hard liquor. Your health care provider may recommend that you drink less depending on your overall health.  Reading food labels  · Check food labels for the amount of saturated fat. Choose foods with no or very little saturated fat.  · Check food labels for the amount of trans fat. Choose foods with no trans fat.  · Check food labels for the amount of cholesterol. Choose foods low in cholesterol. Ask your dietitian how much cholesterol you should have each day.  · Check food labels for the amount of sodium. Choose foods with less than 140 milligrams (mg) per  serving.  Shopping  · Buy dairy products labeled as nonfat (skim) or low-fat (1%).  · Avoid buying processed or prepackaged foods. These are often high in added sugar, sodium, and fat.  Cooking  · Choose healthy fats when cooking, such as olive oil or canola oil.  · Cook foods using lower fat methods, such as baking, broiling, boiling, or grilling.  · Make your own sauces, dressings, and marinades when possible, instead of buying them. Store-bought sauces, dressings, and marinades are often high in sodium and sugar.  Meal planning  · Eat more home-cooked food and less restaurant, buffet, and fast food.  · Eat fatty fish at least 2 times each week. Examples of fatty fish include salmon, trout, mackerel, tuna, and herring.  · If you eat whole eggs, do not eat more than 3 egg yolks per week.  What foods are recommended?  The items listed may not be a complete list. Talk with your dietitian about what dietary choices are best for you.  Grains  Whole wheat or whole grain breads, crackers, cereals, and pasta. Unsweetened oatmeal. Bulgur. Barley. Quinoa. Brown rice. Whole wheat flour tortillas.  Vegetables  Fresh or frozen vegetables. Low-sodium canned vegetables.  Fruits  All fresh, canned (in natural juice), or frozen fruits.  Meats and other protein foods  Skinless chicken or turkey. Ground chicken or turkey. Lean cuts of pork, trimmed of fat. Fish and seafood, especially salmon, trout, and herring. Egg whites. Dried beans, peas, or lentils. Unsalted nuts or seeds. Unsalted canned beans. Natural peanut or almond butter.  Dairy  Low-fat dairy products. Skim or low-fat (1%) milk. Reduced fat (2%) and low-sodium cheese. Low-fat ricotta cheese. Low-fat cottage cheese. Plain, low-fat yogurt.  Fats and oils  Tub margarine without trans fats. Light or reduced-fat mayonnaise. Light or reduced-fat salad dressings. Avocado. Safflower, olive, sunflower, soybean, and canola oils.  What foods are not recommended?  The items listed  may not be a complete list. Talk with your dietitian about what dietary choices are best for you.  Grains  White bread. White (regular) pasta. White rice. Cornbread. Bagels. Pastries. Crackers that contain trans fat.  Vegetables  Creamed or fried vegetables. Vegetables in a cheese sauce.  Fruits  Sweetened dried fruit. Canned fruit in syrup. Fruit juice.  Meats and other protein foods  Fatty cuts of meat. Ribs. Chicken wings. [place]. Sausage. Bologna. Salami. Chitterlings. Fatback. Hot dogs. Bratwurst. Packaged lunch meats.  Dairy  Whole or reduced-fat (2%) milk. Half-and-half. Cream cheese. Full-fat or sweetened yogurt. Full-fat cheese. Nondairy creamers. Whipped toppings. Processed cheese or cheese spreads. Cheese curds.  Beverages  Alcohol. Sweetened drinks, such as soda, lemonade, fruit drinks, or punches.  Fats and oils  Butter. Stick margarine. Lard. Shortening. Ghee. [place] fat. Tropical oils, such as coconut, palm kernel, or palm oils.  Sweets and desserts  Corn syrup. Sugars. Honey. Molasses. Candy. Jam and jelly. Syrup. Sweetened cereals. Cookies. Pies. Cakes. Donuts. Muffins. Ice cream.  Condiments  Store-bought sauces, dressings, and marinades that are high in sugar, such as ketchup and barbecue sauce.  Summary  · High levels of triglycerides can increase the risk of heart disease and stroke. Choosing the right foods can help lower your triglycerides.  · Eat plenty of fresh fruits, vegetables, and whole grains. Choose low-fat dairy and lean meats. Eat fatty fish at least twice a week.  · Avoid processed and prepackaged foods with added sugar, sodium, saturated fat, and trans fat.  · If you need suggestions or have questions about what types of food are good for you, talk with your health care provider or a dietitian.  This information is not intended to replace advice given to you by your health care provider. Make sure you discuss any questions you have with your health care provider.  Document Revised:  11/30/2018 Document Reviewed: 02/20/2018  [employer] Patient Education © 2020 [employer]  Diabetes Mellitus and Nutrition, Adult  When you have diabetes (diabetes mellitus), it is very important to have healthy eating habits because your blood sugar (glucose) levels are greatly affected by what you eat and drink. Eating healthy foods in the appropriate amounts, at about the same times every day, can help you:  · Control your blood glucose.  · Lower your risk of heart disease.  · Improve your blood pressure.  · Reach or maintain a healthy weight.  Every person with diabetes is different, and each person has different needs for a meal plan. Your health care provider may recommend that you work with a diet and nutrition specialist (dietitian) to make a meal plan that is best for you. Your meal plan may vary depending on factors such as:  · The calories you need.  · The medicines you take.  · Your weight.  · Your blood glucose, blood pressure, and cholesterol levels.  · Your activity level.  · Other health conditions you have, such as heart or kidney disease.  How do carbohydrates affect me?  Carbohydrates, also called carbs, affect your blood glucose level more than any other type of food. Eating carbs naturally raises the amount of glucose in your blood. Carb counting is a method for keeping track of how many carbs you eat. Counting carbs is important to keep your blood glucose at a healthy level, especially if you use insulin or take certain oral diabetes medicines.  It is important to know how many carbs you can safely have in each meal. This is different for every person. Your dietitian can help you calculate how many carbs you should have at each meal and for each snack.  Foods that contain carbs include:  · Bread, cereal, rice, pasta, and crackers.  · Potatoes and corn.  · Peas, beans, and lentils.  · Milk and yogurt.  · Fruit and juice.  · Desserts, such as cakes, cookies, ice cream, and candy.  How does  "alcohol affect me?  Alcohol can cause a sudden decrease in blood glucose (hypoglycemia), especially if you use insulin or take certain oral diabetes medicines. Hypoglycemia can be a life-threatening condition. Symptoms of hypoglycemia (sleepiness, dizziness, and confusion) are similar to symptoms of having too much alcohol.  If your health care provider says that alcohol is safe for you, follow these guidelines:  · Limit alcohol intake to no more than 1 drink per day for nonpregnant women and 2 drinks per day for men. One drink equals 12 oz of beer, 5 oz of wine, or 1½ oz of hard liquor.  · Do not drink on an empty stomach.  · Keep yourself hydrated with water, diet soda, or unsweetened iced tea.  · Keep in mind that regular soda, juice, and other mixers may contain a lot of sugar and must be counted as carbs.  What are tips for following this plan?    Reading food labels  · Start by checking the serving size on the \"Nutrition Facts\" label of packaged foods and drinks. The amount of calories, carbs, fats, and other nutrients listed on the label is based on one serving of the item. Many items contain more than one serving per package.  · Check the total grams (g) of carbs in one serving. You can calculate the number of servings of carbs in one serving by dividing the total carbs by 15. For example, if a food has 30 g of total carbs, it would be equal to 2 servings of carbs.  · Check the number of grams (g) of saturated and trans fats in one serving. Choose foods that have low or no amount of these fats.  · Check the number of milligrams (mg) of salt (sodium) in one serving. Most people should limit total sodium intake to less than 2,300 mg per day.  · Always check the nutrition information of foods labeled as \"low-fat\" or \"nonfat\". These foods may be higher in added sugar or refined carbs and should be avoided.  · Talk to your dietitian to identify your daily goals for nutrients listed on the " label.  Shopping  · Avoid buying canned, premade, or processed foods. These foods tend to be high in fat, sodium, and added sugar.  · Shop around the outside edge of the grocery store. This includes fresh fruits and vegetables, bulk grains, fresh meats, and fresh dairy.  Cooking  · Use low-heat cooking methods, such as baking, instead of high-heat cooking methods like deep frying.  · Cook using healthy oils, such as olive, canola, or sunflower oil.  · Avoid cooking with butter, cream, or high-fat meats.  Meal planning  · Eat meals and snacks regularly, preferably at the same times every day. Avoid going long periods of time without eating.  · Eat foods high in fiber, such as fresh fruits, vegetables, beans, and whole grains. Talk to your dietitian about how many servings of carbs you can eat at each meal.  · Eat 4-6 ounces (oz) of lean protein each day, such as lean meat, chicken, fish, eggs, or tofu. One oz of lean protein is equal to:  ? 1 oz of meat, chicken, or fish.  ? 1 egg.  ? ¼ cup of tofu.  · Eat some foods each day that contain healthy fats, such as avocado, nuts, seeds, and fish.  Lifestyle  · Check your blood glucose regularly.  · Exercise regularly as told by your health care provider. This may include:  ? 150 minutes of moderate-intensity or vigorous-intensity exercise each week. This could be brisk walking, biking, or water aerobics.  ? Stretching and doing strength exercises, such as yoga or weightlifting, at least 2 times a week.  · Take medicines as told by your health care provider.  · Do not use any products that contain nicotine or tobacco, such as cigarettes and e-cigarettes. If you need help quitting, ask your health care provider.  · Work with a counselor or diabetes educator to identify strategies to manage stress and any emotional and social challenges.  Questions to ask a health care provider  · Do I need to meet with a diabetes educator?  · Do I need to meet with a dietitian?  · What  number can I call if I have questions?  · When are the best times to check my blood glucose?  Where to find more information:  · American Diabetes Association: diabetes.org  · Academy of Nutrition and Dietetics: www.eatright.org  · National [place] of Diabetes and Digestive and Kidney Diseases (NIH): www.niddk.nih.gov  Summary  · A healthy meal plan will help you control your blood glucose and maintain a healthy lifestyle.  · Working with a diet and nutrition specialist (dietitian) can help you make a meal plan that is best for you.  · Keep in mind that carbohydrates (carbs) and alcohol have immediate effects on your blood glucose levels. It is important to count carbs and to use alcohol carefully.  This information is not intended to replace advice given to you by your health care provider. Make sure you discuss any questions you have with your health care provider.  Document Revised: 11/30/2018 Document Reviewed: 01/22/2018  [employer] Patient Education © 2020 [employer]

## 2020-12-31 DIAGNOSIS — M05.79 RHEUMATOID ARTHRITIS INVOLVING MULTIPLE SITES WITH POSITIVE RHEUMATOID FACTOR (HCC): Chronic | ICD-10-CM

## 2021-01-26 ENCOUNTER — OFFICE VISIT (OUTPATIENT)
Dept: FAMILY MEDICINE CLINIC | Facility: CLINIC | Age: 56
End: 2021-01-26

## 2021-01-26 ENCOUNTER — PRIOR AUTHORIZATION (OUTPATIENT)
Dept: FAMILY MEDICINE CLINIC | Facility: CLINIC | Age: 56
End: 2021-01-26

## 2021-01-26 VITALS
RESPIRATION RATE: 14 BRPM | WEIGHT: 236 LBS | DIASTOLIC BLOOD PRESSURE: 76 MMHG | SYSTOLIC BLOOD PRESSURE: 126 MMHG | TEMPERATURE: 97.3 F | BODY MASS INDEX: 35.77 KG/M2 | HEIGHT: 68 IN | HEART RATE: 88 BPM | OXYGEN SATURATION: 95 %

## 2021-01-26 DIAGNOSIS — E55.9 VITAMIN D DEFICIENCY: ICD-10-CM

## 2021-01-26 DIAGNOSIS — M05.79 RHEUMATOID ARTHRITIS INVOLVING MULTIPLE SITES WITH POSITIVE RHEUMATOID FACTOR (HCC): Chronic | ICD-10-CM

## 2021-01-26 DIAGNOSIS — K21.9 GASTROESOPHAGEAL REFLUX DISEASE WITHOUT ESOPHAGITIS: Chronic | ICD-10-CM

## 2021-01-26 DIAGNOSIS — I10 BENIGN ESSENTIAL HYPERTENSION: Chronic | ICD-10-CM

## 2021-01-26 DIAGNOSIS — J98.4 RESTRICTIVE LUNG DISEASE: Chronic | ICD-10-CM

## 2021-01-26 DIAGNOSIS — IMO0002 DM TYPE 2, UNCONTROLLED, WITH NEUROPATHY: Primary | Chronic | ICD-10-CM

## 2021-01-26 DIAGNOSIS — E53.8 VITAMIN B 12 DEFICIENCY: ICD-10-CM

## 2021-01-26 DIAGNOSIS — E78.2 MIXED HYPERLIPIDEMIA: Chronic | ICD-10-CM

## 2021-01-26 PROCEDURE — 99214 OFFICE O/P EST MOD 30 MIN: CPT | Performed by: NURSE PRACTITIONER

## 2021-01-26 NOTE — PROGRESS NOTES
History of Present Illness   Raimundo Munguia is a 54 yo male who presents for follow up and recheck pertaining to his DM, type 2, HTN, GERD, Rheumatoid Arthritis and Dyslipidemia. In addition, Raimundo has Insomnia and Vitamin deficiency.    Hypertension   The current episode started more than 1 year ago.His BP is adequately controlled. His hypertensive medication is Metoprolol  50 mg which has improved his blood pressure and fast heart rate. Pertinent negatives include no anxiety, blurred vision, chest pain, headaches,  peripheral edema, or sweats. He has reduced the amount of caffeine he consumes on a daily basis.  Lab Results   Component Value Date    CREATININE 0.68 (L) 09/08/2020     Lab Results   Component Value Date    CREATININE 0.83 12/10/2020     Diabetes   He has type 2 diabetes mellitus. His disease course has been worsening.  He does share he has noticed some intermittent neuropathy in his left lower extremity.  Risk factors for coronary artery disease include dyslipidemia, hypertension, male sex, sedentary lifestyle and tobacco exposure. Current diabetic treatment includes Metformin and Bydureon. He is compliant with medication regimen most of the time. He has had a previous visit with a dietitian. An ACE inhibitor/angiotensin II receptor blocker is not being taken due to S/e. Eye exam is scheduled for January 2021.      Lab Results   Component Value Date    HGBA1C 7.50 (H) 09/08/2020       Lab Results   Component Value Date    HGBA1C 8.83 (H) 12/10/2020     Dyslipidemia   This is a chronic problem. The current episode started more than 1 year ago. Factors aggravating his hyperlipidemia include fatty foods. Current antihyperlipidemic treatment includes statins. The current treatment provides significant improvement of lipids. Compliance problems include adherence to diet and adherence to exercise.  Risk factors for coronary artery disease include diabetes mellitus, dyslipidemia, hypertension, male sex  "and obesity.     Lab Results   Component Value Date    CHOL 99 09/08/2020    TRIG 264 (H) 09/08/2020    HDL 22 (L) 09/08/2020    LDL 24 09/08/2020     Lab Results   Component Value Date    CHOL 107 12/10/2020    TRIG 252 (H) 12/10/2020    HDL 26 (L) 12/10/2020    LDL 42 12/10/2020     Vitals:    01/26/21 1126   BP: 126/76   BP Location: Right arm   Patient Position: Sitting   Cuff Size: Adult   Pulse: 88   Temp: 97.3 °F (36.3 °C)   TempSrc: Temporal   SpO2: 95%   Weight: 107 kg (236 lb)   Height: 172.7 cm (67.99\")        The following portions of the patient's history were reviewed and updated as appropriate: allergies, current medications, past family history, past medical history, past social history, past surgical history and problem list.    Review of Systems   Constitutional: Positive for appetite change. Negative for activity change, chills, fatigue and fever.   HENT: Positive for congestion, hearing loss (Chronic and stable), postnasal drip and rhinorrhea. Negative for sinus pressure, sneezing, sore throat and trouble swallowing.    Eyes: Positive for visual disturbance (Wears glasses).   Respiratory: Negative for cough, shortness of breath and wheezing.    Cardiovascular: Negative for chest pain, palpitations and leg swelling.   Gastrointestinal: Negative for constipation, diarrhea, nausea and vomiting.   Musculoskeletal: Positive for arthralgias.   Skin: Negative for color change and rash.   Allergic/Immunologic: Positive for environmental allergies and immunocompromised state.   Neurological: Positive for dizziness. Negative for weakness, light-headedness, headache and confusion.   Hematological: Negative for adenopathy.   Psychiatric/Behavioral: Positive for sleep disturbance. Negative for decreased concentration, suicidal ideas and depressed mood.      Physical Exam  Vitals signs reviewed.   Constitutional:       General: He is not in acute distress.     Appearance: He is well-developed.   HENT:      " Head: Normocephalic.      Nose: Congestion present.   Eyes:      General: No scleral icterus.        Right eye: No discharge.         Left eye: No discharge.      Conjunctiva/sclera: Conjunctivae normal.   Neck:      Musculoskeletal: Neck supple.      Thyroid: No thyromegaly.      Vascular: No JVD.   Cardiovascular:      Rate and Rhythm: Normal rate and regular rhythm.      Heart sounds: Normal heart sounds. No murmur. No friction rub.   Pulmonary:      Effort: Pulmonary effort is normal. No respiratory distress.      Breath sounds: No wheezing or rales.   Abdominal:      General: Bowel sounds are normal. There is no distension.      Palpations: Abdomen is soft.      Tenderness: There is no abdominal tenderness. There is no guarding or rebound.   Musculoskeletal:         General: Deformity (Due to RA especially right arm) present.      Right lower leg: No edema.      Left lower leg: No edema.   Lymphadenopathy:      Cervical: No cervical adenopathy.   Skin:     General: Skin is warm and dry.      Findings: No erythema or rash.   Neurological:      Mental Status: He is alert and oriented to person, place, and time.   Psychiatric:         Mood and Affect: Mood normal.         Speech: Speech normal.         Behavior: Behavior is cooperative.         Thought Content: Thought content normal.         Cognition and Memory: Cognition normal.         Judgment: Judgment normal.       Assessment/Plan     Problems Addressed this Visit        Cardiac and Vasculature    Benign essential hypertension    Relevant Medications    Blood Pressure Monitoring kit    Other Relevant Orders    Comprehensive Metabolic Panel    TSH    Lipid Panel    Hyperlipidemia    Relevant Orders    Lipid Panel       Endocrine and Metabolic    DM type 2, uncontrolled, with neuropathy (CMS/HCC) - Primary    Relevant Medications    Blood Glucose Monitoring Suppl kit    Other Relevant Orders    Comprehensive Metabolic Panel    TSH    Lipid Panel    Hemoglobin  A1c    Vitamin B12    Vitamin D deficiency    Relevant Orders    Vitamin D 25 Hydroxy    Vitamin B 12 deficiency    Relevant Orders    Vitamin B12       Gastrointestinal Abdominal     Gastroesophageal reflux disease without esophagitis    Relevant Orders    CBC & Differential    Comprehensive Metabolic Panel       Musculoskeletal and Injuries    Rheumatoid arthritis with positive rheumatoid factor (CMS/HCC)       Pulmonary and Pneumonias    Restrictive lung disease    Relevant Orders    CBC & Differential      Diagnoses       Codes Comments    DM type 2, uncontrolled, with neuropathy (CMS/[facility])    -  Primary ICD-10-CM: E11.40, E11.65  ICD-9-CM: 250.62, 357.2 Tolerating Bydureon well with no s/e and reported improvement in his glycemic control    Benign essential hypertension     ICD-10-CM: I10  ICD-9-CM: 401.1 Well controlled    Mixed hyperlipidemia     ICD-10-CM: E78.2  ICD-9-CM: 272.2 Continue Atorvastatin and cardiovascular risk reduction modifications including tobacco cessation    Gastroesophageal reflux disease without esophagitis     ICD-10-CM: K21.9  ICD-9-CM: 530.81 Stable with Prevacid    Rheumatoid arthritis involving multiple sites with positive rheumatoid factor (CMS/HCC)     ICD-10-CM: M05.79  ICD-9-CM: 714.0 Continue to be as active as possible. Methotrexate and Folic acid    Restrictive lung disease     ICD-10-CM: J98.4  ICD-9-CM: 518.89 F/U with Pulmonologist    Vitamin B 12 deficiency     ICD-10-CM: E53.8  ICD-9-CM: 266.2 Vit B12 level ordered    Vitamin D deficiency     ICD-10-CM: E55.9  ICD-9-CM: 268.9 Vit D level ordered      Findings and recommendations discussed with Raimundo. He does report improved glycemic control with the Bydureon and Metformin combination which he will continue. He is needing the Bydureon PA 'd which has been started. Continue cardiovascular risk reduction modifications including nutrition and tobacco cessation. He will f/u with me in March with labs prior to his appt;  sooner if problems/concerns occur.       This document has been electronically signed by ABDIRAHMAN Braswell, RICCO-BC, MAXWELLE  January 26, 2021 12:42 EST

## 2021-01-26 NOTE — PATIENT INSTRUCTIONS
Type 2 Diabetes Mellitus, Self Care, Adult  Caring for yourself after you have been diagnosed with type 2 diabetes (type 2 diabetes mellitus) means keeping your blood sugar (glucose) under control with a balance of:  · Nutrition.  · Exercise.  · Lifestyle changes.  · Medicines or insulin, if necessary.  · Support from your team of health care providers and others.  The following information explains what you need to know to manage your diabetes at home.  What are the risks?  Having diabetes can put you at risk for other long-term (chronic) conditions, such as heart disease and kidney disease. Your health care provider may prescribe medicines to help prevent complications from diabetes. These medicines may include:  · Aspirin.  · Medicine to lower cholesterol.  · Medicine to control blood pressure.  How to monitor blood glucose    · Check your blood glucose every day, as often as told by your health care provider.  · Have your A1c (hemoglobin A1c) level checked two or more times a year, or as often as told by your health care provider.  Your health care provider will set individualized treatment goals for you. Generally, the goal of treatment is to maintain the following blood glucose levels:  · Before meals (preprandial):  mg/dL (4.4-7.2 mmol/L).  · After meals (postprandial): below 180 mg/dL (10 mmol/L).  · A1c level: less than 7%.  How to manage hyperglycemia and hypoglycemia  Hyperglycemia symptoms  Hyperglycemia, also called high blood glucose, occurs when blood glucose is too high. Make sure you know the early signs of hyperglycemia, such as:  · Increased thirst.  · Hunger.  · Feeling very tired.  · Needing to urinate more often than usual.  · Blurry vision.  Hypoglycemia symptoms  Hypoglycemia, also called low blood glucose, occurs with a blood glucose level at or below 70 mg/dL (3.9 mmol/L). The risk for hypoglycemia increases during or after exercise, during sleep, during illness, and when skipping  meals or not eating for a long time (fasting).  It is important to know the symptoms of hypoglycemia and treat it right away. Always have a 15-gram rapid-acting carbohydrate snack with you to treat low blood glucose. Family members and close friends should also know the symptoms and should understand how to treat hypoglycemia, in case you are not able to treat yourself. Symptoms may include:  · Hunger.  · Anxiety.  · Sweating and feeling clammy.  · Confusion.  · Dizziness or feeling light-headed.  · Sleepiness.  · Nausea.  · Increased heart rate.  · Headache.  · Blurry vision.  · Irritability.  · A change in coordination.  · Tingling or numbness around the mouth, lips, or tongue.  · Restless sleep.  · Fainting.  · Seizure.  Treating hypoglycemia  If you are alert and able to swallow safely, follow the 15:15 rule:  · Take 15 grams of a rapid-acting carbohydrate. Talk with your health care provider about how much you should take.  · Rapid-acting options include:  ? Glucose pills (take 15 grams).  ? 6-8 pieces of hard candy.  ? 4-6 oz (120-150 mL) of fruit juice.  ? 4-6 oz (120-150 mL) of regular (not diet) soda.  ? 1 Tbsp (15 mL) honey or sugar.  · Check your blood glucose 15 minutes after you take the carbohydrate.  · If the repeat blood glucose level is still at or below 70 mg/dL (3.9 mmol/L), take 15 grams of a carbohydrate again.  · If your blood glucose level does not increase above 70 mg/dL (3.9 mmol/L) after 3 tries, seek emergency medical care.  · After your blood glucose level returns to normal, eat a meal or a snack within 1 hour.  Treating severe hypoglycemia  Severe hypoglycemia is when your blood glucose level is at or below 54 mg/dL (3 mmol/L). Severe hypoglycemia is an emergency. Do not wait to see if the symptoms will go away. Get medical help right away. Call your local emergency services (911 in the [place]  If you have severe hypoglycemia and you cannot eat or drink, you may need an injection of  glucagon. A family member or close friend should learn how to check your blood glucose and how to give you a glucagon injection. Ask your health care provider if you need to have an emergency glucagon injection kit available.  Severe hypoglycemia may need to be treated in a hospital. The treatment may include getting glucose through an IV. You may also need treatment for the cause of your hypoglycemia.  Follow these instructions at home:  Take diabetes medicines as told  · If your health care provider prescribed insulin or diabetes medicines, take them every day.  · Do not run out of insulin or other diabetes medicines that you take. Plan ahead so you always have these available.  · If you use insulin, adjust your dosage based on how physically active you are and what foods you eat. Your health care provider will tell you how to adjust your dosage.  Make healthy food choices    The things that you eat and drink affect your blood glucose and your insulin dosage. Making good choices helps to control your diabetes and prevent other health problems. A healthy meal plan includes eating lean proteins, complex carbohydrates, fresh fruits and vegetables, low-fat dairy products, and healthy fats.  Make an appointment to see a diet and nutrition specialist (registered dietitian) to help you create an eating plan that is right for you. Make sure that you:  · Follow instructions from your health care provider about eating or drinking restrictions.  · Drink enough fluid to keep your urine pale yellow.  · Keep a record of the carbohydrates that you eat. Do this by reading food labels and learning the standard serving sizes of foods.  · Follow your sick day plan whenever you cannot eat or drink as usual. Make this plan in advance with your health care provider.    Stay active  Exercise regularly, as told by your health care provider. This may include:  · Stretching and doing strength exercises, such as yoga or weightlifting, 2 or  more times a week.  · Doing 150 minutes or more of moderate-intensity or vigorous-intensity exercise each week. This could be brisk walking, biking, or water aerobics.  ? Spread out your activity over 3 or more days of the week.  ? Do not go more than 2 days in a row without doing some kind of physical activity.  When you start a new exercise or activity, work with your health care provider to adjust your insulin, medicines, or food intake as needed.  Make healthy lifestyle choices  · Do not use any tobacco products, such as cigarettes, chewing tobacco, and e-cigarettes. If you need help quitting, ask your health care provider.  · If your health care provider says that alcohol is safe for you, limit alcohol intake to no more than 1 drink per day for nonpregnant women and 2 drinks per day for men. One drink equals 12 oz of beer (355 mL), 5 oz of wine (148 mL), or 1½ oz of hard liquor (44 mL).  · Learn to manage stress. If you need help with this, ask your health care provider.  Care for your body    · Keep your immunizations up to date. In addition to getting vaccinations as told by your health care provider, it is recommended that you get vaccinated against the following illnesses:  ? The flu (influenza). Get a flu shot every year.  ? Pneumonia.  ? Hepatitis B.  · Schedule an eye exam soon after your diagnosis, and then one time every year after that.  · Check your skin and feet every day for cuts, bruises, redness, blisters, or sores. Schedule a foot exam with your health care provider once every year.  · Brush your teeth and gums two times a day, and floss one or more times a day. Visit your dentist one or more times every 6 months.  · Maintain a healthy weight.  General instructions  · Take over-the-counter and prescription medicines only as told by your health care provider.  · Share your diabetes management plan with people in your workplace, school, and household.  · Carry a medical alert card or wear medical  alert linnea.  · Keep all follow-up visits as told by your health care provider. This is important.  Questions to ask your health care provider  · Do I need to meet with a diabetes educator?  · Where can I find a support group for people with diabetes?  Where to find more information  For more information about diabetes, visit:  · American Diabetes Association (ADA): www.diabetes.org  · American Association of Diabetes Educators (AADE): www.diabeteseducator.org  Summary  · Caring for yourself after you have been diagnosed with (type 2 diabetes mellitus) means keeping your blood sugar (glucose) under control with a balance of nutrition, exercise, lifestyle changes, and medicine.  · Check your blood glucose every day, as often as told by your health care provider.  · Having diabetes can put you at risk for other long-term (chronic) conditions, such as heart disease and kidney disease. Your health care provider may prescribe medicines to help prevent complications from diabetes.  · Keep all follow-up visits as told by your health care provider. This is important.  This information is not intended to replace advice given to you by your health care provider. Make sure you discuss any questions you have with your health care provider.  Document Revised: 06/10/2019 Document Reviewed: 01/20/2017  [employer] Patient Education © 2020 [employer]

## 2021-01-27 DIAGNOSIS — IMO0002 DM TYPE 2, UNCONTROLLED, WITH NEUROPATHY: Primary | ICD-10-CM

## 2021-01-27 RX ORDER — DULAGLUTIDE 1.5 MG/.5ML
1.5 INJECTION, SOLUTION SUBCUTANEOUS WEEKLY
Qty: 4 PEN | Refills: 3 | Status: SHIPPED | OUTPATIENT
Start: 2021-01-27 | End: 2021-05-20 | Stop reason: SDUPTHER

## 2021-01-28 ENCOUNTER — TELEPHONE (OUTPATIENT)
Dept: FAMILY MEDICINE CLINIC | Facility: CLINIC | Age: 56
End: 2021-01-28

## 2021-01-28 ENCOUNTER — PRIOR AUTHORIZATION (OUTPATIENT)
Dept: FAMILY MEDICINE CLINIC | Facility: CLINIC | Age: 56
End: 2021-01-28

## 2021-01-28 DIAGNOSIS — R00.2 PALPITATIONS: ICD-10-CM

## 2021-01-28 DIAGNOSIS — Z87.09 HISTORY OF ASTHMA: ICD-10-CM

## 2021-01-28 DIAGNOSIS — M05.79 RHEUMATOID ARTHRITIS INVOLVING MULTIPLE SITES WITH POSITIVE RHEUMATOID FACTOR (HCC): Chronic | ICD-10-CM

## 2021-01-28 RX ORDER — MONTELUKAST SODIUM 10 MG/1
TABLET ORAL
Qty: 30 TABLET | Refills: 4 | Status: SHIPPED | OUTPATIENT
Start: 2021-01-28 | End: 2021-06-17

## 2021-01-28 RX ORDER — METOPROLOL SUCCINATE 100 MG/1
100 TABLET, EXTENDED RELEASE ORAL DAILY
Qty: 30 TABLET | Refills: 4 | Status: SHIPPED | OUTPATIENT
Start: 2021-01-28 | End: 2021-06-17

## 2021-01-28 NOTE — TELEPHONE ENCOUNTER
----- Message from ABDIRAHMAN Coughlin sent at 1/27/2021  8:46 PM EST -----  Have sent trulicity 1.5 into his pharmacy  ----- Message -----  From: Ilana Munguia MA  Sent: 1/27/2021   1:49 PM EST  To: ABDIRAHMAN Coughlin    He says he prefers the Trulicity  ----- Message -----  From: Aden Carl APRN  Sent: 1/27/2021  12:35 PM EST  To: Ilana Munguia MA    If you would ask Raimundo which he would prefer. The Bydureon 2 mg is just harder to mix.The Victoza is a daily injection. The Trulicity is once a week   ----- Message -----  From: Ilana Munguia MA  Sent: 1/27/2021  11:48 AM EST  To: ABDIRAHMAN Coughlin    Bydureon was denied. They prefer the bydureon 2mg pen, victoza or trulicity

## 2021-02-25 DIAGNOSIS — K21.9 GASTROESOPHAGEAL REFLUX DISEASE WITHOUT ESOPHAGITIS: Chronic | ICD-10-CM

## 2021-02-25 DIAGNOSIS — M05.79 RHEUMATOID ARTHRITIS INVOLVING MULTIPLE SITES WITH POSITIVE RHEUMATOID FACTOR (HCC): Chronic | ICD-10-CM

## 2021-02-25 DIAGNOSIS — J30.9 CHRONIC ALLERGIC RHINITIS: Chronic | ICD-10-CM

## 2021-02-25 DIAGNOSIS — E11.69 CONTROLLED TYPE 2 DIABETES MELLITUS WITH OTHER SPECIFIED COMPLICATION, WITHOUT LONG-TERM CURRENT USE OF INSULIN (HCC): Chronic | ICD-10-CM

## 2021-02-25 RX ORDER — FLUTICASONE PROPIONATE 50 MCG
SPRAY, SUSPENSION (ML) NASAL
Qty: 16 G | Refills: 4 | Status: SHIPPED | OUTPATIENT
Start: 2021-02-25 | End: 2021-09-08

## 2021-02-25 RX ORDER — FOLIC ACID 1 MG/1
1000 TABLET ORAL DAILY
Qty: 30 TABLET | Refills: 4 | Status: SHIPPED | OUTPATIENT
Start: 2021-02-25 | End: 2021-09-08

## 2021-02-25 RX ORDER — LANCETS 33 GAUGE
EACH MISCELLANEOUS
Qty: 100 EACH | Refills: 4 | Status: SHIPPED | OUTPATIENT
Start: 2021-02-25 | End: 2021-09-08

## 2021-02-25 RX ORDER — LANSOPRAZOLE 30 MG/1
30 CAPSULE, DELAYED RELEASE ORAL DAILY
Qty: 30 CAPSULE | Refills: 4 | Status: SHIPPED | OUTPATIENT
Start: 2021-02-25 | End: 2021-03-23 | Stop reason: SDUPTHER

## 2021-03-11 ENCOUNTER — LAB (OUTPATIENT)
Dept: FAMILY MEDICINE CLINIC | Facility: CLINIC | Age: 56
End: 2021-03-11

## 2021-03-11 DIAGNOSIS — IMO0002 DM TYPE 2, UNCONTROLLED, WITH NEUROPATHY: Chronic | ICD-10-CM

## 2021-03-11 DIAGNOSIS — E55.9 VITAMIN D DEFICIENCY: ICD-10-CM

## 2021-03-11 DIAGNOSIS — J98.4 RESTRICTIVE LUNG DISEASE: ICD-10-CM

## 2021-03-11 DIAGNOSIS — K21.9 GASTROESOPHAGEAL REFLUX DISEASE WITHOUT ESOPHAGITIS: ICD-10-CM

## 2021-03-11 DIAGNOSIS — E78.2 MIXED HYPERLIPIDEMIA: Chronic | ICD-10-CM

## 2021-03-11 DIAGNOSIS — I10 BENIGN ESSENTIAL HYPERTENSION: Chronic | ICD-10-CM

## 2021-03-11 DIAGNOSIS — E53.8 VITAMIN B 12 DEFICIENCY: ICD-10-CM

## 2021-03-11 PROCEDURE — 82607 VITAMIN B-12: CPT | Performed by: NURSE PRACTITIONER

## 2021-03-11 PROCEDURE — 82306 VITAMIN D 25 HYDROXY: CPT | Performed by: NURSE PRACTITIONER

## 2021-03-11 PROCEDURE — 36415 COLL VENOUS BLD VENIPUNCTURE: CPT | Performed by: NURSE PRACTITIONER

## 2021-03-11 PROCEDURE — 83036 HEMOGLOBIN GLYCOSYLATED A1C: CPT | Performed by: NURSE PRACTITIONER

## 2021-03-11 PROCEDURE — 80050 GENERAL HEALTH PANEL: CPT | Performed by: NURSE PRACTITIONER

## 2021-03-11 PROCEDURE — 80061 LIPID PANEL: CPT | Performed by: NURSE PRACTITIONER

## 2021-03-12 LAB
25(OH)D3 SERPL-MCNC: 26.7 NG/ML (ref 30–100)
ALBUMIN SERPL-MCNC: 3.8 G/DL (ref 3.5–5.2)
ALBUMIN/GLOB SERPL: 1.4 G/DL
ALP SERPL-CCNC: 133 U/L (ref 39–117)
ALT SERPL W P-5'-P-CCNC: 26 U/L (ref 1–41)
ANION GAP SERPL CALCULATED.3IONS-SCNC: 14 MMOL/L (ref 5–15)
AST SERPL-CCNC: 15 U/L (ref 1–40)
BASOPHILS # BLD AUTO: 0.08 10*3/MM3 (ref 0–0.2)
BASOPHILS NFR BLD AUTO: 1 % (ref 0–1.5)
BILIRUB SERPL-MCNC: 0.5 MG/DL (ref 0–1.2)
BUN SERPL-MCNC: 10 MG/DL (ref 6–20)
BUN/CREAT SERPL: 13.2 (ref 7–25)
CALCIUM SPEC-SCNC: 9.1 MG/DL (ref 8.6–10.5)
CHLORIDE SERPL-SCNC: 100 MMOL/L (ref 98–107)
CHOLEST SERPL-MCNC: 72 MG/DL (ref 0–200)
CO2 SERPL-SCNC: 20 MMOL/L (ref 22–29)
CREAT SERPL-MCNC: 0.76 MG/DL (ref 0.76–1.27)
DEPRECATED RDW RBC AUTO: 42.1 FL (ref 37–54)
EOSINOPHIL # BLD AUTO: 0.64 10*3/MM3 (ref 0–0.4)
EOSINOPHIL NFR BLD AUTO: 8 % (ref 0.3–6.2)
ERYTHROCYTE [DISTWIDTH] IN BLOOD BY AUTOMATED COUNT: 12.2 % (ref 12.3–15.4)
GFR SERPL CREATININE-BSD FRML MDRD: 107 ML/MIN/1.73
GLOBULIN UR ELPH-MCNC: 2.8 GM/DL
GLUCOSE SERPL-MCNC: 239 MG/DL (ref 65–99)
HBA1C MFR BLD: 7.4 % (ref 4.8–5.6)
HCT VFR BLD AUTO: 49.2 % (ref 37.5–51)
HDLC SERPL-MCNC: 21 MG/DL (ref 40–60)
HGB BLD-MCNC: 17.1 G/DL (ref 13–17.7)
IMM GRANULOCYTES # BLD AUTO: 0.03 10*3/MM3 (ref 0–0.05)
IMM GRANULOCYTES NFR BLD AUTO: 0.4 % (ref 0–0.5)
LDLC SERPL CALC-MCNC: 21 MG/DL (ref 0–100)
LDLC/HDLC SERPL: 0.7 {RATIO}
LYMPHOCYTES # BLD AUTO: 2.61 10*3/MM3 (ref 0.7–3.1)
LYMPHOCYTES NFR BLD AUTO: 32.4 % (ref 19.6–45.3)
MCH RBC QN AUTO: 32.3 PG (ref 26.6–33)
MCHC RBC AUTO-ENTMCNC: 34.8 G/DL (ref 31.5–35.7)
MCV RBC AUTO: 92.8 FL (ref 79–97)
MONOCYTES # BLD AUTO: 0.85 10*3/MM3 (ref 0.1–0.9)
MONOCYTES NFR BLD AUTO: 10.6 % (ref 5–12)
NEUTROPHILS NFR BLD AUTO: 3.84 10*3/MM3 (ref 1.7–7)
NEUTROPHILS NFR BLD AUTO: 47.6 % (ref 42.7–76)
NRBC BLD AUTO-RTO: 0 /100 WBC (ref 0–0.2)
PLATELET # BLD AUTO: 296 10*3/MM3 (ref 140–450)
PMV BLD AUTO: 10.4 FL (ref 6–12)
POTASSIUM SERPL-SCNC: 4.3 MMOL/L (ref 3.5–5.2)
PROT SERPL-MCNC: 6.6 G/DL (ref 6–8.5)
RBC # BLD AUTO: 5.3 10*6/MM3 (ref 4.14–5.8)
SODIUM SERPL-SCNC: 134 MMOL/L (ref 136–145)
TRIGL SERPL-MCNC: 181 MG/DL (ref 0–150)
TSH SERPL DL<=0.05 MIU/L-ACNC: 4.08 UIU/ML (ref 0.27–4.2)
VIT B12 BLD-MCNC: 1169 PG/ML (ref 211–946)
VLDLC SERPL-MCNC: 30 MG/DL (ref 5–40)
WBC # BLD AUTO: 8.05 10*3/MM3 (ref 3.4–10.8)

## 2021-03-17 ENCOUNTER — HOSPITAL ENCOUNTER (OUTPATIENT)
Dept: CT IMAGING | Facility: HOSPITAL | Age: 56
Discharge: HOME | End: 2021-03-17
Admitting: INTERNAL MEDICINE

## 2021-03-17 DIAGNOSIS — R91.1 PULMONARY NODULE: ICD-10-CM

## 2021-03-17 PROCEDURE — 71250 CT THORAX DX C-: CPT | Performed by: RADIOLOGY

## 2021-03-17 PROCEDURE — 71250 CT THORAX DX C-: CPT

## 2021-03-23 ENCOUNTER — OFFICE VISIT (OUTPATIENT)
Dept: FAMILY MEDICINE CLINIC | Facility: CLINIC | Age: 56
End: 2021-03-23

## 2021-03-23 VITALS
DIASTOLIC BLOOD PRESSURE: 82 MMHG | SYSTOLIC BLOOD PRESSURE: 120 MMHG | OXYGEN SATURATION: 95 % | HEIGHT: 68 IN | WEIGHT: 233 LBS | BODY MASS INDEX: 35.31 KG/M2 | TEMPERATURE: 96.9 F | RESPIRATION RATE: 14 BRPM | HEART RATE: 94 BPM

## 2021-03-23 DIAGNOSIS — E78.2 MIXED HYPERLIPIDEMIA: Chronic | ICD-10-CM

## 2021-03-23 DIAGNOSIS — E11.40 CONTROLLED TYPE 2 DIABETES WITH NEUROPATHY (HCC): Primary | Chronic | ICD-10-CM

## 2021-03-23 DIAGNOSIS — M05.79 RHEUMATOID ARTHRITIS INVOLVING MULTIPLE SITES WITH POSITIVE RHEUMATOID FACTOR (HCC): Chronic | ICD-10-CM

## 2021-03-23 DIAGNOSIS — K21.9 GASTROESOPHAGEAL REFLUX DISEASE WITHOUT ESOPHAGITIS: Chronic | ICD-10-CM

## 2021-03-23 DIAGNOSIS — I10 BENIGN ESSENTIAL HYPERTENSION: Chronic | ICD-10-CM

## 2021-03-23 DIAGNOSIS — J98.4 RESTRICTIVE LUNG DISEASE: Chronic | ICD-10-CM

## 2021-03-23 DIAGNOSIS — E55.9 VITAMIN D DEFICIENCY: ICD-10-CM

## 2021-03-23 PROCEDURE — 99214 OFFICE O/P EST MOD 30 MIN: CPT | Performed by: NURSE PRACTITIONER

## 2021-03-23 RX ORDER — LANSOPRAZOLE 30 MG/1
30 CAPSULE, DELAYED RELEASE ORAL DAILY
Qty: 30 CAPSULE | Refills: 4 | Status: SHIPPED | OUTPATIENT
Start: 2021-03-23 | End: 2021-10-27 | Stop reason: SDUPTHER

## 2021-03-23 NOTE — PATIENT INSTRUCTIONS
Type 2 Diabetes Mellitus, Self Care, Adult  When you have type 2 diabetes (type 2 diabetes mellitus), you must make sure your blood sugar (glucose) stays in a healthy range. You can do this with:  · Nutrition.  · Exercise.  · Lifestyle changes.  · Medicines or insulin, if needed.  · Support from your doctors and others.  How to stay aware of blood sugar    · Check your blood sugar level every day, as often as told.  · Have your A1c (hemoglobin A1c) level checked two or more times a year. Have it checked more often if your doctor tells you to.  Your doctor will set personal treatment goals for you. Generally, you should have these blood sugar levels:  · Before meals (preprandial):  mg/dL (4.4-7.2 mmol/L).  · After meals (postprandial): below 180 mg/dL (10 mmol/L).  · A1c level: less than 7%.  How to manage high and low blood sugar  Signs of high blood sugar  High blood sugar is called hyperglycemia. Know the signs of high blood sugar. Signs may include:  · Feeling:  ? Thirsty.  ? Hungry.  ? Very tired.  · Needing to pee (urinate) more than usual.  · Blurry vision.  Signs of low blood sugar  Low blood sugar is called hypoglycemia. This is when blood sugar is at or below 70 mg/dL (3.9 mmol/L). Signs may include:  · Feeling:  ? Hungry.  ? Worried or nervous (anxious).  ? Sweaty and clammy.  ? Confused.  ? Dizzy.  ? Sleepy.  ? Sick to your stomach (nauseous).  · Having:  ? A fast heartbeat.  ? A headache.  ? A change in your vision.  ? Jerky movements that you cannot control (seizure).  ? Tingling or no feeling (numbness) around your mouth, lips, or tongue.  · Having trouble with:  ? Moving (coordination).  ? Sleeping.  ? Passing out (fainting).  ? Getting upset easily (irritability).  Treating low blood sugar  To treat low blood sugar, eat or drink something sugary right away. If you can think clearly and swallow safely, follow the 15:15 rule:  · Take 15 grams of a fast-acting carb (carbohydrate). Talk with your  doctor about how much you should take.  · Some fast-acting carbs are:  ? Sugar tablets (glucose pills). Take 3-4 pills.  ? 6-8 pieces of hard candy.  ? 4-6 oz (120-150 mL) of fruit juice.  ? 4-6 oz (120-150 mL) of regular (not diet) soda.  ? 1 Tbsp (15 mL) honey or sugar.  · Check your blood sugar 15 minutes after you take the carb.  · If your blood sugar is still at or below 70 mg/dL (3.9 mmol/L), take 15 grams of a carb again.  · If your blood sugar does not go above 70 mg/dL (3.9 mmol/L) after 3 tries, get help right away.  · After your blood sugar goes back to normal, eat a meal or a snack within 1 hour.  Treating very low blood sugar  If your blood sugar is at or below 54 mg/dL (3 mmol/L), you have very low blood sugar (severe hypoglycemia). This is an emergency. Do not wait to see if the symptoms will go away. Get medical help right away. Call your local emergency services (911 in the [place]  If you have very low blood sugar and you cannot eat or drink, you may need a glucagon shot (injection). A family member or friend should learn how to check your blood sugar and how to give you a glucagon shot. Ask your doctor if you need to have a glucagon shot kit at home.  Follow these instructions at home:  Medicine  · Take insulin and diabetes medicines as told.  · If your doctor says you should take more or less insulin and medicines, do this exactly as told.  · Do not run out of insulin or medicines.  Having diabetes can raise your risk for other long-term conditions. These include heart disease and kidney disease. Your doctor may prescribe medicines to help you not have these problems.  Food    · Make healthy food choices. These include:  ? Chicken, fish, egg whites, and beans.  ? Oats, whole wheat, bulgur, brown rice, quinoa, and millet.  ? Fresh fruits and vegetables.  ? Low-fat dairy products.  ? Nuts, avocado, olive oil, and canola oil.  · Meet with a  (dietitian). He or she can help you make an  eating plan that is right for you.  · Follow instructions from your doctor about what you cannot eat or drink.  · Drink enough fluid to keep your pee (urine) pale yellow.  · Keep track of carbs that you eat. Do this by reading food labels and learning food serving sizes.  · Follow your sick day plan when you cannot eat or drink normally. Make this plan with your doctor so it is ready to use.  Activity  · Exercise 3 or more times a week.  · Do not go more than 2 days without exercising.  · Talk with your doctor before you start a new exercise. Your doctor may need to tell you to change:  ? How much insulin or medicines you take.  ? How much food you eat.  Lifestyle  · Do not use any tobacco products. These include cigarettes, chewing tobacco, and e-cigarettes. If you need help quitting, ask your doctor.  · Ask your doctor how much alcohol is safe for you.  · Learn to deal with stress. If you need help with this, ask your doctor.  Body care    · Stay up to date with your shots (immunizations).  · Have your eyes and feet checked by a doctor as often as told.  · Check your skin and feet every day. Check for cuts, bruises, redness, blisters, or sores.  · Brush your teeth and gums two times a day. Floss one or more times a day.  · Go to the dentist one or more times every 6 months.  · Stay at a healthy weight.  General instructions  · Take over-the-counter and prescription medicines only as told by your doctor.  · Share your diabetes care plan with:  ? Your work or school.  ? People you live with.  · Carry a card or wear jewelry that says you have diabetes.  · Keep all follow-up visits as told by your doctor. This is important.  Questions to ask your doctor  · Do I need to meet with a diabetes educator?  · Where can I find a support group for people with diabetes?  Where to find more information  To learn more about diabetes, visit:  · American Diabetes Association: www.diabetes.org  · American Association of Diabetes  "Educators: www.diabeteseducator.org  Summary  · When you have type 2 diabetes, you must make sure your blood sugar (glucose) stays in a healthy range.  · Check your blood sugar every day, as often as told.  · Having diabetes can raise your risk for other conditions. Your doctor may prescribe medicines to help you not have these problems.  · Keep all follow-up visits as told by your doctor. This is important.  This information is not intended to replace advice given to you by your health care provider. Make sure you discuss any questions you have with your health care provider.  Document Revised: 06/10/2019 Document Reviewed: 01/20/2017  [employer] Patient Education © 2020 [employer]  https://www.nhlbi.nih.gov/files/docs/public/heart/dash_brief.pdf\">   DASH Eating Plan  DASH stands for Dietary Approaches to Stop Hypertension. The DASH eating plan is a healthy eating plan that has been shown to:  · Reduce high blood pressure (hypertension).  · Reduce your risk for type 2 diabetes, heart disease, and stroke.  · Help with weight loss.  What are tips for following this plan?  Reading food labels  · Check food labels for the amount of salt (sodium) per serving. Choose foods with less than 5 percent of the Daily Value of sodium. Generally, foods with less than 300 milligrams (mg) of sodium per serving fit into this eating plan.  · To find whole grains, look for the word \"whole\" as the first word in the ingredient list.  Shopping  · Buy products labeled as \"low-sodium\" or \"no salt added.\"  · Buy fresh foods. Avoid canned foods and pre-made or frozen meals.  Cooking  · Avoid adding salt when cooking. Use salt-free seasonings or herbs instead of table salt or sea salt. Check with your health care provider or pharmacist before using salt substitutes.  · Do not judd foods. Cook foods using healthy methods such as baking, boiling, grilling, roasting, and broiling instead.  · Cook with heart-healthy oils, such as olive, canola, " avocado, soybean, or sunflower oil.  Meal planning    · Eat a balanced diet that includes:  ? 4 or more servings of fruits and 4 or more servings of vegetables each day. Try to fill one-half of your plate with fruits and vegetables.  ? 6-8 servings of whole grains each day.  ? Less than 6 oz (170 g) of lean meat, poultry, or fish each day. A 3-oz (85-g) serving of meat is about the same size as a deck of cards. One egg equals 1 oz (28 g).  ? 2-3 servings of low-fat dairy each day. One serving is 1 cup (237 mL).  ? 1 serving of nuts, seeds, or beans 5 times each week.  ? 2-3 servings of heart-healthy fats. Healthy fats called omega-3 fatty acids are found in foods such as walnuts, flaxseeds, fortified milks, and eggs. These fats are also found in cold-water fish, such as sardines, salmon, and mackerel.  · Limit how much you eat of:  ? Canned or prepackaged foods.  ? Food that is high in trans fat, such as some fried foods.  ? Food that is high in saturated fat, such as fatty meat.  ? Desserts and other sweets, sugary drinks, and other foods with added sugar.  ? Full-fat dairy products.  · Do not salt foods before eating.  · Do not eat more than 4 egg yolks a week.  · Try to eat at least 2 vegetarian meals a week.  · Eat more home-cooked food and less restaurant, buffet, and fast food.  Lifestyle  · When eating at a restaurant, ask that your food be prepared with less salt or no salt, if possible.  · If you drink alcohol:  ? Limit how much you use to:  § 0-1 drink a day for women who are not pregnant.  § 0-2 drinks a day for men.  ? Be aware of how much alcohol is in your drink. In the [place] one drink equals one 12 oz bottle of beer (355 mL), one 5 oz glass of wine (148 mL), or one 1½ oz glass of hard liquor (44 mL).  General information  · Avoid eating more than 2,300 mg of salt a day. If you have hypertension, you may need to reduce your sodium intake to 1,500 mg a day.  · Work with your health care provider to  maintain a healthy body weight or to lose weight. Ask what an ideal weight is for you.  · Get at least 30 minutes of exercise that causes your heart to beat faster (aerobic exercise) most days of the week. Activities may include walking, swimming, or biking.  · Work with your health care provider or dietitian to adjust your eating plan to your individual calorie needs.  What foods should I eat?  Fruits  All fresh, dried, or frozen fruit. Canned fruit in natural juice (without added sugar).  Vegetables  Fresh or frozen vegetables (raw, steamed, roasted, or grilled). Low-sodium or reduced-sodium tomato and vegetable juice. Low-sodium or reduced-sodium tomato sauce and tomato paste. Low-sodium or reduced-sodium canned vegetables.  Grains  Whole-grain or whole-wheat bread. Whole-grain or whole-wheat pasta. Brown rice. Oatmeal. Quinoa. Bulgur. Whole-grain and low-sodium cereals. [place]ana bread. Low-fat, low-sodium crackers. Whole-wheat flour tortillas.  Meats and other proteins  Skinless chicken or turkey. Ground chicken or turkey. Pork with fat trimmed off. Fish and seafood. Egg whites. Dried beans, peas, or lentils. Unsalted nuts, nut butters, and seeds. Unsalted canned beans. Lean cuts of beef with fat trimmed off. Low-sodium, lean precooked or cured meat, such as sausages or meat loaves.  Dairy  Low-fat (1%) or fat-free (skim) milk. Reduced-fat, low-fat, or fat-free cheeses. Nonfat, low-sodium ricotta or cottage cheese. Low-fat or nonfat yogurt. Low-fat, low-sodium cheese.  Fats and oils  Soft margarine without trans fats. Vegetable oil. Reduced-fat, low-fat, or light mayonnaise and salad dressings (reduced-sodium). Canola, safflower, olive, avocado, soybean, and sunflower oils. Avocado.  Seasonings and condiments  Herbs. Spices. Seasoning mixes without salt.  Other foods  Unsalted popcorn and pretzels. Fat-free sweets.  The items listed above may not be a complete list of foods and beverages you can eat. Contact a  dietitian for more information.  What foods should I avoid?  Fruits  Canned fruit in a light or heavy syrup. Fried fruit. Fruit in cream or butter sauce.  Vegetables  Creamed or fried vegetables. Vegetables in a cheese sauce. Regular canned vegetables (not low-sodium or reduced-sodium). Regular canned tomato sauce and paste (not low-sodium or reduced-sodium). Regular tomato and vegetable juice (not low-sodium or reduced-sodium). Pickles. Olives.  Grains  Baked goods made with fat, such as croissants, muffins, or some breads. Dry pasta or rice meal packs.  Meats and other proteins  Fatty cuts of meat. Ribs. Fried meat. [place]. Bologna, salami, and other precooked or cured meats, such as sausages or meat loaves. Fat from the back of a pig (fatback). Bratwurst. Salted nuts and seeds. Canned beans with added salt. Canned or smoked fish. Whole eggs or egg yolks. Chicken or turkey with skin.  Dairy  Whole or 2% milk, cream, and half-and-half. Whole or full-fat cream cheese. Whole-fat or sweetened yogurt. Full-fat cheese. Nondairy creamers. Whipped toppings. Processed cheese and cheese spreads.  Fats and oils  Butter. Stick margarine. Lard. Shortening. Ghee. [place] fat. Tropical oils, such as coconut, palm kernel, or palm oil.  Seasonings and condiments  Onion salt, garlic salt, seasoned salt, table salt, and sea salt. [facility] sauce. Tartar sauce. Barbecue sauce. Teriyaki sauce. Soy sauce, including reduced-sodium. Steak sauce. Canned and packaged gravies. Fish sauce. Oyster sauce. Cocktail sauce. Store-bought horseradish. Ketchup. Mustard. Meat flavorings and tenderizers. Bouillon cubes. Hot sauces. Pre-made or packaged marinades. Pre-made or packaged taco seasonings. Relishes. Regular salad dressings.  Other foods  Salted popcorn and pretzels.  The items listed above may not be a complete list of foods and beverages you should avoid. Contact a dietitian for more information.  Where to find more  information  · National Heart, Lung, and Blood [place]: www.nhlbi.nih.gov  · American Heart Association: www.heart.org  · Academy of Nutrition and Dietetics: www.eatright.org  · National Kidney Foundation: www.kidney.org  Summary  · The DASH eating plan is a healthy eating plan that has been shown to reduce high blood pressure (hypertension). It may also reduce your risk for type 2 diabetes, heart disease, and stroke.  · When on the DASH eating plan, aim to eat more fresh fruits and vegetables, whole grains, lean proteins, low-fat dairy, and heart-healthy fats.  · With the DASH eating plan, you should limit salt (sodium) intake to 2,300 mg a day. If you have hypertension, you may need to reduce your sodium intake to 1,500 mg a day.  · Work with your health care provider or dietitian to adjust your eating plan to your individual calorie needs.  This information is not intended to replace advice given to you by your health care provider. Make sure you discuss any questions you have with your health care provider.  Document Revised: 11/20/2020 Document Reviewed: 11/20/2020  [employer] Patient Education © 2021 [employer]  YOU ARE SCHEDULED FOR Wednesday, MARCH 24TH AT 12:20 PM FOR YOUR FIRST COVID VACCINATION..[facility] [address] IN [place]

## 2021-03-23 NOTE — PROGRESS NOTES
History of Present Illness   Raimundo Munguia is a 56 yo male who presents for follow up and recheck pertaining to his DM, type 2, HTN, GERD, Rheumatoid Arthritis and Dyslipidemia. In addition, Raimundo has Insomnia and Vitamin deficiency.    Hypertension   The current episode started more than 1 year ago.His BP is adequately controlled. His hypertensive medication is Metoprolol  50 mg which has improved his blood pressure and fast heart rate.  He has reduced the amount of caffeine he consumes on a daily basis.    Lab Results   Component Value Date    CREATININE 0.76 03/11/2021     Diabetes   He has type 2 diabetes mellitus. His disease course has been worsening.  He does share he has noticed some intermittent neuropathy in his left lower extremity.  Risk factors for coronary artery disease include dyslipidemia, hypertension, male sex, sedentary lifestyle and tobacco exposure. Current diabetic treatment includes Metformin and Trulicity. He is compliant with medication regimen most of the time. He has had a previous visit with a dietitian. An ACE inhibitor/angiotensin II receptor blocker is not being taken due to S/e.   Lab Results   Component Value Date    HGBA1C 7.50 (H) 09/08/2020       Lab Results   Component Value Date    HGBA1C 8.83 (H) 12/10/2020     Lab Results   Component Value Date    HGBA1C 7.40 (H) 03/11/2021     Dyslipidemia   This is a chronic problem. The current episode started more than 1 year ago. Factors aggravating his hyperlipidemia include fatty foods. Current antihyperlipidemic treatment includes statins.  Compliance problems include adherence to diet and adherence to exercise.  Risk factors for coronary artery disease include diabetes mellitus, dyslipidemia, hypertension, male sex and obesity.     Lab Results   Component Value Date    CHOL 99 09/08/2020    TRIG 264 (H) 09/08/2020    HDL 22 (L) 09/08/2020    LDL 24 09/08/2020     Lab Results   Component Value Date    CHOL 107 12/10/2020    TRIG  "252 (H) 12/10/2020    HDL 26 (L) 12/10/2020    LDL 42 12/10/2020     Lab Results   Component Value Date    CHOL 72 03/11/2021    TRIG 181 (H) 03/11/2021    HDL 21 (L) 03/11/2021    LDL 21 03/11/2021     Vitals:    03/23/21 1142 03/23/21 1226   BP: 120/88 120/82   BP Location: Left arm Left arm   Patient Position: Sitting Sitting   Cuff Size: Adult Adult   Pulse: 101 94   Resp:  14   Temp: 96.9 °F (36.1 °C) 96.9 °F (36.1 °C)   TempSrc: Temporal Temporal   SpO2: 95% 95%   Weight: 106 kg (233 lb) 106 kg (233 lb)   Height: 172.7 cm (67.99\") 172.7 cm (68\")        The following portions of the patient's history were reviewed and updated as appropriate: allergies, current medications, past family history, past medical history, past social history, past surgical history and problem list.    Review of Systems   Constitutional: Positive for appetite change (Contributes to Trulicity). Negative for activity change, chills, fatigue and fever.   HENT: Negative.    Eyes: Positive for visual disturbance (Wears glasses).   Respiratory: Negative for cough, shortness of breath and wheezing.    Cardiovascular: Negative for chest pain, palpitations and leg swelling.   Gastrointestinal: Positive for GERD. Negative for constipation, diarrhea, nausea and vomiting.   Endocrine: Negative for cold intolerance, heat intolerance, polydipsia, polyphagia and polyuria.   Musculoskeletal: Positive for arthralgias.   Skin: Negative for color change and rash.   Allergic/Immunologic: Positive for environmental allergies.   Neurological: Positive for numbness. Negative for weakness, light-headedness, headache and confusion.   Hematological: Negative for adenopathy.   Psychiatric/Behavioral: Positive for sleep disturbance. Negative for decreased concentration and depressed mood.      Physical Exam  Vitals reviewed.   Constitutional:       General: He is not in acute distress.     Appearance: He is well-developed.      Comments: Pleasant; wearing " appropriate face covering   HENT:      Head: Normocephalic.      Nose: Nose normal.   Eyes:      General: No scleral icterus.        Right eye: No discharge.         Left eye: No discharge.      Conjunctiva/sclera: Conjunctivae normal.   Neck:      Thyroid: No thyromegaly.      Vascular: No JVD.   Cardiovascular:      Rate and Rhythm: Normal rate and regular rhythm.      Heart sounds: Normal heart sounds. No murmur heard.   No friction rub.   Pulmonary:      Effort: Pulmonary effort is normal. No respiratory distress.      Breath sounds: Normal breath sounds. No wheezing or rales.   Abdominal:      General: Bowel sounds are normal. There is no distension.      Palpations: Abdomen is soft.      Tenderness: There is no abdominal tenderness. There is no guarding or rebound.   Musculoskeletal:         General: Deformity (Multiple Rheumatoid nodules bilateral upper extremities) present.      Cervical back: Neck supple.      Right lower leg: No edema.      Left lower leg: No edema.   Lymphadenopathy:      Cervical: No cervical adenopathy.   Skin:     General: Skin is warm and dry.      Capillary Refill: Capillary refill takes less than 2 seconds.      Findings: No erythema.   Neurological:      Mental Status: He is alert and oriented to person, place, and time.   Psychiatric:         Mood and Affect: Mood normal.         Speech: Speech normal.         Behavior: Behavior is cooperative.         Thought Content: Thought content normal.         Cognition and Memory: Cognition normal.       Results for orders placed or performed in visit on 03/11/21   Comprehensive Metabolic Panel    Specimen: Arm, Left; Blood   Result Value Ref Range    Glucose 239 (H) 65 - 99 mg/dL    BUN 10 6 - 20 mg/dL    Creatinine 0.76 0.76 - 1.27 mg/dL    Sodium 134 (L) 136 - 145 mmol/L    Potassium 4.3 3.5 - 5.2 mmol/L    Chloride 100 98 - 107 mmol/L    CO2 20.0 (L) 22.0 - 29.0 mmol/L    Calcium 9.1 8.6 - 10.5 mg/dL    Total Protein 6.6 6.0 - 8.5 g/dL     Albumin 3.80 3.50 - 5.20 g/dL    ALT (SGPT) 26 1 - 41 U/L    AST (SGOT) 15 1 - 40 U/L    Alkaline Phosphatase 133 (H) 39 - 117 U/L    Total Bilirubin 0.5 0.0 - 1.2 mg/dL    eGFR Non African Amer 107 >60 mL/min/1.73    Globulin 2.8 gm/dL    A/G Ratio 1.4 g/dL    BUN/Creatinine Ratio 13.2 7.0 - 25.0    Anion Gap 14.0 5.0 - 15.0 mmol/L   TSH    Specimen: Arm, Left; Blood   Result Value Ref Range    TSH 4.080 0.270 - 4.200 uIU/mL   Lipid Panel    Specimen: Arm, Left; Blood   Result Value Ref Range    Total Cholesterol 72 0 - 200 mg/dL    Triglycerides 181 (H) 0 - 150 mg/dL    HDL Cholesterol 21 (L) 40 - 60 mg/dL    LDL Cholesterol  21 0 - 100 mg/dL    VLDL Cholesterol 30 5 - 40 mg/dL    LDL/HDL Ratio 0.70    Hemoglobin A1c    Specimen: Arm, Left; Blood   Result Value Ref Range    Hemoglobin A1C 7.40 (H) 4.80 - 5.60 %   Vitamin D 25 Hydroxy    Specimen: Arm, Left; Blood   Result Value Ref Range    25 Hydroxy, Vitamin D 26.7 (L) 30.0 - 100.0 ng/ml   Vitamin B12    Specimen: Arm, Left; Blood   Result Value Ref Range    Vitamin B-12 1,169 (H) 211 - 946 pg/mL   CBC Auto Differential    Specimen: Arm, Left; Blood   Result Value Ref Range    WBC 8.05 3.40 - 10.80 10*3/mm3    RBC 5.30 4.14 - 5.80 10*6/mm3    Hemoglobin 17.1 13.0 - 17.7 g/dL    Hematocrit 49.2 37.5 - 51.0 %    MCV 92.8 79.0 - 97.0 fL    MCH 32.3 26.6 - 33.0 pg    MCHC 34.8 31.5 - 35.7 g/dL    RDW 12.2 (L) 12.3 - 15.4 %    RDW-SD 42.1 37.0 - 54.0 fl    MPV 10.4 6.0 - 12.0 fL    Platelets 296 140 - 450 10*3/mm3    Neutrophil % 47.6 42.7 - 76.0 %    Lymphocyte % 32.4 19.6 - 45.3 %    Monocyte % 10.6 5.0 - 12.0 %    Eosinophil % 8.0 (H) 0.3 - 6.2 %    Basophil % 1.0 0.0 - 1.5 %    Immature Grans % 0.4 0.0 - 0.5 %    Neutrophils, Absolute 3.84 1.70 - 7.00 10*3/mm3    Lymphocytes, Absolute 2.61 0.70 - 3.10 10*3/mm3    Monocytes, Absolute 0.85 0.10 - 0.90 10*3/mm3    Eosinophils, Absolute 0.64 (H) 0.00 - 0.40 10*3/mm3    Basophils, Absolute 0.08 0.00 - 0.20 10*3/mm3     Immature Grans, Absolute 0.03 0.00 - 0.05 10*3/mm3    nRBC 0.0 0.0 - 0.2 /100 WBC       Assessment/Plan     Problems Addressed this Visit        Cardiac and Vasculature    Benign essential hypertension    Hyperlipidemia    Relevant Medications    Rosuvastatin Calcium 5 MG capsule sprinkle    Other Relevant Orders    Comprehensive Metabolic Panel    Lipid Panel       Endocrine and Metabolic    Vitamin D deficiency    Relevant Orders    Vitamin D 25 Hydroxy       Gastrointestinal Abdominal     Gastroesophageal reflux disease without esophagitis    Relevant Medications    lansoprazole (PREVACID) 30 MG capsule    Other Relevant Orders    CBC & Differential       Musculoskeletal and Injuries    Rheumatoid arthritis with positive rheumatoid factor (CMS/HCC)    Relevant Orders    Ambulatory Referral to Rheumatology    Uric Acid       Pulmonary and Pneumonias    Restrictive lung disease      Other Visit Diagnoses     Controlled type 2 diabetes with neuropathy (CMS/HCC)  (Chronic)   -  Primary    Lab results reviewed with improvement glycemic control..great job    Relevant Orders    Comprehensive Metabolic Panel    Lipid Panel    TSH    Hemoglobin A1c    Vitamin B12    MicroAlbumin, Urine, Random - Urine, Clean Catch      Diagnoses       Codes Comments    Controlled type 2 diabetes with neuropathy (CMS/HCC)    -  Primary ICD-10-CM: E11.40  ICD-9-CM: 250.60, 357.2 Lab results reviewed with improvement glycemic control..great job    Mixed hyperlipidemia     ICD-10-CM: E78.2  ICD-9-CM: 272.2 Will change Atorvastatin to Crestor. Food choices discussed    Benign essential hypertension     ICD-10-CM: I10  ICD-9-CM: 401.1 Well controlled    Gastroesophageal reflux disease without esophagitis     ICD-10-CM: K21.9  ICD-9-CM: 530.81 Stable with Prevacid    Restrictive lung disease     ICD-10-CM: J98.4  ICD-9-CM: 518.89 Continue to follow with Pulmonologist    Rheumatoid arthritis involving multiple sites with positive rheumatoid  factor (CMS/[facility])     ICD-10-CM: M05.79  ICD-9-CM: 714.0 Referral to Rheumatologist    Vitamin D deficiency     ICD-10-CM: E55.9  ICD-9-CM: 268.9 Daily supplement        Findings and recommendations discussed with Raimundo. Reviewed above labresults. Lifestyle modifications reinforced including nutrition and activity recommendations.Referral to Rheumatologist made. He will  follow up in July sooner if problems/concerns occur.    This document has been electronically signed by ABDIRAHMAN Braswell, RICCO-BC, CDE  March 23, 2021 13:40 EDT

## 2021-03-24 ENCOUNTER — IMMUNIZATION (OUTPATIENT)
Dept: VACCINE CLINIC | Facility: HOSPITAL | Age: 56
End: 2021-03-24

## 2021-03-24 DIAGNOSIS — E53.8 B12 DEFICIENCY: ICD-10-CM

## 2021-03-24 DIAGNOSIS — M62.838 MUSCLE SPASMS OF BOTH LOWER EXTREMITIES: Chronic | ICD-10-CM

## 2021-03-24 DIAGNOSIS — F51.04 PSYCHOPHYSIOLOGICAL INSOMNIA: Chronic | ICD-10-CM

## 2021-03-24 DIAGNOSIS — J30.9 CHRONIC ALLERGIC RHINITIS: Chronic | ICD-10-CM

## 2021-03-24 PROCEDURE — 91300 HC SARSCOV02 VAC 30MCG/0.3ML IM: CPT | Performed by: INTERNAL MEDICINE

## 2021-03-24 PROCEDURE — 0001A: CPT | Performed by: INTERNAL MEDICINE

## 2021-03-24 RX ORDER — LANOLIN ALCOHOL/MO/W.PET/CERES
1000 CREAM (GRAM) TOPICAL DAILY
Qty: 30 TABLET | Refills: 4 | Status: SHIPPED | OUTPATIENT
Start: 2021-03-24 | End: 2021-10-05

## 2021-03-24 RX ORDER — AMITRIPTYLINE HYDROCHLORIDE 100 MG/1
100 TABLET, FILM COATED ORAL NIGHTLY
Qty: 30 TABLET | Refills: 4 | Status: SHIPPED | OUTPATIENT
Start: 2021-03-24 | End: 2021-07-13

## 2021-03-24 RX ORDER — CYCLOBENZAPRINE HCL 10 MG
TABLET ORAL
Qty: 60 TABLET | Refills: 4 | Status: SHIPPED | OUTPATIENT
Start: 2021-03-24 | End: 2021-09-08

## 2021-03-24 RX ORDER — LORATADINE 10 MG/1
10 TABLET ORAL DAILY
Qty: 30 TABLET | Refills: 4 | Status: SHIPPED | OUTPATIENT
Start: 2021-03-24 | End: 2021-10-05

## 2021-03-25 ENCOUNTER — TELEPHONE (OUTPATIENT)
Dept: PULMONOLOGY | Facility: CLINIC | Age: 56
End: 2021-03-25

## 2021-03-25 NOTE — TELEPHONE ENCOUNTER
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  Do you mind calling the PCP of the patient updating about the CT scan of the chest?  CT scan of the chest showed goiter with substernal extension.  We recommend endocrinology follow-up.  ----- Message -----  From: Anne-Marie, Rad Results [place]  Sent: 3/17/2021   2:19 PM EDT  To: Tao Sousa MD

## 2021-03-29 DIAGNOSIS — E04.9 GOITER: Primary | ICD-10-CM

## 2021-03-29 NOTE — TELEPHONE ENCOUNTER
Please call Raimundo I am referring him to the Endocrinologist who is coming to [place] once monthly regarding the goiter which was discovered on his CT of the Chest. If he has any questions to feel free to give me a call.

## 2021-04-14 ENCOUNTER — IMMUNIZATION (OUTPATIENT)
Dept: VACCINE CLINIC | Facility: HOSPITAL | Age: 56
End: 2021-04-14

## 2021-04-14 PROCEDURE — 91300 HC SARSCOV02 VAC 30MCG/0.3ML IM: CPT | Performed by: INTERNAL MEDICINE

## 2021-04-14 PROCEDURE — 0002A: CPT | Performed by: INTERNAL MEDICINE

## 2021-04-21 DIAGNOSIS — E11.69 CONTROLLED TYPE 2 DIABETES MELLITUS WITH OTHER SPECIFIED COMPLICATION, WITHOUT LONG-TERM CURRENT USE OF INSULIN (HCC): Chronic | ICD-10-CM

## 2021-04-21 RX ORDER — BLOOD SUGAR DIAGNOSTIC
STRIP MISCELLANEOUS
Qty: 100 EACH | Refills: 0 | Status: SHIPPED | OUTPATIENT
Start: 2021-04-21 | End: 2021-06-17

## 2021-05-20 DIAGNOSIS — IMO0002 DM TYPE 2, UNCONTROLLED, WITH NEUROPATHY: ICD-10-CM

## 2021-05-20 RX ORDER — DULAGLUTIDE 1.5 MG/.5ML
INJECTION, SOLUTION SUBCUTANEOUS
Qty: 2 ML | Refills: 3 | Status: SHIPPED | OUTPATIENT
Start: 2021-05-20 | End: 2021-10-27 | Stop reason: SDUPTHER

## 2021-06-16 DIAGNOSIS — Z87.09 HISTORY OF ASTHMA: ICD-10-CM

## 2021-06-16 DIAGNOSIS — E11.69 CONTROLLED TYPE 2 DIABETES MELLITUS WITH OTHER SPECIFIED COMPLICATION, WITHOUT LONG-TERM CURRENT USE OF INSULIN (HCC): Chronic | ICD-10-CM

## 2021-06-16 DIAGNOSIS — M05.79 RHEUMATOID ARTHRITIS INVOLVING MULTIPLE SITES WITH POSITIVE RHEUMATOID FACTOR (HCC): Chronic | ICD-10-CM

## 2021-06-16 DIAGNOSIS — R00.2 PALPITATIONS: ICD-10-CM

## 2021-06-17 RX ORDER — ALBUTEROL SULFATE 90 UG/1
AEROSOL, METERED RESPIRATORY (INHALATION)
Qty: 18 G | Refills: 4 | Status: SHIPPED | OUTPATIENT
Start: 2021-06-17 | End: 2021-10-27 | Stop reason: SDUPTHER

## 2021-06-17 RX ORDER — BLOOD SUGAR DIAGNOSTIC
STRIP MISCELLANEOUS
Qty: 100 EACH | Refills: 3 | Status: SHIPPED | OUTPATIENT
Start: 2021-06-17 | End: 2021-10-27 | Stop reason: SDUPTHER

## 2021-06-17 RX ORDER — METOPROLOL SUCCINATE 100 MG/1
100 TABLET, EXTENDED RELEASE ORAL DAILY
Qty: 30 TABLET | Refills: 4 | Status: SHIPPED | OUTPATIENT
Start: 2021-06-17 | End: 2021-10-27 | Stop reason: SDUPTHER

## 2021-06-17 RX ORDER — MONTELUKAST SODIUM 10 MG/1
TABLET ORAL
Qty: 30 TABLET | Refills: 4 | Status: SHIPPED | OUTPATIENT
Start: 2021-06-17 | End: 2021-10-27 | Stop reason: SDUPTHER

## 2021-07-13 ENCOUNTER — OFFICE VISIT (OUTPATIENT)
Dept: FAMILY MEDICINE CLINIC | Facility: CLINIC | Age: 56
End: 2021-07-13

## 2021-07-13 VITALS
TEMPERATURE: 96.9 F | WEIGHT: 227 LBS | BODY MASS INDEX: 34.4 KG/M2 | HEIGHT: 68 IN | SYSTOLIC BLOOD PRESSURE: 110 MMHG | HEART RATE: 89 BPM | OXYGEN SATURATION: 94 % | DIASTOLIC BLOOD PRESSURE: 80 MMHG

## 2021-07-13 DIAGNOSIS — K21.9 GASTROESOPHAGEAL REFLUX DISEASE WITHOUT ESOPHAGITIS: Chronic | ICD-10-CM

## 2021-07-13 DIAGNOSIS — I10 BENIGN ESSENTIAL HYPERTENSION: Chronic | ICD-10-CM

## 2021-07-13 DIAGNOSIS — E55.9 VITAMIN D DEFICIENCY: ICD-10-CM

## 2021-07-13 DIAGNOSIS — E04.9 SUBSTERNAL THYROID GOITER: ICD-10-CM

## 2021-07-13 DIAGNOSIS — E78.5 DYSLIPIDEMIA: Chronic | ICD-10-CM

## 2021-07-13 DIAGNOSIS — E53.8 VITAMIN B 12 DEFICIENCY: ICD-10-CM

## 2021-07-13 DIAGNOSIS — F51.04 CHRONIC INSOMNIA: Chronic | ICD-10-CM

## 2021-07-13 DIAGNOSIS — M05.79 RHEUMATOID ARTHRITIS INVOLVING MULTIPLE SITES WITH POSITIVE RHEUMATOID FACTOR (HCC): Chronic | ICD-10-CM

## 2021-07-13 DIAGNOSIS — E78.2 MIXED HYPERLIPIDEMIA: Chronic | ICD-10-CM

## 2021-07-13 DIAGNOSIS — E11.40 CONTROLLED TYPE 2 DIABETES WITH NEUROPATHY (HCC): Chronic | ICD-10-CM

## 2021-07-13 DIAGNOSIS — J30.9 CHRONIC ALLERGIC RHINITIS: Chronic | ICD-10-CM

## 2021-07-13 DIAGNOSIS — J98.4 RESTRICTIVE LUNG DISEASE: Chronic | ICD-10-CM

## 2021-07-13 DIAGNOSIS — K21.9 GASTROESOPHAGEAL REFLUX DISEASE WITHOUT ESOPHAGITIS: ICD-10-CM

## 2021-07-13 DIAGNOSIS — IMO0002 DM TYPE 2, UNCONTROLLED, WITH NEUROPATHY: Primary | Chronic | ICD-10-CM

## 2021-07-13 PROCEDURE — 84550 ASSAY OF BLOOD/URIC ACID: CPT | Performed by: NURSE PRACTITIONER

## 2021-07-13 PROCEDURE — 80050 GENERAL HEALTH PANEL: CPT | Performed by: NURSE PRACTITIONER

## 2021-07-13 PROCEDURE — 99214 OFFICE O/P EST MOD 30 MIN: CPT | Performed by: NURSE PRACTITIONER

## 2021-07-13 PROCEDURE — 80061 LIPID PANEL: CPT | Performed by: NURSE PRACTITIONER

## 2021-07-13 PROCEDURE — 82607 VITAMIN B-12: CPT | Performed by: NURSE PRACTITIONER

## 2021-07-13 PROCEDURE — 82306 VITAMIN D 25 HYDROXY: CPT | Performed by: NURSE PRACTITIONER

## 2021-07-13 PROCEDURE — 83036 HEMOGLOBIN GLYCOSYLATED A1C: CPT | Performed by: NURSE PRACTITIONER

## 2021-07-13 RX ORDER — AMITRIPTYLINE HYDROCHLORIDE 150 MG/1
150 TABLET, FILM COATED ORAL NIGHTLY
Qty: 30 TABLET | Refills: 2 | Status: SHIPPED | OUTPATIENT
Start: 2021-07-13 | End: 2021-10-05

## 2021-07-13 NOTE — PROGRESS NOTES
History of Present Illness  Raimundo Munguia is a 54 yo male who presents for follow up and recheck pertaining to his DM, type 2, HTN, GERD, Rheumatoid Arthritis and Dyslipidemia. In addition, Raimundo has Insomnia and Vitamin deficiency.He missed his lab appointment prior to his appointment due to a family member's death in [place].     Hypertension   The current episode started more than 1 year ago.His BP is adequately controlled. His hypertensive medication is Metoprolol  50 mg which has improved his blood pressure and fast heart rate.  He has reduced the amount of caffeine he consumes on a daily basis.    Lab Results   Component Value Date    CREATININE 0.76 03/11/2021     Diabetes   He has type 2 diabetes mellitus. His disease course has been improving. He does share he has noticed some intermittent neuropathy in his left lower extremity.  Risk factors for coronary artery disease include dyslipidemia, hypertension, male sex, sedentary lifestyle and tobacco exposure. Current diabetic treatment includes Metformin and Trulicity. He is compliant with medication regimen most of the time. He has had a previous visit with a dietitian. An ACE inhibitor/angiotensin II receptor blocker is not being taken due to S/e.   Lab Results   Component Value Date    HGBA1C 7.50 (H) 09/08/2020     Lab Results   Component Value Date    HGBA1C 8.83 (H) 12/10/2020     Lab Results   Component Value Date    HGBA1C 7.40 (H) 03/11/2021     Dyslipidemia   This is a chronic problem. The current episode started more than 1 year ago. Factors aggravating his hyperlipidemia include fatty foods. Current antihyperlipidemic treatment includes statins.  Compliance problems include adherence to diet and adherence to exercise.  Risk factors for coronary artery disease include diabetes mellitus, dyslipidemia, hypertension, male sex and obesity.     Lab Results   Component Value Date    CHOL 99 09/08/2020    TRIG 264 (H) 09/08/2020    HDL 22 (L)  "09/08/2020    LDL 24 09/08/2020     Lab Results   Component Value Date    CHOL 107 12/10/2020    TRIG 252 (H) 12/10/2020    HDL 26 (L) 12/10/2020    LDL 42 12/10/2020     Lab Results   Component Value Date    CHOL 72 03/11/2021    TRIG 181 (H) 03/11/2021    HDL 21 (L) 03/11/2021    LDL 21 03/11/2021     The following portions of the patient's history were reviewed and updated as appropriate: allergies, current medications, past family history, past medical history, past social history, past surgical history and problem list.    Review of Systems   Constitutional: Positive for appetite change. Negative for activity change, chills, fatigue, fever and unexpected weight change.   HENT: Negative.    Eyes: Positive for visual disturbance.   Respiratory: Negative for cough, shortness of breath and wheezing.    Cardiovascular: Positive for leg swelling. Negative for chest pain and palpitations.   Gastrointestinal: Negative for constipation, diarrhea, nausea and vomiting.        GERD   Endocrine: Negative for cold intolerance, heat intolerance, polydipsia, polyphagia and polyuria.   Musculoskeletal: Positive for arthralgias, gait problem and joint swelling.   Skin: Negative for color change and rash.   Allergic/Immunologic: Positive for environmental allergies.   Neurological: Positive for dizziness and numbness. Negative for tremors, speech difficulty, weakness, light-headedness and headaches.   Hematological: Negative for adenopathy.   Psychiatric/Behavioral: Positive for sleep disturbance. Negative for confusion, decreased concentration and suicidal ideas. The patient is not nervous/anxious.    All other systems reviewed and are negative.    Vital signs:  /80 (BP Location: Left arm, Patient Position: Sitting, Cuff Size: Adult)   Pulse 89   Temp 96.9 °F (36.1 °C) (Temporal)   Ht 172.7 cm (67.99\")   Wt 103 kg (227 lb)   SpO2 94%   BMI 34.52 kg/m²     Physical Exam  Vitals and nursing note reviewed. "   Constitutional:       General: He is not in acute distress.     Appearance: He is well-developed.      Comments: Pleasant; Painful slow movements   HENT:      Head: Normocephalic.      Nose: Nose normal.   Eyes:      General: No scleral icterus.        Right eye: No discharge.         Left eye: No discharge.      Conjunctiva/sclera: Conjunctivae normal.   Neck:      Thyroid: No thyromegaly.      Vascular: No JVD.   Cardiovascular:      Rate and Rhythm: Normal rate and regular rhythm.      Heart sounds: Normal heart sounds. No murmur heard.   No friction rub.   Pulmonary:      Effort: Pulmonary effort is normal. No respiratory distress.      Breath sounds: Decreased breath sounds and rhonchi present. No wheezing or rales.   Abdominal:      General: Bowel sounds are normal. There is no distension.      Palpations: Abdomen is soft.      Tenderness: There is no abdominal tenderness. There is no guarding or rebound.   Musculoskeletal:         General: Tenderness and deformity (Multiple Rheumatoid nodules bilateral upper extremities) present.      Cervical back: Neck supple.      Right lower leg: No edema.      Left lower leg: No edema.   Lymphadenopathy:      Cervical: No cervical adenopathy.   Skin:     General: Skin is warm and dry.      Capillary Refill: Capillary refill takes less than 2 seconds.   Neurological:      Mental Status: He is alert and oriented to person, place, and time.      Cranial Nerves: Cranial nerves are intact.      Gait: Gait abnormal.   Psychiatric:         Mood and Affect: Mood normal.         Speech: Speech normal.         Behavior: Behavior normal.         Thought Content: Thought content normal.         Cognition and Memory: Cognition normal.       Result Review :  Results for orders placed or performed in visit on 03/11/21   Comprehensive Metabolic Panel    Specimen: Arm, Left; Blood   Result Value Ref Range    Glucose 239 (H) 65 - 99 mg/dL    BUN 10 6 - 20 mg/dL    Creatinine 0.76 0.76 -  1.27 mg/dL    Sodium 134 (L) 136 - 145 mmol/L    Potassium 4.3 3.5 - 5.2 mmol/L    Chloride 100 98 - 107 mmol/L    CO2 20.0 (L) 22.0 - 29.0 mmol/L    Calcium 9.1 8.6 - 10.5 mg/dL    Total Protein 6.6 6.0 - 8.5 g/dL    Albumin 3.80 3.50 - 5.20 g/dL    ALT (SGPT) 26 1 - 41 U/L    AST (SGOT) 15 1 - 40 U/L    Alkaline Phosphatase 133 (H) 39 - 117 U/L    Total Bilirubin 0.5 0.0 - 1.2 mg/dL    eGFR Non African Amer 107 >60 mL/min/1.73    Globulin 2.8 gm/dL    A/G Ratio 1.4 g/dL    BUN/Creatinine Ratio 13.2 7.0 - 25.0    Anion Gap 14.0 5.0 - 15.0 mmol/L   TSH    Specimen: Arm, Left; Blood   Result Value Ref Range    TSH 4.080 0.270 - 4.200 uIU/mL   Lipid Panel    Specimen: Arm, Left; Blood   Result Value Ref Range    Total Cholesterol 72 0 - 200 mg/dL    Triglycerides 181 (H) 0 - 150 mg/dL    HDL Cholesterol 21 (L) 40 - 60 mg/dL    LDL Cholesterol  21 0 - 100 mg/dL    VLDL Cholesterol 30 5 - 40 mg/dL    LDL/HDL Ratio 0.70    Hemoglobin A1c    Specimen: Arm, Left; Blood   Result Value Ref Range    Hemoglobin A1C 7.40 (H) 4.80 - 5.60 %   Vitamin D 25 Hydroxy    Specimen: Arm, Left; Blood   Result Value Ref Range    25 Hydroxy, Vitamin D 26.7 (L) 30.0 - 100.0 ng/ml   Vitamin B12    Specimen: Arm, Left; Blood   Result Value Ref Range    Vitamin B-12 1,169 (H) 211 - 946 pg/mL   CBC Auto Differential    Specimen: Arm, Left; Blood   Result Value Ref Range    WBC 8.05 3.40 - 10.80 10*3/mm3    RBC 5.30 4.14 - 5.80 10*6/mm3    Hemoglobin 17.1 13.0 - 17.7 g/dL    Hematocrit 49.2 37.5 - 51.0 %    MCV 92.8 79.0 - 97.0 fL    MCH 32.3 26.6 - 33.0 pg    MCHC 34.8 31.5 - 35.7 g/dL    RDW 12.2 (L) 12.3 - 15.4 %    RDW-SD 42.1 37.0 - 54.0 fl    MPV 10.4 6.0 - 12.0 fL    Platelets 296 140 - 450 10*3/mm3    Neutrophil % 47.6 42.7 - 76.0 %    Lymphocyte % 32.4 19.6 - 45.3 %    Monocyte % 10.6 5.0 - 12.0 %    Eosinophil % 8.0 (H) 0.3 - 6.2 %    Basophil % 1.0 0.0 - 1.5 %    Immature Grans % 0.4 0.0 - 0.5 %    Neutrophils, Absolute 3.84 1.70 -  7.00 10*3/mm3    Lymphocytes, Absolute 2.61 0.70 - 3.10 10*3/mm3    Monocytes, Absolute 0.85 0.10 - 0.90 10*3/mm3    Eosinophils, Absolute 0.64 (H) 0.00 - 0.40 10*3/mm3    Basophils, Absolute 0.08 0.00 - 0.20 10*3/mm3    Immature Grans, Absolute 0.03 0.00 - 0.05 10*3/mm3    nRBC 0.0 0.0 - 0.2 /100 WBC       Patient's Body mass index is 34.52 kg/m². indicating that he is obese (BMI >30). Obesity-related health conditions include the following: hypertension, diabetes mellitus, dyslipidemias, GERD and osteoarthritis. Obesity is improving with lifestyle modifications. BMI is is above average; BMI management plan is completed. We discussed portion control and increasing exercise..     Assessment/Plan     Diagnoses and all orders for this visit:    1. DM type 2, uncontrolled, with neuropathy (CMS/[facility]) (Primary)  Comments:  Labs completed today and reviewed by phone with him. Continue Metformin and Trulicity  Orders:  -     amitriptyline (ELAVIL) 150 MG tablet; Take 1 tablet by mouth Every Night.  Dispense: 30 tablet; Refill: 2  -     Comprehensive Metabolic Panel; Future  -     Lipid Panel; Future  -     TSH; Future  -     Hemoglobin A1c; Future  -     MicroAlbumin, Urine, Random - Urine, Clean Catch; Future    2. Rheumatoid arthritis involving multiple sites with positive rheumatoid factor (CMS/[facility])  Comments:  Referral to Rheumatologist  Orders:  -     Ambulatory Referral to Rheumatology  -     Uric Acid; Future    3. Benign essential hypertension  Comments:  Well Controlled.  Continue Metoprolol   Orders:  -     Comprehensive Metabolic Panel; Future  -     Lipid Panel; Future  -     TSH; Future    4. Dyslipidemia  Comments:  Continue Crestor  Orders:  -     Comprehensive Metabolic Panel; Future  -     Lipid Panel; Future  -     TSH; Future    5. Gastroesophageal reflux disease without esophagitis  Comments:  Continue Prevacid  Orders:  -     CBC & Differential; Future    6. Chronic insomnia  Comments:  Will increase  Elavil to 150 mg and take 3 hours prior to bedtime  Orders:  -     amitriptyline (ELAVIL) 150 MG tablet; Take 1 tablet by mouth Every Night.  Dispense: 30 tablet; Refill: 2    7. Chronic allergic rhinitis  Comments:  Continue Singulair and Claritin  Orders:  -     Comprehensive Metabolic Panel; Future    8. Restrictive lung disease  Comments:  Follow up with Pulmonologist    9. Vitamin B 12 deficiency  Comments:  Daily supplement  Orders:  -     Vitamin B12; Future    10. 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 He will call and reschedule   Orders:  -     TSH; Future      Follow Up October   Findings and recommendations discussed with Raimundo. Lifestyle modifications reinforced including nutrition and activity recommendations. He will follow up in October sooner if problems/concerns occur.   He was given instructions and counseling regarding his condition or for health maintenance advice. Please see specific information pulled into the AVS if appropriate      This document has been electronically signed by:

## 2021-07-13 NOTE — PATIENT INSTRUCTIONS
Type 2 Diabetes Mellitus, Self-Care, Adult  Caring for yourself after you have been diagnosed with type 2 diabetes (type 2 diabetes mellitus) means keeping your blood sugar (glucose) under control with a balance of:  · Nutrition.  · Exercise.  · Lifestyle changes.  · Medicines or insulin, if needed.  · Support from your team of health care providers and others.  The following information explains what you need to know to manage your diabetes at home.  What are the risks?  Having diabetes can put you at risk for other long-term (chronic) conditions, such as heart disease and kidney disease. Your health care provider may prescribe medicines to help prevent complications from diabetes.  How to monitor blood glucose    · Check your blood glucose every day, as often as told by your health care provider.  · Have your A1C (hemoglobin A1C) level checked two or more times a year, or as often as told by your health care provider.  · Your health care provider will set personalized treatment goals for you. Generally, the goal of treatment is to maintain the following blood glucose levels:  ? Before meals:  mg/dL (4.4-7.2 mmol/L).  ? After meals: below 180 mg/dL (10 mmol/L).  ? A1C level: less than 7%.  How to manage hyperglycemia and hypoglycemia  Hyperglycemia symptoms  Hyperglycemia, also called high blood glucose, occurs when blood glucose is too high. Make sure you know the early signs of hyperglycemia, such as:  · Increased thirst.  · Hunger.  · Feeling very tired.  · Needing to urinate more often than usual.  · Blurry vision.  Hypoglycemia symptoms  Hypoglycemia, also called low blood glucose, occurs with a blood glucose level at or below 70 mg/dL (3.9 mmol/L). Diabetes medicines lower your blood glucose and can cause hypoglycemia. The risk for hypoglycemia increases during or after exercise, during sleep, during illness, and when skipping meals or not eating for a long time (fasting).  It is important to know the  symptoms of hypoglycemia and treat it right away. Always have a 15-gram rapid-acting carbohydrate snack with you to treat low blood glucose. Family members and close friends should also know the symptoms and understand how to treat hypoglycemia, in case you are not able to treat yourself. Symptoms may include:  · Hunger.  · Anxiety.  · Sweating and feeling clammy.  · Dizziness or feeling light-headed.  · Sleepiness.  · Increased heart rate.  · Irritability.  · Tingling or numbness around the mouth, lips, or tongue.  · Restless sleep.  Severe hypoglycemia is when your blood glucose level is at or below 54 mg/dL (3 mmol/L). Severe hypoglycemia is an emergency. Do not wait to see if the symptoms will go away. Get medical help right away. Call your local emergency services (911 in the [place] Do not drive yourself to the hospital.  If you have severe hypoglycemia and you cannot eat or drink, you may need glucagon. A family member or close friend should learn how to check your blood glucose and how to give you glucagon. Ask your health care provider if you need to have an emergency glucagon kit available.  Follow these instructions at home:  Medicines  · Take diabetes medicines as told by your health care provider. If your health care provider prescribed insulin or diabetes medicines, take them every day.  · Do not run out of insulin or other diabetes medicines. Plan ahead so you always have these available.  · If you use insulin, adjust your dosage based on your physical activity and what foods you eat. Your health care provider will tell you how to adjust your dosage.  · Take over-the-counter and prescription medicines only as told by your health care provider.  Eating and drinking    What you eat and drink affects your blood glucose and your insulin dosage. Making good choices helps to control your diabetes and prevent other health problems. A healthy meal plan includes eating lean proteins, complex carbohydrates,  fresh fruits and vegetables, low-fat dairy products, and healthy fats.  Make an appointment to see a registered dietitian to help you create an eating plan that is right for you. Make sure that you:  · Follow instructions from your health care provider about eating or drinking restrictions.  · Drink enough fluid to keep your urine pale yellow.  · Keep a record of the carbohydrates that you eat. Do this by reading food labels and learning the standard serving sizes of foods.  · Follow your sick-day plan whenever you cannot eat or drink as usual. Make this plan in advance with your health care provider.    Activity  · Stay active. Exercise regularly, as told by your health care provider. This may include:  ? Stretching and doing strength exercises, such as yoga or weight lifting, 2 or more times a week.  ? Doing 150 minutes or more of moderate-intensity or vigorous-intensity exercise each week. This could be brisk walking, biking, or water aerobics.  § Spread out your activity over 3 or more days of the week.  § Do not go more than 2 days in a row without doing some kind of physical activity.  · When you start a new exercise or activity, work with your health care provider to adjust your insulin, medicines, or food intake as needed.  Lifestyle  · Do not use any products that contain nicotine or tobacco, such as cigarettes, e-cigarettes, and chewing tobacco. If you need help quitting, ask your health care provider.  · If your health care provider says that alcohol is safe for you, limit how much you use to no more than 1 drink a day for women who are not pregnant and 2 drinks a day for men. In the [place] one drink equals one 12 oz bottle of beer (355 mL), one 5 oz glass of wine (148 mL), or one 1½ oz glass of hard liquor (44 mL).  · Learn to manage stress. If you need help with this, ask your health care provider.  Take care of your body    · Keep your immunizations up to date. In addition to getting vaccinations as  told by your health care provider, it is recommended that you get vaccinated against the following illnesses:  ? The flu (influenza). Get a flu shot every year.  ? Pneumonia.  ? Hepatitis B.  · Schedule an eye exam soon after your diagnosis, and then one time every year after that.  · Check your skin and feet every day for cuts, bruises, redness, blisters, or sores. Schedule a foot exam with your health care provider once every year.  · Brush your teeth and gums two times a day, and floss one or more times a day. Visit your dentist one or more times every 6 months.  · Maintain a healthy weight.  General instructions  · Share your diabetes management plan with people in your workplace, school, and household.  · Carry a medical alert card or wear medical alert jewelry.  · Keep all follow-up visits as told by your health care provider. This is important.  Questions to ask your health care provider  · Should I meet with a certified diabetes care and ?  · Where can I find a support group for people with diabetes?  Where to find more information  · American Diabetes Association (ADA): www.diabetes.org  · American Association of Diabetes Care and Education Specialists (ADCES): www.diabeteseducator.org  · International Diabetes Federation (IDF): www.idf.org  Summary  · Caring for yourself after you have been diagnosed with type 2 diabetes (type 2 diabetes mellitus) means keeping your blood sugar (glucose) under control with a balance of nutrition, exercise, lifestyle changes, and medicine.  · Check your blood glucose every day, as often as told by your health care provider.  · Having diabetes can put you at risk for other long-term (chronic) conditions, such as heart disease and kidney disease. Your health care provider may prescribe medicines to help prevent complications from diabetes.  · Share your diabetes management plan with people in your workplace, school, and household.  · Keep all follow-up  "visits as told by your health care provider. This is important.  This information is not intended to replace advice given to you by your health care provider. Make sure you discuss any questions you have with your health care provider.  Document Revised: 01/25/2021 Document Reviewed: 01/26/2021  [employer] Patient Education © 2021 [employer]    https://www.nhlbi.nih.gov/files/docs/public/heart/dash_brief.pdf\">   DASH Eating Plan  DASH stands for Dietary Approaches to Stop Hypertension. The DASH eating plan is a healthy eating plan that has been shown to:  · Reduce high blood pressure (hypertension).  · Reduce your risk for type 2 diabetes, heart disease, and stroke.  · Help with weight loss.  What are tips for following this plan?  Reading food labels  · Check food labels for the amount of salt (sodium) per serving. Choose foods with less than 5 percent of the Daily Value of sodium. Generally, foods with less than 300 milligrams (mg) of sodium per serving fit into this eating plan.  · To find whole grains, look for the word \"whole\" as the first word in the ingredient list.  Shopping  · Buy products labeled as \"low-sodium\" or \"no salt added.\"  · Buy fresh foods. Avoid canned foods and pre-made or frozen meals.  Cooking  · Avoid adding salt when cooking. Use salt-free seasonings or herbs instead of table salt or sea salt. Check with your health care provider or pharmacist before using salt substitutes.  · Do not judd foods. Cook foods using healthy methods such as baking, boiling, grilling, roasting, and broiling instead.  · Cook with heart-healthy oils, such as olive, canola, avocado, soybean, or sunflower oil.  Meal planning    · Eat a balanced diet that includes:  ? 4 or more servings of fruits and 4 or more servings of vegetables each day. Try to fill one-half of your plate with fruits and vegetables.  ? 6-8 servings of whole grains each day.  ? Less than 6 oz (170 g) of lean meat, poultry, or fish each day. A " 3-oz (85-g) serving of meat is about the same size as a deck of cards. One egg equals 1 oz (28 g).  ? 2-3 servings of low-fat dairy each day. One serving is 1 cup (237 mL).  ? 1 serving of nuts, seeds, or beans 5 times each week.  ? 2-3 servings of heart-healthy fats. Healthy fats called omega-3 fatty acids are found in foods such as walnuts, flaxseeds, fortified milks, and eggs. These fats are also found in cold-water fish, such as sardines, salmon, and mackerel.  · Limit how much you eat of:  ? Canned or prepackaged foods.  ? Food that is high in trans fat, such as some fried foods.  ? Food that is high in saturated fat, such as fatty meat.  ? Desserts and other sweets, sugary drinks, and other foods with added sugar.  ? Full-fat dairy products.  · Do not salt foods before eating.  · Do not eat more than 4 egg yolks a week.  · Try to eat at least 2 vegetarian meals a week.  · Eat more home-cooked food and less restaurant, buffet, and fast food.  Lifestyle  · When eating at a restaurant, ask that your food be prepared with less salt or no salt, if possible.  · If you drink alcohol:  ? Limit how much you use to:  § 0-1 drink a day for women who are not pregnant.  § 0-2 drinks a day for men.  ? Be aware of how much alcohol is in your drink. In the [place] one drink equals one 12 oz bottle of beer (355 mL), one 5 oz glass of wine (148 mL), or one 1½ oz glass of hard liquor (44 mL).  General information  · Avoid eating more than 2,300 mg of salt a day. If you have hypertension, you may need to reduce your sodium intake to 1,500 mg a day.  · Work with your health care provider to maintain a healthy body weight or to lose weight. Ask what an ideal weight is for you.  · Get at least 30 minutes of exercise that causes your heart to beat faster (aerobic exercise) most days of the week. Activities may include walking, swimming, or biking.  · Work with your health care provider or dietitian to adjust your eating plan to your  individual calorie needs.  What foods should I eat?  Fruits  All fresh, dried, or frozen fruit. Canned fruit in natural juice (without added sugar).  Vegetables  Fresh or frozen vegetables (raw, steamed, roasted, or grilled). Low-sodium or reduced-sodium tomato and vegetable juice. Low-sodium or reduced-sodium tomato sauce and tomato paste. Low-sodium or reduced-sodium canned vegetables.  Grains  Whole-grain or whole-wheat bread. Whole-grain or whole-wheat pasta. Brown rice. Oatmeal. Quinoa. Bulgur. Whole-grain and low-sodium cereals. [place] bread. Low-fat, low-sodium crackers. Whole-wheat flour tortillas.  Meats and other proteins  Skinless chicken or turkey. Ground chicken or turkey. Pork with fat trimmed off. Fish and seafood. Egg whites. Dried beans, peas, or lentils. Unsalted nuts, nut butters, and seeds. Unsalted canned beans. Lean cuts of beef with fat trimmed off. Low-sodium, lean precooked or cured meat, such as sausages or meat loaves.  Dairy  Low-fat (1%) or fat-free (skim) milk. Reduced-fat, low-fat, or fat-free cheeses. Nonfat, low-sodium ricotta or cottage cheese. Low-fat or nonfat yogurt. Low-fat, low-sodium cheese.  Fats and oils  Soft margarine without trans fats. Vegetable oil. Reduced-fat, low-fat, or light mayonnaise and salad dressings (reduced-sodium). Canola, safflower, olive, avocado, soybean, and sunflower oils. Avocado.  Seasonings and condiments  Herbs. Spices. Seasoning mixes without salt.  Other foods  Unsalted popcorn and pretzels. Fat-free sweets.  The items listed above may not be a complete list of foods and beverages you can eat. Contact a dietitian for more information.  What foods should I avoid?  Fruits  Canned fruit in a light or heavy syrup. Fried fruit. Fruit in cream or butter sauce.  Vegetables  Creamed or fried vegetables. Vegetables in a cheese sauce. Regular canned vegetables (not low-sodium or reduced-sodium). Regular canned tomato sauce and paste (not low-sodium or  reduced-sodium). Regular tomato and vegetable juice (not low-sodium or reduced-sodium). Pickles. Olives.  Grains  Baked goods made with fat, such as croissants, muffins, or some breads. Dry pasta or rice meal packs.  Meats and other proteins  Fatty cuts of meat. Ribs. Fried meat. [place]. Bologna, salami, and other precooked or cured meats, such as sausages or meat loaves. Fat from the back of a pig (fatback). Bratwurst. Salted nuts and seeds. Canned beans with added salt. Canned or smoked fish. Whole eggs or egg yolks. Chicken or turkey with skin.  Dairy  Whole or 2% milk, cream, and half-and-half. Whole or full-fat cream cheese. Whole-fat or sweetened yogurt. Full-fat cheese. Nondairy creamers. Whipped toppings. Processed cheese and cheese spreads.  Fats and oils  Butter. Stick margarine. Lard. Shortening. Ghee. [place] fat. Tropical oils, such as coconut, palm kernel, or palm oil.  Seasonings and condiments  Onion salt, garlic salt, seasoned salt, table salt, and sea salt. Worcestershire sauce. Tartar sauce. Barbecue sauce. Teriyaki sauce. Soy sauce, including reduced-sodium. Steak sauce. Canned and packaged gravies. Fish sauce. Oyster sauce. Cocktail sauce. Store-bought horseradish. Ketchup. Mustard. Meat flavorings and tenderizers. Bouillon cubes. Hot sauces. Pre-made or packaged marinades. Pre-made or packaged taco seasonings. Relishes. Regular salad dressings.  Other foods  Salted popcorn and pretzels.  The items listed above may not be a complete list of foods and beverages you should avoid. Contact a dietitian for more information.  Where to find more information  · National Heart, Lung, and Blood [place]: www.nhlbi.nih.gov  · American Heart Association: www.heart.org  · Academy of Nutrition and Dietetics: www.eatright.org  · National Kidney Foundation: www.kidney.org  Summary  · The DASH eating plan is a healthy eating plan that has been shown to reduce high blood pressure (hypertension). It may also reduce  your risk for type 2 diabetes, heart disease, and stroke.  · When on the DASH eating plan, aim to eat more fresh fruits and vegetables, whole grains, lean proteins, low-fat dairy, and heart-healthy fats.  · With the DASH eating plan, you should limit salt (sodium) intake to 2,300 mg a day. If you have hypertension, you may need to reduce your sodium intake to 1,500 mg a day.  · Work with your health care provider or dietitian to adjust your eating plan to your individual calorie needs.  This information is not intended to replace advice given to you by your health care provider. Make sure you discuss any questions you have with your health care provider.  Document Revised: 11/20/2020 Document Reviewed: 11/20/2020  [employer] Patient Education © 2021 [employer]

## 2021-07-14 PROBLEM — R00.0 TACHYCARDIA: Status: RESOLVED | Noted: 2020-08-01 | Resolved: 2021-07-14

## 2021-07-14 PROBLEM — E04.9 SUBSTERNAL THYROID GOITER: Status: ACTIVE | Noted: 2021-07-14

## 2021-07-14 LAB
25(OH)D3 SERPL-MCNC: 21.7 NG/ML (ref 30–100)
ALBUMIN SERPL-MCNC: 4.3 G/DL (ref 3.5–5.2)
ALBUMIN/GLOB SERPL: 1.6 G/DL
ALP SERPL-CCNC: 119 U/L (ref 39–117)
ALT SERPL W P-5'-P-CCNC: 29 U/L (ref 1–41)
ANION GAP SERPL CALCULATED.3IONS-SCNC: 11.9 MMOL/L (ref 5–15)
AST SERPL-CCNC: 19 U/L (ref 1–40)
BASOPHILS # BLD AUTO: 0.07 10*3/MM3 (ref 0–0.2)
BASOPHILS NFR BLD AUTO: 0.9 % (ref 0–1.5)
BILIRUB SERPL-MCNC: 0.3 MG/DL (ref 0–1.2)
BUN SERPL-MCNC: 8 MG/DL (ref 6–20)
BUN/CREAT SERPL: 11 (ref 7–25)
CALCIUM SPEC-SCNC: 9.4 MG/DL (ref 8.6–10.5)
CHLORIDE SERPL-SCNC: 101 MMOL/L (ref 98–107)
CHOLEST SERPL-MCNC: 145 MG/DL (ref 0–200)
CO2 SERPL-SCNC: 25.1 MMOL/L (ref 22–29)
CREAT SERPL-MCNC: 0.73 MG/DL (ref 0.76–1.27)
DEPRECATED RDW RBC AUTO: 39.6 FL (ref 37–54)
EOSINOPHIL # BLD AUTO: 0.45 10*3/MM3 (ref 0–0.4)
EOSINOPHIL NFR BLD AUTO: 5.7 % (ref 0.3–6.2)
ERYTHROCYTE [DISTWIDTH] IN BLOOD BY AUTOMATED COUNT: 11.9 % (ref 12.3–15.4)
GFR SERPL CREATININE-BSD FRML MDRD: 112 ML/MIN/1.73
GLOBULIN UR ELPH-MCNC: 2.7 GM/DL
GLUCOSE SERPL-MCNC: 139 MG/DL (ref 65–99)
HBA1C MFR BLD: 7.19 % (ref 4.8–5.6)
HCT VFR BLD AUTO: 51.4 % (ref 37.5–51)
HDLC SERPL-MCNC: 27 MG/DL (ref 40–60)
HGB BLD-MCNC: 17.6 G/DL (ref 13–17.7)
IMM GRANULOCYTES # BLD AUTO: 0.01 10*3/MM3 (ref 0–0.05)
IMM GRANULOCYTES NFR BLD AUTO: 0.1 % (ref 0–0.5)
LDLC SERPL CALC-MCNC: 82 MG/DL (ref 0–100)
LDLC/HDLC SERPL: 2.79 {RATIO}
LYMPHOCYTES # BLD AUTO: 3 10*3/MM3 (ref 0.7–3.1)
LYMPHOCYTES NFR BLD AUTO: 38.3 % (ref 19.6–45.3)
MCH RBC QN AUTO: 31.4 PG (ref 26.6–33)
MCHC RBC AUTO-ENTMCNC: 34.2 G/DL (ref 31.5–35.7)
MCV RBC AUTO: 91.6 FL (ref 79–97)
MONOCYTES # BLD AUTO: 0.87 10*3/MM3 (ref 0.1–0.9)
MONOCYTES NFR BLD AUTO: 11.1 % (ref 5–12)
NEUTROPHILS NFR BLD AUTO: 3.43 10*3/MM3 (ref 1.7–7)
NEUTROPHILS NFR BLD AUTO: 43.9 % (ref 42.7–76)
NRBC BLD AUTO-RTO: 0 /100 WBC (ref 0–0.2)
PLATELET # BLD AUTO: 302 10*3/MM3 (ref 140–450)
PMV BLD AUTO: 10.3 FL (ref 6–12)
POTASSIUM SERPL-SCNC: 4.3 MMOL/L (ref 3.5–5.2)
PROT SERPL-MCNC: 7 G/DL (ref 6–8.5)
RBC # BLD AUTO: 5.61 10*6/MM3 (ref 4.14–5.8)
SODIUM SERPL-SCNC: 138 MMOL/L (ref 136–145)
TRIGL SERPL-MCNC: 214 MG/DL (ref 0–150)
TSH SERPL DL<=0.05 MIU/L-ACNC: 5.84 UIU/ML (ref 0.27–4.2)
URATE SERPL-MCNC: 3.9 MG/DL (ref 3.4–7)
VIT B12 BLD-MCNC: 1207 PG/ML (ref 211–946)
VLDLC SERPL-MCNC: 36 MG/DL (ref 5–40)
WBC # BLD AUTO: 7.83 10*3/MM3 (ref 3.4–10.8)

## 2021-09-08 DIAGNOSIS — E11.69 CONTROLLED TYPE 2 DIABETES MELLITUS WITH OTHER SPECIFIED COMPLICATION, WITHOUT LONG-TERM CURRENT USE OF INSULIN (HCC): Chronic | ICD-10-CM

## 2021-09-08 DIAGNOSIS — E78.2 MIXED HYPERLIPIDEMIA: Chronic | ICD-10-CM

## 2021-09-08 DIAGNOSIS — M62.838 MUSCLE SPASMS OF BOTH LOWER EXTREMITIES: Chronic | ICD-10-CM

## 2021-09-08 DIAGNOSIS — J30.9 CHRONIC ALLERGIC RHINITIS: Chronic | ICD-10-CM

## 2021-09-08 DIAGNOSIS — M05.79 RHEUMATOID ARTHRITIS INVOLVING MULTIPLE SITES WITH POSITIVE RHEUMATOID FACTOR (HCC): Chronic | ICD-10-CM

## 2021-09-08 RX ORDER — CYCLOBENZAPRINE HCL 10 MG
TABLET ORAL
Qty: 60 TABLET | Refills: 4 | Status: SHIPPED | OUTPATIENT
Start: 2021-09-08 | End: 2022-03-23

## 2021-09-08 RX ORDER — LANCETS 33 GAUGE
EACH MISCELLANEOUS
Qty: 100 EACH | Refills: 4 | Status: SHIPPED | OUTPATIENT
Start: 2021-09-08 | End: 2022-02-24 | Stop reason: SDUPTHER

## 2021-09-08 RX ORDER — FOLIC ACID 1 MG/1
1000 TABLET ORAL DAILY
Qty: 30 TABLET | Refills: 4 | Status: SHIPPED | OUTPATIENT
Start: 2021-09-08 | End: 2021-10-27 | Stop reason: SDUPTHER

## 2021-09-08 RX ORDER — FLUTICASONE PROPIONATE 50 MCG
SPRAY, SUSPENSION (ML) NASAL
Qty: 16 G | Refills: 4 | Status: SHIPPED | OUTPATIENT
Start: 2021-09-08 | End: 2022-02-24 | Stop reason: SDUPTHER

## 2021-09-08 RX ORDER — ROSUVASTATIN CALCIUM 5 MG/1
TABLET, COATED ORAL
Qty: 30 TABLET | Refills: 4 | Status: SHIPPED | OUTPATIENT
Start: 2021-09-08 | End: 2021-10-27 | Stop reason: SDUPTHER

## 2021-10-05 DIAGNOSIS — E53.8 B12 DEFICIENCY: ICD-10-CM

## 2021-10-05 DIAGNOSIS — F51.04 CHRONIC INSOMNIA: Chronic | ICD-10-CM

## 2021-10-05 DIAGNOSIS — IMO0002 DM TYPE 2, UNCONTROLLED, WITH NEUROPATHY: Chronic | ICD-10-CM

## 2021-10-05 DIAGNOSIS — J30.9 CHRONIC ALLERGIC RHINITIS: Chronic | ICD-10-CM

## 2021-10-05 RX ORDER — LANOLIN ALCOHOL/MO/W.PET/CERES
CREAM (GRAM) TOPICAL
Qty: 30 TABLET | Refills: 0 | Status: SHIPPED | OUTPATIENT
Start: 2021-10-05 | End: 2021-10-27 | Stop reason: SDUPTHER

## 2021-10-05 RX ORDER — LORATADINE 10 MG/1
TABLET ORAL
Qty: 30 TABLET | Refills: 0 | Status: SHIPPED | OUTPATIENT
Start: 2021-10-05 | End: 2021-10-27

## 2021-10-05 RX ORDER — AMITRIPTYLINE HYDROCHLORIDE 150 MG/1
TABLET, FILM COATED ORAL
Qty: 30 TABLET | Refills: 0 | Status: SHIPPED | OUTPATIENT
Start: 2021-10-05 | End: 2021-10-27 | Stop reason: SDUPTHER

## 2021-10-12 ENCOUNTER — LAB (OUTPATIENT)
Dept: FAMILY MEDICINE CLINIC | Facility: CLINIC | Age: 56
End: 2021-10-12

## 2021-10-12 DIAGNOSIS — I10 BENIGN ESSENTIAL HYPERTENSION: Chronic | ICD-10-CM

## 2021-10-12 DIAGNOSIS — IMO0002 DM TYPE 2, UNCONTROLLED, WITH NEUROPATHY: Chronic | ICD-10-CM

## 2021-10-12 DIAGNOSIS — J30.9 CHRONIC ALLERGIC RHINITIS: Chronic | ICD-10-CM

## 2021-10-12 DIAGNOSIS — E04.9 SUBSTERNAL THYROID GOITER: ICD-10-CM

## 2021-10-12 DIAGNOSIS — M05.79 RHEUMATOID ARTHRITIS INVOLVING MULTIPLE SITES WITH POSITIVE RHEUMATOID FACTOR (HCC): Chronic | ICD-10-CM

## 2021-10-12 DIAGNOSIS — E55.9 VITAMIN D DEFICIENCY: ICD-10-CM

## 2021-10-12 DIAGNOSIS — E78.5 DYSLIPIDEMIA: Chronic | ICD-10-CM

## 2021-10-12 DIAGNOSIS — E53.8 VITAMIN B 12 DEFICIENCY: ICD-10-CM

## 2021-10-12 DIAGNOSIS — K21.9 GASTROESOPHAGEAL REFLUX DISEASE WITHOUT ESOPHAGITIS: ICD-10-CM

## 2021-10-12 PROCEDURE — 82306 VITAMIN D 25 HYDROXY: CPT | Performed by: NURSE PRACTITIONER

## 2021-10-12 PROCEDURE — 83036 HEMOGLOBIN GLYCOSYLATED A1C: CPT | Performed by: NURSE PRACTITIONER

## 2021-10-12 PROCEDURE — 82607 VITAMIN B-12: CPT | Performed by: NURSE PRACTITIONER

## 2021-10-12 PROCEDURE — 84550 ASSAY OF BLOOD/URIC ACID: CPT | Performed by: NURSE PRACTITIONER

## 2021-10-12 PROCEDURE — 80061 LIPID PANEL: CPT | Performed by: NURSE PRACTITIONER

## 2021-10-12 PROCEDURE — 80050 GENERAL HEALTH PANEL: CPT | Performed by: NURSE PRACTITIONER

## 2021-10-13 LAB
25(OH)D3 SERPL-MCNC: 22.7 NG/ML (ref 30–100)
ALBUMIN SERPL-MCNC: 4.1 G/DL (ref 3.5–5.2)
ALBUMIN/GLOB SERPL: 1.6 G/DL
ALP SERPL-CCNC: 123 U/L (ref 39–117)
ALT SERPL W P-5'-P-CCNC: 35 U/L (ref 1–41)
ANION GAP SERPL CALCULATED.3IONS-SCNC: 11.8 MMOL/L (ref 5–15)
AST SERPL-CCNC: 22 U/L (ref 1–40)
BASOPHILS # BLD AUTO: 0.09 10*3/MM3 (ref 0–0.2)
BASOPHILS NFR BLD AUTO: 1.2 % (ref 0–1.5)
BILIRUB SERPL-MCNC: 0.4 MG/DL (ref 0–1.2)
BUN SERPL-MCNC: 12 MG/DL (ref 6–20)
BUN/CREAT SERPL: 19 (ref 7–25)
CALCIUM SPEC-SCNC: 9.4 MG/DL (ref 8.6–10.5)
CHLORIDE SERPL-SCNC: 102 MMOL/L (ref 98–107)
CHOLEST SERPL-MCNC: 81 MG/DL (ref 0–200)
CO2 SERPL-SCNC: 25.2 MMOL/L (ref 22–29)
CREAT SERPL-MCNC: 0.63 MG/DL (ref 0.76–1.27)
DEPRECATED RDW RBC AUTO: 40.6 FL (ref 37–54)
EOSINOPHIL # BLD AUTO: 0.25 10*3/MM3 (ref 0–0.4)
EOSINOPHIL NFR BLD AUTO: 3.4 % (ref 0.3–6.2)
ERYTHROCYTE [DISTWIDTH] IN BLOOD BY AUTOMATED COUNT: 12.2 % (ref 12.3–15.4)
GFR SERPL CREATININE-BSD FRML MDRD: 132 ML/MIN/1.73
GLOBULIN UR ELPH-MCNC: 2.6 GM/DL
GLUCOSE SERPL-MCNC: 170 MG/DL (ref 65–99)
HBA1C MFR BLD: 7.9 % (ref 4.8–5.6)
HCT VFR BLD AUTO: 48.9 % (ref 37.5–51)
HDLC SERPL-MCNC: 20 MG/DL (ref 40–60)
HGB BLD-MCNC: 16.9 G/DL (ref 13–17.7)
IMM GRANULOCYTES # BLD AUTO: 0.02 10*3/MM3 (ref 0–0.05)
IMM GRANULOCYTES NFR BLD AUTO: 0.3 % (ref 0–0.5)
LDLC SERPL CALC-MCNC: 26 MG/DL (ref 0–100)
LDLC/HDLC SERPL: 0.8 {RATIO}
LYMPHOCYTES # BLD AUTO: 3.03 10*3/MM3 (ref 0.7–3.1)
LYMPHOCYTES NFR BLD AUTO: 40.7 % (ref 19.6–45.3)
MCH RBC QN AUTO: 31.8 PG (ref 26.6–33)
MCHC RBC AUTO-ENTMCNC: 34.6 G/DL (ref 31.5–35.7)
MCV RBC AUTO: 91.9 FL (ref 79–97)
MONOCYTES # BLD AUTO: 0.96 10*3/MM3 (ref 0.1–0.9)
MONOCYTES NFR BLD AUTO: 12.9 % (ref 5–12)
NEUTROPHILS NFR BLD AUTO: 3.1 10*3/MM3 (ref 1.7–7)
NEUTROPHILS NFR BLD AUTO: 41.5 % (ref 42.7–76)
NRBC BLD AUTO-RTO: 0 /100 WBC (ref 0–0.2)
PLATELET # BLD AUTO: 317 10*3/MM3 (ref 140–450)
PMV BLD AUTO: 10.5 FL (ref 6–12)
POTASSIUM SERPL-SCNC: 4.3 MMOL/L (ref 3.5–5.2)
PROT SERPL-MCNC: 6.7 G/DL (ref 6–8.5)
RBC # BLD AUTO: 5.32 10*6/MM3 (ref 4.14–5.8)
SODIUM SERPL-SCNC: 139 MMOL/L (ref 136–145)
TRIGL SERPL-MCNC: 225 MG/DL (ref 0–150)
TSH SERPL DL<=0.05 MIU/L-ACNC: 3.66 UIU/ML (ref 0.27–4.2)
URATE SERPL-MCNC: 4 MG/DL (ref 3.4–7)
VIT B12 BLD-MCNC: 1084 PG/ML (ref 211–946)
VLDLC SERPL-MCNC: 35 MG/DL (ref 5–40)
WBC # BLD AUTO: 7.45 10*3/MM3 (ref 3.4–10.8)

## 2021-10-27 ENCOUNTER — OFFICE VISIT (OUTPATIENT)
Dept: FAMILY MEDICINE CLINIC | Facility: CLINIC | Age: 56
End: 2021-10-27

## 2021-10-27 VITALS
DIASTOLIC BLOOD PRESSURE: 82 MMHG | BODY MASS INDEX: 35.16 KG/M2 | RESPIRATION RATE: 14 BRPM | HEIGHT: 68 IN | OXYGEN SATURATION: 95 % | WEIGHT: 232 LBS | SYSTOLIC BLOOD PRESSURE: 112 MMHG | TEMPERATURE: 96.6 F | HEART RATE: 97 BPM

## 2021-10-27 DIAGNOSIS — E78.5 DYSLIPIDEMIA: Chronic | ICD-10-CM

## 2021-10-27 DIAGNOSIS — E04.9 SUBSTERNAL THYROID GOITER: ICD-10-CM

## 2021-10-27 DIAGNOSIS — E53.8 B12 DEFICIENCY: ICD-10-CM

## 2021-10-27 DIAGNOSIS — E55.9 VITAMIN D DEFICIENCY: ICD-10-CM

## 2021-10-27 DIAGNOSIS — E11.8 DM TYPE 2, CONTROLLED, WITH COMPLICATION (HCC): Primary | Chronic | ICD-10-CM

## 2021-10-27 DIAGNOSIS — Z23 ENCOUNTER FOR IMMUNIZATION: ICD-10-CM

## 2021-10-27 DIAGNOSIS — F51.04 CHRONIC INSOMNIA: Chronic | ICD-10-CM

## 2021-10-27 DIAGNOSIS — K21.9 GASTROESOPHAGEAL REFLUX DISEASE WITHOUT ESOPHAGITIS: Chronic | ICD-10-CM

## 2021-10-27 DIAGNOSIS — J98.4 RESTRICTIVE LUNG DISEASE: Chronic | ICD-10-CM

## 2021-10-27 DIAGNOSIS — J30.9 CHRONIC ALLERGIC RHINITIS: Chronic | ICD-10-CM

## 2021-10-27 DIAGNOSIS — J32.0 MAXILLARY SINUSITIS, UNSPECIFIED CHRONICITY: ICD-10-CM

## 2021-10-27 DIAGNOSIS — M05.79 RHEUMATOID ARTHRITIS INVOLVING MULTIPLE SITES WITH POSITIVE RHEUMATOID FACTOR (HCC): Chronic | ICD-10-CM

## 2021-10-27 DIAGNOSIS — I10 BENIGN ESSENTIAL HYPERTENSION: Chronic | ICD-10-CM

## 2021-10-27 PROCEDURE — 90471 IMMUNIZATION ADMIN: CPT | Performed by: NURSE PRACTITIONER

## 2021-10-27 PROCEDURE — 90686 IIV4 VACC NO PRSV 0.5 ML IM: CPT | Performed by: NURSE PRACTITIONER

## 2021-10-27 PROCEDURE — 99215 OFFICE O/P EST HI 40 MIN: CPT | Performed by: NURSE PRACTITIONER

## 2021-10-27 RX ORDER — LANOLIN ALCOHOL/MO/W.PET/CERES
1000 CREAM (GRAM) TOPICAL DAILY
Qty: 30 TABLET | Refills: 3 | Status: SHIPPED | OUTPATIENT
Start: 2021-10-27 | End: 2022-02-24 | Stop reason: SDUPTHER

## 2021-10-27 RX ORDER — LANSOPRAZOLE 30 MG/1
30 CAPSULE, DELAYED RELEASE ORAL DAILY
Qty: 30 CAPSULE | Refills: 3 | Status: SHIPPED | OUTPATIENT
Start: 2021-10-27 | End: 2021-12-28

## 2021-10-27 RX ORDER — ROSUVASTATIN CALCIUM 5 MG/1
5 TABLET, COATED ORAL DAILY
Qty: 30 TABLET | Refills: 3 | Status: SHIPPED | OUTPATIENT
Start: 2021-10-27 | End: 2022-02-24 | Stop reason: SDUPTHER

## 2021-10-27 RX ORDER — METOPROLOL SUCCINATE 100 MG/1
100 TABLET, EXTENDED RELEASE ORAL DAILY
Qty: 30 TABLET | Refills: 3 | Status: SHIPPED | OUTPATIENT
Start: 2021-10-27 | End: 2021-11-30

## 2021-10-27 RX ORDER — AMITRIPTYLINE HYDROCHLORIDE 150 MG/1
150 TABLET, FILM COATED ORAL
Qty: 30 TABLET | Refills: 3 | Status: SHIPPED | OUTPATIENT
Start: 2021-10-27 | End: 2021-12-09

## 2021-10-27 RX ORDER — MONTELUKAST SODIUM 10 MG/1
10 TABLET ORAL
Qty: 30 TABLET | Refills: 3 | Status: SHIPPED | OUTPATIENT
Start: 2021-10-27 | End: 2021-12-01

## 2021-10-27 RX ORDER — FOLIC ACID 1 MG/1
1000 TABLET ORAL DAILY
Qty: 30 TABLET | Refills: 3 | Status: SHIPPED | OUTPATIENT
Start: 2021-10-27 | End: 2022-02-24 | Stop reason: SDUPTHER

## 2021-10-27 RX ORDER — LEVOCETIRIZINE DIHYDROCHLORIDE 5 MG/1
5 TABLET, FILM COATED ORAL EVERY EVENING
Qty: 30 TABLET | Refills: 3 | Status: SHIPPED | OUTPATIENT
Start: 2021-10-27 | End: 2021-12-09

## 2021-10-27 RX ORDER — AMOXICILLIN AND CLAVULANATE POTASSIUM 875; 125 MG/1; MG/1
1 TABLET, FILM COATED ORAL 2 TIMES DAILY
Qty: 14 TABLET | Refills: 0 | Status: SHIPPED | OUTPATIENT
Start: 2021-10-27 | End: 2021-12-09

## 2021-10-27 RX ORDER — BLOOD SUGAR DIAGNOSTIC
1 STRIP MISCELLANEOUS 3 TIMES DAILY
Qty: 100 EACH | Refills: 3 | Status: SHIPPED | OUTPATIENT
Start: 2021-10-27 | End: 2021-11-02

## 2021-10-27 RX ORDER — ALBUTEROL SULFATE 90 UG/1
2 AEROSOL, METERED RESPIRATORY (INHALATION) EVERY 4 HOURS PRN
Qty: 18 G | Refills: 3 | Status: SHIPPED | OUTPATIENT
Start: 2021-10-27 | End: 2021-12-01

## 2021-10-27 RX ORDER — DULAGLUTIDE 1.5 MG/.5ML
1.5 INJECTION, SOLUTION SUBCUTANEOUS WEEKLY
Qty: 2 ML | Refills: 3 | Status: SHIPPED | OUTPATIENT
Start: 2021-10-27 | End: 2022-02-24 | Stop reason: SDUPTHER

## 2021-10-27 NOTE — PROGRESS NOTES
History of Present Illness  Raimundo Munguia is a 57 yo male who presents for follow up and recheck pertaining to his DM, type 2, HTN, GERD, Rheumatoid Arthritis and Dyslipidemia. In addition, Raimundo has Insomnia and Vitamin deficiencies. Raimundo is c/o upper respiratory symptoms which started within the week.    Hypertension   The current episode started more than 1 year ago.His BP is adequately controlled. His hypertensive medication is Toprol  mg which has improved his blood pressure and fast heart rate.  He has reduced the amount of caffeine he consumes on a daily basis.    Lab Results   Component Value Date    CREATININE 0.63 (L) 10/12/2021     Diabetes   He has type 2 diabetes mellitus. His disease course has been improving. He does share he has noticed some intermittent neuropathy in his left lower extremity.  Risk factors for coronary artery disease include dyslipidemia, hypertension, male sex, sedentary lifestyle and tobacco exposure. Current diabetic treatment includes Metformin and Trulicity. He is compliant with medication regimen most of the time. He has had a previous visit with a dietitian. An ACE inhibitor/angiotensin II receptor blocker is not being taken due to S/e.     Lab Results   Component Value Date    HGBA1C 7.40 (H) 03/11/2021     Lab Results   Component Value Date    HGBA1C 7.90 (H) 10/12/2021     Dyslipidemia   This is a chronic problem. The current episode started more than 1 year ago. Factors aggravating his hyperlipidemia include fatty foods. Current antihyperlipidemic treatment includes statins.  Compliance problems include adherence to diet and adherence to exercise.  Risk factors for coronary artery disease include diabetes mellitus, dyslipidemia, hypertension, male sex and obesity.   Lab Results   Component Value Date    CHOL 81 10/12/2021    CHOL 145 07/13/2021    CHOL 72 03/11/2021     Lab Results   Component Value Date    TRIG 225 (H) 10/12/2021    TRIG 214 (H) 07/13/2021  Transplant Surgery  Inpatient Daily Progress Note  05/31/2018    Assessment & Plan: Gilles Henning (Poncho) is a 50-year old male with a history of IgA nephropathy, RCC, bilateral native nephrectomy 2017, depression, PTSD, and substance abuse who is now s/p DDKT on 5/30/18.    Graft function: Good, improving. Creatinine improved to 3.83 today (from 6.03), UOP is excellent with 200-975cc/hr today.   Immunosuppression management:   - Thymoglobulin: Received 2mg/kg (125mg) on POD #0, will give an additional dose of 2mg/kg today.  - Steroids: Received 500mg Solumedrol on POD #0, will give a dose of 250mg today.  - Tacrolimus: On Envarsus 8mg daily per protocol, goal range 8-10. First level on 6/1.  - MMF: On 750mg BID.  Complexity of management:Medium. Contributing factors: induction  Hematology: Stable, no issues. Hemoglobin stable with no signs of bleeding. WBC elevated to 12.2 today (from 5.4), likely stress response from surgery and steroid induction. Continue to monitor.  Cardiorespiratory: Stable, no issues. On Metoprolol XL 25mg daily PTA. SBP in the 140s-160s range on Metoprolol 25mg BID. Heart rate in the 60s. Pre-op EKG shows a first degree AV block with a MT interval of 220 ms. Will continue to monitor.  GI/Nutrition: Will advance to regular diet today. Denies nausea. Continue bowel regimen.  Endocrine: No history of DM, had hyperglycemia postop last evening. Will d/c insulin gtt today and start Novolog SSI.  Fluid/Electrolytes: MIVF: Will d/c fluid replacements and IV Lasix today. Continue D5LR @ 125ml/hr. Electrolytes WNL.  : Lucas to remain for 3 days due to new surgical anastomosis (remove 6/2).  Infectious disease: Tmax 99.9, WBC mildly elevated to 12.2 today, likely due to steroids. UC 5/30 negative.  Prophylaxis: GI, DVT. On Bactrim and Valcyte for ppx.  Disposition: Transfer to  today.    Medical Decision Making: Medium  Subsequent visit [id number] (moderate level decision making)    ISMAEL: Anali Mott  "   TRIG 181 (H) 03/11/2021     Lab Results   Component Value Date    HDL 20 (L) 10/12/2021    HDL 27 (L) 07/13/2021    HDL 21 (L) 03/11/2021     Lab Results   Component Value Date    LDL 26 10/12/2021    LDL 82 07/13/2021    LDL 21 03/11/2021     The following portions of the patient's history were reviewed and updated as appropriate: allergies, current medications, past family history, past medical history, past social history, past surgical history and problem list.    Review of Systems   Constitutional: Positive for appetite change. Negative for activity change, chills, fatigue, fever and unexpected weight change.   HENT: Positive for ear pain, rhinorrhea, sinus pressure and sinus pain. Negative for sore throat, tinnitus and voice change. Hearing loss: Chronic and stable          Negative for loss of taste or smell    Eyes: Negative for pain, discharge, redness, itching and visual disturbance.   Respiratory: Negative for cough, shortness of breath and wheezing.    Cardiovascular: Negative for chest pain, palpitations and leg swelling.   Gastrointestinal: Negative for diarrhea, nausea and vomiting.   Endocrine: Negative for cold intolerance, heat intolerance, polydipsia, polyphagia and polyuria.   Skin: Negative for color change and rash.   Allergic/Immunologic: Positive for environmental allergies.   Neurological: Positive for headaches. Negative for dizziness, tremors, speech difficulty, weakness and light-headedness.   Hematological: Negative for adenopathy.   Psychiatric/Behavioral: Positive for sleep disturbance. Negative for confusion and decreased concentration. The patient is not nervous/anxious.    All other systems reviewed and are negative.      Vital signs:  /82 (BP Location: Right arm, Patient Position: Sitting, Cuff Size: Adult)   Pulse 97   Temp 96.6 °F (35.9 °C) (Temporal)   Resp 14   Ht 172.7 cm (68\")   Wt 105 kg (232 lb)   SpO2 95%   BMI 35.28 kg/m²     Physical Exam  Vitals and " NP x [phone]  Fellow: Anthony Clark MD x [phone]  Faculty: Mario Vallejo M.D., M.S.  Attestation:    The patient has been seen and evaluated by me.   Vital signs, labs, medications and orders were reviewed.   When obtained, diagnostic images were reviewed by me and interpreted as above.    The care plan was discussed with the multidisciplinary team and I agree with the findings and plan in this note, with any differences recorded in blue.    Immunosuppressive medication management was reviewed and adjusted as reflected in the note and orders.             Mario Vallejo MD  Department of Surgery    _________________________________________________________________  Transplant History: Admitted 5/29/2018 for kidney transplantation.  5/30/2018 (Kidney), Postoperative day: 1     Interval History:   Feeling well today, abdominal pain is well-controlled with available pain meds. Up in the chair, feeling hungry. Denies nausea.    ROS:   A 10-point review of systems was negative except as noted above.    Meds:    acetaminophen  650 mg Oral Q4H     anti-thymocyte globulin  2 mg/kg Intravenous Central line Once     atorvastatin  20 mg Oral Daily     clonazePAM  1.5 mg Oral Daily     docusate sodium  100 mg Oral BID     insulin aspart  1-7 Units Subcutaneous TID AC     insulin aspart  1-5 Units Subcutaneous At Bedtime     [START ON 6/1/2018] magnesium oxide  400 mg Oral Daily with lunch     methylPREDNISolone  250 mg Intravenous Once     metoprolol tartrate  25 mg Oral BID     mycophenolate  750 mg Oral BID IS     omeprazole  40 mg Oral BID AC     polyethylene glycol  17 g Oral Daily     senna-docusate  2 tablet Oral BID    Or     senna-docusate  2 tablet Oral BID     sodium chloride (PF)  10 mL Intracatheter Q8H     [START ON 6/1/2018] sulfamethoxazole-trimethoprim  1 tablet Oral Once per day on Mon Wed Fri     [START ON 6/1/2018] tacrolimus  0.1 mg/kg Oral QAM AC     traZODone  300 mg Oral At Bedtime     valGANciclovir  450 mg Oral Once per  day on Mon Thu       Physical Exam:     Admit Weight: 56.7 kg (125 lb 1.6 oz)  Today's weight: 126 lbs 8 oz    Vital sign ranges:    Temp:  [97.4  F (36.3  C)-99.9  F (37.7  C)] 99.9  F (37.7  C)  Heart Rate:  [55-75] 64  Resp:  [8-21] 16  BP: (114-175)/(67-93) 161/93  SpO2:  [96 %-100 %] 100 %    GENERAL: Appears alert and oriented times three. In no apparent distress.  SKIN: No jaundice. No rashes or lesions.   HEENT: Eyes symmetrical and free of discharge bilaterally.  CV: Regular rate and rhythm.   RESPIRATORY: Respirations regular, even, and unlabored.  GI: Soft and non distended. Incision is covered with a gauze dressing. LUZ present in right abdomen with serosanguinous drainage.  : Lucas in place, draining clear jones urine.  EXTREMITIES: No peripheral edema. Mild red-purple color present in right hand, no swelling.   NEUROLOGIC: Alert and orientated x 3. No focal deficits.   MUSCULOSKELETAL: No joint swelling or tenderness.     Data:   CMP  Recent Labs  Lab 05/31/18  0804 05/31/18  0550 05/31/18  0426  05/30/18  1430 05/30/18  1041 05/29/18  2350   NA  --   --  141  --  135 138 138   POTASSIUM 4.2 4.2 4.6  < > 3.5 3.7 3.5   CHLORIDE  --   --  103  --  96  --  95   CO2  --   --  30  --  28  --  35*   GLC  --   --  126*  --  333* 137* 111*   BUN  --   --  37*  --  47*  --  45*   CR  --   --  3.83*  --  6.03*  --  6.02*   GFRESTIMATED  --   --  17*  --  10*  --  10*   GFRESTBLACK  --   --  20*  --  12*  --  12*   SHIRAZ  --   --  8.1*  --  7.6*  --  9.0   ICAW  --   --   --   --   --  4.4  --    MAG  --   --  2.0  --  2.0  --   --    PHOS  --   --  3.1  --  3.3  --   --    ALBUMIN  --   --   --   --   --   --  3.7   BILITOTAL  --   --   --   --   --   --  0.5   ALKPHOS  --   --   --   --   --   --  102   AST  --   --   --   --   --   --  18   ALT  --   --   --   --   --   --  14   < > = values in this interval not displayed.  CBC  Recent Labs  Lab 05/31/18  0804 05/31/18  0426  05/30/18  9540  05/29/18  3898  nursing note reviewed.   Constitutional:       General: He is not in acute distress.     Appearance: He is well-developed.   HENT:      Head: Normocephalic.      Nose:      Right Sinus: Maxillary sinus tenderness present.      Left Sinus: Maxillary sinus tenderness present.      Mouth/Throat:      Dentition: Abnormal dentition. Dental caries present.   Eyes:      General: No scleral icterus.        Right eye: No discharge.         Left eye: No discharge.      Conjunctiva/sclera: Conjunctivae normal.   Neck:      Thyroid: No thyromegaly or thyroid tenderness.      Vascular: No JVD.   Cardiovascular:      Rate and Rhythm: Normal rate and regular rhythm.      Heart sounds: Normal heart sounds. No murmur heard.  No friction rub.   Pulmonary:      Effort: Pulmonary effort is normal. No respiratory distress.      Breath sounds: Decreased air movement present. Decreased breath sounds present. No wheezing, rhonchi or rales.   Abdominal:      General: Bowel sounds are normal. There is no distension.      Palpations: Abdomen is soft.      Tenderness: There is no abdominal tenderness. There is no guarding or rebound.   Musculoskeletal:         General: Tenderness (Generalized ) present.      Cervical back: Neck supple.      Right lower leg: No edema.      Left lower leg: No edema.   Lymphadenopathy:      Cervical: No cervical adenopathy.   Skin:     General: Skin is warm and dry.      Capillary Refill: Capillary refill takes less than 2 seconds.   Neurological:      Mental Status: He is alert and oriented to person, place, and time.      Cranial Nerves: Cranial nerves are intact.      Gait: Gait is intact.   Psychiatric:         Mood and Affect: Mood and affect normal.         Speech: Speech normal.         Behavior: Behavior is cooperative.         Thought Content: Thought content normal.         Cognition and Memory: Cognition and memory normal.       Result Review :  Results for orders placed or performed in visit on    HGB 8.4* 8.8*  < > 9.2*  < > 10.6*   WBC  --  12.2*  --  5.4  --  4.2   PLT  --  163  --  117*  --  166   A1C  --   --   --   --   --  4.5   < > = values in this interval not displayed.  COAGS  Recent Labs  Lab 05/29/18  2350   INR 1.14   PTT 30      Urinalysis  Recent Labs   Lab Test  05/30/18   0830  06/08/17   1523   COLOR  Straw  Yellow   APPEARANCE  Clear   --    URINEGLC  Negative  Negative   URINEBILI  Negative   --    URINEKETONE  Negative  Negative   SG  1.004  1.015   UBLD  Small*   --    URINEPH  5.0   --    PROTEIN  Negative   --    NITRITE  Negative  Negative   LEUKEST  Negative  Negative   RBCU  4*   --    WBCU  <1   --      Virology:  Hepatitis C Antibody   Date Value Ref Range Status   05/29/2018 Nonreactive NR^Nonreactive Final     Comment:     Assay performance characteristics have not been established for newborns,   infants, and children       BK Virus Result   Date Value Ref Range Status   04/05/2017 BK Virus DNA Not Detected BKNEG copies/mL Final      10/12/21   Comprehensive Metabolic Panel    Specimen: Arm, Right; Blood   Result Value Ref Range    Glucose 170 (H) 65 - 99 mg/dL    BUN 12 6 - 20 mg/dL    Creatinine 0.63 (L) 0.76 - 1.27 mg/dL    Sodium 139 136 - 145 mmol/L    Potassium 4.3 3.5 - 5.2 mmol/L    Chloride 102 98 - 107 mmol/L    CO2 25.2 22.0 - 29.0 mmol/L    Calcium 9.4 8.6 - 10.5 mg/dL    Total Protein 6.7 6.0 - 8.5 g/dL    Albumin 4.10 3.50 - 5.20 g/dL    ALT (SGPT) 35 1 - 41 U/L    AST (SGOT) 22 1 - 40 U/L    Alkaline Phosphatase 123 (H) 39 - 117 U/L    Total Bilirubin 0.4 0.0 - 1.2 mg/dL    eGFR Non African Amer 132 >60 mL/min/1.73    Globulin 2.6 gm/dL    A/G Ratio 1.6 g/dL    BUN/Creatinine Ratio 19.0 7.0 - 25.0    Anion Gap 11.8 5.0 - 15.0 mmol/L   Lipid Panel    Specimen: Arm, Right; Blood   Result Value Ref Range    Total Cholesterol 81 0 - 200 mg/dL    Triglycerides 225 (H) 0 - 150 mg/dL    HDL Cholesterol 20 (L) 40 - 60 mg/dL    LDL Cholesterol  26 0 - 100 mg/dL    VLDL Cholesterol 35 5 - 40 mg/dL    LDL/HDL Ratio 0.80    TSH    Specimen: Arm, Right; Blood   Result Value Ref Range    TSH 3.660 0.270 - 4.200 uIU/mL   Hemoglobin A1c    Specimen: Arm, Right; Blood   Result Value Ref Range    Hemoglobin A1C 7.90 (H) 4.80 - 5.60 %   Vitamin D 25 Hydroxy    Specimen: Arm, Right; Blood   Result Value Ref Range    25 Hydroxy, Vitamin D 22.7 (L) 30.0 - 100.0 ng/ml   Uric Acid    Specimen: Arm, Right; Blood   Result Value Ref Range    Uric Acid 4.0 3.4 - 7.0 mg/dL   Vitamin B12    Specimen: Arm, Right; Blood   Result Value Ref Range    Vitamin B-12 1,084 (H) 211 - 946 pg/mL   CBC Auto Differential    Specimen: Arm, Right; Blood   Result Value Ref Range    WBC 7.45 3.40 - 10.80 10*3/mm3    RBC 5.32 4.14 - 5.80 10*6/mm3    Hemoglobin 16.9 13.0 - 17.7 g/dL    Hematocrit 48.9 37.5 - 51.0 %    MCV 91.9 79.0 - 97.0 fL    MCH 31.8 26.6 - 33.0 pg    MCHC 34.6 31.5 - 35.7 g/dL    RDW 12.2 (L) 12.3 - 15.4 %    RDW-SD 40.6 37.0 - 54.0 fl    MPV 10.5 6.0 - 12.0  fL    Platelets 317 140 - 450 10*3/mm3    Neutrophil % 41.5 (L) 42.7 - 76.0 %    Lymphocyte % 40.7 19.6 - 45.3 %    Monocyte % 12.9 (H) 5.0 - 12.0 %    Eosinophil % 3.4 0.3 - 6.2 %    Basophil % 1.2 0.0 - 1.5 %    Immature Grans % 0.3 0.0 - 0.5 %    Neutrophils, Absolute 3.10 1.70 - 7.00 10*3/mm3    Lymphocytes, Absolute 3.03 0.70 - 3.10 10*3/mm3    Monocytes, Absolute 0.96 (H) 0.10 - 0.90 10*3/mm3    Eosinophils, Absolute 0.25 0.00 - 0.40 10*3/mm3    Basophils, Absolute 0.09 0.00 - 0.20 10*3/mm3    Immature Grans, Absolute 0.02 0.00 - 0.05 10*3/mm3    nRBC 0.0 0.0 - 0.2 /100 WBC       Patient's Body mass index is 35.28 kg/m². indicating that he is obese (BMI >30). Obesity-related health conditions include the following: hypertension, diabetes mellitus, dyslipidemias, GERD, osteoarthritis and Rheumatoid Arthritis . Obesity is Fluctuating . BMI is is above average; BMI management plan is completed. Provided information on portion control and increasing exercise..     Assessment/Plan     Diagnoses and all orders for this visit:    1. DM type 2, controlled, with complication (HCC) (Primary)  Comments:   Continue Metformin and Trulicity  Orders:  -     Dulaglutide (Trulicity) 1.5 MG/0.5ML solution pen-injector; Inject 1.5 mg under the skin into the appropriate area as directed 1 (One) Time Per Week.  Dispense: 2 mL; Refill: 3  -     glucose blood (OneTouch Ultra) test strip; 1 each by Other route 3 (Three) Times a Day. Use as instructed  Dispense: 100 each; Refill: 3  -     metFORMIN (GLUCOPHAGE) 500 MG tablet; Take 2 tablets by mouth 2 (Two) Times a Day.  Dispense: 120 tablet; Refill: 3  -     Comprehensive Metabolic Panel; Future  -     Lipid Panel; Future  -     TSH; Future  -     Hemoglobin A1c; Future  -     MicroAlbumin, Urine, Random - Urine, Clean Catch; Future    2. Benign essential hypertension  Comments:  Well controlled with Toprol XL  Orders:  -     metoprolol succinate XL (TOPROL-XL) 100 MG 24 hr tablet;  Take 1 tablet by mouth Daily.  Dispense: 30 tablet; Refill: 3  -     Comprehensive Metabolic Panel; Future  -     Lipid Panel; Future  -     TSH; Future    3. Dyslipidemia  Comments:  Continue Crestor and cardiovascular risk reduction modifications including nutrition and exercise   Orders:  -     rosuvastatin (CRESTOR) 5 MG tablet; Take 1 tablet by mouth Daily.  Dispense: 30 tablet; Refill: 3  -     Comprehensive Metabolic Panel; Future  -     Lipid Panel; Future  -     TSH; Future    4. Rheumatoid arthritis involving multiple sites with positive rheumatoid factor (HCC)  Comments:  Continue methotrexate and folic acid. Pending appointment with Rheumatologist   Orders:  -     methotrexate 2.5 MG tablet; Take 10 tablets by mouth 1 (One) Time Per Week.  Dispense: 40 tablet; Refill: 3  -     folic acid (FOLVITE) 1 MG tablet; Take 1 tablet by mouth Daily.  Dispense: 30 tablet; Refill: 3  -     CBC & Differential; Future    5. Gastroesophageal reflux disease without esophagitis  Comments:  Stable with Prevacid  Orders:  -     lansoprazole (PREVACID) 30 MG capsule; Take 1 capsule by mouth Daily.  Dispense: 30 capsule; Refill: 3  -     CBC & Differential; Future    6. Chronic insomnia  Comments:  Continue Elavil 150 mg and take 3 hours prior to bedtime  Orders:  -     amitriptyline (ELAVIL) 150 MG tablet; Take 1 tablet by mouth every night at bedtime.  Dispense: 30 tablet; Refill: 3    7. Chronic allergic rhinitis  Comments:  Continue Singulair and trial of Xygal   Orders:  -     montelukast (SINGULAIR) 10 MG tablet; Take 1 tablet by mouth every night at bedtime.  Dispense: 30 tablet; Refill: 3  -     levocetirizine (Xyzal) 5 MG tablet; Take 1 tablet by mouth Every Evening.  Dispense: 30 tablet; Refill: 3  -     CBC & Differential; Future    8. Restrictive lung disease  Comments:  Stable.  Orders:  -     CBC & Differential; Future    9. Substernal thyroid goiter  Comments:  Referral to Endocrinology for  evaluation/treatment  Orders:  -     Ambulatory Referral to Endocrinology    10. B12 deficiency  Comments:  Continue B12 supplementation  Orders:  -     vitamin B-12 (CYANOCOBALAMIN) 1000 MCG tablet; Take 1 tablet by mouth Daily.  Dispense: 30 tablet; Refill: 3  -     Vitamin D 25 Hydroxy; Future    11. Vitamin D deficiency  Comments:  Level ordered  Orders:  -     Vitamin B12; Future    12. Encounter for immunization  Comments:  Seasonal Influenza vaccination  Orders:  -     FluLaval/Fluarix/Fluzone >6 Months ([id number])    13. Maxillary sinusitis, unspecified chronicity  Comments:  Counseled regarding supportive care measures.   Orders:  -     amoxicillin-clavulanate (Augmentin) 875-125 MG per tablet; Take 1 tablet by mouth 2 (Two) Times a Day.  Dispense: 14 tablet; Refill: 0    Other orders  -     albuterol sulfate HFA (Ventolin HFA) 108 (90 Base) MCG/ACT inhaler; Inhale 2 puffs Every 4 (Four) Hours As Needed for Shortness of Air.  Dispense: 18 g; Refill: 3    I spent 40 minutes caring for Raimundo on this date of service. This time includes time spent by me in the following activities:preparing for the visit, reviewing tests, obtaining and/or reviewing a separately obtained history, performing a medically appropriate examination and/or evaluation , counseling and educating the patient/family/caregiver, ordering medications, tests, or procedures, referring and communicating with other health care professionals , documenting information in the medical record, independently interpreting results and communicating that information with the patient/family/caregiver and care coordination    Follow Up In February   Findings and recommendations discussed with Raimundo.  Reviewed test results. Lifestyle modifications reinforced including nutrition and activity recommendations. He will follow up in February sooner if problems/concerns occur.  Raimundo was given instructions and counseling regarding his condition or for health  maintenance advice. Please see specific information pulled into the AVS if appropriate      This document has been electronically signed by:

## 2021-11-02 DIAGNOSIS — E11.8 DM TYPE 2, CONTROLLED, WITH COMPLICATION (HCC): Chronic | ICD-10-CM

## 2021-11-02 DIAGNOSIS — M05.79 RHEUMATOID ARTHRITIS INVOLVING MULTIPLE SITES WITH POSITIVE RHEUMATOID FACTOR (HCC): Chronic | ICD-10-CM

## 2021-11-02 RX ORDER — BLOOD SUGAR DIAGNOSTIC
STRIP MISCELLANEOUS
Qty: 100 EACH | Refills: 3 | Status: SHIPPED | OUTPATIENT
Start: 2021-11-02 | End: 2022-02-24 | Stop reason: SDUPTHER

## 2021-11-30 DIAGNOSIS — I10 BENIGN ESSENTIAL HYPERTENSION: Chronic | ICD-10-CM

## 2021-11-30 DIAGNOSIS — J30.9 CHRONIC ALLERGIC RHINITIS: Chronic | ICD-10-CM

## 2021-11-30 DIAGNOSIS — M05.79 RHEUMATOID ARTHRITIS INVOLVING MULTIPLE SITES WITH POSITIVE RHEUMATOID FACTOR (HCC): Chronic | ICD-10-CM

## 2021-11-30 RX ORDER — METOPROLOL SUCCINATE 100 MG/1
100 TABLET, EXTENDED RELEASE ORAL DAILY
Qty: 30 TABLET | Refills: 4 | Status: SHIPPED | OUTPATIENT
Start: 2021-11-30 | End: 2022-02-24 | Stop reason: SDUPTHER

## 2021-12-01 RX ORDER — METHOTREXATE 2.5 MG/1
TABLET ORAL
Qty: 40 TABLET | Refills: 4 | Status: SHIPPED | OUTPATIENT
Start: 2021-12-01 | End: 2022-02-24 | Stop reason: SDUPTHER

## 2021-12-01 RX ORDER — MONTELUKAST SODIUM 10 MG/1
TABLET ORAL
Qty: 30 TABLET | Refills: 4 | Status: SHIPPED | OUTPATIENT
Start: 2021-12-01 | End: 2022-02-24 | Stop reason: SDUPTHER

## 2021-12-01 RX ORDER — LORATADINE 10 MG/1
TABLET ORAL
Qty: 30 TABLET | Refills: 0 | OUTPATIENT
Start: 2021-12-01

## 2021-12-09 ENCOUNTER — OFFICE VISIT (OUTPATIENT)
Dept: ENDOCRINOLOGY | Facility: CLINIC | Age: 56
End: 2021-12-09

## 2021-12-09 VITALS
OXYGEN SATURATION: 95 % | RESPIRATION RATE: 22 BRPM | SYSTOLIC BLOOD PRESSURE: 130 MMHG | DIASTOLIC BLOOD PRESSURE: 87 MMHG | BODY MASS INDEX: 34.56 KG/M2 | HEIGHT: 68 IN | HEART RATE: 95 BPM | TEMPERATURE: 97.8 F | WEIGHT: 228 LBS

## 2021-12-09 DIAGNOSIS — E04.9 SUBSTERNAL THYROID GOITER: Primary | ICD-10-CM

## 2021-12-09 PROCEDURE — 99213 OFFICE O/P EST LOW 20 MIN: CPT | Performed by: NURSE PRACTITIONER

## 2021-12-09 RX ORDER — LORATADINE 10 MG/1
10 TABLET ORAL DAILY
COMMUNITY
Start: 2021-11-30 | End: 2021-12-28

## 2021-12-09 NOTE — ASSESSMENT & PLAN NOTE
-Clinically euthyroid.  -Will order a thyroid US.  -Will reassess once resulted and determine further plan of action.

## 2021-12-09 NOTE — PROGRESS NOTES
Chief Complaint   Patient presents with   • Thyroid Problem     unsure of type        Referring Provider  Aden Carl A* HPI   Raimundo Munguia is a 56 y.o. male had concerns including Thyroid Problem (unsure of type).   He is seen as a new patient today.  He had a CT Chest with note of a thyroid goiter with substernal extension in 03/2021.  Patient states that prior to this he was not aware of any prior thyroid issues.  His most recent TFT's were within normal range.  He states he has a hard time swallowing sometimes.  He feels like he has some mild compressive symptoms when he is trying to swallow something. Sometimes he has symptoms at night that wake him up, but that he also wears oxygen.  Has never been told that his thyroid was enlarged that he recalls.    His mother had graves disease with thyroidectomy in the past.    Birth state:[place]  Previous history of radiation to face/neck:none  Consuming foods high in iodine: none  Family history of thyroid complications: mom-graves diease, none other, no hx of thyroid CA in family    The following portions of the patient's history were reviewed and updated as appropriate: allergies, current medications, past family history, past medical history, past social history, past surgical history and problem list.    Past Medical History:   Diagnosis Date   • Cervicalgia    • Helicobacter pylori (H. 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   • Vitamin D deficiency      Past Surgical History:   Procedure Laterality Date   • ABDOMINAL SURGERY     • COLON RESECTION N/A 2/22/2018    Procedure: COLON RESECTION SIGMOID;  Surgeon: Ej Tinoco MD;  [facility];  Service:    • COLONOSCOPY N/A 2/7/2018    Procedure: COLONOSCOPY CPT CODE: 49703;  Surgeon: Teodoro Garg III, MD;  [facility];  Service:    • COLONOSCOPY N/A 3/7/2019    Procedure: COLONOSCOPY FOR SCREENING CPT CODE:  ;  Surgeon: Ej Tinoco MD;  Location: [facility] OR;  Service: Gastroenterology   • ENDOSCOPY N/A 2/7/2018    Procedure: ESOPHAGOGASTRODUODENOSCOPY WITH DILATATION CPT CODE: 12446;  Surgeon: Teodoro Garg III, MD;  Location: [facility] OR;  Service:       Family History   Problem Relation Age of Onset   • Diabetes Mother    • Hypertension Mother    • Hypertension Father    • Hyperlipidemia Father    • Diabetes Sister    • Hyperlipidemia Sister    • Hypertension Sister    • Hypertension Brother    • Hyperlipidemia Brother       Social History     Socioeconomic History   • Marital status: Legally      Spouse name: tess   • Number of children: 3   • Years of education: 9   Tobacco Use   • Smoking status: Never Smoker   • Smokeless tobacco: Current User     Types: Snuff   • Tobacco comment: Encouraged tobacco cessation   Vaping Use   • Vaping Use: Never used   Substance and Sexual Activity   • Alcohol use: No   • Drug use: No   • Sexual activity: Yes     Partners: Female      No Known Allergies   Current Outpatient Medications on File Prior to Visit   Medication Sig Dispense Refill   • Blood Glucose Monitoring Suppl kit 1 Device Daily. 1 each 0   • Blood Pressure Monitoring kit 1 Device Daily. 1 kit 0   • cyclobenzaprine (FLEXERIL) 10 MG tablet TAKE ONE TABLET BY MOUTH TWO TIMES A DAY AS NEEDED FOR MUSCLE SPASMS 60 tablet 4   • Diclofenac Sodium (VOLTAREN) 1 % gel gel APPLY 4GMS TO AFFECTED AREA 3 TIMES DAILY AS NEEDED FOR JOINT PAIN 100 g 3   • Dulaglutide (Trulicity) 1.5 MG/0.5ML solution pen-injector Inject 1.5 mg under the skin into the appropriate area as directed 1 (One) Time Per Week. 2 mL 3   • fluticasone (FLONASE) 50 MCG/ACT nasal spray USE 1 SPRAY IN EACH NOSTRIL 2 TIMES DAILY AS DIRECTED 16 g 4   • folic acid (FOLVITE) 1 MG tablet Take 1 tablet by mouth Daily. 30 tablet 3   • Lancets (OneTouch Delica Plus [id number]) misc USE TO CHECK BLOOD SUGAR THREE TIMES A  each 4   •  "lansoprazole (PREVACID) 30 MG capsule Take 1 capsule by mouth Daily. 30 capsule 3   • loratadine (CLARITIN) 10 MG tablet Take 10 mg by mouth Daily.     • metFORMIN (GLUCOPHAGE) 500 MG tablet Take 2 tablets by mouth 2 (Two) Times a Day. 120 tablet 3   • methotrexate 2.5 MG tablet TAKE 10 TABLETS BY MOUTH ONCE WEEKLY 40 tablet 4   • metoprolol succinate XL (TOPROL-XL) 100 MG 24 hr tablet TAKE 1 TABLET BY MOUTH DAILY. 30 tablet 4   • montelukast (SINGULAIR) 10 MG tablet TAKE ONE TABLET BY MOUTH EVERY NIGHT 30 tablet 4   • OneTouch Ultra test strip USE TO CHECK BLOOD SUGAR THREE TIMES A  each 3   • ProAir  (90 Base) MCG/ACT inhaler INHALE 2 PUFFS BY MOUTH EVERY 4 HOURS AS NEEDED FOR SHORTNESS OF AIR 8.5 g 4   • rosuvastatin (CRESTOR) 5 MG tablet Take 1 tablet by mouth Daily. 30 tablet 3   • vitamin B-12 (CYANOCOBALAMIN) 1000 MCG tablet Take 1 tablet by mouth Daily. 30 tablet 3   • [DISCONTINUED] levocetirizine (Xyzal) 5 MG tablet Take 1 tablet by mouth Every Evening. 30 tablet 3   • amitriptyline (ELAVIL) 150 MG tablet Take 1 tablet by mouth every night at bedtime. 30 tablet 3   • amoxicillin-clavulanate (Augmentin) 875-125 MG per tablet Take 1 tablet by mouth 2 (Two) Times a Day. 14 tablet 0     No current facility-administered medications on file prior to visit.     Review of Systems   HENT: Positive for trouble swallowing.    All other systems reviewed and are negative.     I have reviewed and confirmed the accuracy of the ROS as documented by the MA/LPN/RN ABDIRAHMAN Horan    /87 (BP Location: Left arm, Patient Position: Sitting, Cuff Size: Adult)   Pulse 95   Temp 97.8 °F (36.6 °C) (Temporal)   Resp 22   Ht 172.7 cm (68\")   Wt 103 kg (228 lb)   SpO2 95%   BMI 34.67 kg/m²      Physical Exam  Vitals reviewed.   Constitutional:       Appearance: Normal appearance.   Eyes:      Extraocular Movements: Extraocular movements intact.   Neck:      Thyroid: Thyromegaly present. No thyroid " mass or thyroid tenderness.   Cardiovascular:      Rate and Rhythm: Normal rate and regular rhythm.      Pulses: Normal pulses.      Heart sounds: Normal heart sounds.   Pulmonary:      Effort: Pulmonary effort is normal.      Breath sounds: Normal breath sounds.   Skin:     General: Skin is warm.   Neurological:      Mental Status: He is alert and oriented to person, place, and time.   Psychiatric:         Mood and Affect: Mood normal.         Behavior: Behavior normal.         Thought Content: Thought content normal.         Judgment: Judgment normal.       Labs/Imaging  CMP  Lab Results   Component Value Date    GLUCOSE 170 (H) 10/12/2021    BUN 12 10/12/2021    CREATININE 0.63 (L) 10/12/2021    EGFRIFNONA 132 10/12/2021    EGFRIFAFRI  09/13/2016      Comment:      <15 Indicative of kidney failure.    BCR 19.0 10/12/2021    K 4.3 10/12/2021    CO2 25.2 10/12/2021    CALCIUM 9.4 10/12/2021    ALBUMIN 4.10 10/12/2021    LABIL2 1.2 (L) 05/17/2016    AST 22 10/12/2021    ALT 35 10/12/2021        CBC w/DIFF   Lab Results   Component Value Date    WBC 7.45 10/12/2021    RBC 5.32 10/12/2021    HGB 16.9 10/12/2021    HCT 48.9 10/12/2021    MCV 91.9 10/12/2021    MCH 31.8 10/12/2021    MCHC 34.6 10/12/2021    RDW 12.2 (L) 10/12/2021    RDWSD 40.6 10/12/2021    MPV 10.5 10/12/2021     10/12/2021    NEUTRORELPCT 41.5 (L) 10/12/2021    LYMPHORELPCT 40.7 10/12/2021    MONORELPCT 12.9 (H) 10/12/2021    EOSRELPCT 3.4 10/12/2021    BASORELPCT 1.2 10/12/2021    AUTOIGPER 0.3 10/12/2021    NEUTROABS 3.10 10/12/2021    LYMPHSABS 3.03 10/12/2021    MONOSABS 0.96 (H) 10/12/2021    EOSABS 0.25 10/12/2021    BASOSABS 0.09 10/12/2021    AUTOIGNUM 0.02 10/12/2021    NRBC 0.0 10/12/2021       TSH  Lab Results   Component Value Date    TSH 3.660 10/12/2021    TSH 5.840 (H) 07/13/2021    TSH 4.080 03/11/2021       T4  No results found for: FREET4  Lab Results   Component Value Date    A3HTNRW 8.7 04/27/2015       T3  No results found  for: T3FREE  No results found for: H8IFRTP    TRAb  No results found for: TSURCPAB    TPO  No results found for: THYROIDAB    No valid procedures specified.    Assessment and Plan    Diagnoses and all orders for this visit:    1. Substernal thyroid goiter (Primary)  Assessment & Plan:  -Clinically euthyroid.  -Will order a thyroid US.  -Will reassess once resulted and determine further plan of action.    Orders:  -     US Thyroid       Return in about 3 months (around 3/9/2022) for Follow-up appointment. The patient was instructed to contact the clinic with any interval questions or concerns.    ABDIRAHMAN Horan   Endocrinology

## 2021-12-28 DIAGNOSIS — K21.9 GASTROESOPHAGEAL REFLUX DISEASE WITHOUT ESOPHAGITIS: Chronic | ICD-10-CM

## 2021-12-28 RX ORDER — LANSOPRAZOLE 30 MG/1
CAPSULE, DELAYED RELEASE ORAL
Qty: 30 CAPSULE | Refills: 1 | Status: SHIPPED | OUTPATIENT
Start: 2021-12-28 | End: 2022-02-24 | Stop reason: SDUPTHER

## 2021-12-28 RX ORDER — LORATADINE 10 MG/1
TABLET ORAL
Qty: 30 TABLET | Refills: 1 | Status: SHIPPED | OUTPATIENT
Start: 2021-12-28 | End: 2022-02-24 | Stop reason: SDUPTHER

## 2022-01-05 ENCOUNTER — APPOINTMENT (OUTPATIENT)
Dept: ULTRASOUND IMAGING | Facility: HOSPITAL | Age: 57
End: 2022-01-05

## 2022-01-10 ENCOUNTER — HOSPITAL ENCOUNTER (OUTPATIENT)
Dept: ULTRASOUND IMAGING | Facility: HOSPITAL | Age: 57
Discharge: HOME | End: 2022-01-10
Admitting: NURSE PRACTITIONER

## 2022-01-10 PROCEDURE — 76536 US EXAM OF HEAD AND NECK: CPT | Performed by: RADIOLOGY

## 2022-01-10 PROCEDURE — 76536 US EXAM OF HEAD AND NECK: CPT

## 2022-01-11 ENCOUNTER — TELEPHONE (OUTPATIENT)
Dept: ENDOCRINOLOGY | Facility: CLINIC | Age: 57
End: 2022-01-11

## 2022-01-11 NOTE — TELEPHONE ENCOUNTER
I called patient and sister, vidal and he did not have an alternative number to reach him other than [phone]. Did not get ahold of patient after multiple calls.

## 2022-01-12 ENCOUNTER — TELEPHONE (OUTPATIENT)
Dept: ENDOCRINOLOGY | Facility: CLINIC | Age: 57
End: 2022-01-12

## 2022-01-12 DIAGNOSIS — E04.9 SUBSTERNAL THYROID GOITER: Primary | ICD-10-CM

## 2022-02-24 ENCOUNTER — OFFICE VISIT (OUTPATIENT)
Dept: FAMILY MEDICINE CLINIC | Facility: CLINIC | Age: 57
End: 2022-02-24

## 2022-02-24 VITALS
RESPIRATION RATE: 14 BRPM | OXYGEN SATURATION: 98 % | HEART RATE: 92 BPM | DIASTOLIC BLOOD PRESSURE: 80 MMHG | HEIGHT: 68 IN | BODY MASS INDEX: 34.4 KG/M2 | WEIGHT: 227 LBS | SYSTOLIC BLOOD PRESSURE: 120 MMHG | TEMPERATURE: 97.5 F

## 2022-02-24 DIAGNOSIS — E11.65 UNCONTROLLED TYPE 2 DIABETES MELLITUS WITH HYPERGLYCEMIA: Primary | Chronic | ICD-10-CM

## 2022-02-24 DIAGNOSIS — E04.9 SUBSTERNAL THYROID GOITER: Chronic | ICD-10-CM

## 2022-02-24 DIAGNOSIS — E53.8 B12 DEFICIENCY: ICD-10-CM

## 2022-02-24 DIAGNOSIS — J30.9 CHRONIC ALLERGIC RHINITIS: Chronic | ICD-10-CM

## 2022-02-24 DIAGNOSIS — I10 BENIGN ESSENTIAL HYPERTENSION: Chronic | ICD-10-CM

## 2022-02-24 DIAGNOSIS — K21.9 GASTROESOPHAGEAL REFLUX DISEASE WITHOUT ESOPHAGITIS: Chronic | ICD-10-CM

## 2022-02-24 DIAGNOSIS — M05.79 RHEUMATOID ARTHRITIS INVOLVING MULTIPLE SITES WITH POSITIVE RHEUMATOID FACTOR: Chronic | ICD-10-CM

## 2022-02-24 DIAGNOSIS — E78.5 DYSLIPIDEMIA: Chronic | ICD-10-CM

## 2022-02-24 DIAGNOSIS — J98.4 RESTRICTIVE LUNG DISEASE: Chronic | ICD-10-CM

## 2022-02-24 DIAGNOSIS — E55.9 VITAMIN D DEFICIENCY: ICD-10-CM

## 2022-02-24 PROCEDURE — 83036 HEMOGLOBIN GLYCOSYLATED A1C: CPT | Performed by: NURSE PRACTITIONER

## 2022-02-24 PROCEDURE — 80061 LIPID PANEL: CPT | Performed by: NURSE PRACTITIONER

## 2022-02-24 PROCEDURE — 99215 OFFICE O/P EST HI 40 MIN: CPT | Performed by: NURSE PRACTITIONER

## 2022-02-24 PROCEDURE — 82607 VITAMIN B-12: CPT | Performed by: NURSE PRACTITIONER

## 2022-02-24 PROCEDURE — 82306 VITAMIN D 25 HYDROXY: CPT | Performed by: NURSE PRACTITIONER

## 2022-02-24 PROCEDURE — 80050 GENERAL HEALTH PANEL: CPT | Performed by: NURSE PRACTITIONER

## 2022-02-24 RX ORDER — ROSUVASTATIN CALCIUM 5 MG/1
5 TABLET, COATED ORAL DAILY
Qty: 30 TABLET | Refills: 4 | Status: SHIPPED | OUTPATIENT
Start: 2022-02-24 | End: 2022-10-02 | Stop reason: SDUPTHER

## 2022-02-24 RX ORDER — FLUTICASONE PROPIONATE 50 MCG
1 SPRAY, SUSPENSION (ML) NASAL 2 TIMES DAILY PRN
Qty: 16 G | Refills: 4 | Status: SHIPPED | OUTPATIENT
Start: 2022-02-24 | End: 2022-08-22

## 2022-02-24 RX ORDER — DULAGLUTIDE 1.5 MG/.5ML
1.5 INJECTION, SOLUTION SUBCUTANEOUS WEEKLY
Qty: 2 ML | Refills: 4 | Status: SHIPPED | OUTPATIENT
Start: 2022-02-24 | End: 2022-03-08

## 2022-02-24 RX ORDER — LORATADINE 10 MG/1
10 TABLET ORAL DAILY
Qty: 30 TABLET | Refills: 4 | Status: SHIPPED | OUTPATIENT
Start: 2022-02-24 | End: 2022-08-22

## 2022-02-24 RX ORDER — METOPROLOL SUCCINATE 100 MG/1
100 TABLET, EXTENDED RELEASE ORAL DAILY
Qty: 30 TABLET | Refills: 4 | Status: SHIPPED | OUTPATIENT
Start: 2022-02-24 | End: 2022-09-19

## 2022-02-24 RX ORDER — MONTELUKAST SODIUM 10 MG/1
10 TABLET ORAL
Qty: 30 TABLET | Refills: 4 | Status: SHIPPED | OUTPATIENT
Start: 2022-02-24 | End: 2022-09-19

## 2022-02-24 RX ORDER — BLOOD SUGAR DIAGNOSTIC
1 STRIP MISCELLANEOUS DAILY
Qty: 100 EACH | Refills: 3 | Status: SHIPPED | OUTPATIENT
Start: 2022-02-24 | End: 2022-04-13

## 2022-02-24 RX ORDER — LANCETS 33 GAUGE
1 EACH MISCELLANEOUS DAILY
Qty: 100 EACH | Refills: 3 | Status: SHIPPED | OUTPATIENT
Start: 2022-02-24

## 2022-02-24 RX ORDER — LANOLIN ALCOHOL/MO/W.PET/CERES
1000 CREAM (GRAM) TOPICAL DAILY
Qty: 30 TABLET | Refills: 4 | Status: SHIPPED | OUTPATIENT
Start: 2022-02-24 | End: 2022-08-22

## 2022-02-24 RX ORDER — METHOTREXATE 2.5 MG/1
25 TABLET ORAL WEEKLY
Qty: 40 TABLET | Refills: 4 | Status: SHIPPED | OUTPATIENT
Start: 2022-02-24 | End: 2022-09-19

## 2022-02-24 RX ORDER — LANSOPRAZOLE 30 MG/1
30 CAPSULE, DELAYED RELEASE ORAL DAILY
Qty: 30 CAPSULE | Refills: 4 | Status: SHIPPED | OUTPATIENT
Start: 2022-02-24 | End: 2022-10-02 | Stop reason: SDUPTHER

## 2022-02-24 RX ORDER — FOLIC ACID 1 MG/1
1000 TABLET ORAL DAILY
Qty: 30 TABLET | Refills: 4 | Status: SHIPPED | OUTPATIENT
Start: 2022-02-24 | End: 2022-10-02 | Stop reason: SDUPTHER

## 2022-02-24 NOTE — PROGRESS NOTES
History of Present Illness  Raimundo Munguia is a 57 yo male who presents for follow up and recheck pertaining to his DM, type 2, HTN, GERD, Rheumatoid Arthritis and Dyslipidemia. In addition, Raimundo has Insomnia and Vitamin deficiencies. Raimundo is being evaluated by Endocrinology for his Thyroid goiter with substernal extension. He shares he has been referred to a [place] Surgeon who is planning to do Thyroid Surgery soon. He was to have labs prior to his January appointment but due to weather and his appointments in [place] he did not have them done.     Diabetes   He has type 2 diabetes mellitus. His disease course has been improving. He does share he has noticed some intermittent neuropathy in his left lower extremity.  Risk factors for coronary artery disease include dyslipidemia, hypertension, male sex, sedentary lifestyle and tobacco exposure. Current diabetic treatment includes Metformin and Trulicity.  He has had a previous visit with a dietitian. An ACE inhibitor/angiotensin II receptor blocker is not being taken due to S/e.     Lab Results   Component Value Date    HGBA1C 7.90 (H) 10/12/2021     Hypertension   The current episode started more than 1 year ago.His BP is adequately controlled. His hypertensive medication is Toprol  mg which has improved his blood pressure and fast heart rate.     Lab Results   Component Value Date    CREATININE 0.63 (L) 10/12/2021     Dyslipidemia   This is a chronic problem. The current episode started more than 1 year ago. Factors aggravating his hyperlipidemia include fatty foods. Current antihyperlipidemic treatment includes statins.  Compliance problems include adherence to diet and adherence to exercise.  Risk factors for coronary artery disease include diabetes mellitus, dyslipidemia, hypertension, male sex and obesity.   Lab Results   Component Value Date    CHOL 81 10/12/2021    CHOL 145 07/13/2021    CHOL 72 03/11/2021     Lab Results   Component Value  "Date    TRIG 225 (H) 10/12/2021    TRIG 214 (H) 07/13/2021    TRIG 181 (H) 03/11/2021     Lab Results   Component Value Date    HDL 20 (L) 10/12/2021    HDL 27 (L) 07/13/2021    HDL 21 (L) 03/11/2021     Lab Results   Component Value Date    LDL 26 10/12/2021    LDL 82 07/13/2021    LDL 21 03/11/2021     The following portions of the patient's history were reviewed and updated as appropriate: allergies, current medications, past family history, past medical history, past social history, past surgical history and problem list.    Review of Systems   Constitutional: Positive for appetite change. Negative for activity change, chills, fatigue, fever and unexpected weight change.   Eyes: Negative for visual disturbance.   Respiratory: Negative for cough, shortness of breath and wheezing.    Cardiovascular: Negative for chest pain, palpitations and leg swelling.   Gastrointestinal: Negative for abdominal pain, constipation, diarrhea, nausea and vomiting.   Endocrine: Negative for cold intolerance, heat intolerance, polydipsia, polyphagia and polyuria.   Musculoskeletal: Positive for arthralgias and back pain.   Skin: Negative for color change and rash.   Allergic/Immunologic: Positive for environmental allergies and immunocompromised state.   Neurological: Positive for headaches. Negative for dizziness, tremors, speech difficulty, weakness and light-headedness.   Hematological: Negative for adenopathy.   Psychiatric/Behavioral: Negative for confusion and decreased concentration. The patient is not nervous/anxious.    All other systems reviewed and are negative.    Vital signs:  /80 (BP Location: Left arm, Patient Position: Sitting, Cuff Size: Adult)   Pulse 92   Temp 97.5 °F (36.4 °C) (Temporal)   Resp 14   Ht 172.7 cm (68\")   Wt 103 kg (227 lb)   SpO2 98%   BMI 34.52 kg/m²     Physical Exam  Vitals and nursing note reviewed.   Constitutional:       General: He is not in acute distress.     Appearance: He is " well-developed.      Interventions: Face mask in place.      Comments: Pleasant; Painful slow movements   HENT:      Head: Normocephalic.      Nose: Nose normal.   Eyes:      General: No scleral icterus.        Right eye: No discharge.         Left eye: No discharge.      Conjunctiva/sclera: Conjunctivae normal.   Neck:      Thyroid: Thyromegaly present.      Vascular: No JVD.   Cardiovascular:      Rate and Rhythm: Normal rate and regular rhythm.      Heart sounds: Normal heart sounds. No murmur heard.  No friction rub.   Pulmonary:      Effort: Pulmonary effort is normal. No respiratory distress.      Breath sounds: Decreased air movement present. Decreased breath sounds present. No wheezing or rales.   Abdominal:      General: Bowel sounds are normal. There is no distension.      Palpations: Abdomen is soft.      Tenderness: There is no abdominal tenderness. There is no guarding or rebound.   Musculoskeletal:         General: Deformity (Multiple Rheumatoid nodules bilateral upper extremities) present. No tenderness.      Cervical back: Neck supple.      Right lower leg: No edema.      Left lower leg: No edema.   Lymphadenopathy:      Cervical: No cervical adenopathy.   Skin:     General: Skin is warm and dry.      Capillary Refill: Capillary refill takes less than 2 seconds.      Findings: No erythema or rash.   Neurological:      Mental Status: He is alert and oriented to person, place, and time.      Cranial Nerves: Cranial nerves are intact.      Gait: Gait abnormal.   Psychiatric:         Mood and Affect: Mood normal.         Speech: Speech normal.         Behavior: Behavior normal.         Thought Content: Thought content normal.         Cognition and Memory: Cognition normal.         Judgment: Judgment normal.       Patient's Body mass index is 34.52 kg/m². indicating that he is obese (BMI >30). Obesity-related health conditions include the following: hypertension, diabetes mellitus, dyslipidemias, GERD and  osteoarthritis. Obesity is improving with lifestyle modifications. BMI  is above average; BMI management plan is completed. Provided him information on portion control and increasing exercise..     Assessment/Plan     Diagnoses and all orders for this visit:    1. Uncontrolled type 2 diabetes mellitus with hyperglycemia (HCC) (Primary)  Comments:   Continue Metformin and Trulicity  Orders:  -     Dulaglutide (Trulicity) 1.5 MG/0.5ML solution pen-injector; Inject 1.5 mg under the skin into the appropriate area as directed 1 (One) Time Per Week.  Dispense: 2 mL; Refill: 4  -     Lancets (OneTouch Delica Plus [id number]) misc; 1 each by Other route Daily.  Dispense: 100 each; Refill: 3  -     glucose blood (OneTouch Ultra) test strip; 1 each by Other route Daily.  Dispense: 100 each; Refill: 3  -     Comprehensive Metabolic Panel  -     Lipid Panel  -     TSH  -     Hemoglobin A1c    2. Benign essential hypertension  Comments:  Well controlled with Toprol XL  Orders:  -     metoprolol succinate XL (TOPROL-XL) 100 MG 24 hr tablet; Take 1 tablet by mouth Daily.  Dispense: 30 tablet; Refill: 4  -     Comprehensive Metabolic Panel  -     Lipid Panel  -     TSH    3. Dyslipidemia  Comments:  Continue Crestor and cardiovascular risk reduction modifications including nutrition and exercise   Orders:  -     rosuvastatin (CRESTOR) 5 MG tablet; Take 1 tablet by mouth Daily.  Dispense: 30 tablet; Refill: 4  -     Comprehensive Metabolic Panel  -     Lipid Panel  -     TSH    4. Rheumatoid arthritis involving multiple sites with positive rheumatoid factor (HCC)  Comments:  Continue to be as active as possible. Have previously referred to Rheumatologist without any change in medication   Orders:  -     Diclofenac Sodium (VOLTAREN) 1 % gel gel; Apply  topically to the appropriate area as directed 3 (Three) Times a Day.  Dispense: 100 g; Refill: 4  -     folic acid (FOLVITE) 1 MG tablet; Take 1 tablet by mouth Daily.  Dispense: 30  tablet; Refill: 4  -     methotrexate 2.5 MG tablet; Take 10 tablets by mouth 1 (One) Time Per Week.  Dispense: 40 tablet; Refill: 4  -     CBC & Differential    5. Restrictive lung disease  Comments:  Using Albuterol daily.   Orders:  -     ProAir  (90 Base) MCG/ACT inhaler; Inhale 2 puffs Every 4 (Four) Hours As Needed for Shortness of Air.  Dispense: 8.5 g; Refill: 4  -     CBC & Differential    6. Gastroesophageal reflux disease without esophagitis  Comments:  Stable with Prevacid  Orders:  -     lansoprazole (PREVACID) 30 MG capsule; Take 1 capsule by mouth Daily.  Dispense: 30 capsule; Refill: 4  -     CBC & Differential    7. Chronic allergic rhinitis  Comments:  Continue Singulair and Flonase  Orders:  -     fluticasone (FLONASE) 50 MCG/ACT nasal spray; 1 spray into the nostril(s) as directed by provider 2 (Two) Times a Day As Needed for Rhinitis or Allergies.  Dispense: 16 g; Refill: 4  -     loratadine (CLARITIN) 10 MG tablet; Take 1 tablet by mouth Daily.  Dispense: 30 tablet; Refill: 4  -     montelukast (SINGULAIR) 10 MG tablet; Take 1 tablet by mouth every night at bedtime.  Dispense: 30 tablet; Refill: 4    8. Substernal thyroid goiter  Comments:  Referred to Endocrinology whose note I reviewed. Has been referred to Surgeon for surgery    9. B12 deficiency  Comments:  Continue B12 supplementation  Orders:  -     vitamin B-12 (CYANOCOBALAMIN) 1000 MCG tablet; Take 1 tablet by mouth Daily.  Dispense: 30 tablet; Refill: 4  -     Vitamin D 25 Hydroxy    10. Vitamin D deficiency  Comments:  Level ordered  Orders:  -     Vitamin B12    I spent 40 minutes caring for Raimundo on this date of service. This time includes time spent by me in the following activities:preparing for the visit, reviewing tests, obtaining and/or reviewing a separately obtained history, performing a medically appropriate examination and/or evaluation , counseling and educating the patient/family/caregiver, ordering medications,  tests, or procedures and independently interpreting results and communicating that information with the patient/family/caregiver    Follow Up In March to review labs which he will have today for medication changes   Findings and recommendations discussed with Raimundo. Labs completed today and will f/u in March to review the results and make medication changes.  Lifestyle modifications reinforced including nutrition and activity recommendations.   Raimundo was given instructions and counseling regarding his condition or for health maintenance advice. Please see specific information pulled into the AVS if appropriate    This document has been electronically signed by:

## 2022-02-25 LAB
25(OH)D3 SERPL-MCNC: 19.6 NG/ML (ref 30–100)
ALBUMIN SERPL-MCNC: 4.3 G/DL (ref 3.5–5.2)
ALBUMIN/GLOB SERPL: 1.5 G/DL
ALP SERPL-CCNC: 189 U/L (ref 39–117)
ALT SERPL W P-5'-P-CCNC: 47 U/L (ref 1–41)
ANION GAP SERPL CALCULATED.3IONS-SCNC: 13 MMOL/L (ref 5–15)
AST SERPL-CCNC: 28 U/L (ref 1–40)
BASOPHILS # BLD AUTO: 0.15 10*3/MM3 (ref 0–0.2)
BASOPHILS NFR BLD AUTO: 1.5 % (ref 0–1.5)
BILIRUB SERPL-MCNC: 0.2 MG/DL (ref 0–1.2)
BUN SERPL-MCNC: 12 MG/DL (ref 6–20)
BUN/CREAT SERPL: 15.8 (ref 7–25)
CALCIUM SPEC-SCNC: 9.1 MG/DL (ref 8.6–10.5)
CHLORIDE SERPL-SCNC: 101 MMOL/L (ref 98–107)
CHOLEST SERPL-MCNC: 101 MG/DL (ref 0–200)
CO2 SERPL-SCNC: 22 MMOL/L (ref 22–29)
CREAT SERPL-MCNC: 0.76 MG/DL (ref 0.76–1.27)
DEPRECATED RDW RBC AUTO: 38.9 FL (ref 37–54)
EOSINOPHIL # BLD AUTO: 0.68 10*3/MM3 (ref 0–0.4)
EOSINOPHIL NFR BLD AUTO: 6.7 % (ref 0.3–6.2)
ERYTHROCYTE [DISTWIDTH] IN BLOOD BY AUTOMATED COUNT: 12.1 % (ref 12.3–15.4)
GFR SERPL CREATININE-BSD FRML MDRD: 106 ML/MIN/1.73
GLOBULIN UR ELPH-MCNC: 2.9 GM/DL
GLUCOSE SERPL-MCNC: 252 MG/DL (ref 65–99)
HBA1C MFR BLD: 9.1 % (ref 4.8–5.6)
HCT VFR BLD AUTO: 52.9 % (ref 37.5–51)
HDLC SERPL-MCNC: 22 MG/DL (ref 40–60)
HGB BLD-MCNC: 18.1 G/DL (ref 13–17.7)
IMM GRANULOCYTES # BLD AUTO: 0.03 10*3/MM3 (ref 0–0.05)
IMM GRANULOCYTES NFR BLD AUTO: 0.3 % (ref 0–0.5)
LDLC SERPL CALC-MCNC: 24 MG/DL (ref 0–100)
LDLC/HDLC SERPL: 0.03 {RATIO}
LYMPHOCYTES # BLD AUTO: 3.44 10*3/MM3 (ref 0.7–3.1)
LYMPHOCYTES NFR BLD AUTO: 33.9 % (ref 19.6–45.3)
MCH RBC QN AUTO: 31 PG (ref 26.6–33)
MCHC RBC AUTO-ENTMCNC: 34.2 G/DL (ref 31.5–35.7)
MCV RBC AUTO: 90.7 FL (ref 79–97)
MONOCYTES # BLD AUTO: 0.8 10*3/MM3 (ref 0.1–0.9)
MONOCYTES NFR BLD AUTO: 7.9 % (ref 5–12)
NEUTROPHILS NFR BLD AUTO: 49.7 % (ref 42.7–76)
NEUTROPHILS NFR BLD AUTO: 5.04 10*3/MM3 (ref 1.7–7)
NRBC BLD AUTO-RTO: 0.1 /100 WBC (ref 0–0.2)
PLATELET # BLD AUTO: 279 10*3/MM3 (ref 140–450)
PMV BLD AUTO: 10.7 FL (ref 6–12)
POTASSIUM SERPL-SCNC: 4.4 MMOL/L (ref 3.5–5.2)
PROT SERPL-MCNC: 7.2 G/DL (ref 6–8.5)
RBC # BLD AUTO: 5.83 10*6/MM3 (ref 4.14–5.8)
SODIUM SERPL-SCNC: 136 MMOL/L (ref 136–145)
TRIGL SERPL-MCNC: 392 MG/DL (ref 0–150)
TSH SERPL DL<=0.05 MIU/L-ACNC: 6.3 UIU/ML (ref 0.27–4.2)
VIT B12 BLD-MCNC: 1335 PG/ML (ref 211–946)
VLDLC SERPL-MCNC: 55 MG/DL (ref 5–40)
WBC NRBC COR # BLD: 10.14 10*3/MM3 (ref 3.4–10.8)

## 2022-03-08 ENCOUNTER — OFFICE VISIT (OUTPATIENT)
Dept: FAMILY MEDICINE CLINIC | Facility: CLINIC | Age: 57
End: 2022-03-08

## 2022-03-08 DIAGNOSIS — E78.2 MIXED DYSLIPIDEMIA: Chronic | ICD-10-CM

## 2022-03-08 DIAGNOSIS — M05.79 RHEUMATOID ARTHRITIS INVOLVING MULTIPLE SITES WITH POSITIVE RHEUMATOID FACTOR: Chronic | ICD-10-CM

## 2022-03-08 DIAGNOSIS — E04.9 SUBSTERNAL THYROID GOITER: ICD-10-CM

## 2022-03-08 DIAGNOSIS — J30.9 CHRONIC ALLERGIC RHINITIS: Chronic | ICD-10-CM

## 2022-03-08 DIAGNOSIS — K21.9 GASTROESOPHAGEAL REFLUX DISEASE WITHOUT ESOPHAGITIS: Chronic | ICD-10-CM

## 2022-03-08 DIAGNOSIS — I10 BENIGN ESSENTIAL HYPERTENSION: Chronic | ICD-10-CM

## 2022-03-08 DIAGNOSIS — IMO0002 DM TYPE 2, UNCONTROLLED, WITH NEUROPATHY: Primary | Chronic | ICD-10-CM

## 2022-03-08 PROCEDURE — 99214 OFFICE O/P EST MOD 30 MIN: CPT | Performed by: NURSE PRACTITIONER

## 2022-03-08 RX ORDER — ISOPROPYL ALCOHOL 0.7 ML/1
1 SWAB TOPICAL 3 TIMES DAILY
Qty: 100 EACH | Refills: 6 | Status: SHIPPED | OUTPATIENT
Start: 2022-03-08 | End: 2022-04-13

## 2022-03-08 RX ORDER — LANCETS 28 GAUGE
1 EACH MISCELLANEOUS 3 TIMES DAILY
Qty: 100 EACH | Refills: 6 | Status: SHIPPED | OUTPATIENT
Start: 2022-03-08

## 2022-03-08 NOTE — PROGRESS NOTES
History of Present Illness  Raimundo Munguia is a 57 yo male who presents for follow up and to review recent lab results pertaining to his  DM, type 2, HTN, GERD, Rheumatoid Arthritis and Dyslipidemia. In addition, Raimundo has Insomnia and Vitamin deficiencies. Raimundo is being evaluated by Endocrinology for his Thyroid goiter with substernal extension. He shares he has been referred to a [facility] Surgeon who is planning to do Thyroid Surgery soon.    Diabetes   He has type 2 diabetes mellitus. His disease course has been improving. He does share he has noticed some intermittent neuropathy in his left lower extremity.  Risk factors for coronary artery disease include dyslipidemia, hypertension, male sex, sedentary lifestyle and tobacco exposure. Current diabetic treatment includes Metformin and Trulicity.  He has had a previous visit with a dietitian. An ACE inhibitor/angiotensin II receptor blocker is not being taken due to S/e.     Lab Results   Component Value Date    HGBA1C 7.90 (H) 10/12/2021     Lab Results   Component Value Date    HGBA1C 9.10 (H) 02/24/2022     Hypertension   The current episode started more than 1 year ago.His BP is adequately controlled. His hypertensive medication is Toprol  mg which has improved his blood pressure and fast heart rate.     Lab Results   Component Value Date    GLUCOSE 252 (H) 02/24/2022    BUN 12 02/24/2022    CREATININE 0.76 02/24/2022    EGFRIFNONA 106 02/24/2022    BCR 15.8 02/24/2022    K 4.4 02/24/2022    CO2 22.0 02/24/2022    CALCIUM 9.1 02/24/2022    ALBUMIN 4.30 02/24/2022    AST 28 02/24/2022    ALT 47 (H) 02/24/2022     Dyslipidemia   This is a chronic problem. The current episode started more than 1 year ago. Factors aggravating his hyperlipidemia include fatty foods. Current antihyperlipidemic treatment includes statins.  Compliance problems include adherence to diet and adherence to exercise.  Risk factors for coronary artery disease include diabetes  mellitus, dyslipidemia, hypertension, male sex and obesity.   Lab Results   Component Value Date    CHOL 101 02/24/2022    CHOL 81 10/12/2021    CHOL 145 07/13/2021     Lab Results   Component Value Date    TRIG 392 (H) 02/24/2022    TRIG 225 (H) 10/12/2021    TRIG 214 (H) 07/13/2021     Lab Results   Component Value Date    HDL 22 (L) 02/24/2022    HDL 20 (L) 10/12/2021    HDL 27 (L) 07/13/2021     Lab Results   Component Value Date    LDL 24 02/24/2022    LDL 26 10/12/2021    LDL 82 07/13/2021       The following portions of the patient's history were reviewed and updated as appropriate: allergies, current medications, past family history, past medical history, past social history, past surgical history and problem list.    Review of Systems   Constitutional: Positive for appetite change. Negative for activity change, chills, fatigue, fever and unexpected weight change.   Eyes: Negative for visual disturbance.   Respiratory: Negative for cough, shortness of breath and wheezing.    Cardiovascular: Negative for chest pain, palpitations and leg swelling.   Gastrointestinal: Negative for abdominal pain, constipation, diarrhea, nausea and vomiting.   Endocrine: Negative for cold intolerance, heat intolerance, polydipsia, polyphagia and polyuria.   Musculoskeletal: Positive for arthralgias and back pain.   Skin: Negative for color change and rash.   Allergic/Immunologic: Positive for environmental allergies and immunocompromised state.   Neurological: Positive for headaches. Negative for dizziness, tremors, speech difficulty, weakness and light-headedness.   Hematological: Negative for adenopathy.   Psychiatric/Behavioral: Negative for confusion and decreased concentration. The patient is not nervous/anxious.    All other systems reviewed and are negative.    Vital signs:  /84 (BP Location: Right arm, Patient Position: Sitting, Cuff Size: Adult)   Pulse 94   Temp 98.4 °F (36.9 °C) (Temporal)   Resp 14   Ht  "172.7 cm (68\")   Wt 103 kg (227 lb)   SpO2 97%   BMI 34.52 kg/m²     Physical Exam  Vitals and nursing note reviewed.   Constitutional:       General: He is not in acute distress.     Appearance: He is well-developed.      Interventions: Face mask in place.      Comments: Pleasant; Painful slow movements   HENT:      Head: Normocephalic.      Nose: Nose normal.   Eyes:      General: No scleral icterus.        Right eye: No discharge.         Left eye: No discharge.      Conjunctiva/sclera: Conjunctivae normal.   Neck:      Thyroid: Thyromegaly present.      Vascular: No JVD.   Cardiovascular:      Rate and Rhythm: Normal rate and regular rhythm.      Heart sounds: Normal heart sounds. No murmur heard.    No friction rub.   Pulmonary:      Effort: Pulmonary effort is normal. No respiratory distress.      Breath sounds: Decreased air movement present. Decreased breath sounds present. No wheezing, rhonchi or rales.   Abdominal:      General: Abdomen is protuberant. Bowel sounds are normal.      Palpations: Abdomen is soft.      Tenderness: There is no abdominal tenderness. There is no guarding.   Musculoskeletal:         General: Deformity (Multiple Rheumatoid nodules bilateral upper extremities) present. No tenderness.      Cervical back: Neck supple.      Right lower leg: No edema.      Left lower leg: No edema.   Lymphadenopathy:      Cervical: No cervical adenopathy.   Skin:     General: Skin is warm and dry.      Capillary Refill: Capillary refill takes less than 2 seconds.   Neurological:      Mental Status: He is alert and oriented to person, place, and time.      Cranial Nerves: Cranial nerves are intact.      Gait: Gait abnormal.   Psychiatric:         Mood and Affect: Mood normal.         Speech: Speech normal.         Behavior: Behavior is cooperative.         Thought Content: Thought content normal.         Cognition and Memory: Cognition normal.       Result Review :  Results for orders placed or " performed in visit on 02/24/22   Comprehensive Metabolic Panel    Specimen: Arm, Left; Blood   Result Value Ref Range    Glucose 252 (H) 65 - 99 mg/dL    BUN 12 6 - 20 mg/dL    Creatinine 0.76 0.76 - 1.27 mg/dL    Sodium 136 136 - 145 mmol/L    Potassium 4.4 3.5 - 5.2 mmol/L    Chloride 101 98 - 107 mmol/L    CO2 22.0 22.0 - 29.0 mmol/L    Calcium 9.1 8.6 - 10.5 mg/dL    Total Protein 7.2 6.0 - 8.5 g/dL    Albumin 4.30 3.50 - 5.20 g/dL    ALT (SGPT) 47 (H) 1 - 41 U/L    AST (SGOT) 28 1 - 40 U/L    Alkaline Phosphatase 189 (H) 39 - 117 U/L    Total Bilirubin 0.2 0.0 - 1.2 mg/dL    eGFR Non African Amer 106 >60 mL/min/1.73    Globulin 2.9 gm/dL    A/G Ratio 1.5 g/dL    BUN/Creatinine Ratio 15.8 7.0 - 25.0    Anion Gap 13.0 5.0 - 15.0 mmol/L   Lipid Panel    Specimen: Arm, Left; Blood   Result Value Ref Range    Total Cholesterol 101 0 - 200 mg/dL    Triglycerides 392 (H) 0 - 150 mg/dL    HDL Cholesterol 22 (L) 40 - 60 mg/dL    LDL Cholesterol  24 0 - 100 mg/dL    VLDL Cholesterol 55 (H) 5 - 40 mg/dL    LDL/HDL Ratio 0.03    TSH    Specimen: Arm, Left; Blood   Result Value Ref Range    TSH 6.300 (H) 0.270 - 4.200 uIU/mL   Hemoglobin A1c    Specimen: Arm, Left; Blood   Result Value Ref Range    Hemoglobin A1C 9.10 (H) 4.80 - 5.60 %   Vitamin B12    Specimen: Arm, Left; Blood   Result Value Ref Range    Vitamin B-12 1,335 (H) 211 - 946 pg/mL   Vitamin D 25 Hydroxy    Specimen: Arm, Left; Blood   Result Value Ref Range    25 Hydroxy, Vitamin D 19.6 (L) 30.0 - 100.0 ng/ml   CBC Auto Differential    Specimen: Arm, Left; Blood   Result Value Ref Range    WBC 10.14 3.40 - 10.80 10*3/mm3    RBC 5.83 (H) 4.14 - 5.80 10*6/mm3    Hemoglobin 18.1 (H) 13.0 - 17.7 g/dL    Hematocrit 52.9 (H) 37.5 - 51.0 %    MCV 90.7 79.0 - 97.0 fL    MCH 31.0 26.6 - 33.0 pg    MCHC 34.2 31.5 - 35.7 g/dL    RDW 12.1 (L) 12.3 - 15.4 %    RDW-SD 38.9 37.0 - 54.0 fl    MPV 10.7 6.0 - 12.0 fL    Platelets 279 140 - 450 10*3/mm3    Neutrophil % 49.7 42.7  - 76.0 %    Lymphocyte % 33.9 19.6 - 45.3 %    Monocyte % 7.9 5.0 - 12.0 %    Eosinophil % 6.7 (H) 0.3 - 6.2 %    Basophil % 1.5 0.0 - 1.5 %    Immature Grans % 0.3 0.0 - 0.5 %    Neutrophils, Absolute 5.04 1.70 - 7.00 10*3/mm3    Lymphocytes, Absolute 3.44 (H) 0.70 - 3.10 10*3/mm3    Monocytes, Absolute 0.80 0.10 - 0.90 10*3/mm3    Eosinophils, Absolute 0.68 (H) 0.00 - 0.40 10*3/mm3    Basophils, Absolute 0.15 0.00 - 0.20 10*3/mm3    Immature Grans, Absolute 0.03 0.00 - 0.05 10*3/mm3    nRBC 0.1 0.0 - 0.2 /100 WBC     Patient's Body mass index is 34.52 kg/m². indicating that he is obese (BMI >30). Obesity-related health conditions include the following: obstructive sleep apnea, hypertension, diabetes mellitus, dyslipidemias, GERD and Rheumatoid arthritis . Obesity is improving with lifestyle modifications. BMI  is above average; BMI management plan is completed Provided information on.portion control and increasing exercise..     Assessment/Plan     Diagnoses and all orders for this visit:    1. DM type 2, uncontrolled, with neuropathy (HCC) (Primary)  Comments:  Will continue Metformin and add Xultophy 15 units. D/C Trulicity. Requested he call if there were any issues obtaining the Xultophy or supplies  Orders:  -     Insulin Degludec-Liraglutide (XULTOPHY) 100-3.6 UNIT-MG/ML solution pen-injector subcutaneous pen; Inject 15 Units under the skin into the appropriate area as directed Daily.  Dispense: 5 pen; Refill: 0  -     glucose blood test strip; 1 each by Other route 3 (Three) Times a Day.  Dispense: 100 each; Refill: 6  -     Lancets Ultra Fine misc; 1 Device 3 (Three) Times a Day.  Dispense: 100 each; Refill: 6  -     Alcohol Swabs (Alcohol Wipes) 70 % pads; 1 each 3 (Three) Times a Day.  Dispense: 100 each; Refill: 6  -     Blood Glucose Monitoring Suppl kit; 1 Device Daily.  Dispense: 1 each; Refill: 0    2. Benign essential hypertension  Comments:  Continue Metoprolol 100 mg daily Goal to add  ACEI/ARB    3. Mixed dyslipidemia  Comments:  Lipid panel reviewed. Noted elevated Triglycerides which we discussed Will consider adding Vascepa     4. Gastroesophageal reflux disease without esophagitis  Comments:  Stable with Prevacid     5. Rheumatoid arthritis involving multiple sites with positive rheumatoid factor (HCC)  Comments:  Contine Methotrexate and Folic acid. Continue to search for Rheumatologist who will accept his insurance     6. Chronic allergic rhinitis  Comments:  Continue Claritin and Singulair     7. Substernal thyroid goiter  Comments:  F/U with Endocrinology and Surgery.       Follow Up In four weeks  Findings and recommendations discussed with Raimundo. Reviewed above test results and treatment options. He is receptive. Lifestyle modifications reinforced including nutrition and activity recommendations. He will follow up in four weeks sooner if problems/concerns occur.  Raimundo was given instructions and counseling regarding his condition or for health maintenance advice. Please see specific information pulled into the AVS if appropriate      This document has been electronically signed by:

## 2022-03-09 ENCOUNTER — PRIOR AUTHORIZATION (OUTPATIENT)
Dept: FAMILY MEDICINE CLINIC | Facility: CLINIC | Age: 57
End: 2022-03-09

## 2022-03-10 ENCOUNTER — OFFICE VISIT (OUTPATIENT)
Dept: ENDOCRINOLOGY | Facility: CLINIC | Age: 57
End: 2022-03-10

## 2022-03-10 VITALS
SYSTOLIC BLOOD PRESSURE: 129 MMHG | TEMPERATURE: 98.1 F | WEIGHT: 226.8 LBS | OXYGEN SATURATION: 93 % | BODY MASS INDEX: 34.37 KG/M2 | DIASTOLIC BLOOD PRESSURE: 87 MMHG | HEART RATE: 100 BPM | HEIGHT: 68 IN

## 2022-03-10 VITALS
HEIGHT: 68 IN | TEMPERATURE: 98.4 F | BODY MASS INDEX: 34.4 KG/M2 | DIASTOLIC BLOOD PRESSURE: 84 MMHG | HEART RATE: 94 BPM | OXYGEN SATURATION: 97 % | SYSTOLIC BLOOD PRESSURE: 120 MMHG | WEIGHT: 227 LBS | RESPIRATION RATE: 14 BRPM

## 2022-03-10 DIAGNOSIS — E04.9 SUBSTERNAL THYROID GOITER: Primary | ICD-10-CM

## 2022-03-10 LAB
EXPIRATION DATE: ABNORMAL
GLUCOSE BLDC GLUCOMTR-MCNC: 178 MG/DL (ref 70–130)
HBA1C MFR BLD: 8.9 %
Lab: ABNORMAL

## 2022-03-10 PROCEDURE — 82962 GLUCOSE BLOOD TEST: CPT | Performed by: NURSE PRACTITIONER

## 2022-03-10 PROCEDURE — 83036 HEMOGLOBIN GLYCOSYLATED A1C: CPT | Performed by: NURSE PRACTITIONER

## 2022-03-10 PROCEDURE — 3052F HG A1C>EQUAL 8.0%<EQUAL 9.0%: CPT | Performed by: NURSE PRACTITIONER

## 2022-03-10 PROCEDURE — 99214 OFFICE O/P EST MOD 30 MIN: CPT | Performed by: NURSE PRACTITIONER

## 2022-03-10 RX ORDER — LEVOTHYROXINE SODIUM 0.03 MG/1
25 TABLET ORAL DAILY
Qty: 30 TABLET | Refills: 2 | Status: SHIPPED | OUTPATIENT
Start: 2022-03-10 | End: 2022-04-16 | Stop reason: HOSPADM

## 2022-03-10 NOTE — PROGRESS NOTES
Chief Complaint   Patient presents with   • Thyroid Problem        Referring Provider  No ref. provider found     HPI   Raimundo Munguia is a 56 y.o. male had concerns including Thyroid Problem.     He had a CT Chest with note of a thyroid goiter with substernal extension in 03/2021.  Patient states that prior to this he was not aware of any prior thyroid issues.  His most recent TFT's were within normal range.    He states he has a hard time swallowing sometimes.  He feels like he has some mild compressive symptoms when he is trying to swallow something. Sometimes he has symptoms at night that wake him up, but that he also wears oxygen.  Has never been told that his thyroid was enlarged that he recalls.    His mother had graves disease with thyroidectomy in the past.    Birth state:[place]  Previous history of radiation to face/neck:none  Consuming foods high in iodine: none  Family history of thyroid complications: mom-kyra diease, none other, no hx of thyroid CA in family    Patient was seen by Dr. Wynn, General Surgery, for surgical eval in Jan 2022 to evaluate the need for removal.  He was informed that he does need surgery and that they will get him on the schedule.  He has not heard from them since that time.  Contacted the office today and they informed that the hospital facility that they use for surgery is just now allowing for them to perform surgeries in the last couple of weeks and are limiting how many they are allowed to do at this time.  They are working on getting him on the surgery schedule and they will be contacting him for surgical date.    The following portions of the patient's history were reviewed and updated as appropriate: allergies, current medications, past family history, past medical history, past social history, past surgical history and problem list.    Past Medical History:   Diagnosis Date   • Cervicalgia    • Helicobacter pylori (H. pylori) infection    • Hyperlipidemia    •  Osteopenia    • Palpitations    • Substernal thyroid goiter 7/14/2021   • Vitamin D deficiency      Past Surgical History:   Procedure Laterality Date   • ABDOMINAL SURGERY     • COLON RESECTION N/A 2/22/2018    Procedure: COLON RESECTION SIGMOID;  Surgeon: Ej Tinoco MD;  Location: [facility] OR;  Service:    • COLONOSCOPY N/A 2/7/2018    Procedure: COLONOSCOPY CPT CODE: 75273;  Surgeon: Teodoro Garg III, MD;  Location: [facility] OR;  Service:    • COLONOSCOPY N/A 3/7/2019    Procedure: COLONOSCOPY FOR SCREENING CPT CODE: ;  Surgeon: Ej Tinoco MD;  Location: [facility] OR;  Service: Gastroenterology   • ENDOSCOPY N/A 2/7/2018    Procedure: ESOPHAGOGASTRODUODENOSCOPY WITH DILATATION CPT CODE: 03053;  Surgeon: Teodoro Garg III, MD;  Location: [facility] OR;  Service:       Family History   Problem Relation Age of Onset   • Diabetes Mother    • Hypertension Mother    • Hypertension Father    • Hyperlipidemia Father    • Diabetes Sister    • Hyperlipidemia Sister    • Hypertension Sister    • Hypertension Brother    • Hyperlipidemia Brother       Social History     Socioeconomic History   • Marital status: Legally      Spouse name: tess   • Number of children: 3   • Years of education: 9   Tobacco Use   • Smoking status: Never Smoker   • Smokeless tobacco: Current User     Types: Snuff   • Tobacco comment: Encouraged tobacco cessation   Vaping Use   • Vaping Use: Never used   Substance and Sexual Activity   • Alcohol use: No   • Drug use: No   • Sexual activity: Yes     Partners: Female      No Known Allergies   Current Outpatient Medications on File Prior to Visit   Medication Sig Dispense Refill   • Alcohol Swabs (Alcohol Wipes) 70 % pads 1 each 3 (Three) Times a Day. 100 each 6   • Blood Glucose Monitoring Suppl kit 1 Device Daily. 1 each 0   • Blood Pressure Monitoring kit 1 Device Daily. 1 kit 0   • cyclobenzaprine (FLEXERIL) 10 MG tablet TAKE ONE TABLET BY MOUTH TWO TIMES A DAY AS NEEDED  FOR MUSCLE SPASMS 60 tablet 4   • Diclofenac Sodium (VOLTAREN) 1 % gel gel Apply  topically to the appropriate area as directed 3 (Three) Times a Day. 100 g 4   • fluticasone (FLONASE) 50 MCG/ACT nasal spray 1 spray into the nostril(s) as directed by provider 2 (Two) Times a Day As Needed for Rhinitis or Allergies. 16 g 4   • folic acid (FOLVITE) 1 MG tablet Take 1 tablet by mouth Daily. 30 tablet 4   • glucose blood (OneTouch Ultra) test strip 1 each by Other route Daily. 100 each 3   • glucose blood test strip 1 each by Other route 3 (Three) Times a Day. 100 each 6   • Lancets (OneTouch Delica Plus [id number]) misc 1 each by Other route Daily. 100 each 3   • Lancets Ultra Fine misc 1 Device 3 (Three) Times a Day. 100 each 6   • lansoprazole (PREVACID) 30 MG capsule Take 1 capsule by mouth Daily. 30 capsule 4   • loratadine (CLARITIN) 10 MG tablet Take 1 tablet by mouth Daily. 30 tablet 4   • metFORMIN (GLUCOPHAGE) 500 MG tablet Take 2 tablets by mouth 2 (Two) Times a Day. 120 tablet 3   • methotrexate 2.5 MG tablet Take 10 tablets by mouth 1 (One) Time Per Week. 40 tablet 4   • metoprolol succinate XL (TOPROL-XL) 100 MG 24 hr tablet Take 1 tablet by mouth Daily. 30 tablet 4   • montelukast (SINGULAIR) 10 MG tablet Take 1 tablet by mouth every night at bedtime. 30 tablet 4   • ProAir  (90 Base) MCG/ACT inhaler Inhale 2 puffs Every 4 (Four) Hours As Needed for Shortness of Air. 8.5 g 4   • rosuvastatin (CRESTOR) 5 MG tablet Take 1 tablet by mouth Daily. 30 tablet 4   • vitamin B-12 (CYANOCOBALAMIN) 1000 MCG tablet Take 1 tablet by mouth Daily. 30 tablet 4     No current facility-administered medications on file prior to visit.     Review of Systems   HENT: Positive for trouble swallowing.         He has increasing symptoms when he lifts his arms above his head as well.   Eyes: Negative.    Endocrine: Negative.    Psychiatric/Behavioral: Positive for sleep disturbance.   All other systems reviewed and are  "negative.    /87 (BP Location: Left arm, Patient Position: Sitting, Cuff Size: Adult)   Pulse 100   Temp 98.1 °F (36.7 °C) (Oral)   Ht 172.7 cm (68\")   Wt 103 kg (226 lb 12.8 oz)   SpO2 93%   BMI 34.48 kg/m²      Physical Exam  Vitals reviewed.   Constitutional:       Appearance: Normal appearance.   Eyes:      Extraocular Movements: Extraocular movements intact.   Neck:      Thyroid: Thyromegaly present. No thyroid mass or thyroid tenderness.   Pulmonary:      Effort: Pulmonary effort is normal.   Skin:     General: Skin is warm.   Neurological:      Mental Status: He is alert and oriented to person, place, and time.   Psychiatric:         Mood and Affect: Mood normal.         Behavior: Behavior normal.         Thought Content: Thought content normal.         Judgment: Judgment normal.       Labs/Imaging  CMP  Lab Results   Component Value Date    GLUCOSE 252 (H) 02/24/2022    BUN 12 02/24/2022    CREATININE 0.76 02/24/2022    EGFRIFNONA 106 02/24/2022    EGFRIFAFRI  09/13/2016      Comment:      <15 Indicative of kidney failure.    BCR 15.8 02/24/2022    K 4.4 02/24/2022    CO2 22.0 02/24/2022    CALCIUM 9.1 02/24/2022    ALBUMIN 4.30 02/24/2022    LABIL2 1.2 (L) 05/17/2016    AST 28 02/24/2022    ALT 47 (H) 02/24/2022        CBC w/DIFF   Lab Results   Component Value Date    WBC 10.14 02/24/2022    RBC 5.83 (H) 02/24/2022    HGB 18.1 (H) 02/24/2022    HCT 52.9 (H) 02/24/2022    MCV 90.7 02/24/2022    MCH 31.0 02/24/2022    MCHC 34.2 02/24/2022    RDW 12.1 (L) 02/24/2022    RDWSD 38.9 02/24/2022    MPV 10.7 02/24/2022     02/24/2022    NEUTRORELPCT 49.7 02/24/2022    LYMPHORELPCT 33.9 02/24/2022    MONORELPCT 7.9 02/24/2022    EOSRELPCT 6.7 (H) 02/24/2022    BASORELPCT 1.5 02/24/2022    AUTOIGPER 0.3 02/24/2022    NEUTROABS 5.04 02/24/2022    LYMPHSABS 3.44 (H) 02/24/2022    MONOSABS 0.80 02/24/2022    EOSABS 0.68 (H) 02/24/2022    BASOSABS 0.15 02/24/2022    AUTOIGNUM 0.03 02/24/2022    NRBC " 0.1 02/24/2022       TSH  Lab Results   Component Value Date    TSH 6.300 (H) 02/24/2022    TSH 3.660 10/12/2021    TSH 5.840 (H) 07/13/2021       T4  No results found for: FREET4  Lab Results   Component Value Date    D9IZYCO 8.7 04/27/2015       T3  No results found for: T3FREE  No results found for: L9WYIYT    TRAb  No results found for: TSURCPAB    TPO  No results found for: THYROIDAB    No valid procedures specified.    Assessment and Plan    Diagnoses and all orders for this visit:    1. Substernal thyroid goiter (Primary)  Assessment & Plan:  -Clinically euthyroid.  -Due to patient having delays to having surgical intervention and his most recent TFT's his TSH was slightly elevated at 6.3, will try adding a low dose of T4 to see if this will help resolve any inflammation present.  Reminded of proper administration including taking 7 pills per week on an empty stomach with no missed doses, waiting 30-60 minutes prior to other medications or food.  -Follow-up in 6 months.    Orders:  -     POC Glucose Fingerstick  -     POC Glycosylated Hemoglobin (Hb A1C)    Other orders  -     levothyroxine (SYNTHROID, LEVOTHROID) 25 MCG tablet; Take 1 tablet by mouth Daily.  Dispense: 30 tablet; Refill: 2       Return in about 6 months (around 9/10/2022) for Follow-up appointment. The patient was instructed to contact the clinic with any interval questions or concerns.    ABDIRAHMAN Horan   Endocrinology

## 2022-03-14 NOTE — ASSESSMENT & PLAN NOTE
-Clinically euthyroid.  -Due to patient having delays to having surgical intervention and his most recent TFT's his TSH was slightly elevated at 6.3, will try adding a low dose of T4 to see if this will help resolve any inflammation present.  Reminded of proper administration including taking 7 pills per week on an empty stomach with no missed doses, waiting 30-60 minutes prior to other medications or food.  -Follow-up in 6 months.

## 2022-03-15 ENCOUNTER — TELEPHONE (OUTPATIENT)
Dept: FAMILY MEDICINE CLINIC | Facility: CLINIC | Age: 57
End: 2022-03-15

## 2022-03-15 DIAGNOSIS — IMO0002 DM TYPE 2, UNCONTROLLED, WITH NEUROPATHY: Primary | Chronic | ICD-10-CM

## 2022-03-15 NOTE — TELEPHONE ENCOUNTER
PHONE CALL FROM [facility] FOLLOWING UP ON Insulin Degludec-Liraglutide (XULTOPHY) 100-3.6 UNIT-MG/ML solution pen-injector subcutaneous pen.  PHARMACY TOLD THEM THEY COULDN'T RUN IT TO SEE IF PA WAS APPROVED BECAUSE THERE WERE NO REFILLS.  PLEASE SEND TO PHARMACY.     CALL IF NEEDED [phone]  [facility] # [id number]

## 2022-03-22 ENCOUNTER — OFFICE VISIT (OUTPATIENT)
Dept: FAMILY MEDICINE CLINIC | Facility: CLINIC | Age: 57
End: 2022-03-22

## 2022-03-22 VITALS
DIASTOLIC BLOOD PRESSURE: 80 MMHG | HEART RATE: 90 BPM | BODY MASS INDEX: 34.1 KG/M2 | OXYGEN SATURATION: 95 % | WEIGHT: 225 LBS | TEMPERATURE: 97.3 F | SYSTOLIC BLOOD PRESSURE: 118 MMHG | HEIGHT: 68 IN

## 2022-03-22 DIAGNOSIS — IMO0002 DM TYPE 2, UNCONTROLLED, WITH NEUROPATHY: Primary | Chronic | ICD-10-CM

## 2022-03-22 DIAGNOSIS — I10 BENIGN ESSENTIAL HYPERTENSION: Chronic | ICD-10-CM

## 2022-03-22 DIAGNOSIS — M62.838 MUSCLE SPASMS OF BOTH LOWER EXTREMITIES: Chronic | ICD-10-CM

## 2022-03-22 DIAGNOSIS — F51.04 CHRONIC INSOMNIA: Chronic | ICD-10-CM

## 2022-03-22 DIAGNOSIS — E04.9 SUBSTERNAL THYROID GOITER: Chronic | ICD-10-CM

## 2022-03-22 DIAGNOSIS — E78.2 MIXED DYSLIPIDEMIA: Chronic | ICD-10-CM

## 2022-03-22 PROCEDURE — 99214 OFFICE O/P EST MOD 30 MIN: CPT | Performed by: NURSE PRACTITIONER

## 2022-03-22 NOTE — PROGRESS NOTES
History of Present Illness  Raimundo Munguia is a 57 yo male who presents for follow up and to review recent lab results pertaining to his  DM, type 2, HTN, GERD, Rheumatoid Arthritis and Dyslipidemia. In addition, Raimundo has Insomnia and Vitamin deficiencies. Raimundo is being evaluated by Endocrinology for his Thyroid goiter with substernal extension. He shares he has been referred to a [facility] Surgeon who is planning to do Thyroid Surgery in April.    Diabetes   He has type 2 diabetes mellitus. His disease course has been improving since starting Xultophy. Risk factors for coronary artery disease include dyslipidemia, hypertension, male sex, sedentary lifestyle and tobacco exposure. Current diabetic treatment includes Metformin and Xultophy.  He has had a previous visit with a dietitian. An ACE inhibitor/angiotensin II receptor blocker is not being taken due to S/e.     Lab Results   Component Value Date    HGBA1C 7.90 (H) 10/12/2021     Lab Results   Component Value Date    HGBA1C 9.10 (H) 02/24/2022     Hypertension   The current episode started more than 1 year ago.His BP is adequately controlled. His hypertensive medication is Toprol  mg which has improved his blood pressure and fast heart rate.     Lab Results   Component Value Date    GLUCOSE 252 (H) 02/24/2022    BUN 12 02/24/2022    CREATININE 0.76 02/24/2022    EGFRIFNONA 106 02/24/2022    BCR 15.8 02/24/2022    K 4.4 02/24/2022    CO2 22.0 02/24/2022    CALCIUM 9.1 02/24/2022    ALBUMIN 4.30 02/24/2022    AST 28 02/24/2022    ALT 47 (H) 02/24/2022     Dyslipidemia   This is a chronic problem. The current episode started more than 1 year ago. Factors aggravating his hyperlipidemia include fatty foods. Current antihyperlipidemic treatment includes statins.  Compliance problems include adherence to diet and adherence to exercise.  Risk factors for coronary artery disease include diabetes mellitus, dyslipidemia, hypertension, male sex and  "obesity.   Lab Results   Component Value Date    CHOL 101 02/24/2022    CHOL 81 10/12/2021    CHOL 145 07/13/2021     Lab Results   Component Value Date    TRIG 392 (H) 02/24/2022    TRIG 225 (H) 10/12/2021    TRIG 214 (H) 07/13/2021     Lab Results   Component Value Date    HDL 22 (L) 02/24/2022    HDL 20 (L) 10/12/2021    HDL 27 (L) 07/13/2021     Lab Results   Component Value Date    LDL 24 02/24/2022    LDL 26 10/12/2021    LDL 82 07/13/2021     The following portions of the patient's history were reviewed and updated as appropriate: allergies, current medications, past family history, past medical history, past social history, past surgical history and problem list.    Review of Systems   Constitutional: Positive for appetite change. Negative for activity change, chills, fatigue, fever and unexpected weight change.   HENT: Positive for hearing loss (Chronic and stable) and trouble swallowing. Negative for voice change.    Eyes: Negative for visual disturbance.   Respiratory: Negative for cough, shortness of breath and wheezing.    Cardiovascular: Negative for chest pain, palpitations and leg swelling.   Gastrointestinal: Negative for constipation, diarrhea, nausea and vomiting.   Endocrine: Negative for cold intolerance, heat intolerance, polydipsia, polyphagia and polyuria.   Musculoskeletal: Positive for arthralgias and back pain.   Skin: Negative for color change and rash.   Allergic/Immunologic: Positive for environmental allergies and immunocompromised state.   Neurological: Negative for dizziness, tremors, speech difficulty, weakness and light-headedness.   Hematological: Negative for adenopathy.   Psychiatric/Behavioral: Positive for sleep disturbance. Negative for confusion and decreased concentration. The patient is not nervous/anxious.    All other systems reviewed and are negative.    Vital signs:  /80   Pulse 90   Temp 97.3 °F (36.3 °C) (Temporal)   Ht 172.7 cm (67.99\")   Wt 102 kg (225 " lb)   SpO2 95%   BMI 34.22 kg/m²     Physical Exam  Vitals and nursing note reviewed.   Constitutional:       General: He is not in acute distress.     Appearance: He is well-developed.      Interventions: Face mask in place.      Comments: Pleasant; Painful slow movements   HENT:      Head: Normocephalic.      Nose: Nose normal.   Eyes:      General: No scleral icterus.        Right eye: No discharge.         Left eye: No discharge.      Conjunctiva/sclera: Conjunctivae normal.   Neck:      Thyroid: Thyromegaly present.      Vascular: No JVD.   Cardiovascular:      Rate and Rhythm: Normal rate and regular rhythm.      Heart sounds: Normal heart sounds. No murmur heard.    No friction rub.   Pulmonary:      Effort: Pulmonary effort is normal. No respiratory distress.      Breath sounds: Decreased air movement present. Decreased breath sounds present. No wheezing, rhonchi or rales.   Abdominal:      General: Abdomen is protuberant. Bowel sounds are normal.      Palpations: Abdomen is soft.      Tenderness: There is no abdominal tenderness. There is no guarding.   Musculoskeletal:         General: Deformity (Multiple Rheumatoid nodules bilateral upper extremities) present. No tenderness.      Cervical back: Neck supple.      Right lower leg: No edema.      Left lower leg: No edema.   Lymphadenopathy:      Cervical: No cervical adenopathy.   Skin:     General: Skin is warm and dry.      Capillary Refill: Capillary refill takes less than 2 seconds.   Neurological:      Mental Status: He is alert and oriented to person, place, and time.      Cranial Nerves: Cranial nerves are intact.      Gait: Gait abnormal.   Psychiatric:         Mood and Affect: Mood normal.         Speech: Speech normal.         Behavior: Behavior is cooperative.         Thought Content: Thought content normal.         Cognition and Memory: Cognition and memory normal.       Result Review :Endocrinology note reviewed  Patient's Body mass index is  34.22 kg/m². indicating that he is obese (BMI >30). Obesity-related health conditions include the following: hypertension, diabetes mellitus, dyslipidemias and Rhematoid Arthritis . Obesity is unchanged. BMI  is above average; BMI management plan is completed. Provided information on  portion control and increasing exercise..     Assessment/Plan     Diagnoses and all orders for this visit:    1. DM type 2, uncontrolled, with neuropathy (HCC) (Primary)  Comments:  Will continue Metformin and  increase to Xultophy 35 units.   Orders:  -     Insulin Pen Needle 32G X 5 MM misc; 1 each 3 (Three) Times a Day.  Dispense: 100 each; Refill: 5  -     metFORMIN (GLUCOPHAGE) 500 MG tablet; Take 1 tablet by mouth Daily With Breakfast.  Dispense: 30 tablet; Refill: 0  -     Insulin Degludec-Liraglutide (XULTOPHY) 100-3.6 UNIT-MG/ML solution pen-injector subcutaneous pen; Inject 40 Units under the skin into the appropriate area as directed Daily.  Dispense: 5 pen; Refill: 1    2. Benign essential hypertension  Comments:  Continue Metoprolol Succinate  mg daily    3. Mixed dyslipidemia  Comments:  Discussed eating to lower Triglycerides and he feels the Xultophy is helping him with his nutrition. Continue Rosuvastatin 5 mg for now     4. Substernal thyroid goiter  Comments:  F/U with Dr Wynn for surgical intervention on April 15th    Follow Up In May   Findings and recommendations discussed with Raimundo. Discussed his current blood glucose readings which have improved. He is currently taking Xultophy 34 units Metformin 500 mg daily with breakfast. Lifestyle modifications reinforced including nutrition and activity recommendations. Will follow up in days sooner if problems/concerns occur.  Patient was given instructions and counseling regarding his condition or for health maintenance advice. Please see specific information pulled into the AVS if appropriate      This document has been electronically signed by:

## 2022-03-23 RX ORDER — CYCLOBENZAPRINE HCL 10 MG
TABLET ORAL
Qty: 60 TABLET | Refills: 0 | Status: SHIPPED | OUTPATIENT
Start: 2022-03-23 | End: 2022-05-17

## 2022-03-23 RX ORDER — AMITRIPTYLINE HYDROCHLORIDE 150 MG/1
TABLET, FILM COATED ORAL
Qty: 30 TABLET | Refills: 3 | Status: SHIPPED | OUTPATIENT
Start: 2022-03-23 | End: 2022-08-22

## 2022-04-13 ENCOUNTER — PRE-ADMISSION TESTING (OUTPATIENT)
Dept: PREADMISSION TESTING | Facility: HOSPITAL | Age: 57
End: 2022-04-13

## 2022-04-13 VITALS — BODY MASS INDEX: 35 KG/M2 | HEIGHT: 68 IN | WEIGHT: 230.93 LBS

## 2022-04-13 LAB
DEPRECATED RDW RBC AUTO: 40.7 FL (ref 37–54)
ERYTHROCYTE [DISTWIDTH] IN BLOOD BY AUTOMATED COUNT: 12.3 % (ref 12.3–15.4)
HCT VFR BLD AUTO: 49.5 % (ref 37.5–51)
HGB BLD-MCNC: 17.1 G/DL (ref 13–17.7)
MCH RBC QN AUTO: 31.1 PG (ref 26.6–33)
MCHC RBC AUTO-ENTMCNC: 34.5 G/DL (ref 31.5–35.7)
MCV RBC AUTO: 90.2 FL (ref 79–97)
PLATELET # BLD AUTO: 334 10*3/MM3 (ref 140–450)
PMV BLD AUTO: 9 FL (ref 6–12)
POTASSIUM SERPL-SCNC: 4.3 MMOL/L (ref 3.5–5.2)
QT INTERVAL: 354 MS
QTC INTERVAL: 437 MS
RBC # BLD AUTO: 5.49 10*6/MM3 (ref 4.14–5.8)
SARS-COV-2 RNA PNL SPEC NAA+PROBE: NOT DETECTED
WBC NRBC COR # BLD: 11.38 10*3/MM3 (ref 3.4–10.8)

## 2022-04-13 PROCEDURE — 85027 COMPLETE CBC AUTOMATED: CPT

## 2022-04-13 PROCEDURE — 36415 COLL VENOUS BLD VENIPUNCTURE: CPT

## 2022-04-13 PROCEDURE — U0004 COV-19 TEST NON-CDC HGH THRU: HCPCS

## 2022-04-13 PROCEDURE — 93010 ELECTROCARDIOGRAM REPORT: CPT | Performed by: INTERNAL MEDICINE

## 2022-04-13 PROCEDURE — 84132 ASSAY OF SERUM POTASSIUM: CPT

## 2022-04-13 PROCEDURE — C9803 HOPD COVID-19 SPEC COLLECT: HCPCS

## 2022-04-13 PROCEDURE — 93005 ELECTROCARDIOGRAM TRACING: CPT

## 2022-04-13 NOTE — PAT
Patient denies any current skin issues.     An arrival time for procedure was not given during PAT visit. If patient had any questions or concerns about their arrival time, they were instructed to contact their surgeon/physician.  Additionally, if the patient referred to an arrival time that was acquired from their my chart account, patient was encouraged to verify that time with their surgeon/physician.  NO arrival times given in Pre Admission Testing Department.    Patient to apply Chlorhexadine wipes  to surgical area (as instructed) the night before procedure and the AM of procedure. Wipes provided.    COVID in PAT

## 2022-04-14 ENCOUNTER — ANESTHESIA EVENT (OUTPATIENT)
Dept: PERIOP | Facility: HOSPITAL | Age: 57
End: 2022-04-14

## 2022-04-14 RX ORDER — FAMOTIDINE 10 MG/ML
20 INJECTION, SOLUTION INTRAVENOUS ONCE
Status: CANCELLED | OUTPATIENT
Start: 2022-04-14 | End: 2022-04-14

## 2022-04-15 ENCOUNTER — ANESTHESIA (OUTPATIENT)
Dept: PERIOP | Facility: HOSPITAL | Age: 57
End: 2022-04-15

## 2022-04-15 ENCOUNTER — HOSPITAL ENCOUNTER (OUTPATIENT)
Facility: HOSPITAL | Age: 57
Discharge: HOME | End: 2022-04-16
Attending: SURGERY | Admitting: SURGERY

## 2022-04-15 DIAGNOSIS — E04.1 RIGHT THYROID NODULE: Primary | ICD-10-CM

## 2022-04-15 DIAGNOSIS — E04.1 THYROID NODULE: ICD-10-CM

## 2022-04-15 DIAGNOSIS — E04.9 SUBSTERNAL THYROID GOITER: ICD-10-CM

## 2022-04-15 LAB
GLUCOSE BLDC GLUCOMTR-MCNC: 125 MG/DL (ref 70–130)
GLUCOSE BLDC GLUCOMTR-MCNC: 158 MG/DL (ref 70–130)
GLUCOSE BLDC GLUCOMTR-MCNC: 161 MG/DL (ref 70–130)
GLUCOSE BLDC GLUCOMTR-MCNC: 208 MG/DL (ref 70–130)

## 2022-04-15 PROCEDURE — 25010000002 ONDANSETRON PER 1 MG: Performed by: NURSE ANESTHETIST, CERTIFIED REGISTERED

## 2022-04-15 PROCEDURE — 88307 TISSUE EXAM BY PATHOLOGIST: CPT | Performed by: SURGERY

## 2022-04-15 PROCEDURE — C1889 IMPLANT/INSERT DEVICE, NOC: HCPCS | Performed by: SURGERY

## 2022-04-15 PROCEDURE — G0378 HOSPITAL OBSERVATION PER HR: HCPCS

## 2022-04-15 PROCEDURE — 63710000001 INSULIN LISPRO (HUMAN) PER 5 UNITS: Performed by: SURGERY

## 2022-04-15 PROCEDURE — 25010000002 FENTANYL CITRATE (PF) 50 MCG/ML SOLUTION

## 2022-04-15 PROCEDURE — 25010000002 DEXAMETHASONE PER 1 MG: Performed by: NURSE ANESTHETIST, CERTIFIED REGISTERED

## 2022-04-15 PROCEDURE — 25010000002 FENTANYL CITRATE (PF) 50 MCG/ML SOLUTION: Performed by: NURSE ANESTHETIST, CERTIFIED REGISTERED

## 2022-04-15 PROCEDURE — 82962 GLUCOSE BLOOD TEST: CPT

## 2022-04-15 PROCEDURE — 25010000002 FENTANYL CITRATE (PF) 50 MCG/ML SOLUTION: Performed by: SURGERY

## 2022-04-15 PROCEDURE — 25010000002 PROPOFOL 10 MG/ML EMULSION: Performed by: NURSE ANESTHETIST, CERTIFIED REGISTERED

## 2022-04-15 PROCEDURE — 60220 PARTIAL REMOVAL OF THYROID: CPT | Performed by: PHYSICIAN ASSISTANT

## 2022-04-15 PROCEDURE — 25010000002 HYDROMORPHONE 1 MG/ML SOLUTION

## 2022-04-15 PROCEDURE — 25010000002 SUCCINYLCHOLINE PER 20 MG: Performed by: NURSE ANESTHETIST, CERTIFIED REGISTERED

## 2022-04-15 PROCEDURE — 25010000002 CEFAZOLIN IN DEXTROSE 2-4 GM/100ML-% SOLUTION: Performed by: SURGERY

## 2022-04-15 DEVICE — CLIP LIGAT VASC HORIZON TI MD BLU 6CT: Type: IMPLANTABLE DEVICE | Site: NECK | Status: FUNCTIONAL

## 2022-04-15 DEVICE — CLIP LIGAT VASC HORIZON TI SM YEL 6CT: Type: IMPLANTABLE DEVICE | Site: NECK | Status: FUNCTIONAL

## 2022-04-15 DEVICE — ABSORBABLE HEMOSTAT (OXIDIZED REGENERATED CELLULOSE, U.S.P.)
Type: IMPLANTABLE DEVICE | Site: NECK | Status: FUNCTIONAL
Brand: SURGICEL

## 2022-04-15 RX ORDER — SODIUM CHLORIDE 0.9 % (FLUSH) 0.9 %
10 SYRINGE (ML) INJECTION AS NEEDED
Status: DISCONTINUED | OUTPATIENT
Start: 2022-04-15 | End: 2022-04-15 | Stop reason: HOSPADM

## 2022-04-15 RX ORDER — SODIUM CHLORIDE 0.9 % (FLUSH) 0.9 %
3-10 SYRINGE (ML) INJECTION AS NEEDED
Status: DISCONTINUED | OUTPATIENT
Start: 2022-04-15 | End: 2022-04-16 | Stop reason: HOSPADM

## 2022-04-15 RX ORDER — DEXAMETHASONE SODIUM PHOSPHATE 4 MG/ML
INJECTION, SOLUTION INTRA-ARTICULAR; INTRALESIONAL; INTRAMUSCULAR; INTRAVENOUS; SOFT TISSUE AS NEEDED
Status: DISCONTINUED | OUTPATIENT
Start: 2022-04-15 | End: 2022-04-15 | Stop reason: SURG

## 2022-04-15 RX ORDER — FENTANYL CITRATE 50 UG/ML
50 INJECTION, SOLUTION INTRAMUSCULAR; INTRAVENOUS
Status: DISCONTINUED | OUTPATIENT
Start: 2022-04-15 | End: 2022-04-16 | Stop reason: HOSPADM

## 2022-04-15 RX ORDER — FAMOTIDINE 20 MG/1
20 TABLET, FILM COATED ORAL ONCE
Status: COMPLETED | OUTPATIENT
Start: 2022-04-15 | End: 2022-04-15

## 2022-04-15 RX ORDER — METOPROLOL SUCCINATE 100 MG/1
100 TABLET, EXTENDED RELEASE ORAL DAILY
Status: DISCONTINUED | OUTPATIENT
Start: 2022-04-16 | End: 2022-04-16 | Stop reason: HOSPADM

## 2022-04-15 RX ORDER — HYDRALAZINE HYDROCHLORIDE 20 MG/ML
5 INJECTION INTRAMUSCULAR; INTRAVENOUS
Status: DISCONTINUED | OUTPATIENT
Start: 2022-04-15 | End: 2022-04-16 | Stop reason: HOSPADM

## 2022-04-15 RX ORDER — METOPROLOL SUCCINATE 50 MG/1
100 TABLET, EXTENDED RELEASE ORAL ONCE
Status: COMPLETED | OUTPATIENT
Start: 2022-04-15 | End: 2022-04-15

## 2022-04-15 RX ORDER — NALOXONE HCL 0.4 MG/ML
0.1 VIAL (ML) INJECTION
Status: DISCONTINUED | OUTPATIENT
Start: 2022-04-15 | End: 2022-04-16 | Stop reason: HOSPADM

## 2022-04-15 RX ORDER — DOCUSATE SODIUM 100 MG/1
100 CAPSULE, LIQUID FILLED ORAL 2 TIMES DAILY PRN
Status: DISCONTINUED | OUTPATIENT
Start: 2022-04-15 | End: 2022-04-16 | Stop reason: HOSPADM

## 2022-04-15 RX ORDER — ONDANSETRON 2 MG/ML
INJECTION INTRAMUSCULAR; INTRAVENOUS AS NEEDED
Status: DISCONTINUED | OUTPATIENT
Start: 2022-04-15 | End: 2022-04-15 | Stop reason: SURG

## 2022-04-15 RX ORDER — DEXTROSE MONOHYDRATE 25 G/50ML
25 INJECTION, SOLUTION INTRAVENOUS
Status: DISCONTINUED | OUTPATIENT
Start: 2022-04-15 | End: 2022-04-16 | Stop reason: HOSPADM

## 2022-04-15 RX ORDER — MAGNESIUM HYDROXIDE 1200 MG/15ML
LIQUID ORAL AS NEEDED
Status: DISCONTINUED | OUTPATIENT
Start: 2022-04-15 | End: 2022-04-15 | Stop reason: HOSPADM

## 2022-04-15 RX ORDER — ONDANSETRON 2 MG/ML
4 INJECTION INTRAMUSCULAR; INTRAVENOUS ONCE AS NEEDED
Status: DISCONTINUED | OUTPATIENT
Start: 2022-04-15 | End: 2022-04-15 | Stop reason: SDUPTHER

## 2022-04-15 RX ORDER — CEFAZOLIN SODIUM 2 G/100ML
2 INJECTION, SOLUTION INTRAVENOUS ONCE
Status: COMPLETED | OUTPATIENT
Start: 2022-04-15 | End: 2022-04-15

## 2022-04-15 RX ORDER — PROMETHAZINE HYDROCHLORIDE 25 MG/1
25 TABLET ORAL ONCE AS NEEDED
Status: DISCONTINUED | OUTPATIENT
Start: 2022-04-15 | End: 2022-04-16 | Stop reason: HOSPADM

## 2022-04-15 RX ORDER — IPRATROPIUM BROMIDE AND ALBUTEROL SULFATE 2.5; .5 MG/3ML; MG/3ML
3 SOLUTION RESPIRATORY (INHALATION) ONCE AS NEEDED
Status: DISCONTINUED | OUTPATIENT
Start: 2022-04-15 | End: 2022-04-16 | Stop reason: HOSPADM

## 2022-04-15 RX ORDER — NALOXONE HCL 0.4 MG/ML
0.4 VIAL (ML) INJECTION AS NEEDED
Status: DISCONTINUED | OUTPATIENT
Start: 2022-04-15 | End: 2022-04-15 | Stop reason: SDUPTHER

## 2022-04-15 RX ORDER — SUCCINYLCHOLINE CHLORIDE 20 MG/ML
INJECTION INTRAMUSCULAR; INTRAVENOUS AS NEEDED
Status: DISCONTINUED | OUTPATIENT
Start: 2022-04-15 | End: 2022-04-15 | Stop reason: SURG

## 2022-04-15 RX ORDER — ONDANSETRON 2 MG/ML
4 INJECTION INTRAMUSCULAR; INTRAVENOUS EVERY 6 HOURS PRN
Status: DISCONTINUED | OUTPATIENT
Start: 2022-04-15 | End: 2022-04-16 | Stop reason: HOSPADM

## 2022-04-15 RX ORDER — SODIUM CHLORIDE 0.9 % (FLUSH) 0.9 %
10 SYRINGE (ML) INJECTION EVERY 12 HOURS SCHEDULED
Status: DISCONTINUED | OUTPATIENT
Start: 2022-04-15 | End: 2022-04-15 | Stop reason: HOSPADM

## 2022-04-15 RX ORDER — FAMOTIDINE 20 MG/1
20 TABLET, FILM COATED ORAL 2 TIMES DAILY
Status: DISCONTINUED | OUTPATIENT
Start: 2022-04-15 | End: 2022-04-16 | Stop reason: HOSPADM

## 2022-04-15 RX ORDER — HYDROCODONE BITARTRATE AND ACETAMINOPHEN 5; 325 MG/1; MG/1
1 TABLET ORAL ONCE AS NEEDED
Status: DISCONTINUED | OUTPATIENT
Start: 2022-04-15 | End: 2022-04-16 | Stop reason: HOSPADM

## 2022-04-15 RX ORDER — OXYCODONE HYDROCHLORIDE AND ACETAMINOPHEN 5; 325 MG/1; MG/1
1 TABLET ORAL EVERY 4 HOURS PRN
Status: DISCONTINUED | OUTPATIENT
Start: 2022-04-15 | End: 2022-04-16 | Stop reason: HOSPADM

## 2022-04-15 RX ORDER — LIDOCAINE HYDROCHLORIDE 20 MG/ML
10 SOLUTION OROPHARYNGEAL
Status: DISCONTINUED | OUTPATIENT
Start: 2022-04-15 | End: 2022-04-16 | Stop reason: HOSPADM

## 2022-04-15 RX ORDER — FENTANYL CITRATE 50 UG/ML
INJECTION, SOLUTION INTRAMUSCULAR; INTRAVENOUS
Status: COMPLETED
Start: 2022-04-15 | End: 2022-04-15

## 2022-04-15 RX ORDER — SODIUM CHLORIDE 0.9 % (FLUSH) 0.9 %
3 SYRINGE (ML) INJECTION EVERY 12 HOURS SCHEDULED
Status: DISCONTINUED | OUTPATIENT
Start: 2022-04-15 | End: 2022-04-16 | Stop reason: HOSPADM

## 2022-04-15 RX ORDER — FENTANYL CITRATE 50 UG/ML
INJECTION, SOLUTION INTRAMUSCULAR; INTRAVENOUS AS NEEDED
Status: DISCONTINUED | OUTPATIENT
Start: 2022-04-15 | End: 2022-04-15 | Stop reason: SURG

## 2022-04-15 RX ORDER — BUPIVACAINE HCL/0.9 % NACL/PF 0.125 %
PLASTIC BAG, INJECTION (ML) EPIDURAL AS NEEDED
Status: DISCONTINUED | OUTPATIENT
Start: 2022-04-15 | End: 2022-04-15 | Stop reason: SURG

## 2022-04-15 RX ORDER — SODIUM CHLORIDE 9 MG/ML
75 INJECTION, SOLUTION INTRAVENOUS CONTINUOUS
Status: DISCONTINUED | OUTPATIENT
Start: 2022-04-15 | End: 2022-04-15

## 2022-04-15 RX ORDER — NICOTINE POLACRILEX 4 MG
15 LOZENGE BUCCAL
Status: DISCONTINUED | OUTPATIENT
Start: 2022-04-15 | End: 2022-04-16 | Stop reason: HOSPADM

## 2022-04-15 RX ORDER — LIDOCAINE HYDROCHLORIDE 10 MG/ML
0.5 INJECTION, SOLUTION EPIDURAL; INFILTRATION; INTRACAUDAL; PERINEURAL ONCE AS NEEDED
Status: COMPLETED | OUTPATIENT
Start: 2022-04-15 | End: 2022-04-15

## 2022-04-15 RX ORDER — PROMETHAZINE HYDROCHLORIDE 25 MG/1
25 SUPPOSITORY RECTAL ONCE AS NEEDED
Status: DISCONTINUED | OUTPATIENT
Start: 2022-04-15 | End: 2022-04-16 | Stop reason: HOSPADM

## 2022-04-15 RX ORDER — AMITRIPTYLINE HYDROCHLORIDE 50 MG/1
150 TABLET, FILM COATED ORAL NIGHTLY
Status: DISCONTINUED | OUTPATIENT
Start: 2022-04-15 | End: 2022-04-16 | Stop reason: HOSPADM

## 2022-04-15 RX ORDER — LABETALOL HYDROCHLORIDE 5 MG/ML
5 INJECTION, SOLUTION INTRAVENOUS
Status: DISCONTINUED | OUTPATIENT
Start: 2022-04-15 | End: 2022-04-16 | Stop reason: HOSPADM

## 2022-04-15 RX ORDER — SODIUM CHLORIDE, SODIUM LACTATE, POTASSIUM CHLORIDE, CALCIUM CHLORIDE 600; 310; 30; 20 MG/100ML; MG/100ML; MG/100ML; MG/100ML
9 INJECTION, SOLUTION INTRAVENOUS CONTINUOUS
Status: DISCONTINUED | OUTPATIENT
Start: 2022-04-15 | End: 2022-04-15

## 2022-04-15 RX ORDER — HYDROMORPHONE HYDROCHLORIDE 1 MG/ML
0.5 INJECTION, SOLUTION INTRAMUSCULAR; INTRAVENOUS; SUBCUTANEOUS
Status: DISCONTINUED | OUTPATIENT
Start: 2022-04-15 | End: 2022-04-15 | Stop reason: SDUPTHER

## 2022-04-15 RX ORDER — MIDAZOLAM HYDROCHLORIDE 1 MG/ML
1 INJECTION INTRAMUSCULAR; INTRAVENOUS
Status: DISCONTINUED | OUTPATIENT
Start: 2022-04-15 | End: 2022-04-15 | Stop reason: HOSPADM

## 2022-04-15 RX ORDER — MONTELUKAST SODIUM 10 MG/1
10 TABLET ORAL DAILY
Status: DISCONTINUED | OUTPATIENT
Start: 2022-04-15 | End: 2022-04-16 | Stop reason: HOSPADM

## 2022-04-15 RX ORDER — ONDANSETRON 4 MG/1
4 TABLET, FILM COATED ORAL EVERY 6 HOURS PRN
Status: DISCONTINUED | OUTPATIENT
Start: 2022-04-15 | End: 2022-04-16 | Stop reason: HOSPADM

## 2022-04-15 RX ORDER — PROPOFOL 10 MG/ML
VIAL (ML) INTRAVENOUS AS NEEDED
Status: DISCONTINUED | OUTPATIENT
Start: 2022-04-15 | End: 2022-04-15 | Stop reason: SURG

## 2022-04-15 RX ORDER — ROCURONIUM BROMIDE 10 MG/ML
INJECTION, SOLUTION INTRAVENOUS AS NEEDED
Status: DISCONTINUED | OUTPATIENT
Start: 2022-04-15 | End: 2022-04-15 | Stop reason: SURG

## 2022-04-15 RX ORDER — LIDOCAINE HYDROCHLORIDE 10 MG/ML
INJECTION, SOLUTION EPIDURAL; INFILTRATION; INTRACAUDAL; PERINEURAL AS NEEDED
Status: DISCONTINUED | OUTPATIENT
Start: 2022-04-15 | End: 2022-04-15 | Stop reason: SURG

## 2022-04-15 RX ORDER — ALBUTEROL SULFATE 90 UG/1
2 AEROSOL, METERED RESPIRATORY (INHALATION) EVERY 4 HOURS PRN
Status: DISCONTINUED | OUTPATIENT
Start: 2022-04-15 | End: 2022-04-16 | Stop reason: HOSPADM

## 2022-04-15 RX ADMIN — SODIUM CHLORIDE 75 ML/HR: 9 INJECTION, SOLUTION INTRAVENOUS at 13:16

## 2022-04-15 RX ADMIN — HYDROMORPHONE HYDROCHLORIDE 0.5 MG: 1 INJECTION, SOLUTION INTRAMUSCULAR; INTRAVENOUS; SUBCUTANEOUS at 11:50

## 2022-04-15 RX ADMIN — FENTANYL CITRATE 50 MCG: 50 INJECTION, SOLUTION INTRAMUSCULAR; INTRAVENOUS at 11:35

## 2022-04-15 RX ADMIN — PROPOFOL 160 MG: 10 INJECTION, EMULSION INTRAVENOUS at 09:43

## 2022-04-15 RX ADMIN — METOPROLOL SUCCINATE 100 MG: 50 TABLET, EXTENDED RELEASE ORAL at 09:00

## 2022-04-15 RX ADMIN — HYDROCODONE BITARTRATE AND ACETAMINOPHEN 1 TABLET: 5; 325 TABLET ORAL at 13:33

## 2022-04-15 RX ADMIN — FENTANYL CITRATE 50 MCG: 50 INJECTION, SOLUTION INTRAMUSCULAR; INTRAVENOUS at 11:45

## 2022-04-15 RX ADMIN — SODIUM CHLORIDE, POTASSIUM CHLORIDE, SODIUM LACTATE AND CALCIUM CHLORIDE 9 ML/HR: 600; 310; 30; 20 INJECTION, SOLUTION INTRAVENOUS at 11:53

## 2022-04-15 RX ADMIN — MONTELUKAST SODIUM 10 MG: 10 TABLET, COATED ORAL at 13:20

## 2022-04-15 RX ADMIN — SUCCINYLCHOLINE CHLORIDE 120 MG: 20 INJECTION, SOLUTION INTRAMUSCULAR; INTRAVENOUS at 09:43

## 2022-04-15 RX ADMIN — FAMOTIDINE 20 MG: 20 TABLET ORAL at 21:14

## 2022-04-15 RX ADMIN — LIDOCAINE HYDROCHLORIDE 10 ML: 20 SOLUTION ORAL; TOPICAL at 17:12

## 2022-04-15 RX ADMIN — ROCURONIUM BROMIDE 10 MG: 10 INJECTION, SOLUTION INTRAVENOUS at 09:43

## 2022-04-15 RX ADMIN — DEXAMETHASONE SODIUM PHOSPHATE 6 MG: 4 INJECTION, SOLUTION INTRA-ARTICULAR; INTRALESIONAL; INTRAMUSCULAR; INTRAVENOUS; SOFT TISSUE at 09:47

## 2022-04-15 RX ADMIN — LIDOCAINE HYDROCHLORIDE 0.5 ML: 10 INJECTION, SOLUTION EPIDURAL; INFILTRATION; INTRACAUDAL; PERINEURAL at 09:00

## 2022-04-15 RX ADMIN — Medication 100 MCG: at 10:45

## 2022-04-15 RX ADMIN — SODIUM CHLORIDE, POTASSIUM CHLORIDE, SODIUM LACTATE AND CALCIUM CHLORIDE 9 ML/HR: 600; 310; 30; 20 INJECTION, SOLUTION INTRAVENOUS at 09:00

## 2022-04-15 RX ADMIN — FENTANYL CITRATE 50 MCG: 50 INJECTION, SOLUTION INTRAMUSCULAR; INTRAVENOUS at 11:04

## 2022-04-15 RX ADMIN — INSULIN LISPRO 2 UNITS: 100 INJECTION, SOLUTION INTRAVENOUS; SUBCUTANEOUS at 17:49

## 2022-04-15 RX ADMIN — CEFAZOLIN SODIUM 2 G: 2 INJECTION, SOLUTION INTRAVENOUS at 09:48

## 2022-04-15 RX ADMIN — Medication 3 ML: at 21:15

## 2022-04-15 RX ADMIN — LIDOCAINE HYDROCHLORIDE 100 MG: 10 INJECTION, SOLUTION EPIDURAL; INFILTRATION; INTRACAUDAL; PERINEURAL at 09:43

## 2022-04-15 RX ADMIN — ONDANSETRON 4 MG: 2 INJECTION INTRAMUSCULAR; INTRAVENOUS at 10:58

## 2022-04-15 RX ADMIN — AMITRIPTYLINE HYDROCHLORIDE 150 MG: 50 TABLET, FILM COATED ORAL at 21:14

## 2022-04-15 RX ADMIN — FAMOTIDINE 20 MG: 20 TABLET ORAL at 09:00

## 2022-04-15 RX ADMIN — FENTANYL CITRATE 100 MCG: 50 INJECTION, SOLUTION INTRAMUSCULAR; INTRAVENOUS at 09:43

## 2022-04-15 NOTE — H&P
Pre-Op H&P  Raimundo Munguia  [id number]  1965      Chief complaint: Thyroid nodule      Subjective:  Patient is a 56 y.o.male presents for scheduled surgery by Dr. Wynn.  He anticipates a THYROIDECTOMY RIGHT LOBECTOMY today.  He had a CT scan on work-up for shortness of breath 3/2021 and had an incidental finding of thyroid nodule.  He does report some difficulty swallowing at baseline.  TSH on 2/24/2022 6.3.      Review of Systems:  Constitutional-- No fever, chills or sweats. No fatigue.  CV-- No chest pain, palpitation or syncope. + HTN, HLD  Resp-- No SOB, cough, hemoptysis  Skin--No rashes or lesions      Allergies: No Known Allergies      Home Meds:  Medications Prior to Admission   Medication Sig Dispense Refill Last Dose   • amitriptyline (ELAVIL) 150 MG tablet TAKE ONE TABLET BY MOUTH EVERY NIGHT AT BEDTIME (Patient taking differently: Take 150 mg by mouth Every Night.) 30 tablet 3    • cyclobenzaprine (FLEXERIL) 10 MG tablet TAKE ONE TABLET BY MOUTH TWO TIMES A DAY AS NEEDED FOR MUSCLE SPASMS (Patient taking differently: Take 10 mg by mouth 2 (Two) Times a Day As Needed for Muscle Spasms.) 60 tablet 0    • Diclofenac Sodium (VOLTAREN) 1 % gel gel Apply  topically to the appropriate area as directed 3 (Three) Times a Day. (Patient taking differently: Apply 4 g topically to the appropriate area as directed 3 (Three) Times a Day As Needed.) 100 g 4    • fluticasone (FLONASE) 50 MCG/ACT nasal spray 1 spray into the nostril(s) as directed by provider 2 (Two) Times a Day As Needed for Rhinitis or Allergies. 16 g 4    • folic acid (FOLVITE) 1 MG tablet Take 1 tablet by mouth Daily. 30 tablet 4    • Insulin Degludec-Liraglutide (XULTOPHY) 100-3.6 UNIT-MG/ML solution pen-injector subcutaneous pen Inject 40 Units under the skin into the appropriate area as directed Daily. 5 pen 1    • Lancets (OneTouch Delica Plus [id number]) misc 1 each by Other route Daily. 100 each 3    • Lancets Ultra Fine misc 1 Device  3 (Three) Times a Day. 100 each 6    • lansoprazole (PREVACID) 30 MG capsule Take 1 capsule by mouth Daily. 30 capsule 4    • levothyroxine (SYNTHROID, LEVOTHROID) 25 MCG tablet Take 1 tablet by mouth Daily. 30 tablet 2    • loratadine (CLARITIN) 10 MG tablet Take 1 tablet by mouth Daily. 30 tablet 4    • metFORMIN (GLUCOPHAGE) 500 MG tablet Take 1 tablet by mouth Daily With Breakfast. (Patient taking differently: Take 1,000 mg by mouth 2 (Two) Times a Day With Meals.) 30 tablet 0    • methotrexate 2.5 MG tablet Take 10 tablets by mouth 1 (One) Time Per Week. (Patient taking differently: Take 25 mg by mouth 1 (One) Time Per Week. Wednesdays) 40 tablet 4    • metoprolol succinate XL (TOPROL-XL) 100 MG 24 hr tablet Take 1 tablet by mouth Daily. 30 tablet 4    • montelukast (SINGULAIR) 10 MG tablet Take 1 tablet by mouth every night at bedtime. 30 tablet 4    • ProAir  (90 Base) MCG/ACT inhaler Inhale 2 puffs Every 4 (Four) Hours As Needed for Shortness of Air. 8.5 g 4    • rosuvastatin (CRESTOR) 5 MG tablet Take 1 tablet by mouth Daily. (Patient taking differently: Take 5 mg by mouth Every Night.) 30 tablet 4    • vitamin B-12 (CYANOCOBALAMIN) 1000 MCG tablet Take 1 tablet by mouth Daily. 30 tablet 4          PMH:   Past Medical History:   Diagnosis Date   • Asthma     inhlaer  prn   • Cervicalgia    • Diabetes mellitus (HCC) 2015    po meds as well as insulin daily; checks sugars at home   • GERD (gastroesophageal reflux disease)    • Helicobacter pylori (H. pylori) infection    • Hyperlipidemia    • Hypertension    • Osteopenia    • Palpitations    • PVD (peripheral vascular disease) (HCC)    • Requires supplemental oxygen     2 liters at night usually   • Shortness of breath    • Substernal thyroid goiter 07/14/2021   • Vitamin D deficiency      PSH:    Past Surgical History:   Procedure Laterality Date   • COLON RESECTION N/A 02/22/2018    Procedure: COLON RESECTION SIGMOID;  Surgeon: Ej Tinoco MD;   "Location: [facility] OR;  Service:    • COLONOSCOPY N/A 02/07/2018    Procedure: COLONOSCOPY CPT CODE: 06327;  Surgeon: Teodoro Garg III, MD;  Location: [facility] OR;  Service:    • COLONOSCOPY N/A 03/07/2019    Procedure: COLONOSCOPY FOR SCREENING CPT CODE: ;  Surgeon: Ej Tinoco MD;  Location: [facility] OR;  Service: Gastroenterology   • ENDOSCOPY N/A 02/07/2018    Procedure: ESOPHAGOGASTRODUODENOSCOPY WITH DILATATION CPT CODE: 29868;  Surgeon: Teodoro Garg III, MD;  Location: [facility] OR;  Service:        Immunization History:  Influenza: 2021  Pneumococcal: UTD  Tetanus: UTD  Covid x3: 2021    Social History:   Tobacco:   Social History     Tobacco Use   Smoking Status Never Smoker   Smokeless Tobacco Current User   • Types: Snuff   Tobacco Comment    one can every 2-3 days; 25-26 years of use      Alcohol:     Social History     Substance and Sexual Activity   Alcohol Use No         Physical Exam:/94 (BP Location: Right arm, Patient Position: Lying)   Pulse 120   Temp 97.2 °F (36.2 °C) (Temporal)   Resp 16   Ht 172.7 cm (68\")   Wt 105 kg (230 lb 14.9 oz)   SpO2 95%   BMI 35.11 kg/m²       General Appearance:    Alert, cooperative, no distress, appears stated age   Head:    Normocephalic, without obvious abnormality, atraumatic   Lungs:     Clear to auscultation bilaterally, respirations unlabored    Heart:   Regular rate and rhythm, S1 and S2 normal    Abdomen:    Soft without tenderness.  Obese   Extremities:   Extremities normal, atraumatic, no cyanosis or edema   Skin:   Skin color, texture, turgor normal, no rashes or lesions   Neurologic:   Grossly intact     Results Review:     LABS:  Lab Results   Component Value Date    WBC 11.38 (H) 04/13/2022    HGB 17.1 04/13/2022    HCT 49.5 04/13/2022    MCV 90.2 04/13/2022     04/13/2022    NEUTROABS 5.04 02/24/2022    GLUCOSE 252 (H) 02/24/2022    BUN 12 02/24/2022    CREATININE 0.76 02/24/2022    EGFRIFNONA 106 02/24/2022    " EGFRIFAFRI  09/13/2016      Comment:      <15 Indicative of kidney failure.     02/24/2022    K 4.3 04/13/2022     02/24/2022    CO2 22.0 02/24/2022    CALCIUM 9.1 02/24/2022    ALBUMIN 4.30 02/24/2022    AST 28 02/24/2022    ALT 47 (H) 02/24/2022    BILITOT 0.2 02/24/2022      Latest Reference Range & Units 02/24/22 12:22   TSH Baseline 0.270 - 4.200 uIU/mL 6.300 (H)   (H): Data is abnormally high  RADIOLOGY:  Imaging Results (Last 72 Hours)     ** No results found for the last 72 hours. **          I reviewed the patient's new clinical results.    Cancer Staging (if applicable)  Cancer Patient: __ yes __no __unknown; If yes, clinical stage T:__ N:__M:__, stage group or __N/A      Impression: Nontoxic single thyroid nodule      Plan: THYROIDECTOMY RIGHT LOBECTOMY      Niya Barber, ABDIRAHMAN   4/15/2022   09:00 EDT

## 2022-04-15 NOTE — PLAN OF CARE
Goal Outcome Evaluation: Patient arrived on floor after surgery, pleasant and doing well. Discussed pain control with patient as he does not have a lot of pain in his neck, but has had higher level of pain with swallowing. Ordered throat lozenges via provider and told him he could have warm drinks to help. Patient is resting well.

## 2022-04-15 NOTE — CASE MANAGEMENT/SOCIAL WORK
Continued Stay Note  [facility]     Patient Name: Raimundo Munguia  MRN: [id number]  Today's Date: 4/15/2022    Admit Date: 4/15/2022     Discharge Plan     Row Name 04/15/22 1248       Plan    Plan Home    Patient/Family in Agreement with Plan yes    Final Discharge Disposition Code 01 - home or self-care               Discharge Codes    No documentation.                     Clau Benjamin RN

## 2022-04-15 NOTE — OP NOTE
General Surgery Operative Note    THYROIDECTOMY  Procedure Report    Patient Name:  Raimundo Munguia  YOB: 1965  [id number]     Date of Surgery:  4/15/2022       Pre-op Diagnosis: Right thyroid nodule    Post-op Diagnosis: Right thyroid nodule      Procedure(s):  RIGHT THYROID LOBECTOMY    Surgeon: Fidencio Wynn MD    Assistant: Francois Vazquez PA     Anesthesia: General    Estimated Blood Loss: minimal    Implants:    Implant Name Type Inv. Item Serial No.  Lot No. LRB No. Used Action   CLIP LIGAT VASC HORIZON TI MD KANDACE 6CT - [id number] Implant CLIP LIGAT VASC HORIZON TI MD KANDACE 6CT  [employer]  Right 4 Implanted   CLIP LIGAT VASC HORIZON TI SM YEL 6CT - [id number] Implant CLIP LIGAT VASC HORIZON TI SM YEL 6CT  [employer]  Right 1 Implanted   HEMOST ABS SURGICEL 4X8IN - [id number] Implant HEMOST ABS SURGICEL 4X8IN  [employer]  Right 1 Implanted       Specimen:          Specimens       ID Source Type Tests Collected By Collected At Frozen?    A Thyroid Tissue TISSUE PATHOLOGY EXAM   Fidencio Wynn MD 4/15/22 1044 No    Description: RIGHT THYROID FOR PERMANENT    This specimen was not marked as sent.                Findings: Right thyroid nodule    Indications:  The patient is a 56 year old with an incidental finding of a right thyroid nodule on axial imaging with substernal extension. We discussed the risks, benefits, and alternatives to right thyroid lobectomy, and the patient was found to be agreeable. Informed consent was obtained.    Description of Procedure:     After obtaining informed consent, the patient was taken to the operating room and placed in supine position. After appropriate DVT and antibiotic prophylaxis, general anesthesia was induced with a placement of an Nims tube for nerve monitoring. The neck was prepped and draped in standard sterile fashion.    An approximately 6 cm incision was made 2 fingerbreadths superior to the  suprasternal notch transversely across the midline neck.  The skin was incised using a 15 blade.  The dermis and subcutaneous tissue were divided using Bovie electrocautery.  The platysma was dissected out and divided using Bovie electrocautery.  Subplatysmal flaps were created superiorly to the thyroid cartilage, inferiorly to the sternal notch, and laterally to the sternocleidomastoid muscles.  The strap muscles were divided in the midline at the median raphae.  Attention was first paid to the right thyroid lobe.  The strap muscles were dissected off the anterior and lateral surfaces of the thyroid lobe using Bovie electrocautery.  Babcocks were placed on the thyroid lobe and it was retracted anteriorly and medially.  Superior thyroid vessels were ligated with combination of clips and LigaSure.  The superior and inferior parathyroid glands were identified and dissected away from the thyroid lobe with blood supply left intact.  The recurrent laryngeal nerve was identified by both nerve stimulation and visualization.  Soft tissues around this area were dissected using bipolar electrocautery and small vessels ligated with clips.  Once the thyroid was dissected away from the area of the nerve the remainder the thyroid lobe was dissected off the anterior surface of the trachea using bipolar cautery.  Inferior thyroid vessels were ligated with combination of clips and LigaSure. The thyroid was then transected at the level of the isthmus using the Ligasure device. The specimen was then removed from the body and inspected for parathyroid tissue, and none was noted. It was then sent to pathology as a permanent specimen.    Valsalva maneuver was performed by anesthesia to assess for any further bleeding and any further bleeding was controlled using placement of clips.  After hemostasis was obtained the right recurrent laryngeal nerve was grossly intact throughout its entire visualized course and stimulated appropriately  with nerve stimulation.  Surgicel was placed in all areas of dissection.  The strap muscles were loosely reapproximated using interrupted 3-0 Vicryl.  The platysma was reapproximated using interrupted 3-0 Vicryl.  The skin was reapproximated using a running subcuticular 4-0 Monocryl.  A dry dressing was placed on top of this.  There were no complications. The patient awoke from anesthesia without complications and was taken to the PACU in stable condition.        Assistant: Francois Vazquez PA  was responsible for performing the following activities: Retraction, Suction, Suturing, Closing and Placing Dressing and their skilled assistance was necessary for the success of this case.    Fidencio Wynn MD     Date: 4/15/2022  Time: 11:01 EDT

## 2022-04-15 NOTE — ANESTHESIA PROCEDURE NOTES
Airway  Date/Time: 4/15/2022 9:44 AM  Airway not difficult    General Information and Staff    Patient location during procedure: OR  CRNA: Juanita Sanchez CRNA    Indications and Patient Condition  Indications for airway management: airway protection    Preoxygenated: yes  MILS maintained throughout  Mask difficulty assessment: 2 - vent by mask + OA or adjuvant +/- NMBA    Final Airway Details  Final airway type: endotracheal airway      Successful airway: NIM tube    Successful intubation technique: video laryngoscopy  Facilitating devices/methods: intubating stylet  Endotracheal tube insertion site: oral  Blade: Soria  Blade size: 4  Cormack-Lehane Classification: grade I - full view of glottis  Placement verified by: chest auscultation and capnometry   Measured from: lips  Number of attempts at approach: 1  Assessment: lips, teeth, and gum same as pre-op and atraumatic intubation    Additional Comments  Negative epigastric sounds, symmetrical chest rise and fall, visualized VC with NIM tube placement; poor dentition noted-dentition and lips unchanged

## 2022-04-15 NOTE — ANESTHESIA POSTPROCEDURE EVALUATION
Patient: Raimundo Munguia    Procedure Summary     Date: 04/15/22 Room / Location:  [facility]  [facility]    Anesthesia Start: 0938 Anesthesia Stop:     Procedure: THYROIDECTOMY RIGHT LOBECTOMY (Right Neck) Diagnosis: Right thyroid nodule    Surgeons: Fidencio Wynn MD Provider: Lamberto Pizano MD    Anesthesia Type: general ASA Status: 3          Anesthesia Type: general    Vitals  Vitals Value Taken Time   /92 04/15/22 1120   Temp 97.1 °F (36.2 °C) 04/15/22 1116   Pulse 111 04/15/22 1122   Resp 18 04/15/22 1116   SpO2 91 % 04/15/22 1122   Vitals shown include unvalidated device data.        Post Anesthesia Care and Evaluation    Patient location during evaluation: PACU  Patient participation: complete - patient cannot participate  Level of consciousness: awake and alert and sleepy but conscious  Pain score: 0  Pain management: adequate  Airway patency: patent  Anesthetic complications: No anesthetic complications  PONV Status: none  Cardiovascular status: hemodynamically stable and acceptable  Respiratory status: nonlabored ventilation, acceptable, nasal cannula and oral airway  Hydration status: acceptable

## 2022-04-15 NOTE — PROGRESS NOTES
"Patient Name:  Raimundo Munguia  YOB: 1965  [id number]    Surgery Progress Note    Date of visit: 4/15/2022    Subjective   Patient with sore throat, no incisional pain. No nausea/vomiting. No fevers/chills.          Objective       /91 (BP Location: Right arm, Patient Position: Lying)   Pulse 115   Temp 98.1 °F (36.7 °C) (Oral)   Resp 17   Ht 172.7 cm (68\")   Wt 105 kg (230 lb 14.9 oz)   SpO2 93%   BMI 35.11 kg/m²     Intake/Output Summary (Last 24 hours) at 4/15/2022 1748  Last data filed at 4/15/2022 1102  Gross per 24 hour   Intake 900 ml   Output --   Net 900 ml       CV:  Rhythm regular and rate regular  L:  Clear to auscultation bilaterally  Abd:  Bowel sounds positive, soft  Ext:  No cyanosis, clubbing, edema  HEENT: neck flat, dressing c/d/i           Assessment/Plan     Pt POD#0 right thyroid lobectomy  Pain control  OOB, IS, DVT prlx  Adv diet as tolerated, HLIV        Fidencio Wynn MD  4/15/2022  17:48 EDT      "

## 2022-04-15 NOTE — ANESTHESIA PREPROCEDURE EVALUATION
Anesthesia Evaluation     Patient summary reviewed and Nursing notes reviewed                Airway   Mallampati: II  TM distance: >3 FB  Neck ROM: full  No difficulty expected  Dental - normal exam   (+) poor dentition    Comment: Multiple teeth in very poor condition    Pulmonary - normal exam   (+) a smoker, COPD, asthma,home oxygen (2L @ night),   Cardiovascular     Rate: abnormal    (+) hypertension, PVD, hyperlipidemia,     ROS comment:   Echo 11/19: normal EF, no significant valve disease  PE comment: tachycardic    Neuro/Psych- negative ROS  GI/Hepatic/Renal/Endo    (+) morbid obesity, GERD,  diabetes mellitus, thyroid problem hypothyroidism    Musculoskeletal     Abdominal  - normal exam    Bowel sounds: normal.   Substance History - negative use     OB/GYN negative ob/gyn ROS         Other   arthritis,                    Anesthesia Plan    ASA 3     general   (Vora d/t very poor dentition)  intravenous induction     Anesthetic plan, all risks, benefits, and alternatives have been provided, discussed and informed consent has been obtained with: patient.    Plan discussed with CRNA.        CODE STATUS:

## 2022-04-15 NOTE — BRIEF OP NOTE
THYROIDECTOMY  Progress Note    Raimundo Munguia  4/15/2022    Pre-op Diagnosis:   Right thyroid nodule       Post-Op Diagnosis Codes:     * Right thyroid nodule [E04.1]    Procedure/CPT® Codes:        Procedure(s):  RIGHT THYROID LOBECTOMY    Surgeon(s):  Fidencio Wynn MD    Anesthesia: General    Staff:   Circulator: Ruth Spivey RN  Scrub Person: Kleber Rodrigues  Nursing Assistant: Shanique Mccall PCT  Assistant: Francois Vazquez PA  Assistant: Francois Vazquez PA      Estimated Blood Loss: minimal    Urine Voided: * No values recorded between 4/15/2022  9:39 AM and 4/15/2022 11:00 AM *    Specimens:                Specimens     ID Source Type Tests Collected By Collected At [facility]?    A Thyroid Tissue · TISSUE PATHOLOGY EXAM   Fidencio Wynn MD 4/15/22 1044     This specimen was not marked as sent.                Drains: * No LDAs found *    Findings: Right thyroid nodule     Complications: none    Assistant: Francois Vazquez PA  was responsible for performing the following activities: Retraction, Suction, Suturing, Closing and Placing Dressing and their skilled assistance was necessary for the success of this case.    Fidencio Wynn MD     Date: 4/15/2022  Time: 11:00 EDT

## 2022-04-16 ENCOUNTER — READMISSION MANAGEMENT (OUTPATIENT)
Dept: CALL CENTER | Facility: HOSPITAL | Age: 57
End: 2022-04-16

## 2022-04-16 VITALS
RESPIRATION RATE: 16 BRPM | OXYGEN SATURATION: 94 % | SYSTOLIC BLOOD PRESSURE: 123 MMHG | DIASTOLIC BLOOD PRESSURE: 72 MMHG | WEIGHT: 230.93 LBS | TEMPERATURE: 97.5 F | HEIGHT: 68 IN | HEART RATE: 109 BPM | BODY MASS INDEX: 35 KG/M2

## 2022-04-16 LAB
BASOPHILS # BLD AUTO: 0.06 10*3/MM3 (ref 0–0.2)
BASOPHILS NFR BLD AUTO: 0.3 % (ref 0–1.5)
DEPRECATED RDW RBC AUTO: 42.6 FL (ref 37–54)
EOSINOPHIL # BLD AUTO: 0.08 10*3/MM3 (ref 0–0.4)
EOSINOPHIL NFR BLD AUTO: 0.4 % (ref 0.3–6.2)
ERYTHROCYTE [DISTWIDTH] IN BLOOD BY AUTOMATED COUNT: 12.4 % (ref 12.3–15.4)
GLUCOSE BLDC GLUCOMTR-MCNC: 137 MG/DL (ref 70–130)
HCT VFR BLD AUTO: 47.9 % (ref 37.5–51)
HGB BLD-MCNC: 16.8 G/DL (ref 13–17.7)
IMM GRANULOCYTES # BLD AUTO: 0.09 10*3/MM3 (ref 0–0.05)
IMM GRANULOCYTES NFR BLD AUTO: 0.4 % (ref 0–0.5)
LYMPHOCYTES # BLD AUTO: 3.18 10*3/MM3 (ref 0.7–3.1)
LYMPHOCYTES NFR BLD AUTO: 15 % (ref 19.6–45.3)
MCH RBC QN AUTO: 32.7 PG (ref 26.6–33)
MCHC RBC AUTO-ENTMCNC: 35.1 G/DL (ref 31.5–35.7)
MCV RBC AUTO: 93.4 FL (ref 79–97)
MONOCYTES # BLD AUTO: 1.84 10*3/MM3 (ref 0.1–0.9)
MONOCYTES NFR BLD AUTO: 8.7 % (ref 5–12)
NEUTROPHILS NFR BLD AUTO: 15.9 10*3/MM3 (ref 1.7–7)
NEUTROPHILS NFR BLD AUTO: 75.2 % (ref 42.7–76)
NRBC BLD AUTO-RTO: 0.2 /100 WBC (ref 0–0.2)
PLATELET # BLD AUTO: 388 10*3/MM3 (ref 140–450)
PMV BLD AUTO: 10.1 FL (ref 6–12)
RBC # BLD AUTO: 5.13 10*6/MM3 (ref 4.14–5.8)
WBC NRBC COR # BLD: 21.15 10*3/MM3 (ref 3.4–10.8)

## 2022-04-16 PROCEDURE — G0378 HOSPITAL OBSERVATION PER HR: HCPCS

## 2022-04-16 PROCEDURE — 82962 GLUCOSE BLOOD TEST: CPT

## 2022-04-16 PROCEDURE — 85025 COMPLETE CBC W/AUTO DIFF WBC: CPT | Performed by: SURGERY

## 2022-04-16 RX ORDER — LEVOTHYROXINE SODIUM 0.05 MG/1
50 TABLET ORAL DAILY
Qty: 30 TABLET | Refills: 2 | Status: SHIPPED | OUTPATIENT
Start: 2022-04-16 | End: 2022-05-31 | Stop reason: SDUPTHER

## 2022-04-16 RX ORDER — LEVOTHYROXINE SODIUM 0.05 MG/1
50 TABLET ORAL DAILY
Qty: 30 TABLET | Refills: 2 | Status: SHIPPED | OUTPATIENT
Start: 2022-04-16 | End: 2022-10-12

## 2022-04-16 RX ORDER — HYDROCODONE BITARTRATE AND ACETAMINOPHEN 5; 325 MG/1; MG/1
1 TABLET ORAL ONCE AS NEEDED
Qty: 10 TABLET | Refills: 0 | Status: SHIPPED | OUTPATIENT
Start: 2022-04-16 | End: 2022-04-25

## 2022-04-16 RX ORDER — HYDROCODONE BITARTRATE AND ACETAMINOPHEN 5; 325 MG/1; MG/1
1 TABLET ORAL EVERY 4 HOURS PRN
Qty: 10 TABLET | Refills: 0 | Status: SHIPPED | OUTPATIENT
Start: 2022-04-16 | End: 2022-05-31

## 2022-04-16 RX ADMIN — MONTELUKAST SODIUM 10 MG: 10 TABLET, COATED ORAL at 09:10

## 2022-04-16 RX ADMIN — METFORMIN HYDROCHLORIDE 500 MG: 500 TABLET ORAL at 08:00

## 2022-04-16 RX ADMIN — METOPROLOL SUCCINATE 100 MG: 100 TABLET, EXTENDED RELEASE ORAL at 09:10

## 2022-04-16 RX ADMIN — FAMOTIDINE 20 MG: 20 TABLET ORAL at 09:10

## 2022-04-16 RX ADMIN — Medication 3 ML: at 09:13

## 2022-04-16 NOTE — PROGRESS NOTES
"Patient Name:  Raimundo Munguia  YOB: 1965  [id number]    Surgery Progress Note    Date of visit: 4/16/2022    Subjective   Pain minimal. No fevers/chills. No nausea/vomiting. No numbness/tiungling in fingers/toes/lips         Objective       /72 (BP Location: Left arm, Patient Position: Lying)   Pulse 109   Temp 97.5 °F (36.4 °C) (Oral)   Resp 16   Ht 172.7 cm (68\")   Wt 105 kg (230 lb 14.9 oz)   SpO2 94%   BMI 35.11 kg/m²     Intake/Output Summary (Last 24 hours) at 4/16/2022 0835  Last data filed at 4/16/2022 0018  Gross per 24 hour   Intake 1860 ml   Output --   Net 1860 ml       CV:  Rhythm regular and rate regular  L:  Clear to auscultation bilaterally  Abd:  Bowel sounds positive, soft  Ext:  No cyanosis, clubbing, edema  HEENT: neck flat, dressing c/d/i           Assessment/Plan     Pt POD#1 right thyroid lobectomy  Pain control  OOB, IS, DVT prlx  Adv diet as tolerated  DC planning      Fidencio Wynn MD  4/16/2022  08:35 EDT      "

## 2022-04-16 NOTE — DISCHARGE SUMMARY
Patient Name:  Raimundo Munguia  YOB: 1965  [id number]      Date of Discharge:  4/16/2022    Discharge Diagnosis:   Right thyroid nodule    Problem List:  Active Hospital Problems    Diagnosis  POA   • Right thyroid nodule [E04.1]  Yes      Resolved Hospital Problems   No resolved problems to display.       Presenting Problem/History of Present Illness  Right thyroid nodule [E04.1]      Hospital Course  Patient is a 56 y.o. male presented with right thyroid nodule. On postoperative day one, he was tolerating a diet without nausea or vomiting, was afebrile, pain was well controlled, was spontaneously voiding, and ambulating appropriately. He was thus found to be safe for discharge to home.       Procedures Performed    Procedure(s):  THYROIDECTOMY RIGHT LOBECTOMY  -------------------       Consults:   Consults     No orders found for last 30 day(s).          Pertinent Test Results: N/A    Condition on Discharge:  Pain controlled with oral pain medication, tolerating diet, ambulating appropriately      Vital Signs  Temp:  [97.1 °F (36.2 °C)-98.1 °F (36.7 °C)] 97.5 °F (36.4 °C)  Heart Rate:  [] 109  Resp:  [16-20] 16  BP: (115-147)/() 123/72    Discharge Disposition  Home or Self Care    Discharge Medications     Discharge Medications      New Medications      Instructions Start Date   HYDROcodone-acetaminophen 5-325 MG per tablet  Commonly known as: NORCO   1 tablet, Oral, Once As Needed         Changes to Medications      Instructions Start Date   amitriptyline 150 MG tablet  Commonly known as: ELAVIL  What changed: when to take this   TAKE ONE TABLET BY MOUTH EVERY NIGHT AT BEDTIME      Diclofenac Sodium 1 % gel gel  Commonly known as: VOLTAREN  What changed:   · how much to take  · when to take this  · reasons to take this   Topical, 3 Times Daily      levothyroxine 50 MCG tablet  Commonly known as: Synthroid  What changed:   · medication strength  · how much to take   50 mcg, Oral,  Daily      metFORMIN 500 MG tablet  Commonly known as: GLUCOPHAGE  What changed:   · how much to take  · when to take this   500 mg, Oral, Daily With Breakfast      methotrexate 2.5 MG tablet  What changed: additional instructions   25 mg, Oral, Weekly      rosuvastatin 5 MG tablet  Commonly known as: CRESTOR  What changed: when to take this   5 mg, Oral, Daily         Continue These Medications      Instructions Start Date   cyclobenzaprine 10 MG tablet  Commonly known as: FLEXERIL   TAKE ONE TABLET BY MOUTH TWO TIMES A DAY AS NEEDED FOR MUSCLE SPASMS      fluticasone 50 MCG/ACT nasal spray  Commonly known as: FLONASE   1 spray, Nasal, 2 Times Daily PRN      Insulin Degludec-Liraglutide 100-3.6 UNIT-MG/ML solution pen-injector subcutaneous pen  Commonly known as: XULTOPHY   40 Units, Subcutaneous, Daily      lansoprazole 30 MG capsule  Commonly known as: PREVACID   30 mg, Oral, Daily      loratadine 10 MG tablet  Commonly known as: CLARITIN   10 mg, Oral, Daily      metoprolol succinate  MG 24 hr tablet  Commonly known as: TOPROL-XL   100 mg, Oral, Daily      montelukast 10 MG tablet  Commonly known as: SINGULAIR   10 mg, Oral, Every Night at Bedtime      OneTouch Delica Plus [id number] misc   1 each, Other, Daily      Lancets Ultra Fine misc   1 Device, Does not apply, 3 Times Daily      ProAir  (90 Base) MCG/ACT inhaler  Generic drug: albuterol sulfate HFA   2 puffs, Inhalation, Every 4 Hours PRN      vitamin B-12 1000 MCG tablet  Commonly known as: CYANOCOBALAMIN   1,000 mcg, Oral, Daily         ASK your doctor about these medications      Instructions Start Date   folic acid 1 MG tablet  Commonly known as: FOLVITE   1,000 mcg, Oral, Daily             Discharge Diet       Activity at Discharge      Follow-up Appointments  Future Appointments   Date Time Provider Department Center   5/26/2022 11:00 AM [facility]   5/31/2022 12:30 PM Aden Carl APRN [facility]    9/12/2022  2:15 PM Madeleine Talley APRN [facility]         Test Results Pending at Discharge  Pending Labs     Order Current Status    Tissue Pathology Exam In process          Time: Discharge 15 min    Fidencio Wynn MD   04/16/22   08:41 EDT

## 2022-04-16 NOTE — PLAN OF CARE
Goal Outcome Evaluation:           Progress: improving  Outcome Evaluation: VSS. RA. Pt tolerated clears. Blood glucose stable. Dressing c/d/i. Amb well. Voiding well. Denies nausea. Pain controlled. Pt calm/cooperative/pleasant.

## 2022-04-16 NOTE — PLAN OF CARE
Goal Outcome Evaluation:                 Patient stable and ready for discharge. Family present and at bedside. Medications reviewed and patient verbalizes understanding. Discharge via WC.

## 2022-04-16 NOTE — OUTREACH NOTE
Prep Survey    Flowsheet Row Responses   [facility] patient discharged from? [place]   Is LACE score < 7 ? No   Emergency Room discharge w/ pulse ox? No   Eligibility [facility]   Date of Admission 04/15/22   Date of Discharge 04/16/22   Discharge Disposition Home or Self Care   Discharge diagnosis s/p THYROIDECTOMY RIGHT LOBECTOMY   Does the patient have one of the following disease processes/diagnoses(primary or secondary)? General Surgery   Does the patient have Home health ordered? No   Is there a DME ordered? No   Prep survey completed? Yes          KAM ENGLAND - Registered Nurse

## 2022-04-18 ENCOUNTER — TRANSITIONAL CARE MANAGEMENT TELEPHONE ENCOUNTER (OUTPATIENT)
Dept: CALL CENTER | Facility: HOSPITAL | Age: 57
End: 2022-04-18

## 2022-04-18 LAB
CYTO UR: NORMAL
LAB AP CASE REPORT: NORMAL
LAB AP CLINICAL INFORMATION: NORMAL
PATH REPORT.FINAL DX SPEC: NORMAL
PATH REPORT.GROSS SPEC: NORMAL

## 2022-04-18 NOTE — OUTREACH NOTE
Call Center TCM Note    Flowsheet Row Responses   [facility] patient discharged from? [place]   Does the patient have one of the following disease processes/diagnoses(primary or secondary)? General Surgery   TCM attempt successful? Yes   Call end time 1702   Discharge diagnosis s/p THYROIDECTOMY RIGHT LOBECTOMY   Meds reviewed with patient/caregiver? Yes   Is the patient having any side effects they believe may be caused by any medication additions or changes? No   Does the patient have all medications related to this admission filled (includes all antibiotics, pain medications, etc.) Yes   Is the patient taking all medications as directed (includes completed medication regime)? Yes   Does the patient have a follow up appointment scheduled with their surgeon? No   What is preventing the patient from scheduling follow up appointments? Haven't had time   Nursing Interventions Educated patient on importance of making appointment, Advised patient to make appointment   Has the patient kept scheduled appointments due by today? N/A   Comments Declined making sooner appt than May.   Psychosocial issues? No   Did the patient receive a copy of their discharge instructions? Yes   Nursing interventions Reviewed instructions with patient   What is the patient's perception of their health status since discharge? Improving   Nursing interventions Nurse provided patient education   Is the patient /caregiver able to teach back basic post-op care? Practice 'cough and deep breath', Drive as instructed by MD in discharge instructions, Keep incision areas clean,dry and protected, Lifting as instructed by MD in discharge instructions   Is the patient/caregiver able to teach back signs and symptoms of incisional infection? Increased redness, swelling or pain at the incisonal site, Increased drainage or bleeding, Incisional warmth, Pus or odor from incision, Fever   Is the patient/caregiver able to teach back steps to recovery at  "home? Set small, achievable goals for return to baseline health, Rest and rebuild strength, gradually increase activity   Is the patient/caregiver able to teach back the hierarchy of who to call/visit for symptoms/problems? PCP, Specialist, Home health nurse, Urgent Care, ED, 911 Yes   Additional teach back comments States he is doing \"alright\".  Says he is up and slowly moving.  States neighbor is a nurse and checks on him.   TCM call completed? Yes   Wrap up additional comments Denies questions or needs at this time.          Elizabeth Gonzalez LPN    4/18/2022, 17:03 EDT      "

## 2022-04-21 ENCOUNTER — PRIOR AUTHORIZATION (OUTPATIENT)
Dept: FAMILY MEDICINE CLINIC | Facility: CLINIC | Age: 57
End: 2022-04-21

## 2022-04-21 ENCOUNTER — TELEPHONE (OUTPATIENT)
Dept: FAMILY MEDICINE CLINIC | Facility: CLINIC | Age: 57
End: 2022-04-21

## 2022-04-21 DIAGNOSIS — E11.65 UNCONTROLLED TYPE 2 DIABETES MELLITUS WITH HYPERGLYCEMIA: Primary | ICD-10-CM

## 2022-04-21 RX ORDER — INSULIN GLARGINE 100 [IU]/ML
40 INJECTION, SOLUTION SUBCUTANEOUS DAILY
Qty: 4 PEN | Refills: 0 | Status: SHIPPED | OUTPATIENT
Start: 2022-04-21 | End: 2022-05-31

## 2022-04-21 NOTE — TELEPHONE ENCOUNTER
Spoke with patient and he is in understanding.      ----- Message from ABDIRAHMAN Coughlin sent at 4/21/2022  2:36 PM EDT -----  Sent in [facility] for him. He should be able to take 40 units. Let me know if not covered   ----- Message -----  From: Ilana Munguia MA  Sent: 4/21/2022   2:16 PM EDT  To: ABDIRAHMAN Coughlin    He is taking 40 units.  ----- Message -----  From: Aden Carl APRN  Sent: 4/20/2022   2:04 PM EDT  To: Ilana Munguia MA    I can change him to an only basal insulin but the Xultophy is in a group of diabetes medications that have been shown only to contribute to one type of Thyroid cancer and that is only if there is a family history of that type of cancer. How many units is he now taking?   ----- Message -----  From: Ilana Munguia MA  Sent: 4/19/2022   3:56 PM EDT  To: ABDIRAHMAN Coughlin    Patient called stating that he wanted to change from the Xultophy because is it causes cancer. He said is there anything else he can try?

## 2022-05-02 ENCOUNTER — TELEPHONE (OUTPATIENT)
Dept: FAMILY MEDICINE CLINIC | Facility: CLINIC | Age: 57
End: 2022-05-02

## 2022-05-02 NOTE — TELEPHONE ENCOUNTER
Patient called saying that his insulin wasn't able to be picked up that Aden sent in last week. I called his pharmacy and let them know that I ran a PA and got it approved. They ran it through their system and got the ok to fill it. I called and let patient know this and he is in understanding.

## 2022-05-13 DIAGNOSIS — I10 BENIGN ESSENTIAL HYPERTENSION: Primary | ICD-10-CM

## 2022-05-13 DIAGNOSIS — E04.9 SUBSTERNAL THYROID GOITER: ICD-10-CM

## 2022-05-13 DIAGNOSIS — K21.9 GASTROESOPHAGEAL REFLUX DISEASE WITHOUT ESOPHAGITIS: ICD-10-CM

## 2022-05-13 DIAGNOSIS — E53.8 VITAMIN B 12 DEFICIENCY: ICD-10-CM

## 2022-05-13 DIAGNOSIS — E78.2 MIXED DYSLIPIDEMIA: ICD-10-CM

## 2022-05-13 DIAGNOSIS — M05.79 RHEUMATOID ARTHRITIS INVOLVING MULTIPLE SITES WITH POSITIVE RHEUMATOID FACTOR: ICD-10-CM

## 2022-05-13 DIAGNOSIS — E55.9 VITAMIN D DEFICIENCY: ICD-10-CM

## 2022-05-13 DIAGNOSIS — E11.65 UNCONTROLLED TYPE 2 DIABETES MELLITUS WITH HYPERGLYCEMIA: ICD-10-CM

## 2022-05-13 DIAGNOSIS — M62.838 MUSCLE SPASMS OF BOTH LOWER EXTREMITIES: ICD-10-CM

## 2022-05-17 DIAGNOSIS — M62.838 MUSCLE SPASMS OF BOTH LOWER EXTREMITIES: Chronic | ICD-10-CM

## 2022-05-17 RX ORDER — CYCLOBENZAPRINE HCL 10 MG
TABLET ORAL
Qty: 60 TABLET | Refills: 0 | Status: SHIPPED | OUTPATIENT
Start: 2022-05-17 | End: 2022-06-21

## 2022-05-26 ENCOUNTER — CLINICAL SUPPORT (OUTPATIENT)
Dept: FAMILY MEDICINE CLINIC | Facility: CLINIC | Age: 57
End: 2022-05-26

## 2022-05-26 DIAGNOSIS — E78.2 MIXED DYSLIPIDEMIA: ICD-10-CM

## 2022-05-26 DIAGNOSIS — E53.8 VITAMIN B 12 DEFICIENCY: ICD-10-CM

## 2022-05-26 DIAGNOSIS — E04.9 SUBSTERNAL THYROID GOITER: ICD-10-CM

## 2022-05-26 DIAGNOSIS — E11.65 UNCONTROLLED TYPE 2 DIABETES MELLITUS WITH HYPERGLYCEMIA: ICD-10-CM

## 2022-05-26 DIAGNOSIS — M62.838 MUSCLE SPASMS OF BOTH LOWER EXTREMITIES: ICD-10-CM

## 2022-05-26 DIAGNOSIS — I10 BENIGN ESSENTIAL HYPERTENSION: ICD-10-CM

## 2022-05-26 DIAGNOSIS — M05.79 RHEUMATOID ARTHRITIS INVOLVING MULTIPLE SITES WITH POSITIVE RHEUMATOID FACTOR: ICD-10-CM

## 2022-05-26 DIAGNOSIS — K21.9 GASTROESOPHAGEAL REFLUX DISEASE WITHOUT ESOPHAGITIS: ICD-10-CM

## 2022-05-26 DIAGNOSIS — E55.9 VITAMIN D DEFICIENCY: ICD-10-CM

## 2022-05-26 LAB
BASOPHILS # BLD AUTO: 0.07 10*3/MM3 (ref 0–0.2)
BASOPHILS NFR BLD AUTO: 0.9 % (ref 0–1.5)
DEPRECATED RDW RBC AUTO: 39.9 FL (ref 37–54)
EOSINOPHIL # BLD AUTO: 0.37 10*3/MM3 (ref 0–0.4)
EOSINOPHIL NFR BLD AUTO: 4.5 % (ref 0.3–6.2)
ERYTHROCYTE [DISTWIDTH] IN BLOOD BY AUTOMATED COUNT: 12 % (ref 12.3–15.4)
HCT VFR BLD AUTO: 48.9 % (ref 37.5–51)
HGB BLD-MCNC: 16.9 G/DL (ref 13–17.7)
IMM GRANULOCYTES # BLD AUTO: 0.02 10*3/MM3 (ref 0–0.05)
IMM GRANULOCYTES NFR BLD AUTO: 0.2 % (ref 0–0.5)
LYMPHOCYTES # BLD AUTO: 3.24 10*3/MM3 (ref 0.7–3.1)
LYMPHOCYTES NFR BLD AUTO: 39.7 % (ref 19.6–45.3)
MCH RBC QN AUTO: 31.6 PG (ref 26.6–33)
MCHC RBC AUTO-ENTMCNC: 34.6 G/DL (ref 31.5–35.7)
MCV RBC AUTO: 91.6 FL (ref 79–97)
MONOCYTES # BLD AUTO: 0.97 10*3/MM3 (ref 0.1–0.9)
MONOCYTES NFR BLD AUTO: 11.9 % (ref 5–12)
NEUTROPHILS NFR BLD AUTO: 3.5 10*3/MM3 (ref 1.7–7)
NEUTROPHILS NFR BLD AUTO: 42.8 % (ref 42.7–76)
NRBC BLD AUTO-RTO: 0 /100 WBC (ref 0–0.2)
PLATELET # BLD AUTO: 271 10*3/MM3 (ref 140–450)
PMV BLD AUTO: 10.1 FL (ref 6–12)
RBC # BLD AUTO: 5.34 10*6/MM3 (ref 4.14–5.8)
WBC NRBC COR # BLD: 8.17 10*3/MM3 (ref 3.4–10.8)

## 2022-05-26 PROCEDURE — 84550 ASSAY OF BLOOD/URIC ACID: CPT | Performed by: NURSE PRACTITIONER

## 2022-05-26 PROCEDURE — 84439 ASSAY OF FREE THYROXINE: CPT | Performed by: NURSE PRACTITIONER

## 2022-05-26 PROCEDURE — 82607 VITAMIN B-12: CPT | Performed by: NURSE PRACTITIONER

## 2022-05-26 PROCEDURE — 83735 ASSAY OF MAGNESIUM: CPT | Performed by: NURSE PRACTITIONER

## 2022-05-26 PROCEDURE — 36415 COLL VENOUS BLD VENIPUNCTURE: CPT | Performed by: NURSE PRACTITIONER

## 2022-05-26 PROCEDURE — 80050 GENERAL HEALTH PANEL: CPT | Performed by: NURSE PRACTITIONER

## 2022-05-26 PROCEDURE — 80061 LIPID PANEL: CPT | Performed by: NURSE PRACTITIONER

## 2022-05-26 PROCEDURE — 82306 VITAMIN D 25 HYDROXY: CPT | Performed by: NURSE PRACTITIONER

## 2022-05-27 LAB
25(OH)D3 SERPL-MCNC: 16.8 NG/ML (ref 30–100)
ALBUMIN SERPL-MCNC: 4.1 G/DL (ref 3.5–5.2)
ALBUMIN/GLOB SERPL: 1.6 G/DL
ALP SERPL-CCNC: 178 U/L (ref 39–117)
ALT SERPL W P-5'-P-CCNC: 27 U/L (ref 1–41)
ANION GAP SERPL CALCULATED.3IONS-SCNC: 12 MMOL/L (ref 5–15)
AST SERPL-CCNC: 20 U/L (ref 1–40)
BILIRUB SERPL-MCNC: 0.2 MG/DL (ref 0–1.2)
BUN SERPL-MCNC: 11 MG/DL (ref 6–20)
BUN/CREAT SERPL: 13.9 (ref 7–25)
CALCIUM SPEC-SCNC: 8.9 MG/DL (ref 8.6–10.5)
CHLORIDE SERPL-SCNC: 103 MMOL/L (ref 98–107)
CHOLEST SERPL-MCNC: 110 MG/DL (ref 0–200)
CO2 SERPL-SCNC: 23 MMOL/L (ref 22–29)
CREAT SERPL-MCNC: 0.79 MG/DL (ref 0.76–1.27)
EGFRCR SERPLBLD CKD-EPI 2021: 104.9 ML/MIN/1.73
GLOBULIN UR ELPH-MCNC: 2.6 GM/DL
GLUCOSE SERPL-MCNC: 269 MG/DL (ref 65–99)
HDLC SERPL-MCNC: 23 MG/DL (ref 40–60)
LDL/HDL RATIO NULL: ABNORMAL
LDLC SERPL CALC-MCNC: 23 MG/DL (ref 0–100)
MAGNESIUM SERPL-MCNC: 2.3 MG/DL (ref 1.6–2.6)
POTASSIUM SERPL-SCNC: 4.2 MMOL/L (ref 3.5–5.2)
PROT SERPL-MCNC: 6.7 G/DL (ref 6–8.5)
SODIUM SERPL-SCNC: 138 MMOL/L (ref 136–145)
T4 FREE SERPL-MCNC: 1.21 NG/DL (ref 0.93–1.7)
TRIGL SERPL-MCNC: 464 MG/DL (ref 0–150)
TSH SERPL DL<=0.05 MIU/L-ACNC: 5.22 UIU/ML (ref 0.27–4.2)
URATE SERPL-MCNC: 3.9 MG/DL (ref 3.4–7)
VIT B12 BLD-MCNC: 1186 PG/ML (ref 211–946)
VLDLC SERPL-MCNC: 64 MG/DL (ref 5–40)

## 2022-05-31 ENCOUNTER — OFFICE VISIT (OUTPATIENT)
Dept: FAMILY MEDICINE CLINIC | Facility: CLINIC | Age: 57
End: 2022-05-31

## 2022-05-31 VITALS
OXYGEN SATURATION: 90 % | HEIGHT: 68 IN | TEMPERATURE: 98.1 F | WEIGHT: 227 LBS | DIASTOLIC BLOOD PRESSURE: 80 MMHG | SYSTOLIC BLOOD PRESSURE: 120 MMHG | BODY MASS INDEX: 34.4 KG/M2 | HEART RATE: 93 BPM

## 2022-05-31 DIAGNOSIS — I10 BENIGN ESSENTIAL HYPERTENSION: Chronic | ICD-10-CM

## 2022-05-31 DIAGNOSIS — E78.2 MIXED DYSLIPIDEMIA: Chronic | ICD-10-CM

## 2022-05-31 DIAGNOSIS — M05.79 RHEUMATOID ARTHRITIS INVOLVING MULTIPLE SITES WITH POSITIVE RHEUMATOID FACTOR: Chronic | ICD-10-CM

## 2022-05-31 DIAGNOSIS — E55.9 VITAMIN D DEFICIENCY: ICD-10-CM

## 2022-05-31 DIAGNOSIS — K21.9 GASTROESOPHAGEAL REFLUX DISEASE WITHOUT ESOPHAGITIS: Chronic | ICD-10-CM

## 2022-05-31 DIAGNOSIS — E11.65 UNCONTROLLED TYPE 2 DIABETES MELLITUS WITH HYPERGLYCEMIA: Primary | Chronic | ICD-10-CM

## 2022-05-31 DIAGNOSIS — Z98.890 S/P THYROID SURGERY: ICD-10-CM

## 2022-05-31 LAB
EXPIRATION DATE: NORMAL
HBA1C MFR BLD: 8.5 %
Lab: NORMAL

## 2022-05-31 PROCEDURE — 3052F HG A1C>EQUAL 8.0%<EQUAL 9.0%: CPT | Performed by: NURSE PRACTITIONER

## 2022-05-31 PROCEDURE — 36416 COLLJ CAPILLARY BLOOD SPEC: CPT | Performed by: NURSE PRACTITIONER

## 2022-05-31 PROCEDURE — 99214 OFFICE O/P EST MOD 30 MIN: CPT | Performed by: NURSE PRACTITIONER

## 2022-05-31 RX ORDER — (INSULIN DEGLUDEC AND LIRAGLUTIDE) 100; 3.6 [IU]/ML; MG/ML
40 INJECTION, SOLUTION SUBCUTANEOUS DAILY
Qty: 5 PEN | Refills: 2 | Status: SHIPPED | OUTPATIENT
Start: 2022-05-31 | End: 2022-10-02 | Stop reason: SDUPTHER

## 2022-05-31 RX ORDER — LEVOTHYROXINE SODIUM 0.05 MG/1
50 TABLET ORAL DAILY
Qty: 30 TABLET | Refills: 1 | Status: SHIPPED | OUTPATIENT
Start: 2022-05-31 | End: 2022-10-12 | Stop reason: SDUPTHER

## 2022-06-02 PROBLEM — E04.1 RIGHT THYROID NODULE: Status: RESOLVED | Noted: 2022-04-15 | Resolved: 2022-06-02

## 2022-06-02 PROBLEM — E04.9 SUBSTERNAL THYROID GOITER: Status: RESOLVED | Noted: 2021-07-14 | Resolved: 2022-06-02

## 2022-06-02 RX ORDER — MULTIVIT-MIN/IRON/FOLIC ACID/K 18-600-40
1 CAPSULE ORAL DAILY
Qty: 30 CAPSULE | Refills: 3 | Status: SHIPPED | OUTPATIENT
Start: 2022-06-02 | End: 2022-09-30 | Stop reason: SDUPTHER

## 2022-06-09 ENCOUNTER — TRANSCRIBE ORDERS (OUTPATIENT)
Dept: LAB | Facility: HOSPITAL | Age: 57
End: 2022-06-09

## 2022-06-09 ENCOUNTER — LAB (OUTPATIENT)
Dept: LAB | Facility: HOSPITAL | Age: 57
End: 2022-06-09

## 2022-06-09 DIAGNOSIS — E04.1 NONTOXIC UNINODULAR GOITER: ICD-10-CM

## 2022-06-09 DIAGNOSIS — E04.1 NONTOXIC UNINODULAR GOITER: Primary | ICD-10-CM

## 2022-06-09 LAB — TSH SERPL DL<=0.05 MIU/L-ACNC: 3.1 UIU/ML (ref 0.27–4.2)

## 2022-06-09 PROCEDURE — 84443 ASSAY THYROID STIM HORMONE: CPT

## 2022-06-21 DIAGNOSIS — M62.838 MUSCLE SPASMS OF BOTH LOWER EXTREMITIES: Chronic | ICD-10-CM

## 2022-06-21 RX ORDER — CYCLOBENZAPRINE HCL 10 MG
TABLET ORAL
Qty: 60 TABLET | Refills: 3 | Status: SHIPPED | OUTPATIENT
Start: 2022-06-21 | End: 2022-10-02 | Stop reason: SDUPTHER

## 2022-08-22 DIAGNOSIS — M05.79 RHEUMATOID ARTHRITIS INVOLVING MULTIPLE SITES WITH POSITIVE RHEUMATOID FACTOR: Chronic | ICD-10-CM

## 2022-08-22 DIAGNOSIS — F51.04 CHRONIC INSOMNIA: Chronic | ICD-10-CM

## 2022-08-22 DIAGNOSIS — J30.9 CHRONIC ALLERGIC RHINITIS: Chronic | ICD-10-CM

## 2022-08-22 DIAGNOSIS — E53.8 B12 DEFICIENCY: ICD-10-CM

## 2022-08-22 DIAGNOSIS — E11.65 UNCONTROLLED TYPE 2 DIABETES MELLITUS WITH HYPERGLYCEMIA: Chronic | ICD-10-CM

## 2022-08-22 RX ORDER — LANOLIN ALCOHOL/MO/W.PET/CERES
1000 CREAM (GRAM) TOPICAL DAILY
Qty: 30 TABLET | Refills: 4 | Status: SHIPPED | OUTPATIENT
Start: 2022-08-22 | End: 2023-01-20 | Stop reason: SDUPTHER

## 2022-08-22 RX ORDER — DULAGLUTIDE 1.5 MG/.5ML
INJECTION, SOLUTION SUBCUTANEOUS
Qty: 2 ML | Refills: 4 | OUTPATIENT
Start: 2022-08-22

## 2022-08-22 RX ORDER — LORATADINE 10 MG/1
10 TABLET ORAL DAILY
Qty: 30 TABLET | Refills: 4 | Status: SHIPPED | OUTPATIENT
Start: 2022-08-22 | End: 2022-09-30

## 2022-08-22 RX ORDER — FLUTICASONE PROPIONATE 50 MCG
SPRAY, SUSPENSION (ML) NASAL
Qty: 16 G | Refills: 4 | Status: SHIPPED | OUTPATIENT
Start: 2022-08-22 | End: 2023-01-20 | Stop reason: SDUPTHER

## 2022-08-22 RX ORDER — AMITRIPTYLINE HYDROCHLORIDE 100 MG/1
100 TABLET, FILM COATED ORAL NIGHTLY
Qty: 30 TABLET | Refills: 0 | Status: SHIPPED | OUTPATIENT
Start: 2022-08-22 | End: 2022-09-19

## 2022-09-19 DIAGNOSIS — F51.04 CHRONIC INSOMNIA: Chronic | ICD-10-CM

## 2022-09-19 DIAGNOSIS — M05.79 RHEUMATOID ARTHRITIS INVOLVING MULTIPLE SITES WITH POSITIVE RHEUMATOID FACTOR: Chronic | ICD-10-CM

## 2022-09-19 DIAGNOSIS — I10 BENIGN ESSENTIAL HYPERTENSION: Chronic | ICD-10-CM

## 2022-09-19 DIAGNOSIS — J30.9 CHRONIC ALLERGIC RHINITIS: Chronic | ICD-10-CM

## 2022-09-19 RX ORDER — METOPROLOL SUCCINATE 100 MG/1
TABLET, EXTENDED RELEASE ORAL
Qty: 30 TABLET | Refills: 3 | Status: SHIPPED | OUTPATIENT
Start: 2022-09-19 | End: 2023-01-20 | Stop reason: SDUPTHER

## 2022-09-19 RX ORDER — PEN NEEDLE, DIABETIC 32GX 5/32"
NEEDLE, DISPOSABLE MISCELLANEOUS
Qty: 100 EACH | Refills: 5 | Status: SHIPPED | OUTPATIENT
Start: 2022-09-19 | End: 2023-01-20

## 2022-09-19 RX ORDER — MONTELUKAST SODIUM 10 MG/1
TABLET ORAL
Qty: 30 TABLET | Refills: 3 | Status: SHIPPED | OUTPATIENT
Start: 2022-09-19 | End: 2023-01-20 | Stop reason: SDUPTHER

## 2022-09-19 RX ORDER — METHOTREXATE 2.5 MG/1
TABLET ORAL
Qty: 40 TABLET | Refills: 5 | Status: SHIPPED | OUTPATIENT
Start: 2022-09-19 | End: 2023-01-20 | Stop reason: SDUPTHER

## 2022-09-19 RX ORDER — AMITRIPTYLINE HYDROCHLORIDE 100 MG/1
TABLET, FILM COATED ORAL
Qty: 30 TABLET | Refills: 5 | Status: SHIPPED | OUTPATIENT
Start: 2022-09-19 | End: 2023-01-20 | Stop reason: SDUPTHER

## 2022-09-23 ENCOUNTER — LAB (OUTPATIENT)
Dept: FAMILY MEDICINE CLINIC | Facility: CLINIC | Age: 57
End: 2022-09-23

## 2022-09-23 DIAGNOSIS — E11.8 DM TYPE 2, CONTROLLED, WITH COMPLICATION: Chronic | ICD-10-CM

## 2022-09-23 DIAGNOSIS — M05.79 RHEUMATOID ARTHRITIS INVOLVING MULTIPLE SITES WITH POSITIVE RHEUMATOID FACTOR: Chronic | ICD-10-CM

## 2022-09-23 DIAGNOSIS — E78.2 MIXED DYSLIPIDEMIA: Chronic | ICD-10-CM

## 2022-09-23 DIAGNOSIS — K21.9 GASTROESOPHAGEAL REFLUX DISEASE WITHOUT ESOPHAGITIS: ICD-10-CM

## 2022-09-23 DIAGNOSIS — E11.65 UNCONTROLLED TYPE 2 DIABETES MELLITUS WITH HYPERGLYCEMIA: Chronic | ICD-10-CM

## 2022-09-23 DIAGNOSIS — E55.9 VITAMIN D DEFICIENCY: ICD-10-CM

## 2022-09-23 DIAGNOSIS — I10 BENIGN ESSENTIAL HYPERTENSION: Chronic | ICD-10-CM

## 2022-09-23 PROCEDURE — 80050 GENERAL HEALTH PANEL: CPT | Performed by: NURSE PRACTITIONER

## 2022-09-23 PROCEDURE — 82306 VITAMIN D 25 HYDROXY: CPT | Performed by: NURSE PRACTITIONER

## 2022-09-23 PROCEDURE — 80061 LIPID PANEL: CPT | Performed by: NURSE PRACTITIONER

## 2022-09-23 PROCEDURE — 82607 VITAMIN B-12: CPT | Performed by: NURSE PRACTITIONER

## 2022-09-23 PROCEDURE — 83036 HEMOGLOBIN GLYCOSYLATED A1C: CPT | Performed by: NURSE PRACTITIONER

## 2022-09-23 PROCEDURE — 84550 ASSAY OF BLOOD/URIC ACID: CPT | Performed by: NURSE PRACTITIONER

## 2022-09-24 LAB
25(OH)D3 SERPL-MCNC: 19.9 NG/ML (ref 30–100)
ALBUMIN SERPL-MCNC: 4 G/DL (ref 3.5–5.2)
ALBUMIN/GLOB SERPL: 1.5 G/DL
ALP SERPL-CCNC: 156 U/L (ref 39–117)
ALT SERPL W P-5'-P-CCNC: 24 U/L (ref 1–41)
ANION GAP SERPL CALCULATED.3IONS-SCNC: 11 MMOL/L (ref 5–15)
AST SERPL-CCNC: 19 U/L (ref 1–40)
BASOPHILS # BLD AUTO: 0.08 10*3/MM3 (ref 0–0.2)
BASOPHILS NFR BLD AUTO: 1 % (ref 0–1.5)
BILIRUB SERPL-MCNC: 0.2 MG/DL (ref 0–1.2)
BUN SERPL-MCNC: 9 MG/DL (ref 6–20)
BUN/CREAT SERPL: 11.7 (ref 7–25)
CALCIUM SPEC-SCNC: 9.4 MG/DL (ref 8.6–10.5)
CHLORIDE SERPL-SCNC: 105 MMOL/L (ref 98–107)
CHOLEST SERPL-MCNC: 86 MG/DL (ref 0–200)
CO2 SERPL-SCNC: 22 MMOL/L (ref 22–29)
CREAT SERPL-MCNC: 0.77 MG/DL (ref 0.76–1.27)
DEPRECATED RDW RBC AUTO: 40.5 FL (ref 37–54)
EGFRCR SERPLBLD CKD-EPI 2021: 105.1 ML/MIN/1.73
EOSINOPHIL # BLD AUTO: 0.42 10*3/MM3 (ref 0–0.4)
EOSINOPHIL NFR BLD AUTO: 5.1 % (ref 0.3–6.2)
ERYTHROCYTE [DISTWIDTH] IN BLOOD BY AUTOMATED COUNT: 12 % (ref 12.3–15.4)
GLOBULIN UR ELPH-MCNC: 2.6 GM/DL
GLUCOSE SERPL-MCNC: 205 MG/DL (ref 65–99)
HBA1C MFR BLD: 7.3 % (ref 4.8–5.6)
HCT VFR BLD AUTO: 50.4 % (ref 37.5–51)
HDLC SERPL-MCNC: 26 MG/DL (ref 40–60)
HGB BLD-MCNC: 17.3 G/DL (ref 13–17.7)
IMM GRANULOCYTES # BLD AUTO: 0.03 10*3/MM3 (ref 0–0.05)
IMM GRANULOCYTES NFR BLD AUTO: 0.4 % (ref 0–0.5)
LDLC SERPL CALC-MCNC: 31 MG/DL (ref 0–100)
LDLC/HDLC SERPL: 0.94 {RATIO}
LYMPHOCYTES # BLD AUTO: 3.17 10*3/MM3 (ref 0.7–3.1)
LYMPHOCYTES NFR BLD AUTO: 38.8 % (ref 19.6–45.3)
MCH RBC QN AUTO: 31.6 PG (ref 26.6–33)
MCHC RBC AUTO-ENTMCNC: 34.3 G/DL (ref 31.5–35.7)
MCV RBC AUTO: 92 FL (ref 79–97)
MONOCYTES # BLD AUTO: 0.98 10*3/MM3 (ref 0.1–0.9)
MONOCYTES NFR BLD AUTO: 12 % (ref 5–12)
NEUTROPHILS NFR BLD AUTO: 3.48 10*3/MM3 (ref 1.7–7)
NEUTROPHILS NFR BLD AUTO: 42.7 % (ref 42.7–76)
NRBC BLD AUTO-RTO: 0 /100 WBC (ref 0–0.2)
PLATELET # BLD AUTO: 304 10*3/MM3 (ref 140–450)
PMV BLD AUTO: 10.6 FL (ref 6–12)
POTASSIUM SERPL-SCNC: 4.3 MMOL/L (ref 3.5–5.2)
PROT SERPL-MCNC: 6.6 G/DL (ref 6–8.5)
RBC # BLD AUTO: 5.48 10*6/MM3 (ref 4.14–5.8)
SODIUM SERPL-SCNC: 138 MMOL/L (ref 136–145)
TRIGL SERPL-MCNC: 178 MG/DL (ref 0–150)
TSH SERPL DL<=0.05 MIU/L-ACNC: 8.22 UIU/ML (ref 0.27–4.2)
URATE SERPL-MCNC: 3.1 MG/DL (ref 3.4–7)
VIT B12 BLD-MCNC: 1162 PG/ML (ref 211–946)
VLDLC SERPL-MCNC: 29 MG/DL (ref 5–40)
WBC NRBC COR # BLD: 8.16 10*3/MM3 (ref 3.4–10.8)

## 2022-09-26 DIAGNOSIS — J98.4 RESTRICTIVE LUNG DISEASE: Chronic | ICD-10-CM

## 2022-09-30 ENCOUNTER — OFFICE VISIT (OUTPATIENT)
Dept: FAMILY MEDICINE CLINIC | Facility: CLINIC | Age: 57
End: 2022-09-30

## 2022-09-30 VITALS
BODY MASS INDEX: 34.1 KG/M2 | SYSTOLIC BLOOD PRESSURE: 126 MMHG | HEIGHT: 68 IN | WEIGHT: 225 LBS | OXYGEN SATURATION: 95 % | HEART RATE: 97 BPM | TEMPERATURE: 96.2 F | DIASTOLIC BLOOD PRESSURE: 78 MMHG

## 2022-09-30 DIAGNOSIS — I10 BENIGN ESSENTIAL HYPERTENSION: Chronic | ICD-10-CM

## 2022-09-30 DIAGNOSIS — F51.04 CHRONIC INSOMNIA: Chronic | ICD-10-CM

## 2022-09-30 DIAGNOSIS — E11.40 TYPE 2 DIABETES, CONTROLLED, WITH NEUROPATHY: Primary | Chronic | ICD-10-CM

## 2022-09-30 DIAGNOSIS — E78.2 MIXED DYSLIPIDEMIA: Chronic | ICD-10-CM

## 2022-09-30 DIAGNOSIS — J30.9 CHRONIC ALLERGIC RHINITIS: Chronic | ICD-10-CM

## 2022-09-30 DIAGNOSIS — E55.9 VITAMIN D DEFICIENCY: ICD-10-CM

## 2022-09-30 DIAGNOSIS — J98.4 RESTRICTIVE LUNG DISEASE: Chronic | ICD-10-CM

## 2022-09-30 DIAGNOSIS — E89.0 STATUS POST PARTIAL THYROIDECTOMY: ICD-10-CM

## 2022-09-30 DIAGNOSIS — K21.9 GASTROESOPHAGEAL REFLUX DISEASE WITHOUT ESOPHAGITIS: Chronic | ICD-10-CM

## 2022-09-30 DIAGNOSIS — M05.79 RHEUMATOID ARTHRITIS INVOLVING MULTIPLE SITES WITH POSITIVE RHEUMATOID FACTOR: Chronic | ICD-10-CM

## 2022-09-30 DIAGNOSIS — Z23 ENCOUNTER FOR IMMUNIZATION: ICD-10-CM

## 2022-09-30 DIAGNOSIS — E53.8 VITAMIN B 12 DEFICIENCY: ICD-10-CM

## 2022-09-30 PROCEDURE — 90677 PCV20 VACCINE IM: CPT | Performed by: NURSE PRACTITIONER

## 2022-09-30 PROCEDURE — 90686 IIV4 VACC NO PRSV 0.5 ML IM: CPT | Performed by: NURSE PRACTITIONER

## 2022-09-30 PROCEDURE — 90472 IMMUNIZATION ADMIN EACH ADD: CPT | Performed by: NURSE PRACTITIONER

## 2022-09-30 PROCEDURE — 99214 OFFICE O/P EST MOD 30 MIN: CPT | Performed by: NURSE PRACTITIONER

## 2022-09-30 PROCEDURE — 90471 IMMUNIZATION ADMIN: CPT | Performed by: NURSE PRACTITIONER

## 2022-09-30 RX ORDER — LEVOCETIRIZINE DIHYDROCHLORIDE 5 MG/1
5 TABLET, FILM COATED ORAL EVERY EVENING
Qty: 30 TABLET | Refills: 2 | Status: SHIPPED | OUTPATIENT
Start: 2022-09-30 | End: 2022-12-27

## 2022-09-30 RX ORDER — MULTIVIT-MIN/IRON/FOLIC ACID/K 18-600-40
1 CAPSULE ORAL DAILY
Qty: 30 CAPSULE | Refills: 3 | Status: SHIPPED | OUTPATIENT
Start: 2022-09-30 | End: 2023-01-20 | Stop reason: SDUPTHER

## 2022-10-02 PROBLEM — E89.0 STATUS POST PARTIAL THYROIDECTOMY: Status: ACTIVE | Noted: 2022-10-02

## 2022-10-02 RX ORDER — CYCLOBENZAPRINE HCL 10 MG
10 TABLET ORAL 2 TIMES DAILY PRN
Qty: 60 TABLET | Refills: 3 | Status: SHIPPED | OUTPATIENT
Start: 2022-10-02 | End: 2023-01-20 | Stop reason: SDUPTHER

## 2022-10-02 RX ORDER — FOLIC ACID 1 MG/1
1000 TABLET ORAL DAILY
Qty: 30 TABLET | Refills: 4 | Status: SHIPPED | OUTPATIENT
Start: 2022-10-02 | End: 2022-11-29

## 2022-10-02 RX ORDER — LANSOPRAZOLE 30 MG/1
30 CAPSULE, DELAYED RELEASE ORAL DAILY
Qty: 30 CAPSULE | Refills: 4 | Status: SHIPPED | OUTPATIENT
Start: 2022-10-02 | End: 2022-12-27

## 2022-10-02 RX ORDER — (INSULIN DEGLUDEC AND LIRAGLUTIDE) 100; 3.6 [IU]/ML; MG/ML
44 INJECTION, SOLUTION SUBCUTANEOUS DAILY
Qty: 15 ML | Refills: 3 | Status: SHIPPED | OUTPATIENT
Start: 2022-10-02 | End: 2022-11-01

## 2022-10-02 RX ORDER — ROSUVASTATIN CALCIUM 5 MG/1
5 TABLET, COATED ORAL NIGHTLY
Qty: 30 TABLET | Refills: 3 | Status: SHIPPED | OUTPATIENT
Start: 2022-10-02 | End: 2022-11-29

## 2022-10-12 ENCOUNTER — OFFICE VISIT (OUTPATIENT)
Dept: ENDOCRINOLOGY | Facility: CLINIC | Age: 57
End: 2022-10-12

## 2022-10-12 VITALS
BODY MASS INDEX: 33.15 KG/M2 | HEIGHT: 69 IN | DIASTOLIC BLOOD PRESSURE: 78 MMHG | HEART RATE: 95 BPM | SYSTOLIC BLOOD PRESSURE: 116 MMHG | WEIGHT: 223.8 LBS | OXYGEN SATURATION: 94 %

## 2022-10-12 DIAGNOSIS — E89.0 POSTSURGICAL HYPOTHYROIDISM: Primary | ICD-10-CM

## 2022-10-12 PROCEDURE — 99214 OFFICE O/P EST MOD 30 MIN: CPT | Performed by: NURSE PRACTITIONER

## 2022-10-12 RX ORDER — BLOOD SUGAR DIAGNOSTIC
STRIP MISCELLANEOUS
Qty: 100 EACH | Refills: 6 | Status: SHIPPED | OUTPATIENT
Start: 2022-10-12 | End: 2023-01-20 | Stop reason: SDUPTHER

## 2022-10-12 RX ORDER — LEVOTHYROXINE SODIUM 0.07 MG/1
75 TABLET ORAL DAILY
Qty: 30 TABLET | Refills: 2 | Status: SHIPPED | OUTPATIENT
Start: 2022-10-12 | End: 2022-12-27

## 2022-10-12 RX ORDER — LANCETS 30 GAUGE
EACH MISCELLANEOUS
Qty: 100 EACH | Refills: 6 | Status: SHIPPED | OUTPATIENT
Start: 2022-10-12 | End: 2023-01-20 | Stop reason: SDUPTHER

## 2022-10-12 NOTE — ASSESSMENT & PLAN NOTE
-Clinically hypothyroid.  -TFT's were above range.  Will increase T4 to 75 mcg QD.  Reminded of proper administration including taking 7 pills per week on an empty stomach with no missed doses, waiting 30-60 minutes prior to other medications or food.  -Follow-up in 4 months.

## 2022-10-12 NOTE — PROGRESS NOTES
Raimundo Munguia is a 57 y.o. male had concerns including substernal thyroid goiter (Follow up).   Post-surgical hypothyroidism.    Patient had a nodule removal by Dr. Wynn in 4/2022.  He has been taking T4 50 mcg QD.  He has not been missing any doses.  His most recent labs were elevated greater than 8.    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  Patient states that prior to this he was not aware of any prior thyroid issues.  His most recent TFT's were within normal range.    He states he has a hard time swallowing sometimes.  He feels like he has some mild compressive symptoms when he is trying to swallow something. Sometimes he has symptoms at night that wake him up, but that he also wears oxygen.  Has never been told that his thyroid was enlarged that he recalls.    His mother had graves disease with thyroidectomy in the past.    Birth state:[place]  Previous history of radiation to face/neck:none  Consuming foods high in iodine: none  Family history of thyroid complications: mom-graves diease, none other, no hx of thyroid CA in family    Patient was seen by Dr. Wynn, General Surgery, for surgical eval in Jan 2022 to evaluate the need for removal.  He was informed that he does need surgery and that they will get him on the schedule.  He has not heard from them since that time.  Contacted the office today and they informed that the hospital facility that they use for surgery is just now allowing for them to perform surgeries in the last couple of weeks and are limiting how many they are allowed to do at this time.  They are working on getting him on the surgery schedule and they will be contacting him for surgical date.    The following portions of the patient's history were reviewed and updated as appropriate: allergies, current medications, past family history, past medical history, past social history, past surgical history and problem list.    Past  "Medical History:   Diagnosis Date   • Asthma     inhlaer  prn   • Cervicalgia    • CVA (cerebral vascular accident) ([facility])     around 2012; \"light\" residual dysarthria   • Diabetes mellitus ([facility]) 2015    po meds as well as insulin daily; checks sugars at home   • GERD (gastroesophageal reflux disease)    • Helicobacter pylori (H. 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   • Vitamin D deficiency      Past Surgical History:   Procedure Laterality Date   • COLON RESECTION N/A 02/22/2018    Procedure: COLON RESECTION SIGMOID;  Surgeon: Ej Tinoco MD;  Location: [facility] OR;  Service:    • COLONOSCOPY N/A 02/07/2018    Procedure: COLONOSCOPY CPT CODE: 81251;  Surgeon: Teodoro Garg III, MD;  Location: [facility] OR;  Service:    • COLONOSCOPY N/A 03/07/2019    Procedure: COLONOSCOPY FOR SCREENING CPT CODE: ;  Surgeon: Ej Tinoco MD;  Location: [facility] OR;  Service: Gastroenterology   • ENDOSCOPY N/A 02/07/2018    Procedure: ESOPHAGOGASTRODUODENOSCOPY WITH DILATATION CPT CODE: 28238;  Surgeon: Teodoro Garg III, MD;  Location: [facility] OR;  Service:    • THYROIDECTOMY Right 4/15/2022    Procedure: THYROIDECTOMY RIGHT LOBECTOMY;  Surgeon: Fidencio Wynn MD;  Location: [facility] OR;  Service: General;  Laterality: Right;      Family History   Problem Relation Age of Onset   • Diabetes Mother    • Hypertension Mother    • Hypertension Father    • Hyperlipidemia Father    • Diabetes Sister    • Hyperlipidemia Sister    • Hypertension Sister    • Hypertension Brother    • Hyperlipidemia Brother       Social History     Socioeconomic History   • Marital status: Legally      Spouse name: tess   • Number of children: 3   • Years " of education: 9   Tobacco Use   • Smoking status: Never   • Smokeless tobacco: Current     Types: Snuff   • Tobacco comments:     one can every 2-3 days; 25-26 years of use   Vaping Use   • Vaping Use: Never used   Substance and Sexual Activity   • Alcohol use: No   • Drug use: No   • Sexual activity: Defer     Partners: Female      No Known Allergies   Current Outpatient Medications on File Prior to Visit   Medication Sig Dispense Refill   • amitriptyline (ELAVIL) 100 MG tablet TAKE ONE TABLET BY MOUTH EVERY NIGHT AT BEDTIME 30 tablet 5   • BD Pen Needle Cordelia U/F 32G X 4 MM misc USE TO INJECT INSULIN THREE TIMES A  each 5   • cyclobenzaprine (FLEXERIL) 10 MG tablet Take 1 tablet by mouth 2 (Two) Times a Day As Needed for Muscle Spasms. 60 tablet 3   • Diclofenac Sodium (VOLTAREN) 1 % gel gel APPLY 2-4 GRAMS TO AFFECTED AREA TOPICALLY THREE TIMES A DAY AS DIRECTED 100 g 4   • fluticasone (FLONASE) 50 MCG/ACT nasal spray USE 1 SPRAY IN EACH NOSTRIL TWO TIMES A DAY AS NEEDED FOR RHINITIS OR ALLERGIES 16 g 4   • folic acid (FOLVITE) 1 MG tablet Take 1 tablet by mouth Daily. 30 tablet 4   • Insulin Degludec-Liraglutide (Xultophy) 100-3.6 UNIT-MG/ML solution pen-injector subcutaneous pen Inject 44 Units under the skin into the appropriate area as directed Daily for 30 doses. 15 mL 3   • Lancets (OneTouch Delica Plus [id number]) misc 1 each by Other route Daily. 100 each 3   • Lancets Ultra Fine misc 1 Device 3 (Three) Times a Day. 100 each 6   • lansoprazole (PREVACID) 30 MG capsule Take 1 capsule by mouth Daily. 30 capsule 4   • levocetirizine (XYZAL) 5 MG tablet Take 1 tablet by mouth Every Evening. 30 tablet 2   • metFORMIN (GLUCOPHAGE) 500 MG tablet Take 2 tablets by mouth 2 (Two) Times a Day. 120 tablet 3   • methotrexate 2.5 MG tablet TAKE 10 TABLETS BY MOUTH ONCE WEEKLY 40 tablet 5   • metoprolol succinate XL (TOPROL-XL) 100 MG 24 hr tablet TAKE ONE TABLET BY MOUTH EVERY DAY 30 tablet 3   • montelukast  "(SINGULAIR) 10 MG tablet TAKE ONE TABLET BY MOUTH EVERY NIGHT 30 tablet 3   • ProAir  (90 Base) MCG/ACT inhaler INHALE 2 PUFFS BY MOUTH EVERY 4 HOURS AS NEEDED FOR SHORTNESS OF AIR 8.5 g 4   • rosuvastatin (CRESTOR) 5 MG tablet Take 1 tablet by mouth Every Night. 30 tablet 3   • vitamin B-12 (CYANOCOBALAMIN) 1000 MCG tablet TAKE 1 TABLET BY MOUTH DAILY. 30 tablet 4   • Vitamin D, Cholecalciferol, 50 MCG (2000 UT) capsule Take 1 capsule by mouth Daily. 30 capsule 3   • [DISCONTINUED] levothyroxine (Synthroid) 50 MCG tablet Take 1 tablet by mouth Daily for 90 days. 30 tablet 2   • [DISCONTINUED] levothyroxine (Synthroid) 50 MCG tablet Take 1 tablet by mouth Daily. 30 tablet 1     No current facility-administered medications on file prior to visit.     Review of Systems   HENT: Positive for trouble swallowing.    Eyes: Negative.    Endocrine: Negative.    Psychiatric/Behavioral: Positive for sleep disturbance.   All other systems reviewed and are negative.    /78 (BP Location: Left arm, Patient Position: Sitting, Cuff Size: Adult)   Pulse 95   Ht 175.3 cm (69\")   Wt 102 kg (223 lb 12.8 oz)   SpO2 94%   BMI 33.05 kg/m²      Physical Exam  Vitals reviewed.   Constitutional:       Appearance: Normal appearance.   Eyes:      Extraocular Movements: Extraocular movements intact.   Neck:      Thyroid: Thyromegaly present. No thyroid mass or thyroid tenderness.   Cardiovascular:      Rate and Rhythm: Normal rate.   Pulmonary:      Effort: Pulmonary effort is normal.   Skin:     General: Skin is warm.   Neurological:      Mental Status: He is alert and oriented to person, place, and time.   Psychiatric:         Mood and Affect: Mood normal.         Behavior: Behavior normal.         Thought Content: Thought content normal.         Judgment: Judgment normal.       Labs/Imaging  CMP  Lab Results   Component Value Date    GLUCOSE 205 (H) 09/23/2022    BUN 9 09/23/2022    CREATININE 0.77 09/23/2022    EGFRIFNONA " 106 02/24/2022    EGFRIFAFRI  09/13/2016      Comment:      <15 Indicative of kidney failure.    BCR 11.7 09/23/2022    K 4.3 09/23/2022    CO2 22.0 09/23/2022    CALCIUM 9.4 09/23/2022    ALBUMIN 4.00 09/23/2022    LABIL2 1.2 (L) 05/17/2016    AST 19 09/23/2022    ALT 24 09/23/2022        CBC w/DIFF   Lab Results   Component Value Date    WBC 8.16 09/23/2022    RBC 5.48 09/23/2022    HGB 17.3 09/23/2022    HCT 50.4 09/23/2022    MCV 92.0 09/23/2022    MCH 31.6 09/23/2022    MCHC 34.3 09/23/2022    RDW 12.0 (L) 09/23/2022    RDWSD 40.5 09/23/2022    MPV 10.6 09/23/2022     09/23/2022    NEUTRORELPCT 42.7 09/23/2022    LYMPHORELPCT 38.8 09/23/2022    MONORELPCT 12.0 09/23/2022    EOSRELPCT 5.1 09/23/2022    BASORELPCT 1.0 09/23/2022    AUTOIGPER 0.4 09/23/2022    NEUTROABS 3.48 09/23/2022    LYMPHSABS 3.17 (H) 09/23/2022    MONOSABS 0.98 (H) 09/23/2022    EOSABS 0.42 (H) 09/23/2022    BASOSABS 0.08 09/23/2022    AUTOIGNUM 0.03 09/23/2022    NRBC 0.0 09/23/2022       TSH  Lab Results   Component Value Date    TSH 8.220 (H) 09/23/2022    TSH 3.100 06/09/2022    TSH 5.220 (H) 05/26/2022       T4  Lab Results   Component Value Date    FREET4 1.21 05/26/2022     Lab Results   Component Value Date    W0OGQQM 8.7 04/27/2015       T3  No results found for: T3FREE  No results found for: F4XZVOT    TRAb  No results found for: TSURCPAB    TPO  No results found for: THYROIDAB    No valid procedures specified.    Assessment and Plan    Diagnoses and all orders for this visit:    1. Postsurgical hypothyroidism (Primary)  Assessment & Plan:  -Clinically hypothyroid.  -TFT's were above range.  Will increase T4 to 75 mcg QD.  Reminded of proper administration including taking 7 pills per week on an empty stomach with no missed doses, waiting 30-60 minutes prior to other medications or food.  -Follow-up in 4 months.    Orders:  -     levothyroxine (Synthroid) 75 MCG tablet; Take 1 tablet by mouth Daily.  Dispense: 30 tablet;  Refill: 2  -     TSH; Future    Other orders  -     Cancel: POC Glucose Fingerstick       Return in about 4 months (around 2/12/2023) for Follow-up appointment. The patient was instructed to contact the clinic with any interval questions or concerns.    This document has been electronically signed by ABDIRAHMAN Horan  October 12, 2022 14:29 EDT  Endocrinology

## 2022-11-29 DIAGNOSIS — M05.79 RHEUMATOID ARTHRITIS INVOLVING MULTIPLE SITES WITH POSITIVE RHEUMATOID FACTOR: Chronic | ICD-10-CM

## 2022-11-29 DIAGNOSIS — E78.2 MIXED DYSLIPIDEMIA: Chronic | ICD-10-CM

## 2022-11-29 RX ORDER — ROSUVASTATIN CALCIUM 5 MG/1
TABLET, COATED ORAL
Qty: 30 TABLET | Refills: 4 | Status: SHIPPED | OUTPATIENT
Start: 2022-11-29 | End: 2023-01-20 | Stop reason: SDUPTHER

## 2022-11-29 RX ORDER — FOLIC ACID 1 MG/1
1000 TABLET ORAL DAILY
Qty: 30 TABLET | Refills: 4 | Status: SHIPPED | OUTPATIENT
Start: 2022-11-29 | End: 2023-01-20 | Stop reason: SDUPTHER

## 2022-12-25 DIAGNOSIS — K21.9 GASTROESOPHAGEAL REFLUX DISEASE WITHOUT ESOPHAGITIS: Chronic | ICD-10-CM

## 2022-12-25 DIAGNOSIS — E89.0 POSTSURGICAL HYPOTHYROIDISM: ICD-10-CM

## 2022-12-25 DIAGNOSIS — J30.9 CHRONIC ALLERGIC RHINITIS: Chronic | ICD-10-CM

## 2022-12-27 RX ORDER — LEVOCETIRIZINE DIHYDROCHLORIDE 5 MG/1
5 TABLET, FILM COATED ORAL EVERY EVENING
Qty: 30 TABLET | Refills: 2 | Status: SHIPPED | OUTPATIENT
Start: 2022-12-27 | End: 2023-03-21

## 2022-12-27 RX ORDER — LANSOPRAZOLE 30 MG/1
30 CAPSULE, DELAYED RELEASE ORAL DAILY
Qty: 30 CAPSULE | Refills: 4 | Status: SHIPPED | OUTPATIENT
Start: 2022-12-27 | End: 2023-01-20 | Stop reason: SDUPTHER

## 2022-12-27 RX ORDER — LEVOTHYROXINE SODIUM 0.07 MG/1
75 TABLET ORAL DAILY
Qty: 90 TABLET | Refills: 2 | Status: SHIPPED | OUTPATIENT
Start: 2022-12-27 | End: 2023-02-13 | Stop reason: DRUGHIGH

## 2023-01-02 DIAGNOSIS — M05.79 RHEUMATOID ARTHRITIS INVOLVING MULTIPLE SITES WITH POSITIVE RHEUMATOID FACTOR: Chronic | ICD-10-CM

## 2023-01-13 ENCOUNTER — LAB (OUTPATIENT)
Dept: FAMILY MEDICINE CLINIC | Facility: CLINIC | Age: 58
End: 2023-01-13
Payer: COMMERCIAL

## 2023-01-13 DIAGNOSIS — K21.9 GASTROESOPHAGEAL REFLUX DISEASE WITHOUT ESOPHAGITIS: ICD-10-CM

## 2023-01-13 DIAGNOSIS — E78.2 MIXED DYSLIPIDEMIA: Chronic | ICD-10-CM

## 2023-01-13 DIAGNOSIS — M05.79 RHEUMATOID ARTHRITIS INVOLVING MULTIPLE SITES WITH POSITIVE RHEUMATOID FACTOR: Chronic | ICD-10-CM

## 2023-01-13 DIAGNOSIS — E11.40 TYPE 2 DIABETES, CONTROLLED, WITH NEUROPATHY: Chronic | ICD-10-CM

## 2023-01-13 DIAGNOSIS — E53.8 VITAMIN B 12 DEFICIENCY: ICD-10-CM

## 2023-01-13 DIAGNOSIS — E55.9 VITAMIN D DEFICIENCY: ICD-10-CM

## 2023-01-13 DIAGNOSIS — I10 BENIGN ESSENTIAL HYPERTENSION: Chronic | ICD-10-CM

## 2023-01-13 PROCEDURE — 84550 ASSAY OF BLOOD/URIC ACID: CPT | Performed by: NURSE PRACTITIONER

## 2023-01-13 PROCEDURE — 82607 VITAMIN B-12: CPT | Performed by: NURSE PRACTITIONER

## 2023-01-13 PROCEDURE — 80050 GENERAL HEALTH PANEL: CPT | Performed by: NURSE PRACTITIONER

## 2023-01-13 PROCEDURE — 82306 VITAMIN D 25 HYDROXY: CPT | Performed by: NURSE PRACTITIONER

## 2023-01-13 PROCEDURE — 83036 HEMOGLOBIN GLYCOSYLATED A1C: CPT | Performed by: NURSE PRACTITIONER

## 2023-01-13 PROCEDURE — 80061 LIPID PANEL: CPT | Performed by: NURSE PRACTITIONER

## 2023-01-14 LAB
25(OH)D3 SERPL-MCNC: 30.5 NG/ML (ref 30–100)
ALBUMIN SERPL-MCNC: 4.5 G/DL (ref 3.5–5.2)
ALBUMIN/GLOB SERPL: 1.5 G/DL
ALP SERPL-CCNC: 106 U/L (ref 39–117)
ALT SERPL W P-5'-P-CCNC: 25 U/L (ref 1–41)
ANION GAP SERPL CALCULATED.3IONS-SCNC: 11 MMOL/L (ref 5–15)
AST SERPL-CCNC: 20 U/L (ref 1–40)
BASOPHILS # BLD AUTO: 0.11 10*3/MM3 (ref 0–0.2)
BASOPHILS NFR BLD AUTO: 1.2 % (ref 0–1.5)
BILIRUB SERPL-MCNC: 0.5 MG/DL (ref 0–1.2)
BUN SERPL-MCNC: 9 MG/DL (ref 6–20)
BUN/CREAT SERPL: 11 (ref 7–25)
CALCIUM SPEC-SCNC: 9.7 MG/DL (ref 8.6–10.5)
CHLORIDE SERPL-SCNC: 100 MMOL/L (ref 98–107)
CHOLEST SERPL-MCNC: 118 MG/DL (ref 0–200)
CO2 SERPL-SCNC: 28 MMOL/L (ref 22–29)
CREAT SERPL-MCNC: 0.82 MG/DL (ref 0.76–1.27)
DEPRECATED RDW RBC AUTO: 38.2 FL (ref 37–54)
EGFRCR SERPLBLD CKD-EPI 2021: 103.1 ML/MIN/1.73
EOSINOPHIL # BLD AUTO: 0.58 10*3/MM3 (ref 0–0.4)
EOSINOPHIL NFR BLD AUTO: 6.1 % (ref 0.3–6.2)
ERYTHROCYTE [DISTWIDTH] IN BLOOD BY AUTOMATED COUNT: 11.5 % (ref 12.3–15.4)
GLOBULIN UR ELPH-MCNC: 3.1 GM/DL
GLUCOSE SERPL-MCNC: 102 MG/DL (ref 65–99)
HBA1C MFR BLD: 7.4 % (ref 4.8–5.6)
HCT VFR BLD AUTO: 52.4 % (ref 37.5–51)
HDLC SERPL-MCNC: 26 MG/DL (ref 40–60)
HGB BLD-MCNC: 18 G/DL (ref 13–17.7)
IMM GRANULOCYTES # BLD AUTO: 0.03 10*3/MM3 (ref 0–0.05)
IMM GRANULOCYTES NFR BLD AUTO: 0.3 % (ref 0–0.5)
LDLC SERPL CALC-MCNC: 70 MG/DL (ref 0–100)
LDLC/HDLC SERPL: 2.63 {RATIO}
LYMPHOCYTES # BLD AUTO: 3.95 10*3/MM3 (ref 0.7–3.1)
LYMPHOCYTES NFR BLD AUTO: 41.4 % (ref 19.6–45.3)
MCH RBC QN AUTO: 31.6 PG (ref 26.6–33)
MCHC RBC AUTO-ENTMCNC: 34.4 G/DL (ref 31.5–35.7)
MCV RBC AUTO: 91.9 FL (ref 79–97)
MONOCYTES # BLD AUTO: 0.83 10*3/MM3 (ref 0.1–0.9)
MONOCYTES NFR BLD AUTO: 8.7 % (ref 5–12)
NEUTROPHILS NFR BLD AUTO: 4.03 10*3/MM3 (ref 1.7–7)
NEUTROPHILS NFR BLD AUTO: 42.3 % (ref 42.7–76)
NRBC BLD AUTO-RTO: 0 /100 WBC (ref 0–0.2)
PLATELET # BLD AUTO: 387 10*3/MM3 (ref 140–450)
PMV BLD AUTO: 9.8 FL (ref 6–12)
POTASSIUM SERPL-SCNC: 4.1 MMOL/L (ref 3.5–5.2)
PROT SERPL-MCNC: 7.6 G/DL (ref 6–8.5)
RBC # BLD AUTO: 5.7 10*6/MM3 (ref 4.14–5.8)
SODIUM SERPL-SCNC: 139 MMOL/L (ref 136–145)
TRIGL SERPL-MCNC: 118 MG/DL (ref 0–150)
TSH SERPL DL<=0.05 MIU/L-ACNC: 5.23 UIU/ML (ref 0.27–4.2)
URATE SERPL-MCNC: 4 MG/DL (ref 3.4–7)
VIT B12 BLD-MCNC: 1427 PG/ML (ref 211–946)
VLDLC SERPL-MCNC: 22 MG/DL (ref 5–40)
WBC NRBC COR # BLD: 9.53 10*3/MM3 (ref 3.4–10.8)

## 2023-01-20 ENCOUNTER — OFFICE VISIT (OUTPATIENT)
Dept: FAMILY MEDICINE CLINIC | Facility: CLINIC | Age: 58
End: 2023-01-20
Payer: COMMERCIAL

## 2023-01-20 VITALS
SYSTOLIC BLOOD PRESSURE: 120 MMHG | DIASTOLIC BLOOD PRESSURE: 90 MMHG | HEART RATE: 114 BPM | TEMPERATURE: 97.6 F | OXYGEN SATURATION: 95 % | BODY MASS INDEX: 33.18 KG/M2 | HEIGHT: 69 IN | WEIGHT: 224 LBS

## 2023-01-20 DIAGNOSIS — K21.9 GASTROESOPHAGEAL REFLUX DISEASE WITHOUT ESOPHAGITIS: Chronic | ICD-10-CM

## 2023-01-20 DIAGNOSIS — E53.8 B12 DEFICIENCY: ICD-10-CM

## 2023-01-20 DIAGNOSIS — H91.93 BILATERAL HEARING LOSS, UNSPECIFIED HEARING LOSS TYPE: ICD-10-CM

## 2023-01-20 DIAGNOSIS — E78.2 MIXED DYSLIPIDEMIA: Chronic | ICD-10-CM

## 2023-01-20 DIAGNOSIS — M05.79 RHEUMATOID ARTHRITIS INVOLVING MULTIPLE SITES WITH POSITIVE RHEUMATOID FACTOR: Chronic | ICD-10-CM

## 2023-01-20 DIAGNOSIS — I10 BENIGN ESSENTIAL HYPERTENSION: Chronic | ICD-10-CM

## 2023-01-20 DIAGNOSIS — F51.04 CHRONIC INSOMNIA: Chronic | ICD-10-CM

## 2023-01-20 DIAGNOSIS — E89.0 POSTSURGICAL HYPOTHYROIDISM: Chronic | ICD-10-CM

## 2023-01-20 DIAGNOSIS — E55.9 VITAMIN D DEFICIENCY: ICD-10-CM

## 2023-01-20 DIAGNOSIS — J30.9 CHRONIC ALLERGIC RHINITIS: Chronic | ICD-10-CM

## 2023-01-20 DIAGNOSIS — J98.4 RESTRICTIVE LUNG DISEASE: Chronic | ICD-10-CM

## 2023-01-20 DIAGNOSIS — E11.40 TYPE 2 DIABETES, CONTROLLED, WITH NEUROPATHY: Primary | Chronic | ICD-10-CM

## 2023-01-20 PROCEDURE — 3051F HG A1C>EQUAL 7.0%<8.0%: CPT | Performed by: NURSE PRACTITIONER

## 2023-01-20 PROCEDURE — 99214 OFFICE O/P EST MOD 30 MIN: CPT | Performed by: NURSE PRACTITIONER

## 2023-01-20 NOTE — PROGRESS NOTES
History of Present Illness  Raimundo Munguia is a 56 yo male who presents for follow up and to review recent lab results pertaining to his  DM, type 2, HTN, GERD, Rheumatoid Arthritis and Dyslipidemia. In addition, Raimundo has Insomnia and Vitamin deficiencies.    Diabetes   He has type 2 diabetes mellitus. His disease course has been improving since starting Xultophy. Risk factors for coronary artery disease include dyslipidemia, hypertension, male sex, sedentary lifestyle and tobacco exposure. Current diabetic treatment includes Metformin and Xultophy  He has had a previous visit with a dietitian. An ACE inhibitor/angiotensin II receptor blocker is not being taken due to S/e.     Lab Results   Component Value Date    HGBA1C 9.10 (H) 02/24/2022     Lab Results   Component Value Date    HGBA1C 8.5 05/31/2022     Lab Results   Component Value Date    HGBA1C 7.30 (H) 09/23/2022     Lab Results   Component Value Date    HGBA1C 7.40 (H) 01/13/2023     Hypertension   The current episode started more than 1 year ago.His BP is adequately controlled. His hypertensive medication is Toprol  mg which has improved his blood pressure and fast heart rate.   Lab Results   Component Value Date    GLUCOSE 102 (H) 01/13/2023    BUN 9 01/13/2023    CREATININE 0.82 01/13/2023    BCR 11.0 01/13/2023    K 4.1 01/13/2023    CO2 28.0 01/13/2023    CALCIUM 9.7 01/13/2023    ALBUMIN 4.5 01/13/2023    AST 20 01/13/2023    ALT 25 01/13/2023       Dyslipidemia   This is a chronic problem. The current episode started more than 1 year ago. Factors aggravating his hyperlipidemia include fatty foods. Current antihyperlipidemic treatment includes statins.  Compliance problems include adherence to diet and adherence to exercise.  Risk factors for coronary artery disease include diabetes mellitus, dyslipidemia, hypertension, male sex and obesity.   Lab Results   Component Value Date    CHOL 118 01/13/2023    CHOL 86 09/23/2022    CHOL 110  "05/26/2022     Lab Results   Component Value Date    TRIG 118 01/13/2023    TRIG 178 (H) 09/23/2022    TRIG 464 (H) 05/26/2022     Lab Results   Component Value Date    HDL 26 (L) 01/13/2023    HDL 26 (L) 09/23/2022    HDL 23 (L) 05/26/2022     Lab Results   Component Value Date    LDL 70 01/13/2023    LDL 31 09/23/2022    LDL 23 05/26/2022     The following portions of the patient's history were reviewed and updated as appropriate: allergies, current medications, past family history, past medical history, past social history, past surgical history and problem list.    Review of Systems   Constitutional: Negative for activity change, appetite change, chills, fatigue, fever and unexpected weight change.   HENT: Positive for hearing loss (Chronic). Negative for voice change.    Eyes: Negative for visual disturbance.   Respiratory: Negative for cough, shortness of breath and wheezing.    Cardiovascular: Negative for chest pain, palpitations and leg swelling.   Gastrointestinal: Negative for nausea and vomiting.   Endocrine: Negative for cold intolerance, heat intolerance, polydipsia, polyphagia and polyuria.   Musculoskeletal: Positive for arthralgias, back pain and joint swelling.   Skin: Negative for color change and rash.   Allergic/Immunologic: Positive for environmental allergies and immunocompromised state.   Neurological: Negative for dizziness, tremors, speech difficulty, weakness and light-headedness.   Hematological: Negative for adenopathy.   Psychiatric/Behavioral: Positive for sleep disturbance (Improving). Negative for confusion and decreased concentration. The patient is not nervous/anxious.    All other systems reviewed and are negative.    Vital signs:  /90 (BP Location: Right arm, Patient Position: Sitting, Cuff Size: Adult)   Pulse 114   Temp 97.6 °F (36.4 °C) (Temporal)   Ht 175.3 cm (69.02\")   Wt 102 kg (224 lb)   SpO2 95%   BMI 33.06 kg/m²     Physical Exam  Vitals and nursing note " reviewed.   Constitutional:       General: He is not in acute distress.     Appearance: He is well-developed.      Interventions: Face mask in place.      Comments: Pleasant; Slow movements   HENT:      Head: Normocephalic.      Nose: Nose normal.   Eyes:      General: No scleral icterus.        Right eye: No discharge.         Left eye: No discharge.      Conjunctiva/sclera: Conjunctivae normal.   Neck:      Vascular: No JVD.      Comments: Noted well healed thyroid surgical incision  Cardiovascular:      Rate and Rhythm: Normal rate and regular rhythm.      Heart sounds: Normal heart sounds. No murmur heard.    No friction rub.   Pulmonary:      Effort: Pulmonary effort is normal. No respiratory distress.      Breath sounds: No decreased breath sounds, wheezing, rhonchi or rales.   Abdominal:      General: Abdomen is protuberant. Bowel sounds are normal.      Palpations: Abdomen is soft.      Tenderness: There is no abdominal tenderness. There is no guarding.   Musculoskeletal:         General: Deformity (Multiple Rheumatoid nodules bilateral upper extremities) present. No tenderness.      Cervical back: Neck supple.      Right lower leg: No edema.      Left lower leg: No edema.   Lymphadenopathy:      Cervical: No cervical adenopathy.   Skin:     General: Skin is warm and dry.      Capillary Refill: Capillary refill takes less than 2 seconds.   Neurological:      Mental Status: He is alert and oriented to person, place, and time.      Gait: Gait abnormal.   Psychiatric:         Mood and Affect: Mood normal.         Speech: Speech normal.         Behavior: Behavior is cooperative.         Thought Content: Thought content normal.         Cognition and Memory: Cognition and memory normal.     Result Review :  Results for orders placed or performed in visit on 01/13/23   Comprehensive Metabolic Panel    Specimen: Arm, Right; Blood   Result Value Ref Range    Glucose 102 (H) 65 - 99 mg/dL    BUN 9 6 - 20 mg/dL     Creatinine 0.82 0.76 - 1.27 mg/dL    Sodium 139 136 - 145 mmol/L    Potassium 4.1 3.5 - 5.2 mmol/L    Chloride 100 98 - 107 mmol/L    CO2 28.0 22.0 - 29.0 mmol/L    Calcium 9.7 8.6 - 10.5 mg/dL    Total Protein 7.6 6.0 - 8.5 g/dL    Albumin 4.5 3.5 - 5.2 g/dL    ALT (SGPT) 25 1 - 41 U/L    AST (SGOT) 20 1 - 40 U/L    Alkaline Phosphatase 106 39 - 117 U/L    Total Bilirubin 0.5 0.0 - 1.2 mg/dL    Globulin 3.1 gm/dL    A/G Ratio 1.5 g/dL    BUN/Creatinine Ratio 11.0 7.0 - 25.0    Anion Gap 11.0 5.0 - 15.0 mmol/L    eGFR 103.1 >60.0 mL/min/1.73   Lipid Panel    Specimen: Arm, Right; Blood   Result Value Ref Range    Total Cholesterol 118 0 - 200 mg/dL    Triglycerides 118 0 - 150 mg/dL    HDL Cholesterol 26 (L) 40 - 60 mg/dL    LDL Cholesterol  70 0 - 100 mg/dL    VLDL Cholesterol 22 5 - 40 mg/dL    LDL/HDL Ratio 2.63    TSH    Specimen: Arm, Right; Blood   Result Value Ref Range    TSH 5.230 (H) 0.270 - 4.200 uIU/mL   Hemoglobin A1c    Specimen: Arm, Right; Blood   Result Value Ref Range    Hemoglobin A1C 7.40 (H) 4.80 - 5.60 %   Vitamin B12    Specimen: Arm, Right; Blood   Result Value Ref Range    Vitamin B-12 1,427 (H) 211 - 946 pg/mL   Uric Acid    Specimen: Arm, Right; Blood   Result Value Ref Range    Uric Acid 4.0 3.4 - 7.0 mg/dL   Vitamin D 25 Hydroxy    Specimen: Arm, Right; Blood   Result Value Ref Range    25 Hydroxy, Vitamin D 30.5 30.0 - 100.0 ng/ml   CBC Auto Differential    Specimen: Arm, Right; Blood   Result Value Ref Range    WBC 9.53 3.40 - 10.80 10*3/mm3    RBC 5.70 4.14 - 5.80 10*6/mm3    Hemoglobin 18.0 (H) 13.0 - 17.7 g/dL    Hematocrit 52.4 (H) 37.5 - 51.0 %    MCV 91.9 79.0 - 97.0 fL    MCH 31.6 26.6 - 33.0 pg    MCHC 34.4 31.5 - 35.7 g/dL    RDW 11.5 (L) 12.3 - 15.4 %    RDW-SD 38.2 37.0 - 54.0 fl    MPV 9.8 6.0 - 12.0 fL    Platelets 387 140 - 450 10*3/mm3    Neutrophil % 42.3 (L) 42.7 - 76.0 %    Lymphocyte % 41.4 19.6 - 45.3 %    Monocyte % 8.7 5.0 - 12.0 %    Eosinophil % 6.1 0.3 - 6.2 %  "   Basophil % 1.2 0.0 - 1.5 %    Immature Grans % 0.3 0.0 - 0.5 %    Neutrophils, Absolute 4.03 1.70 - 7.00 10*3/mm3    Lymphocytes, Absolute 3.95 (H) 0.70 - 3.10 10*3/mm3    Monocytes, Absolute 0.83 0.10 - 0.90 10*3/mm3    Eosinophils, Absolute 0.58 (H) 0.00 - 0.40 10*3/mm3    Basophils, Absolute 0.11 0.00 - 0.20 10*3/mm3    Immature Grans, Absolute 0.03 0.00 - 0.05 10*3/mm3    nRBC 0.0 0.0 - 0.2 /100 WBC     BMI is >= 30 and <35. (Class 1 Obesity). The following options were offered after discussion;: weight loss educational material (shared in after visit summary)     Assessment & Plan     Diagnoses and all orders for this visit:    1. Type 2 diabetes, controlled, with neuropathy (HCC) (Primary)  Comments:  Noted  A1c of 7.4.  He will continue Xultophy and metformin  Orders:  -     Insulin Pen Needle (Pen Needles 1/2\") 29G X 12MM misc; 1 Device Daily.  Dispense: 30 each; Refill: 0  -     Lancets (OneTouch Delica Plus [id number]) misc; Apply 1 each topically to the appropriate area as directed 3 (Three) Times a Day. Test blood glucose  Dispense: 100 each; Refill: 6  -     metFORMIN (GLUCOPHAGE) 500 MG tablet; Take 2 tablets by mouth 2 (Two) Times a Day.  Dispense: 120 tablet; Refill: 4  -     glucose blood (OneTouch Ultra) test strip; 1 each by Other route 3 (Three) Times a Day. Test blood glucose  Dispense: 100 each; Refill: 6  -     Insulin Degludec-Liraglutide (Xultophy) 100-3.6 UNIT-MG/ML solution pen-injector subcutaneous pen; Inject 50 Units under the skin into the appropriate area as directed Daily.  Dispense: 45 mL; Refill: 0  -     Comprehensive Metabolic Panel; Future  -     Lipid Panel; Future  -     TSH; Future  -     Hemoglobin A1c; Future  -     MicroAlbumin, Urine, Random - Urine, Clean Catch; Future  -     Vitamin B12; Future    2. Benign essential hypertension  Comments:  Well controlled with Toprol XL  Orders:  -     metoprolol succinate XL (TOPROL-XL) 100 MG 24 hr tablet; Take 1 tablet by mouth " Daily.  Dispense: 30 tablet; Refill: 4  -     Comprehensive Metabolic Panel; Future  -     Lipid Panel; Future  -     TSH; Future    3. Mixed dyslipidemia  Comments:  Improved lipid panel.  Continue rosuvastatin  Orders:  -     rosuvastatin (CRESTOR) 5 MG tablet; Take 1 tablet by mouth Daily.  Dispense: 30 tablet; Refill: 4  -     Comprehensive Metabolic Panel; Future  -     Lipid Panel; Future  -     TSH; Future    4. Rheumatoid arthritis involving multiple sites with positive rheumatoid factor (HCC)  Comments:  Stable with current medication he will continue methotrexate and folic acid  Orders:  -     cyclobenzaprine (FLEXERIL) 10 MG tablet; Take 1 tablet by mouth 2 (Two) Times a Day As Needed for Muscle Spasms.  Dispense: 60 tablet; Refill: 4  -     Diclofenac Sodium (VOLTAREN) 1 % gel gel; Apply  topically to the appropriate area as directed 4 (Four) Times a Day.  Dispense: 100 g; Refill: 4  -     folic acid (FOLVITE) 1 MG tablet; Take 1 tablet by mouth Daily.  Dispense: 30 tablet; Refill: 4  -     methotrexate 2.5 MG tablet; Take 1 tablet by mouth 1 (One) Time Per Week.  Dispense: 40 tablet; Refill: 4  -     Uric Acid; Future    5. Restrictive lung disease  Comments:  Using Albuterol daily.  Improved since his thyroid surgery  Orders:  -     ProAir  (90 Base) MCG/ACT inhaler; Inhale 2 puffs Every 4 (Four) Hours As Needed for Shortness of Air.  Dispense: 8.5 g; Refill: 4    6. Chronic insomnia  Comments:  Continue Elavil 150 mg and take 3 hours prior to bedtime  Orders:  -     amitriptyline (ELAVIL) 100 MG tablet; Take 1 tablet by mouth every night at bedtime.  Dispense: 30 tablet; Refill: 4    7. Chronic allergic rhinitis  Comments:  Continue Singulair and Flonase  Orders:  -     fluticasone (FLONASE) 50 MCG/ACT nasal spray; 1 spray into the nostril(s) as directed by provider 2 (Two) Times a Day.  Dispense: 16 g; Refill: 4  -     montelukast (SINGULAIR) 10 MG tablet; Take 1 tablet by mouth every night at  bedtime.  Dispense: 30 tablet; Refill: 4    8. Gastroesophageal reflux disease without esophagitis  Comments:  Stable with Prevacid  Orders:  -     lansoprazole (PREVACID) 30 MG capsule; Take 1 capsule by mouth Daily.  Dispense: 30 capsule; Refill: 4  -     CBC & Differential; Future    9. Postsurgical hypothyroidism  Comments:  Noted TSH.  No change to his levothyroxine.  Pending endocrinology appointment  Orders:  -     TSH; Future  -     T4, Free; Future    10. B12 deficiency  Comments:  Continue B12 supplementation  Orders:  -     vitamin B-12 (CYANOCOBALAMIN) 1000 MCG tablet; Take 1 tablet by mouth Daily.  Dispense: 30 tablet; Refill: 4    11. Vitamin D deficiency  Comments:   Vit D daily supplement  Orders:  -     Vitamin D, Cholecalciferol, 50 MCG (2000 UT) capsule; Take 1 capsule by mouth Daily.  Dispense: 30 capsule; Refill: 4  -     Vitamin D,25-Hydroxy; Future    12. Bilateral hearing loss, unspecified hearing loss type  Comments:  Referral to audiology  Orders:  -     Ambulatory Referral to Audiology    Follow Up In April  Findings and recommendations discussed with Raimundo. Reviewed test results. Lifestyle modifications reinforced including nutrition and activity recommendations.  Raimundo will follow up in April sooner if problems/concerns occur.  He was given instructions and counseling regarding his condition or for health maintenance advice. Please see specific information pulled into the AVS if appropriate    This document has been electronically signed by:

## 2023-01-22 RX ORDER — LANCETS 30 GAUGE
1 EACH MISCELLANEOUS 3 TIMES DAILY
Qty: 100 EACH | Refills: 6 | Status: SHIPPED | OUTPATIENT
Start: 2023-01-22

## 2023-01-22 RX ORDER — LANSOPRAZOLE 30 MG/1
30 CAPSULE, DELAYED RELEASE ORAL DAILY
Qty: 30 CAPSULE | Refills: 4 | Status: SHIPPED | OUTPATIENT
Start: 2023-01-22

## 2023-01-22 RX ORDER — ROSUVASTATIN CALCIUM 5 MG/1
5 TABLET, COATED ORAL DAILY
Qty: 30 TABLET | Refills: 4 | Status: SHIPPED | OUTPATIENT
Start: 2023-01-22

## 2023-01-22 RX ORDER — METOPROLOL SUCCINATE 100 MG/1
100 TABLET, EXTENDED RELEASE ORAL DAILY
Qty: 30 TABLET | Refills: 4 | Status: SHIPPED | OUTPATIENT
Start: 2023-01-22

## 2023-01-22 RX ORDER — PEN NEEDLE, DIABETIC 29 G X1/2"
1 NEEDLE, DISPOSABLE MISCELLANEOUS DAILY
Qty: 30 EACH | Refills: 0 | Status: SHIPPED | OUTPATIENT
Start: 2023-01-22

## 2023-01-22 RX ORDER — MONTELUKAST SODIUM 10 MG/1
10 TABLET ORAL
Qty: 30 TABLET | Refills: 4 | Status: SHIPPED | OUTPATIENT
Start: 2023-01-22

## 2023-01-22 RX ORDER — MULTIVIT-MIN/IRON/FOLIC ACID/K 18-600-40
1 CAPSULE ORAL DAILY
Qty: 30 CAPSULE | Refills: 4 | Status: SHIPPED | OUTPATIENT
Start: 2023-01-22

## 2023-01-22 RX ORDER — CYCLOBENZAPRINE HCL 10 MG
10 TABLET ORAL 2 TIMES DAILY PRN
Qty: 60 TABLET | Refills: 4 | Status: SHIPPED | OUTPATIENT
Start: 2023-01-22

## 2023-01-22 RX ORDER — METHOTREXATE 2.5 MG/1
2.5 TABLET ORAL WEEKLY
Qty: 40 TABLET | Refills: 4 | Status: SHIPPED | OUTPATIENT
Start: 2023-01-22 | End: 2023-02-17

## 2023-01-22 RX ORDER — FOLIC ACID 1 MG/1
1000 TABLET ORAL DAILY
Qty: 30 TABLET | Refills: 4 | Status: SHIPPED | OUTPATIENT
Start: 2023-01-22

## 2023-01-22 RX ORDER — LANOLIN ALCOHOL/MO/W.PET/CERES
1000 CREAM (GRAM) TOPICAL DAILY
Qty: 30 TABLET | Refills: 4 | Status: SHIPPED | OUTPATIENT
Start: 2023-01-22

## 2023-01-22 RX ORDER — (INSULIN DEGLUDEC AND LIRAGLUTIDE) 100; 3.6 [IU]/ML; MG/ML
50 INJECTION, SOLUTION SUBCUTANEOUS DAILY
Qty: 45 ML | Refills: 0 | Status: SHIPPED | OUTPATIENT
Start: 2023-01-22 | End: 2023-03-01

## 2023-01-22 RX ORDER — FLUTICASONE PROPIONATE 50 MCG
1 SPRAY, SUSPENSION (ML) NASAL 2 TIMES DAILY
Qty: 16 G | Refills: 4 | Status: SHIPPED | OUTPATIENT
Start: 2023-01-22

## 2023-01-22 RX ORDER — AMITRIPTYLINE HYDROCHLORIDE 100 MG/1
100 TABLET, FILM COATED ORAL
Qty: 30 TABLET | Refills: 4 | Status: SHIPPED | OUTPATIENT
Start: 2023-01-22 | End: 2023-03-21

## 2023-02-13 ENCOUNTER — OFFICE VISIT (OUTPATIENT)
Dept: ENDOCRINOLOGY | Facility: CLINIC | Age: 58
End: 2023-02-13
Payer: COMMERCIAL

## 2023-02-13 VITALS
DIASTOLIC BLOOD PRESSURE: 88 MMHG | HEIGHT: 68 IN | WEIGHT: 222.4 LBS | OXYGEN SATURATION: 96 % | SYSTOLIC BLOOD PRESSURE: 132 MMHG | HEART RATE: 101 BPM | BODY MASS INDEX: 33.71 KG/M2

## 2023-02-13 DIAGNOSIS — E89.0 POSTSURGICAL HYPOTHYROIDISM: Primary | ICD-10-CM

## 2023-02-13 PROCEDURE — 99214 OFFICE O/P EST MOD 30 MIN: CPT | Performed by: NURSE PRACTITIONER

## 2023-02-13 RX ORDER — LEVOTHYROXINE SODIUM 112 UG/1
112 TABLET ORAL DAILY
Qty: 30 TABLET | Refills: 5 | Status: SHIPPED | OUTPATIENT
Start: 2023-02-13

## 2023-02-13 NOTE — PROGRESS NOTES
Raimundo Munguia is a 57 y.o. male had concerns including Postsurgical hypothyroidism (Follow up).   Post-surgical hypothyroidism.    Patient had a nodule removal by Dr. Wynn in 4/2022.  He has been taking T4 75 mcg QD.  He has not been missing any doses.  His most recent labs were elevated greater than 5.    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  Patient states that prior to this he was not aware of any prior thyroid issues.  His most recent TFT's were within normal range.    He states he has a hard time swallowing sometimes.  He feels like he has some mild compressive symptoms when he is trying to swallow something. Sometimes he has symptoms at night that wake him up, but that he also wears oxygen.  Has never been told that his thyroid was enlarged that he recalls.    His mother had graves disease with thyroidectomy in the past.    Birth state:[place]  Previous history of radiation to face/neck:none  Consuming foods high in iodine: none  Family history of thyroid complications: mom-graves diease, none other, no hx of thyroid CA in family    Patient was seen by Dr. Wynn, General Surgery, for surgical eval in Jan 2022 to evaluate the need for removal.  He was informed that he does need surgery and that they will get him on the schedule.  He has not heard from them since that time.  Contacted the office today and they informed that the hospital facility that they use for surgery is just now allowing for them to perform surgeries in the last couple of weeks and are limiting how many they are allowed to do at this time.  They are working on getting him on the surgery schedule and they will be contacting him for surgical date.    The following portions of the patient's history were reviewed and updated as appropriate: allergies, current medications, past family history, past medical history, past social history, past surgical history and problem list.    Past  "Medical History:   Diagnosis Date   • Asthma     inhlaer  prn   • Cervicalgia    • CVA (cerebral vascular accident) (Formerly [facility])     around 2012; \"light\" residual dysarthria   • Diabetes mellitus ([facility]) 2015    po meds as well as insulin daily; checks sugars at home   • GERD (gastroesophageal reflux disease)    • Helicobacter pylori (H. 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   • Vitamin D deficiency      Past Surgical History:   Procedure Laterality Date   • COLON RESECTION N/A 02/22/2018    Procedure: COLON RESECTION SIGMOID;  Surgeon: Ej Tinoco MD;  Location: [facility] OR;  Service:    • COLONOSCOPY N/A 02/07/2018    Procedure: COLONOSCOPY CPT CODE: 63005;  Surgeon: Teodoro Garg III, MD;  Location: [facility] OR;  Service:    • COLONOSCOPY N/A 03/07/2019    Procedure: COLONOSCOPY FOR SCREENING CPT CODE: ;  Surgeon: Ej Tinoco MD;  Location: [facility] OR;  Service: Gastroenterology   • ENDOSCOPY N/A 02/07/2018    Procedure: ESOPHAGOGASTRODUODENOSCOPY WITH DILATATION CPT CODE: 73918;  Surgeon: Teodoro Garg III, MD;  Location: [facility] OR;  Service:    • THYROIDECTOMY Right 4/15/2022    Procedure: THYROIDECTOMY RIGHT LOBECTOMY;  Surgeon: Fidencio Wynn MD;  Location: [facility] OR;  Service: General;  Laterality: Right;      Family History   Problem Relation Age of Onset   • Diabetes Mother    • Hypertension Mother    • Hypertension Father    • Hyperlipidemia Father    • Diabetes Sister    • Hyperlipidemia Sister    • Hypertension Sister    • Hypertension Brother    • Hyperlipidemia Brother       Social History     Socioeconomic History   • Marital status: Legally      Spouse name: tess   • Number of children: 3   • Years " "of education: 9   Tobacco Use   • Smoking status: Never   • Smokeless tobacco: Current     Types: Snuff   • Tobacco comments:     one can every 2-3 days; 25-26 years of use   Vaping Use   • Vaping Use: Never used   Substance and Sexual Activity   • Alcohol use: No   • Drug use: No   • Sexual activity: Defer     Partners: Female      No Known Allergies   Current Outpatient Medications on File Prior to Visit   Medication Sig Dispense Refill   • amitriptyline (ELAVIL) 100 MG tablet Take 1 tablet by mouth every night at bedtime. 30 tablet 4   • cyclobenzaprine (FLEXERIL) 10 MG tablet Take 1 tablet by mouth 2 (Two) Times a Day As Needed for Muscle Spasms. 60 tablet 4   • Diclofenac Sodium (VOLTAREN) 1 % gel gel Apply  topically to the appropriate area as directed 4 (Four) Times a Day. 100 g 4   • fluticasone (FLONASE) 50 MCG/ACT nasal spray 1 spray into the nostril(s) as directed by provider 2 (Two) Times a Day. 16 g 4   • folic acid (FOLVITE) 1 MG tablet Take 1 tablet by mouth Daily. 30 tablet 4   • glucose blood (OneTouch Ultra) test strip 1 each by Other route 3 (Three) Times a Day. Test blood glucose 100 each 6   • Insulin Pen Needle (Pen Needles 1/2\") 29G X 12MM misc 1 Device Daily. 30 each 0   • Lancets (OneTouch Delica Plus [id number]) misc Apply 1 each topically to the appropriate area as directed 3 (Three) Times a Day. Test blood glucose 100 each 6   • Lancets (OneTouch Delica Plus [id number]) misc 1 each by Other route Daily. 100 each 3   • Lancets Ultra Fine misc 1 Device 3 (Three) Times a Day. 100 each 6   • lansoprazole (PREVACID) 30 MG capsule Take 1 capsule by mouth Daily. 30 capsule 4   • levocetirizine (XYZAL) 5 MG tablet Take 1 tablet by mouth Every Evening. 30 tablet 2   • methotrexate 2.5 MG tablet Take 1 tablet by mouth 1 (One) Time Per Week. 40 tablet 4   • metoprolol succinate XL (TOPROL-XL) 100 MG 24 hr tablet Take 1 tablet by mouth Daily. 30 tablet 4   • montelukast (SINGULAIR) 10 MG tablet Take 1 " "tablet by mouth every night at bedtime. 30 tablet 4   • ProAir  (90 Base) MCG/ACT inhaler Inhale 2 puffs Every 4 (Four) Hours As Needed for Shortness of Air. 8.5 g 4   • rosuvastatin (CRESTOR) 5 MG tablet Take 1 tablet by mouth Daily. 30 tablet 4   • vitamin B-12 (CYANOCOBALAMIN) 1000 MCG tablet Take 1 tablet by mouth Daily. 30 tablet 4   • Vitamin D, Cholecalciferol, 50 MCG (2000 UT) capsule Take 1 capsule by mouth Daily. 30 capsule 4   • [DISCONTINUED] levothyroxine (SYNTHROID, LEVOTHROID) 75 MCG tablet Take 1 tablet by mouth Daily. 90 tablet 2   • Insulin Degludec-Liraglutide (Xultophy) 100-3.6 UNIT-MG/ML solution pen-injector subcutaneous pen Inject 50 Units under the skin into the appropriate area as directed Daily. 45 mL 0   • metFORMIN (GLUCOPHAGE) 500 MG tablet Take 2 tablets by mouth 2 (Two) Times a Day. 120 tablet 4     No current facility-administered medications on file prior to visit.     Review of Systems   HENT: Positive for trouble swallowing.    Eyes: Negative.    Endocrine: Negative.    Psychiatric/Behavioral: Positive for sleep disturbance.   All other systems reviewed and are negative.    /88 (BP Location: Right arm, Patient Position: Standing, Cuff Size: Adult)   Pulse 101   Ht 172.7 cm (68\")   Wt 101 kg (222 lb 6.4 oz)   SpO2 96%   BMI 33.82 kg/m²      Physical Exam  Vitals reviewed.   Constitutional:       Appearance: Normal appearance.   Eyes:      Extraocular Movements: Extraocular movements intact.   Neck:      Thyroid: Thyromegaly present. No thyroid mass or thyroid tenderness.   Cardiovascular:      Rate and Rhythm: Normal rate.   Pulmonary:      Effort: Pulmonary effort is normal.   Skin:     General: Skin is warm.   Neurological:      Mental Status: He is alert and oriented to person, place, and time.   Psychiatric:         Mood and Affect: Mood normal.         Behavior: Behavior normal.         Thought Content: Thought content normal.         Judgment: Judgment " normal.       Labs/Imaging  CMP  Lab Results   Component Value Date    GLUCOSE 102 (H) 01/13/2023    BUN 9 01/13/2023    CREATININE 0.82 01/13/2023    EGFRIFNONA 106 02/24/2022    EGFRIFAFRI  09/13/2016      Comment:      <15 Indicative of kidney failure.    BCR 11.0 01/13/2023    K 4.1 01/13/2023    CO2 28.0 01/13/2023    CALCIUM 9.7 01/13/2023    ALBUMIN 4.5 01/13/2023    LABIL2 1.2 (L) 05/17/2016    AST 20 01/13/2023    ALT 25 01/13/2023        CBC w/DIFF   Lab Results   Component Value Date    WBC 9.53 01/13/2023    RBC 5.70 01/13/2023    HGB 18.0 (H) 01/13/2023    HCT 52.4 (H) 01/13/2023    MCV 91.9 01/13/2023    MCH 31.6 01/13/2023    MCHC 34.4 01/13/2023    RDW 11.5 (L) 01/13/2023    RDWSD 38.2 01/13/2023    MPV 9.8 01/13/2023     01/13/2023    NEUTRORELPCT 42.3 (L) 01/13/2023    LYMPHORELPCT 41.4 01/13/2023    MONORELPCT 8.7 01/13/2023    EOSRELPCT 6.1 01/13/2023    BASORELPCT 1.2 01/13/2023    AUTOIGPER 0.3 01/13/2023    NEUTROABS 4.03 01/13/2023    LYMPHSABS 3.95 (H) 01/13/2023    MONOSABS 0.83 01/13/2023    EOSABS 0.58 (H) 01/13/2023    BASOSABS 0.11 01/13/2023    AUTOIGNUM 0.03 01/13/2023    NRBC 0.0 01/13/2023       TSH  Lab Results   Component Value Date    TSH 5.230 (H) 01/13/2023    TSH 8.220 (H) 09/23/2022    TSH 3.100 06/09/2022       T4  Lab Results   Component Value Date    FREET4 1.21 05/26/2022     Lab Results   Component Value Date    A6MVNLU 8.7 04/27/2015       T3  No results found for: T3FREE  No results found for: K4KZQKX    TRAb  No results found for: TSURCPAB    TPO  No results found for: THYROIDAB    No valid procedures specified.    Assessment and Plan    Diagnoses and all orders for this visit:    1. Postsurgical hypothyroidism (Primary)  Assessment & Plan:  -Clinically hypothyroid.  -TFT's were above range.  Will increase T4 to 112 mcg QD.  Reminded of proper administration including taking 7 pills per week on an empty stomach with no missed doses, waiting 30-60 minutes prior  to other medications or food.  -Follow-up in 6 months.    Orders:  -     levothyroxine (SYNTHROID, LEVOTHROID) 112 MCG tablet; Take 1 tablet by mouth Daily.  Dispense: 30 tablet; Refill: 5  -     Cancel: US Thyroid  -     US Thyroid       Return in about 6 months (around 8/13/2023) for Follow-up appointment. The patient was instructed to contact the clinic with any interval questions or concerns.    This document has been electronically signed by ABDIRAHMAN Horan  February 13, 2023 10:55 EST  Endocrinology

## 2023-02-13 NOTE — ASSESSMENT & PLAN NOTE
-Clinically hypothyroid.  -TFT's were above range.  Will increase T4 to 112 mcg QD.  Reminded of proper administration including taking 7 pills per week on an empty stomach with no missed doses, waiting 30-60 minutes prior to other medications or food.  -Follow-up in 6 months.

## 2023-02-17 DIAGNOSIS — M05.79 RHEUMATOID ARTHRITIS INVOLVING MULTIPLE SITES WITH POSITIVE RHEUMATOID FACTOR: Chronic | ICD-10-CM

## 2023-02-17 RX ORDER — METHOTREXATE 2.5 MG/1
TABLET ORAL
Qty: 40 TABLET | Refills: 4 | Status: SHIPPED | OUTPATIENT
Start: 2023-02-17

## 2023-03-01 DIAGNOSIS — E11.40 TYPE 2 DIABETES, CONTROLLED, WITH NEUROPATHY: Chronic | ICD-10-CM

## 2023-03-01 RX ORDER — (INSULIN DEGLUDEC AND LIRAGLUTIDE) 100; 3.6 [IU]/ML; MG/ML
INJECTION, SOLUTION SUBCUTANEOUS
Qty: 45 ML | Refills: 1 | Status: SHIPPED | OUTPATIENT
Start: 2023-03-01

## 2023-03-08 DIAGNOSIS — J98.4 RESTRICTIVE LUNG DISEASE: Chronic | ICD-10-CM

## 2023-03-08 RX ORDER — ALBUTEROL SULFATE 90 UG/1
AEROSOL, METERED RESPIRATORY (INHALATION)
Qty: 18 G | Refills: 4 | Status: SHIPPED | OUTPATIENT
Start: 2023-03-08

## 2023-03-21 DIAGNOSIS — J30.9 CHRONIC ALLERGIC RHINITIS: Chronic | ICD-10-CM

## 2023-03-21 DIAGNOSIS — F51.04 CHRONIC INSOMNIA: Chronic | ICD-10-CM

## 2023-03-21 RX ORDER — LEVOCETIRIZINE DIHYDROCHLORIDE 5 MG/1
TABLET, FILM COATED ORAL
Qty: 30 TABLET | Refills: 2 | Status: SHIPPED | OUTPATIENT
Start: 2023-03-21

## 2023-03-21 RX ORDER — AMITRIPTYLINE HYDROCHLORIDE 100 MG/1
TABLET, FILM COATED ORAL
Qty: 30 TABLET | Refills: 5 | Status: SHIPPED | OUTPATIENT
Start: 2023-03-21

## 2023-04-13 ENCOUNTER — LAB (OUTPATIENT)
Dept: FAMILY MEDICINE CLINIC | Facility: CLINIC | Age: 58
End: 2023-04-13
Payer: COMMERCIAL

## 2023-04-13 DIAGNOSIS — E55.9 VITAMIN D DEFICIENCY: ICD-10-CM

## 2023-04-13 DIAGNOSIS — I10 BENIGN ESSENTIAL HYPERTENSION: Chronic | ICD-10-CM

## 2023-04-13 DIAGNOSIS — E11.40 TYPE 2 DIABETES, CONTROLLED, WITH NEUROPATHY: Chronic | ICD-10-CM

## 2023-04-13 DIAGNOSIS — M05.79 RHEUMATOID ARTHRITIS INVOLVING MULTIPLE SITES WITH POSITIVE RHEUMATOID FACTOR: Chronic | ICD-10-CM

## 2023-04-13 DIAGNOSIS — E78.2 MIXED DYSLIPIDEMIA: Chronic | ICD-10-CM

## 2023-04-13 DIAGNOSIS — K21.9 GASTROESOPHAGEAL REFLUX DISEASE WITHOUT ESOPHAGITIS: ICD-10-CM

## 2023-04-13 DIAGNOSIS — E89.0 POSTSURGICAL HYPOTHYROIDISM: Chronic | ICD-10-CM

## 2023-04-13 PROCEDURE — 82306 VITAMIN D 25 HYDROXY: CPT | Performed by: NURSE PRACTITIONER

## 2023-04-13 PROCEDURE — 80050 GENERAL HEALTH PANEL: CPT | Performed by: NURSE PRACTITIONER

## 2023-04-13 PROCEDURE — 84550 ASSAY OF BLOOD/URIC ACID: CPT | Performed by: NURSE PRACTITIONER

## 2023-04-13 PROCEDURE — 83036 HEMOGLOBIN GLYCOSYLATED A1C: CPT | Performed by: NURSE PRACTITIONER

## 2023-04-13 PROCEDURE — 82043 UR ALBUMIN QUANTITATIVE: CPT | Performed by: NURSE PRACTITIONER

## 2023-04-13 PROCEDURE — 82607 VITAMIN B-12: CPT | Performed by: NURSE PRACTITIONER

## 2023-04-13 PROCEDURE — 84439 ASSAY OF FREE THYROXINE: CPT | Performed by: NURSE PRACTITIONER

## 2023-04-13 PROCEDURE — 80061 LIPID PANEL: CPT | Performed by: NURSE PRACTITIONER

## 2023-04-14 LAB
25(OH)D3 SERPL-MCNC: 27.1 NG/ML (ref 30–100)
ALBUMIN SERPL-MCNC: 4.1 G/DL (ref 3.5–5.2)
ALBUMIN UR-MCNC: <1.2 MG/DL
ALBUMIN/GLOB SERPL: 1.4 G/DL
ALP SERPL-CCNC: 123 U/L (ref 39–117)
ALT SERPL W P-5'-P-CCNC: 24 U/L (ref 1–41)
ANION GAP SERPL CALCULATED.3IONS-SCNC: 10 MMOL/L (ref 5–15)
AST SERPL-CCNC: 22 U/L (ref 1–40)
BASOPHILS # BLD AUTO: 0.11 10*3/MM3 (ref 0–0.2)
BASOPHILS NFR BLD AUTO: 1.4 % (ref 0–1.5)
BILIRUB SERPL-MCNC: 0.3 MG/DL (ref 0–1.2)
BUN SERPL-MCNC: 12 MG/DL (ref 6–20)
BUN/CREAT SERPL: 11.9 (ref 7–25)
CALCIUM SPEC-SCNC: 9.2 MG/DL (ref 8.6–10.5)
CHLORIDE SERPL-SCNC: 102 MMOL/L (ref 98–107)
CHOLEST SERPL-MCNC: 102 MG/DL (ref 0–200)
CO2 SERPL-SCNC: 25 MMOL/L (ref 22–29)
CREAT SERPL-MCNC: 1.01 MG/DL (ref 0.76–1.27)
DEPRECATED RDW RBC AUTO: 39.6 FL (ref 37–54)
EGFRCR SERPLBLD CKD-EPI 2021: 87.3 ML/MIN/1.73
EOSINOPHIL # BLD AUTO: 0.35 10*3/MM3 (ref 0–0.4)
EOSINOPHIL NFR BLD AUTO: 4.3 % (ref 0.3–6.2)
ERYTHROCYTE [DISTWIDTH] IN BLOOD BY AUTOMATED COUNT: 11.6 % (ref 12.3–15.4)
GLOBULIN UR ELPH-MCNC: 3 GM/DL
GLUCOSE SERPL-MCNC: 174 MG/DL (ref 65–99)
HBA1C MFR BLD: 7.3 % (ref 4.8–5.6)
HCT VFR BLD AUTO: 53 % (ref 37.5–51)
HDLC SERPL-MCNC: 23 MG/DL (ref 40–60)
HGB BLD-MCNC: 18.5 G/DL (ref 13–17.7)
IMM GRANULOCYTES # BLD AUTO: 0.03 10*3/MM3 (ref 0–0.05)
IMM GRANULOCYTES NFR BLD AUTO: 0.4 % (ref 0–0.5)
LDLC SERPL CALC-MCNC: 48 MG/DL (ref 0–100)
LDLC/HDLC SERPL: 1.83 {RATIO}
LYMPHOCYTES # BLD AUTO: 3.45 10*3/MM3 (ref 0.7–3.1)
LYMPHOCYTES NFR BLD AUTO: 42.8 % (ref 19.6–45.3)
MCH RBC QN AUTO: 32.3 PG (ref 26.6–33)
MCHC RBC AUTO-ENTMCNC: 34.9 G/DL (ref 31.5–35.7)
MCV RBC AUTO: 92.5 FL (ref 79–97)
MONOCYTES # BLD AUTO: 0.98 10*3/MM3 (ref 0.1–0.9)
MONOCYTES NFR BLD AUTO: 12.2 % (ref 5–12)
NEUTROPHILS NFR BLD AUTO: 3.14 10*3/MM3 (ref 1.7–7)
NEUTROPHILS NFR BLD AUTO: 38.9 % (ref 42.7–76)
NRBC BLD AUTO-RTO: 0 /100 WBC (ref 0–0.2)
PLATELET # BLD AUTO: 296 10*3/MM3 (ref 140–450)
PMV BLD AUTO: 10.7 FL (ref 6–12)
POTASSIUM SERPL-SCNC: 4.7 MMOL/L (ref 3.5–5.2)
PROT SERPL-MCNC: 7.1 G/DL (ref 6–8.5)
RBC # BLD AUTO: 5.73 10*6/MM3 (ref 4.14–5.8)
SODIUM SERPL-SCNC: 137 MMOL/L (ref 136–145)
T4 FREE SERPL-MCNC: 1.71 NG/DL (ref 0.93–1.7)
TRIGL SERPL-MCNC: 185 MG/DL (ref 0–150)
TSH SERPL DL<=0.05 MIU/L-ACNC: 1.71 UIU/ML (ref 0.27–4.2)
URATE SERPL-MCNC: 4.6 MG/DL (ref 3.4–7)
VIT B12 BLD-MCNC: 1501 PG/ML (ref 211–946)
VLDLC SERPL-MCNC: 31 MG/DL (ref 5–40)
WBC NRBC COR # BLD: 8.06 10*3/MM3 (ref 3.4–10.8)

## 2023-04-25 ENCOUNTER — OFFICE VISIT (OUTPATIENT)
Dept: FAMILY MEDICINE CLINIC | Facility: CLINIC | Age: 58
End: 2023-04-25
Payer: COMMERCIAL

## 2023-04-25 VITALS
WEIGHT: 221 LBS | OXYGEN SATURATION: 96 % | TEMPERATURE: 97.3 F | SYSTOLIC BLOOD PRESSURE: 110 MMHG | HEIGHT: 68 IN | HEART RATE: 98 BPM | DIASTOLIC BLOOD PRESSURE: 80 MMHG | BODY MASS INDEX: 33.49 KG/M2

## 2023-04-25 DIAGNOSIS — E53.8 B12 DEFICIENCY: ICD-10-CM

## 2023-04-25 DIAGNOSIS — E78.2 MIXED DYSLIPIDEMIA: Chronic | ICD-10-CM

## 2023-04-25 DIAGNOSIS — E55.9 VITAMIN D DEFICIENCY: ICD-10-CM

## 2023-04-25 DIAGNOSIS — J30.9 CHRONIC ALLERGIC RHINITIS: Chronic | ICD-10-CM

## 2023-04-25 DIAGNOSIS — J98.4 RESTRICTIVE LUNG DISEASE: Chronic | ICD-10-CM

## 2023-04-25 DIAGNOSIS — I10 BENIGN ESSENTIAL HYPERTENSION: Chronic | ICD-10-CM

## 2023-04-25 DIAGNOSIS — E11.40 TYPE 2 DIABETES, CONTROLLED, WITH NEUROPATHY: Primary | Chronic | ICD-10-CM

## 2023-04-25 DIAGNOSIS — E89.0 POSTSURGICAL HYPOTHYROIDISM: ICD-10-CM

## 2023-04-25 DIAGNOSIS — K21.9 GASTROESOPHAGEAL REFLUX DISEASE WITHOUT ESOPHAGITIS: Chronic | ICD-10-CM

## 2023-04-25 DIAGNOSIS — M05.79 RHEUMATOID ARTHRITIS INVOLVING MULTIPLE SITES WITH POSITIVE RHEUMATOID FACTOR: Chronic | ICD-10-CM

## 2023-04-25 RX ORDER — METHOTREXATE 2.5 MG/1
25 TABLET ORAL WEEKLY
Qty: 40 TABLET | Refills: 4 | Status: SHIPPED | OUTPATIENT
Start: 2023-04-25

## 2023-04-25 RX ORDER — (INSULIN DEGLUDEC AND LIRAGLUTIDE) 100; 3.6 [IU]/ML; MG/ML
50 INJECTION, SOLUTION SUBCUTANEOUS DAILY
Qty: 45 ML | Refills: 3 | Status: SHIPPED | OUTPATIENT
Start: 2023-04-25

## 2023-04-25 RX ORDER — MULTIVIT-MIN/IRON/FOLIC ACID/K 18-600-40
1 CAPSULE ORAL DAILY
Qty: 30 CAPSULE | Refills: 4 | Status: SHIPPED | OUTPATIENT
Start: 2023-04-25

## 2023-04-25 RX ORDER — PEN NEEDLE, DIABETIC 29 G X1/2"
1 NEEDLE, DISPOSABLE MISCELLANEOUS DAILY
Qty: 30 EACH | Refills: 0 | Status: SHIPPED | OUTPATIENT
Start: 2023-04-25

## 2023-04-25 RX ORDER — METOPROLOL SUCCINATE 100 MG/1
100 TABLET, EXTENDED RELEASE ORAL DAILY
Qty: 30 TABLET | Refills: 4 | Status: SHIPPED | OUTPATIENT
Start: 2023-04-25

## 2023-04-25 RX ORDER — MONTELUKAST SODIUM 10 MG/1
10 TABLET ORAL
Qty: 30 TABLET | Refills: 4 | Status: SHIPPED | OUTPATIENT
Start: 2023-04-25

## 2023-04-25 RX ORDER — AZELASTINE 1 MG/ML
SPRAY, METERED NASAL
Qty: 1 EACH | Refills: 3 | Status: SHIPPED | OUTPATIENT
Start: 2023-04-25

## 2023-04-25 RX ORDER — LANSOPRAZOLE 30 MG/1
30 CAPSULE, DELAYED RELEASE ORAL DAILY
Qty: 30 CAPSULE | Refills: 4 | Status: SHIPPED | OUTPATIENT
Start: 2023-04-25

## 2023-04-25 RX ORDER — FOLIC ACID 1 MG/1
1000 TABLET ORAL DAILY
Qty: 30 TABLET | Refills: 4 | Status: SHIPPED | OUTPATIENT
Start: 2023-04-25

## 2023-04-25 RX ORDER — FEXOFENADINE HCL 180 MG/1
180 TABLET ORAL DAILY
Qty: 30 TABLET | Refills: 5 | Status: SHIPPED | OUTPATIENT
Start: 2023-04-25

## 2023-04-25 RX ORDER — LANOLIN ALCOHOL/MO/W.PET/CERES
1000 CREAM (GRAM) TOPICAL DAILY
Qty: 30 TABLET | Refills: 4 | Status: SHIPPED | OUTPATIENT
Start: 2023-04-25

## 2023-04-25 RX ORDER — ROSUVASTATIN CALCIUM 5 MG/1
5 TABLET, COATED ORAL DAILY
Qty: 30 TABLET | Refills: 4 | Status: SHIPPED | OUTPATIENT
Start: 2023-04-25

## 2023-04-25 NOTE — PROGRESS NOTES
History of Present Illness  Raimundo Munguia is a 56 yo male who presents for follow up and to review recent lab results pertaining to his  DM, type 2, HTN, GERD, Rheumatoid Arthritis and Dyslipidemia. In addition, Raimundo has Insomnia and Vitamin deficiencies.    Diabetes   He has type 2 diabetes mellitus. His disease course has been improving since starting Xultophy. Risk factors for coronary artery disease include dyslipidemia, hypertension, male sex, sedentary lifestyle and tobacco exposure. Current diabetic treatment includes Metformin and Xultophy  He has had a previous visit with a dietitian. An ACE inhibitor/angiotensin II receptor blocker is not being taken due to S/e.     Lab Results   Component Value Date    HGBA1C 7.40 (H) 01/13/2023     Lab Results   Component Value Date    HGBA1C 7.30 (H) 04/13/2023     Hypertension   The current episode started more than 1 year ago.His BP is adequately controlled. His hypertensive medication is Toprol  mg which has improved his blood pressure and fast heart rate.   Lab Results   Component Value Date    GLUCOSE 174 (H) 04/13/2023    BUN 12 04/13/2023    CREATININE 1.01 04/13/2023    EGFR 87.3 04/13/2023    BCR 11.9 04/13/2023    K 4.7 04/13/2023    CO2 25.0 04/13/2023    CALCIUM 9.2 04/13/2023    ALBUMIN 4.1 04/13/2023    BILITOT 0.3 04/13/2023    AST 22 04/13/2023    ALT 24 04/13/2023       Dyslipidemia   This is a chronic problem. The current episode started more than 1 year ago. Factors aggravating his hyperlipidemia include fatty foods. Current antihyperlipidemic treatment includes statins.  Compliance problems include adherence to diet and adherence to exercise.  Risk factors for coronary artery disease include diabetes mellitus, dyslipidemia, hypertension, male sex and obesity.   Lab Results   Component Value Date    CHOL 102 04/13/2023    CHOL 118 01/13/2023    CHOL 86 09/23/2022     Lab Results   Component Value Date    TRIG 185 (H) 04/13/2023    TRIG 118  "01/13/2023    TRIG 178 (H) 09/23/2022     Lab Results   Component Value Date    HDL 23 (L) 04/13/2023    HDL 26 (L) 01/13/2023    HDL 26 (L) 09/23/2022     Lab Results   Component Value Date    LDL 48 04/13/2023    LDL 70 01/13/2023    LDL 31 09/23/2022     The following portions of the patient's history were reviewed and updated as appropriate: allergies, current medications, past family history, past medical history, past social history, past surgical history and problem list.    Review of Systems   Constitutional: Negative for activity change, appetite change, chills, fatigue, fever and unexpected weight change.   HENT: Positive for hearing loss (Chronic). Negative for voice change.    Eyes: Negative for visual disturbance.   Respiratory: Negative for cough, shortness of breath and wheezing.    Cardiovascular: Negative for chest pain, palpitations and leg swelling.   Gastrointestinal: Negative for nausea and vomiting.   Endocrine: Negative for cold intolerance, heat intolerance, polydipsia, polyphagia and polyuria.   Musculoskeletal: Positive for arthralgias, back pain and joint swelling.   Skin: Negative for color change and rash.   Allergic/Immunologic: Positive for environmental allergies and immunocompromised state.   Neurological: Negative for dizziness, tremors, speech difficulty, weakness and light-headedness.   Hematological: Negative for adenopathy.   Psychiatric/Behavioral: Positive for sleep disturbance (Improving). Negative for confusion and decreased concentration. The patient is not nervous/anxious.    All other systems reviewed and are negative.    Vital signs:  /80 (BP Location: Right arm, Patient Position: Sitting, Cuff Size: Adult)   Pulse 98   Temp 97.3 °F (36.3 °C) (Temporal)   Ht 172.7 cm (67.99\")   Wt 100 kg (221 lb)   SpO2 96%   BMI 33.61 kg/m²     Physical Exam  Vitals and nursing note reviewed.   Constitutional:       General: He is not in acute distress.     Appearance: He is " well-developed.      Interventions: Face mask in place.   HENT:      Head: Normocephalic.      Nose: Nose normal.   Eyes:      General: No scleral icterus.        Right eye: No discharge.         Left eye: No discharge.      Conjunctiva/sclera: Conjunctivae normal.   Cardiovascular:      Rate and Rhythm: Normal rate and regular rhythm.      Heart sounds: Normal heart sounds. No murmur heard.    No friction rub.   Pulmonary:      Effort: Pulmonary effort is normal. No respiratory distress.      Breath sounds: No decreased breath sounds, wheezing, rhonchi or rales.   Abdominal:      General: Abdomen is protuberant. Bowel sounds are normal.      Palpations: Abdomen is soft.      Tenderness: There is no abdominal tenderness. There is no guarding.   Musculoskeletal:         General: Deformity (Multiple Rheumatoid nodules bilateral upper extremities) present. No tenderness.      Cervical back: Neck supple.      Right lower leg: No edema.      Left lower leg: No edema.   Lymphadenopathy:      Cervical: No cervical adenopathy.   Skin:     General: Skin is warm and dry.      Capillary Refill: Capillary refill takes less than 2 seconds.   Neurological:      Mental Status: He is alert and oriented to person, place, and time.      Gait: Gait abnormal.   Psychiatric:         Mood and Affect: Mood normal.         Speech: Speech normal.         Behavior: Behavior is cooperative.         Thought Content: Thought content normal.         Cognition and Memory: Cognition and memory normal.     Result Review :  Results for orders placed or performed in visit on 04/13/23   Comprehensive Metabolic Panel    Specimen: Arm, Right; Blood   Result Value Ref Range    Glucose 174 (H) 65 - 99 mg/dL    BUN 12 6 - 20 mg/dL    Creatinine 1.01 0.76 - 1.27 mg/dL    Sodium 137 136 - 145 mmol/L    Potassium 4.7 3.5 - 5.2 mmol/L    Chloride 102 98 - 107 mmol/L    CO2 25.0 22.0 - 29.0 mmol/L    Calcium 9.2 8.6 - 10.5 mg/dL    Total Protein 7.1 6.0 - 8.5  g/dL    Albumin 4.1 3.5 - 5.2 g/dL    ALT (SGPT) 24 1 - 41 U/L    AST (SGOT) 22 1 - 40 U/L    Alkaline Phosphatase 123 (H) 39 - 117 U/L    Total Bilirubin 0.3 0.0 - 1.2 mg/dL    Globulin 3.0 gm/dL    A/G Ratio 1.4 g/dL    BUN/Creatinine Ratio 11.9 7.0 - 25.0    Anion Gap 10.0 5.0 - 15.0 mmol/L    eGFR 87.3 >60.0 mL/min/1.73   Lipid Panel    Specimen: Arm, Right; Blood   Result Value Ref Range    Total Cholesterol 102 0 - 200 mg/dL    Triglycerides 185 (H) 0 - 150 mg/dL    HDL Cholesterol 23 (L) 40 - 60 mg/dL    LDL Cholesterol  48 0 - 100 mg/dL    VLDL Cholesterol 31 5 - 40 mg/dL    LDL/HDL Ratio 1.83    TSH    Specimen: Arm, Right; Blood   Result Value Ref Range    TSH 1.710 0.270 - 4.200 uIU/mL   Hemoglobin A1c    Specimen: Arm, Right; Blood   Result Value Ref Range    Hemoglobin A1C 7.30 (H) 4.80 - 5.60 %   Vitamin D,25-Hydroxy    Specimen: Arm, Right; Blood   Result Value Ref Range    25 Hydroxy, Vitamin D 27.1 (L) 30.0 - 100.0 ng/ml   Uric Acid    Specimen: Arm, Right; Blood   Result Value Ref Range    Uric Acid 4.6 3.4 - 7.0 mg/dL   MicroAlbumin, Urine, Random - Urine, Random Void    Specimen: Urine, Random Void   Result Value Ref Range    Microalbumin, Urine <1.2 mg/dL   Vitamin B12    Specimen: Arm, Right; Blood   Result Value Ref Range    Vitamin B-12 1,501 (H) 211 - 946 pg/mL   T4, Free    Specimen: Arm, Right; Blood   Result Value Ref Range    Free T4 1.71 (H) 0.93 - 1.70 ng/dL   CBC Auto Differential    Specimen: Arm, Right; Blood   Result Value Ref Range    WBC 8.06 3.40 - 10.80 10*3/mm3    RBC 5.73 4.14 - 5.80 10*6/mm3    Hemoglobin 18.5 (H) 13.0 - 17.7 g/dL    Hematocrit 53.0 (H) 37.5 - 51.0 %    MCV 92.5 79.0 - 97.0 fL    MCH 32.3 26.6 - 33.0 pg    MCHC 34.9 31.5 - 35.7 g/dL    RDW 11.6 (L) 12.3 - 15.4 %    RDW-SD 39.6 37.0 - 54.0 fl    MPV 10.7 6.0 - 12.0 fL    Platelets 296 140 - 450 10*3/mm3    Neutrophil % 38.9 (L) 42.7 - 76.0 %    Lymphocyte % 42.8 19.6 - 45.3 %    Monocyte % 12.2 (H) 5.0 - 12.0  "%    Eosinophil % 4.3 0.3 - 6.2 %    Basophil % 1.4 0.0 - 1.5 %    Immature Grans % 0.4 0.0 - 0.5 %    Neutrophils, Absolute 3.14 1.70 - 7.00 10*3/mm3    Lymphocytes, Absolute 3.45 (H) 0.70 - 3.10 10*3/mm3    Monocytes, Absolute 0.98 (H) 0.10 - 0.90 10*3/mm3    Eosinophils, Absolute 0.35 0.00 - 0.40 10*3/mm3    Basophils, Absolute 0.11 0.00 - 0.20 10*3/mm3    Immature Grans, Absolute 0.03 0.00 - 0.05 10*3/mm3    nRBC 0.0 0.0 - 0.2 /100 WBC     BMI is >= 30 and <35. (Class 1 Obesity). The following options were offered after discussion;: weight loss educational material (shared in after visit summary)     Assessment & Plan     Diagnoses and all orders for this visit:    1. Type 2 diabetes, controlled, with neuropathy (Primary)  Comments:  Noted  A1c of 7.3.  He will continue Xultophy and metformin  Orders:  -     Insulin Pen Needle (Pen Needles 1/2\") 29G X 12MM misc; 1 Device Daily.  Dispense: 30 each; Refill: 0  -     metFORMIN (GLUCOPHAGE) 500 MG tablet; Take 2 tablets by mouth 2 (Two) Times a Day.  Dispense: 120 tablet; Refill: 4  -     Insulin Degludec-Liraglutide (Xultophy) 100-3.6 UNIT-MG/ML solution pen-injector subcutaneous pen; Inject 50 Units under the skin into the appropriate area as directed Daily.  Dispense: 45 mL; Refill: 3  -     Comprehensive Metabolic Panel; Future  -     Lipid Panel; Future  -     TSH; Future  -     Hemoglobin A1c; Future  -     Vitamin B12; Future    2. Benign essential hypertension  Comments:  Well controlled with Toprol XL  Orders:  -     metoprolol succinate XL (TOPROL-XL) 100 MG 24 hr tablet; Take 1 tablet by mouth Daily.  Dispense: 30 tablet; Refill: 4  -     Comprehensive Metabolic Panel; Future  -     Lipid Panel; Future  -     TSH; Future    3. Mixed dyslipidemia  Comments:  Improved lipid panel.  Continue rosuvastatin  Orders:  -     rosuvastatin (CRESTOR) 5 MG tablet; Take 1 tablet by mouth Daily.  Dispense: 30 tablet; Refill: 4  -     Comprehensive Metabolic Panel; " Future  -     Lipid Panel; Future  -     TSH; Future    4. Rheumatoid arthritis involving multiple sites with positive rheumatoid factor  Comments:  Stable with current medication he will continue methotrexate and folic acid. Declines Rheumatology referral   Orders:  -     folic acid (FOLVITE) 1 MG tablet; Take 1 tablet by mouth Daily.  Dispense: 30 tablet; Refill: 4  -     methotrexate 2.5 MG tablet; Take 10 tablets by mouth 1 (One) Time Per Week.  Dispense: 40 tablet; Refill: 4  -     Uric Acid; Future    5. Gastroesophageal reflux disease without esophagitis  Comments:  Stable with Prevacid  Orders:  -     lansoprazole (PREVACID) 30 MG capsule; Take 1 capsule by mouth Daily.  Dispense: 30 capsule; Refill: 4  -     CBC & Differential; Future    6. Chronic allergic rhinitis  Comments:  Changed Xyzal to Allegra. Continue Azelatine NS and Singulair   Orders:  -     fexofenadine (ALLEGRA) 180 MG tablet; Take 1 tablet by mouth Daily.  Dispense: 30 tablet; Refill: 5  -     azelastine (ASTELIN) 0.1 % nasal spray; 2 sprays both nostrils twice daily as needed  Dispense: 1 each; Refill: 3  -     montelukast (SINGULAIR) 10 MG tablet; Take 1 tablet by mouth every night at bedtime.  Dispense: 30 tablet; Refill: 4    7. Restrictive lung disease  Comments:  Alb Inh as needed     8. B12 deficiency  Comments:  Continue B12 supplementation  Orders:  -     vitamin B-12 (CYANOCOBALAMIN) 1000 MCG tablet; Take 1 tablet by mouth Daily.  Dispense: 30 tablet; Refill: 4  -     Vitamin B12; Future    9. Vitamin D deficiency  Comments:   Vit D daily supplement  Orders:  -     Vitamin D, Cholecalciferol, 50 MCG (2000 UT) capsule; Take 1 capsule by mouth Daily.  Dispense: 30 capsule; Refill: 4  -     Vitamin D,25-Hydroxy; Future    10. Postsurgical hypothyroidism  -     TSH; Future  -     T4, Free; Future    Follow Up In August  Findings and recommendations discussed with Raimundo. Reviewed test results. Lifestyle modifications reinforced  including nutrition and activity recommendations. He will follow up in August sooner if problems/concerns occur.  Raimundo  was given instructions and counseling regarding his condition or for health maintenance advice. Please see specific information pulled into the AVS if appropriate    This document has been electronically signed by:

## 2023-05-16 DIAGNOSIS — K21.9 GASTROESOPHAGEAL REFLUX DISEASE WITHOUT ESOPHAGITIS: Chronic | ICD-10-CM

## 2023-05-16 RX ORDER — LANSOPRAZOLE 30 MG/1
30 CAPSULE, DELAYED RELEASE ORAL DAILY
Qty: 30 CAPSULE | Refills: 4 | OUTPATIENT
Start: 2023-05-16

## 2023-05-19 DIAGNOSIS — E11.40 TYPE 2 DIABETES, CONTROLLED, WITH NEUROPATHY: Chronic | ICD-10-CM

## 2023-05-19 RX ORDER — LANCETS 30 GAUGE
EACH MISCELLANEOUS
Qty: 100 EACH | Refills: 6 | Status: SHIPPED | OUTPATIENT
Start: 2023-05-19

## 2023-05-19 RX ORDER — BLOOD SUGAR DIAGNOSTIC
STRIP MISCELLANEOUS
Qty: 100 EACH | Refills: 6 | Status: SHIPPED | OUTPATIENT
Start: 2023-05-19

## 2023-05-24 DIAGNOSIS — M05.79 RHEUMATOID ARTHRITIS INVOLVING MULTIPLE SITES WITH POSITIVE RHEUMATOID FACTOR: Chronic | ICD-10-CM

## 2023-06-26 DIAGNOSIS — E55.9 VITAMIN D DEFICIENCY: ICD-10-CM

## 2023-07-25 ENCOUNTER — OFFICE VISIT (OUTPATIENT)
Dept: FAMILY MEDICINE CLINIC | Facility: CLINIC | Age: 58
End: 2023-07-25
Payer: COMMERCIAL

## 2023-07-25 DIAGNOSIS — E55.9 VITAMIN D DEFICIENCY: ICD-10-CM

## 2023-07-25 DIAGNOSIS — F51.04 CHRONIC INSOMNIA: Chronic | ICD-10-CM

## 2023-07-25 DIAGNOSIS — E53.8 B12 DEFICIENCY: ICD-10-CM

## 2023-07-25 DIAGNOSIS — E89.0 POSTSURGICAL HYPOTHYROIDISM: ICD-10-CM

## 2023-07-25 DIAGNOSIS — E78.2 MIXED DYSLIPIDEMIA: Chronic | ICD-10-CM

## 2023-07-25 DIAGNOSIS — E11.40 TYPE 2 DIABETES, CONTROLLED, WITH NEUROPATHY: Primary | Chronic | ICD-10-CM

## 2023-07-25 DIAGNOSIS — K21.9 GASTROESOPHAGEAL REFLUX DISEASE WITHOUT ESOPHAGITIS: ICD-10-CM

## 2023-07-25 DIAGNOSIS — I10 BENIGN ESSENTIAL HYPERTENSION: Chronic | ICD-10-CM

## 2023-07-25 DIAGNOSIS — M05.79 RHEUMATOID ARTHRITIS INVOLVING MULTIPLE SITES WITH POSITIVE RHEUMATOID FACTOR: Chronic | ICD-10-CM

## 2023-07-25 DIAGNOSIS — J98.4 RESTRICTIVE LUNG DISEASE: Chronic | ICD-10-CM

## 2023-07-25 DIAGNOSIS — J30.9 CHRONIC ALLERGIC RHINITIS: Chronic | ICD-10-CM

## 2023-07-25 PROCEDURE — 82607 VITAMIN B-12: CPT | Performed by: NURSE PRACTITIONER

## 2023-07-25 PROCEDURE — 84550 ASSAY OF BLOOD/URIC ACID: CPT | Performed by: NURSE PRACTITIONER

## 2023-07-25 PROCEDURE — 80061 LIPID PANEL: CPT | Performed by: NURSE PRACTITIONER

## 2023-07-25 PROCEDURE — 82306 VITAMIN D 25 HYDROXY: CPT | Performed by: NURSE PRACTITIONER

## 2023-07-25 PROCEDURE — 80050 GENERAL HEALTH PANEL: CPT | Performed by: NURSE PRACTITIONER

## 2023-07-25 PROCEDURE — 84439 ASSAY OF FREE THYROXINE: CPT | Performed by: NURSE PRACTITIONER

## 2023-07-25 PROCEDURE — 83036 HEMOGLOBIN GLYCOSYLATED A1C: CPT | Performed by: NURSE PRACTITIONER

## 2023-07-25 PROCEDURE — 85007 BL SMEAR W/DIFF WBC COUNT: CPT | Performed by: NURSE PRACTITIONER

## 2023-07-25 RX ORDER — CHOLECALCIFEROL (VITAMIN D3) 125 MCG
2000 CAPSULE ORAL DAILY
Qty: 30 CAPSULE | Refills: 3 | Status: SHIPPED | OUTPATIENT
Start: 2023-07-25

## 2023-07-25 RX ORDER — AZELASTINE 1 MG/ML
SPRAY, METERED NASAL
Qty: 1 EACH | Refills: 3 | Status: SHIPPED | OUTPATIENT
Start: 2023-07-25

## 2023-07-25 RX ORDER — LEVOCETIRIZINE DIHYDROCHLORIDE 5 MG/1
5 TABLET, FILM COATED ORAL EVERY EVENING
Qty: 30 TABLET | Refills: 3 | Status: SHIPPED | OUTPATIENT
Start: 2023-07-25

## 2023-07-25 RX ORDER — LANCETS 28 GAUGE
1 EACH MISCELLANEOUS 3 TIMES DAILY
Qty: 100 EACH | Refills: 3 | Status: SHIPPED | OUTPATIENT
Start: 2023-07-25

## 2023-07-25 RX ORDER — FOLIC ACID 1 MG/1
1000 TABLET ORAL DAILY
Qty: 30 TABLET | Refills: 3 | Status: SHIPPED | OUTPATIENT
Start: 2023-07-25

## 2023-07-25 RX ORDER — LANSOPRAZOLE 30 MG/1
30 CAPSULE, DELAYED RELEASE ORAL DAILY
Qty: 30 CAPSULE | Refills: 3 | Status: SHIPPED | OUTPATIENT
Start: 2023-07-25

## 2023-07-25 RX ORDER — PEN NEEDLE, DIABETIC 29 G X1/2"
1 NEEDLE, DISPOSABLE MISCELLANEOUS DAILY
Qty: 100 EACH | Refills: 0 | Status: SHIPPED | OUTPATIENT
Start: 2023-07-25

## 2023-07-25 RX ORDER — (INSULIN DEGLUDEC AND LIRAGLUTIDE) 100; 3.6 [IU]/ML; MG/ML
50 INJECTION, SOLUTION SUBCUTANEOUS DAILY
Qty: 45 ML | Refills: 3 | Status: SHIPPED | OUTPATIENT
Start: 2023-07-25

## 2023-07-25 RX ORDER — ALBUTEROL SULFATE 90 UG/1
2 AEROSOL, METERED RESPIRATORY (INHALATION) EVERY 4 HOURS PRN
Qty: 18 G | Refills: 3 | Status: SHIPPED | OUTPATIENT
Start: 2023-07-25

## 2023-07-25 RX ORDER — BLOOD SUGAR DIAGNOSTIC
1 STRIP MISCELLANEOUS 3 TIMES DAILY
Qty: 100 EACH | Refills: 3 | Status: SHIPPED | OUTPATIENT
Start: 2023-07-25

## 2023-07-25 RX ORDER — CYCLOBENZAPRINE HCL 10 MG
10 TABLET ORAL 2 TIMES DAILY PRN
Qty: 60 TABLET | Refills: 1 | Status: SHIPPED | OUTPATIENT
Start: 2023-07-25

## 2023-07-25 RX ORDER — LANOLIN ALCOHOL/MO/W.PET/CERES
1000 CREAM (GRAM) TOPICAL DAILY
Qty: 30 TABLET | Refills: 3 | Status: SHIPPED | OUTPATIENT
Start: 2023-07-25

## 2023-07-25 RX ORDER — AMITRIPTYLINE HYDROCHLORIDE 100 MG/1
100 TABLET ORAL
Qty: 30 TABLET | Refills: 3 | Status: SHIPPED | OUTPATIENT
Start: 2023-07-25

## 2023-07-25 RX ORDER — ROSUVASTATIN CALCIUM 5 MG/1
5 TABLET, COATED ORAL DAILY
Qty: 30 TABLET | Refills: 3 | Status: SHIPPED | OUTPATIENT
Start: 2023-07-25

## 2023-07-25 NOTE — PROGRESS NOTES
History of Present Illness  Raimundo Munguia is a 58 yo male who presents for follow up pertaining to his  DM, type 2, HTN, GERD, Rheumatoid Arthritis and Dyslipidemia. In addition, Raimundo has Insomnia and Vitamin deficiencies.    Diabetes   He has type 2 diabetes mellitus. His disease course has been improving since starting Xultophy. Risk factors for coronary artery disease include dyslipidemia, hypertension, male sex, sedentary lifestyle and tobacco exposure. Current diabetic treatment includes Metformin and Xultophy  He has had a previous visit with a dietitian. An ACE inhibitor/angiotensin II receptor blocker is not being taken due to S/e.     Lab Results   Component Value Date    HGBA1C 7.40 (H) 01/13/2023     Lab Results   Component Value Date    HGBA1C 7.30 (H) 04/13/2023      Hypertension   The current episode started more than 1 year ago.His BP is adequately controlled. His hypertensive medication is Toprol  mg which has improved his blood pressure and fast heart rate.   Lab Results   Component Value Date    GLUCOSE 174 (H) 04/13/2023    BUN 12 04/13/2023    CREATININE 1.01 04/13/2023    EGFR 87.3 04/13/2023    BCR 11.9 04/13/2023    K 4.7 04/13/2023    CO2 25.0 04/13/2023    CALCIUM 9.2 04/13/2023    ALBUMIN 4.1 04/13/2023    BILITOT 0.3 04/13/2023    AST 22 04/13/2023    ALT 24 04/13/2023       Dyslipidemia   This is a chronic problem. The current episode started more than 1 year ago. Factors aggravating his hyperlipidemia include fatty foods. Current antihyperlipidemic treatment includes statins.  Compliance problems include adherence to diet and adherence to exercise.  Risk factors for coronary artery disease include diabetes mellitus, dyslipidemia, hypertension, male sex and obesity.   Lab Results   Component Value Date    CHOL 102 04/13/2023    CHOL 118 01/13/2023    CHOL 86 09/23/2022     Lab Results   Component Value Date    TRIG 185 (H) 04/13/2023    TRIG 118 01/13/2023    TRIG 178 (H)  "09/23/2022     Lab Results   Component Value Date    HDL 23 (L) 04/13/2023    HDL 26 (L) 01/13/2023    HDL 26 (L) 09/23/2022     Lab Results   Component Value Date    LDL 48 04/13/2023    LDL 70 01/13/2023    LDL 31 09/23/2022     The following portions of the patient's history were reviewed and updated as appropriate: allergies, current medications, past family history, past medical history, past social history, past surgical history and problem list.    Review of Systems   Constitutional:  Negative for activity change, appetite change, chills, fatigue, fever and unexpected weight change.   HENT:  Positive for hearing loss (Chronic). Negative for voice change.    Eyes:  Negative for visual disturbance.   Respiratory:  Negative for cough, shortness of breath and wheezing.    Cardiovascular:  Negative for chest pain, palpitations and leg swelling.   Gastrointestinal:  Negative for nausea and vomiting.   Endocrine: Negative for cold intolerance, heat intolerance, polydipsia, polyphagia and polyuria.   Musculoskeletal:  Positive for arthralgias, back pain and joint swelling.   Skin:  Negative for color change and rash.   Allergic/Immunologic: Positive for environmental allergies and immunocompromised state.   Neurological:  Negative for dizziness, tremors, speech difficulty, weakness and light-headedness.   Hematological:  Negative for adenopathy.   Psychiatric/Behavioral:  Positive for sleep disturbance. Negative for confusion and decreased concentration. The patient is not nervous/anxious.    All other systems reviewed and are negative.    Vital signs:  /70 (BP Location: Left arm, Patient Position: Sitting, Cuff Size: Adult)   Pulse 95   Temp 97.3 °F (36.3 °C) (Temporal)   Resp 14   Ht 172.7 cm (67.99\")   Wt 100 kg (221 lb)   SpO2 94%   BMI 33.61 kg/m²     Physical Exam  Vitals and nursing note reviewed.   Constitutional:       General: He is not in acute distress.     Appearance: He is well-developed. "      Interventions: Face mask in place.   HENT:      Head: Normocephalic.      Nose: Nose normal.   Eyes:      General: No scleral icterus.        Right eye: No discharge.         Left eye: No discharge.      Conjunctiva/sclera: Conjunctivae normal.   Cardiovascular:      Rate and Rhythm: Normal rate and regular rhythm.      Heart sounds: Normal heart sounds. No murmur heard.    No friction rub.   Pulmonary:      Effort: Pulmonary effort is normal. No respiratory distress.      Breath sounds: No decreased breath sounds, wheezing, rhonchi or rales.   Abdominal:      General: Abdomen is protuberant. Bowel sounds are normal.      Palpations: Abdomen is soft.      Tenderness: There is no abdominal tenderness. There is no guarding.   Musculoskeletal:         General: Deformity (Multiple Rheumatoid nodules bilateral upper extremities) present. No tenderness.      Cervical back: Neck supple.      Right lower leg: No edema.      Left lower leg: No edema.   Lymphadenopathy:      Cervical: No cervical adenopathy.   Skin:     General: Skin is warm and dry.      Capillary Refill: Capillary refill takes less than 2 seconds.   Neurological:      Mental Status: He is alert and oriented to person, place, and time.      Gait: Gait abnormal.   Psychiatric:         Mood and Affect: Mood normal.         Speech: Speech normal.         Behavior: Behavior is cooperative.         Thought Content: Thought content normal.         Cognition and Memory: Cognition and memory normal.       Result Review :  Results for orders placed or performed in visit on 07/25/23   Comprehensive Metabolic Panel    Specimen: Arm, Right; Blood   Result Value Ref Range    Glucose 247 (H) 65 - 99 mg/dL    BUN 13 6 - 20 mg/dL    Creatinine 0.88 0.76 - 1.27 mg/dL    Sodium 138 136 - 145 mmol/L    Potassium 4.6 3.5 - 5.2 mmol/L    Chloride 107 98 - 107 mmol/L    CO2 20.2 (L) 22.0 - 29.0 mmol/L    Calcium 9.1 8.6 - 10.5 mg/dL    Total Protein 6.6 6.0 - 8.5 g/dL     Albumin 4.2 3.5 - 5.2 g/dL    ALT (SGPT) 24 1 - 41 U/L    AST (SGOT) 17 1 - 40 U/L    Alkaline Phosphatase 147 (H) 39 - 117 U/L    Total Bilirubin 0.2 0.0 - 1.2 mg/dL    Globulin 2.4 gm/dL    A/G Ratio 1.8 g/dL    BUN/Creatinine Ratio 14.8 7.0 - 25.0    Anion Gap 10.8 5.0 - 15.0 mmol/L    eGFR 100.9 >60.0 mL/min/1.73   Lipid Panel    Specimen: Arm, Right; Blood   Result Value Ref Range    Total Cholesterol 104 0 - 200 mg/dL    Triglycerides 191 (H) 0 - 150 mg/dL    HDL Cholesterol 24 (L) 40 - 60 mg/dL    LDL Cholesterol  48 0 - 100 mg/dL    VLDL Cholesterol 32 5 - 40 mg/dL    LDL/HDL Ratio 1.74    TSH    Specimen: Arm, Right; Blood   Result Value Ref Range    TSH 1.900 0.270 - 4.200 uIU/mL   Hemoglobin A1c    Specimen: Arm, Right; Blood   Result Value Ref Range    Hemoglobin A1C 7.10 (H) 4.80 - 5.60 %   Vitamin D,25-Hydroxy    Specimen: Arm, Right; Blood   Result Value Ref Range    25 Hydroxy, Vitamin D 33.1 30.0 - 100.0 ng/ml   Uric Acid    Specimen: Arm, Right; Blood   Result Value Ref Range    Uric Acid 3.5 3.4 - 7.0 mg/dL   Vitamin B12    Specimen: Arm, Right; Blood   Result Value Ref Range    Vitamin B-12 1,117 (H) 211 - 946 pg/mL   T4, Free    Specimen: Arm, Right; Blood   Result Value Ref Range    Free T4 1.51 0.93 - 1.70 ng/dL   CBC Auto Differential    Specimen: Arm, Right; Blood   Result Value Ref Range    WBC 8.96 3.40 - 10.80 10*3/mm3    RBC 5.52 4.14 - 5.80 10*6/mm3    Hemoglobin 17.5 13.0 - 17.7 g/dL    Hematocrit 50.9 37.5 - 51.0 %    MCV 92.2 79.0 - 97.0 fL    MCH 31.7 26.6 - 33.0 pg    MCHC 34.4 31.5 - 35.7 g/dL    RDW 11.7 (L) 12.3 - 15.4 %    RDW-SD 39.4 37.0 - 54.0 fl    MPV 10.4 6.0 - 12.0 fL    Platelets 304 140 - 450 10*3/mm3   Manual Differential    Specimen: Arm, Right; Blood   Result Value Ref Range    Neutrophil % 53.1 42.7 - 76.0 %    Lymphocyte % 33.7 19.6 - 45.3 %    Monocyte % 6.1 5.0 - 12.0 %    Eosinophil % 5.1 0.3 - 6.2 %    Basophil % 2.0 (H) 0.0 - 1.5 %    Neutrophils Absolute  "4.76 1.70 - 7.00 10*3/mm3    Lymphocytes Absolute 3.02 0.70 - 3.10 10*3/mm3    Monocytes Absolute 0.55 0.10 - 0.90 10*3/mm3    Eosinophils Absolute 0.46 (H) 0.00 - 0.40 10*3/mm3    Basophils Absolute 0.18 0.00 - 0.20 10*3/mm3    RBC Morphology Normal Normal    WBC Morphology Normal Normal    Platelet Morphology Normal Normal   BMI is >= 30 and <35. (Class 1 Obesity). The following options were offered after discussion;: weight loss educational material (shared in after visit summary)   Assessment & Plan     Diagnoses and all orders for this visit:    1. Type 2 diabetes, controlled, with neuropathy (Primary)  Comments:  He will continue Xultophy and metformin  Orders:  -     Insulin Degludec-Liraglutide (Xultophy) 100-3.6 UNIT-MG/ML solution pen-injector subcutaneous pen; Inject 50 Units under the skin into the appropriate area as directed Daily.  Dispense: 45 mL; Refill: 3  -     Insulin Pen Needle (Pen Needles 1/2\") 29G X 12MM misc; 1 Device Daily.  Dispense: 100 each; Refill: 0  -     Lancets Ultra Fine misc; 1 Device 3 (Three) Times a Day.  Dispense: 100 each; Refill: 3  -     glucose blood (OneTouch Ultra) test strip; 1 each by Other route 3 (Three) Times a Day. Test blood glucose  Dispense: 100 each; Refill: 3  -     Comprehensive Metabolic Panel  -     Lipid Panel  -     TSH  -     Hemoglobin A1c  -     Vitamin B12  -     Comprehensive Metabolic Panel; Future  -     Lipid Panel; Future  -     TSH; Future  -     Hemoglobin A1c; Future  -     MicroAlbumin, Urine, Random - Urine, Clean Catch; Future  -     Vitamin B12; Future    2. Benign essential hypertension  Comments:  Well controlled with Toprol XL  Orders:  -     Comprehensive Metabolic Panel  -     Lipid Panel  -     TSH  -     Comprehensive Metabolic Panel; Future  -     Lipid Panel; Future  -     TSH; Future  -     Magnesium; Future    3. Mixed dyslipidemia  Comments:  Continue rosuvastatin  Orders:  -     rosuvastatin (CRESTOR) 5 MG tablet; Take 1 " tablet by mouth Daily.  Dispense: 30 tablet; Refill: 3  -     Comprehensive Metabolic Panel  -     Lipid Panel  -     TSH  -     Comprehensive Metabolic Panel; Future  -     Lipid Panel; Future  -     TSH; Future    4. Rheumatoid arthritis involving multiple sites with positive rheumatoid factor  Comments:  Stable with current medication he will continue methotrexate and folic acid. Declines Rheumatology referral   Orders:  -     cyclobenzaprine (FLEXERIL) 10 MG tablet; Take 1 tablet by mouth 2 (Two) Times a Day As Needed for Muscle Spasms.  Dispense: 60 tablet; Refill: 1  -     folic acid (FOLVITE) 1 MG tablet; Take 1 tablet by mouth Daily.  Dispense: 30 tablet; Refill: 3  -     Uric Acid  -     Manual Differential  -     Uric Acid; Future  -     C-reactive Protein; Future  -     Sedimentation Rate; Future    5. Gastroesophageal reflux disease without esophagitis  Comments:  Stable with Prevacid  Orders:  -     lansoprazole (PREVACID) 30 MG capsule; Take 1 capsule by mouth Daily.  Dispense: 30 capsule; Refill: 3  -     CBC & Differential  -     CBC & Differential; Future    6. Restrictive lung disease  Comments:  Using Albuterol daily.  Improved since his thyroid surgery  Orders:  -     albuterol sulfate  (90 Base) MCG/ACT inhaler; Inhale 2 puffs Every 4 (Four) Hours As Needed for Shortness of Air.  Dispense: 18 g; Refill: 3    7. Chronic insomnia  Comments:  Continue Elavil 150 mg and take 3 hours prior to bedtime  Orders:  -     amitriptyline (ELAVIL) 100 MG tablet; Take 1 tablet by mouth every night at bedtime.  Dispense: 30 tablet; Refill: 3    8. Chronic allergic rhinitis  Comments:  Continue Allegra, Azelatine NS and Singulair  Orders:  -     azelastine (ASTELIN) 0.1 % nasal spray; 2 sprays both nostrils twice daily as needed  Dispense: 1 each; Refill: 3  -     levocetirizine (XYZAL) 5 MG tablet; Take 1 tablet by mouth Every Evening.  Dispense: 30 tablet; Refill: 3    9. Vitamin D  deficiency  Comments:   Vit D daily supplement  Orders:  -     Cholecalciferol (D3 High Potency) 50 MCG (2000 UT) capsule; Take 1 capsule by mouth Daily.  Dispense: 30 capsule; Refill: 3  -     Vitamin D,25-Hydroxy  -     Vitamin D,25-Hydroxy; Future    10. B12 deficiency  Comments:  Continue B12 supplementation  Orders:  -     vitamin B-12 (CYANOCOBALAMIN) 1000 MCG tablet; Take 1 tablet by mouth Daily.  Dispense: 30 tablet; Refill: 3  -     Vitamin B12    11. Postsurgical hypothyroidism  Comments:  Follow-up with endocrinology  Orders:  -     TSH  -     T4, Free  -     TSH; Future  -     T4, Free; Future    Follow Up In November  Findings and recommendations discussed with Raimundo. Reviewed treatment options. Lifestyle modifications reinforced including nutrition and activity recommendations.  Raimundo will follow up in November sooner if problems/concerns occur.   Raimundo was given instructions and counseling regarding his condition or for health maintenance advice. Please see specific information pulled into the AVS if appropriate      This document has been electronically signed by:

## 2023-07-26 VITALS
BODY MASS INDEX: 33.49 KG/M2 | DIASTOLIC BLOOD PRESSURE: 70 MMHG | HEIGHT: 68 IN | SYSTOLIC BLOOD PRESSURE: 110 MMHG | RESPIRATION RATE: 14 BRPM | HEART RATE: 95 BPM | WEIGHT: 221 LBS | OXYGEN SATURATION: 94 % | TEMPERATURE: 97.3 F

## 2023-07-26 LAB
25(OH)D3 SERPL-MCNC: 33.1 NG/ML (ref 30–100)
ALBUMIN SERPL-MCNC: 4.2 G/DL (ref 3.5–5.2)
ALBUMIN/GLOB SERPL: 1.8 G/DL
ALP SERPL-CCNC: 147 U/L (ref 39–117)
ALT SERPL W P-5'-P-CCNC: 24 U/L (ref 1–41)
ANION GAP SERPL CALCULATED.3IONS-SCNC: 10.8 MMOL/L (ref 5–15)
AST SERPL-CCNC: 17 U/L (ref 1–40)
BASOPHILS # BLD MANUAL: 0.18 10*3/MM3 (ref 0–0.2)
BASOPHILS NFR BLD MANUAL: 2 % (ref 0–1.5)
BILIRUB SERPL-MCNC: 0.2 MG/DL (ref 0–1.2)
BUN SERPL-MCNC: 13 MG/DL (ref 6–20)
BUN/CREAT SERPL: 14.8 (ref 7–25)
CALCIUM SPEC-SCNC: 9.1 MG/DL (ref 8.6–10.5)
CHLORIDE SERPL-SCNC: 107 MMOL/L (ref 98–107)
CHOLEST SERPL-MCNC: 104 MG/DL (ref 0–200)
CO2 SERPL-SCNC: 20.2 MMOL/L (ref 22–29)
CREAT SERPL-MCNC: 0.88 MG/DL (ref 0.76–1.27)
DEPRECATED RDW RBC AUTO: 39.4 FL (ref 37–54)
EGFRCR SERPLBLD CKD-EPI 2021: 100.9 ML/MIN/1.73
EOSINOPHIL # BLD MANUAL: 0.46 10*3/MM3 (ref 0–0.4)
EOSINOPHIL NFR BLD MANUAL: 5.1 % (ref 0.3–6.2)
ERYTHROCYTE [DISTWIDTH] IN BLOOD BY AUTOMATED COUNT: 11.7 % (ref 12.3–15.4)
GLOBULIN UR ELPH-MCNC: 2.4 GM/DL
GLUCOSE SERPL-MCNC: 247 MG/DL (ref 65–99)
HBA1C MFR BLD: 7.1 % (ref 4.8–5.6)
HCT VFR BLD AUTO: 50.9 % (ref 37.5–51)
HDLC SERPL-MCNC: 24 MG/DL (ref 40–60)
HGB BLD-MCNC: 17.5 G/DL (ref 13–17.7)
LDLC SERPL CALC-MCNC: 48 MG/DL (ref 0–100)
LDLC/HDLC SERPL: 1.74 {RATIO}
LYMPHOCYTES # BLD MANUAL: 3.02 10*3/MM3 (ref 0.7–3.1)
LYMPHOCYTES NFR BLD MANUAL: 6.1 % (ref 5–12)
MCH RBC QN AUTO: 31.7 PG (ref 26.6–33)
MCHC RBC AUTO-ENTMCNC: 34.4 G/DL (ref 31.5–35.7)
MCV RBC AUTO: 92.2 FL (ref 79–97)
MONOCYTES # BLD: 0.55 10*3/MM3 (ref 0.1–0.9)
NEUTROPHILS # BLD AUTO: 4.76 10*3/MM3 (ref 1.7–7)
NEUTROPHILS NFR BLD MANUAL: 53.1 % (ref 42.7–76)
PLAT MORPH BLD: NORMAL
PLATELET # BLD AUTO: 304 10*3/MM3 (ref 140–450)
PMV BLD AUTO: 10.4 FL (ref 6–12)
POTASSIUM SERPL-SCNC: 4.6 MMOL/L (ref 3.5–5.2)
PROT SERPL-MCNC: 6.6 G/DL (ref 6–8.5)
RBC # BLD AUTO: 5.52 10*6/MM3 (ref 4.14–5.8)
RBC MORPH BLD: NORMAL
SODIUM SERPL-SCNC: 138 MMOL/L (ref 136–145)
T4 FREE SERPL-MCNC: 1.51 NG/DL (ref 0.93–1.7)
TRIGL SERPL-MCNC: 191 MG/DL (ref 0–150)
TSH SERPL DL<=0.05 MIU/L-ACNC: 1.9 UIU/ML (ref 0.27–4.2)
URATE SERPL-MCNC: 3.5 MG/DL (ref 3.4–7)
VARIANT LYMPHS NFR BLD MANUAL: 33.7 % (ref 19.6–45.3)
VIT B12 BLD-MCNC: 1117 PG/ML (ref 211–946)
VLDLC SERPL-MCNC: 32 MG/DL (ref 5–40)
WBC MORPH BLD: NORMAL
WBC NRBC COR # BLD: 8.96 10*3/MM3 (ref 3.4–10.8)

## 2023-08-01 DIAGNOSIS — J98.4 RESTRICTIVE LUNG DISEASE: Chronic | ICD-10-CM

## 2023-08-01 RX ORDER — ALBUTEROL SULFATE 90 UG/1
AEROSOL, METERED RESPIRATORY (INHALATION)
Qty: 18 G | Refills: 4 | OUTPATIENT
Start: 2023-08-01

## 2023-08-14 ENCOUNTER — OFFICE VISIT (OUTPATIENT)
Dept: ENDOCRINOLOGY | Facility: CLINIC | Age: 58
End: 2023-08-14
Payer: COMMERCIAL

## 2023-08-14 VITALS
BODY MASS INDEX: 33.59 KG/M2 | DIASTOLIC BLOOD PRESSURE: 72 MMHG | WEIGHT: 221.6 LBS | SYSTOLIC BLOOD PRESSURE: 110 MMHG | OXYGEN SATURATION: 93 % | HEIGHT: 68 IN | HEART RATE: 106 BPM

## 2023-08-14 DIAGNOSIS — E11.40 TYPE 2 DIABETES, CONTROLLED, WITH NEUROPATHY: Chronic | ICD-10-CM

## 2023-08-14 DIAGNOSIS — E89.0 POSTSURGICAL HYPOTHYROIDISM: Primary | ICD-10-CM

## 2023-08-14 PROCEDURE — 3078F DIAST BP <80 MM HG: CPT | Performed by: NURSE PRACTITIONER

## 2023-08-14 PROCEDURE — 3051F HG A1C>EQUAL 7.0%<8.0%: CPT | Performed by: NURSE PRACTITIONER

## 2023-08-14 PROCEDURE — 3074F SYST BP LT 130 MM HG: CPT | Performed by: NURSE PRACTITIONER

## 2023-08-14 PROCEDURE — 1160F RVW MEDS BY RX/DR IN RCRD: CPT | Performed by: NURSE PRACTITIONER

## 2023-08-14 PROCEDURE — 99214 OFFICE O/P EST MOD 30 MIN: CPT | Performed by: NURSE PRACTITIONER

## 2023-08-14 PROCEDURE — 1159F MED LIST DOCD IN RCRD: CPT | Performed by: NURSE PRACTITIONER

## 2023-08-20 NOTE — ASSESSMENT & PLAN NOTE
-Clinically euthyroid.  -TFT's were above range.  Most recent TFT's in range.  Continue T4 112 mcg QD.  Reminded of proper administration including taking 7 pills per week on an empty stomach with no missed doses, waiting 30-60 minutes prior to other medications or food.  -Will obtain US Thyroid with increasing compressive symptoms.  -Follow-up in 3 months.

## 2023-08-20 NOTE — PROGRESS NOTES
Raimundo Munguia is a 58 y.o. male had concerns including Postsurgical hypothyroidism.   Post-surgical hypothyroidism.    Patient had a right hemithyroidectomy by Dr. Wynn in 4/2022 that was noted to be benign follicular nodule.  He has been taking T4 112 mcg QD.  He has not been missing any doses.  His most recent labs were in range.  He reports that he is starting to have some increased compressive s/sx's.  He has not had any imaging since his last visit.    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  Patient states that prior to this he was not aware of any prior thyroid issues.  His most recent TFT's were within normal range.    He states he has a hard time swallowing sometimes.  He feels like he has some mild compressive symptoms when he is trying to swallow something. Sometimes he has symptoms at night that wake him up, but that he also wears oxygen.  Has never been told that his thyroid was enlarged that he recalls.    His mother had graves disease with thyroidectomy in the past.    Birth state:[place]  Previous history of radiation to face/neck:none  Consuming foods high in iodine: none  Family history of thyroid complications: mom-kyra diease, none other, no hx of thyroid CA in family    Patient was seen by Dr. Wynn, General Surgery, for surgical eval in Jan 2022 to evaluate the need for removal.  He was informed that he does need surgery and that they will get him on the schedule.  He has not heard from them since that time.  Contacted the office today and they informed that the hospital facility that they use for surgery is just now allowing for them to perform surgeries in the last couple of weeks and are limiting how many they are allowed to do at this time.  They are working on getting him on the surgery schedule and they will be contacting him for surgical date.    The following portions of the patient's history were reviewed and updated as  "appropriate: allergies, current medications, past family history, past medical history, past social history, past surgical history and problem list.    Past Medical History:   Diagnosis Date    Asthma     inhlaer  prn    Cervicalgia     CVA (cerebral vascular accident)     around 2012; \"light\" residual dysarthria    Diabetes mellitus 2015    po meds as well as insulin daily; checks sugars at home    GERD (gastroesophageal reflux disease)     Helicobacter pylori (H. 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    Vitamin D deficiency      Past Surgical History:   Procedure Laterality Date    COLON RESECTION N/A 02/22/2018    Procedure: COLON RESECTION SIGMOID;  Surgeon: Ej Tinoco MD;  Location:  [facility] OR;  Service:     COLONOSCOPY N/A 02/07/2018    Procedure: COLONOSCOPY CPT CODE: 48509;  Surgeon: Teodoro Garg III, MD;  Location: [facility] OR;  Service:     COLONOSCOPY N/A 03/07/2019    Procedure: COLONOSCOPY FOR SCREENING CPT CODE: ;  Surgeon: Ej Tinoco MD;  Location: [facility] OR;  Service: Gastroenterology    ENDOSCOPY N/A 02/07/2018    Procedure: ESOPHAGOGASTRODUODENOSCOPY WITH DILATATION CPT CODE: 49431;  Surgeon: Teodoro Garg III, MD;  Location:  [facility] OR;  Service:     THYROIDECTOMY Right 4/15/2022    Procedure: THYROIDECTOMY RIGHT LOBECTOMY;  Surgeon: Fidencio Wynn MD;  Location:  [facility] OR;  Service: General;  Laterality: Right;      Family History   Problem Relation Age of Onset    Diabetes Mother     Hypertension Mother     Hypertension Father     Hyperlipidemia Father     Diabetes Sister     Hyperlipidemia Sister     Hypertension Sister     Hypertension Brother     Hyperlipidemia Brother       Social History " "    Socioeconomic History    Marital status: Legally      Spouse name: tess    Number of children: 3    Years of education: 9   Tobacco Use    Smoking status: Never     Passive exposure: Never    Smokeless tobacco: Current     Types: Snuff    Tobacco comments:     one can every 2-3 days; 25-26 years of use   Vaping Use    Vaping Use: Never used   Substance and Sexual Activity    Alcohol use: No    Drug use: No    Sexual activity: Defer     Partners: Female      No Known Allergies   Current Outpatient Medications on File Prior to Visit   Medication Sig Dispense Refill    albuterol sulfate  (90 Base) MCG/ACT inhaler Inhale 2 puffs Every 4 (Four) Hours As Needed for Shortness of Air. 18 g 3    amitriptyline (ELAVIL) 100 MG tablet Take 1 tablet by mouth every night at bedtime. 30 tablet 3    azelastine (ASTELIN) 0.1 % nasal spray 2 sprays both nostrils twice daily as needed 1 each 3    Cholecalciferol (D3 High Potency) 50 MCG (2000 UT) capsule Take 1 capsule by mouth Daily. 30 capsule 3    cyclobenzaprine (FLEXERIL) 10 MG tablet Take 1 tablet by mouth 2 (Two) Times a Day As Needed for Muscle Spasms. 60 tablet 1    Diclofenac Sodium (VOLTAREN) 1 % gel gel apply 2-4 grams to appropriate area topically FOUR TIMES DAILY 100 g 4    fluticasone (FLONASE) 50 MCG/ACT nasal spray use 1 spray in each nostril TWICE DAILY AS DIRECTED 16 g 4    folic acid (FOLVITE) 1 MG tablet Take 1 tablet by mouth Daily. 30 tablet 3    glucose blood (OneTouch Ultra) test strip 1 each by Other route 3 (Three) Times a Day. Test blood glucose 100 each 3    Insulin Degludec-Liraglutide (Xultophy) 100-3.6 UNIT-MG/ML solution pen-injector subcutaneous pen Inject 50 Units under the skin into the appropriate area as directed Daily. 45 mL 3    Insulin Pen Needle (Pen Needles 1/2\") 29G X 12MM misc 1 Device Daily. 100 each 0    Lancets Ultra Fine misc 1 Device 3 (Three) Times a Day. 100 each 3    lansoprazole (PREVACID) 30 MG capsule Take 1 " "capsule by mouth Daily. 30 capsule 3    levocetirizine (XYZAL) 5 MG tablet Take 1 tablet by mouth Every Evening. 30 tablet 3    levothyroxine (SYNTHROID, LEVOTHROID) 112 MCG tablet TAKE ONE TABLET BY MOUTH EVERY DAY 30 tablet 5    metFORMIN (GLUCOPHAGE) 500 MG tablet TAKE TWO TABLETS BY MOUTH TWICE DAILY 120 tablet 4    methotrexate 2.5 MG tablet TAKE 10 TABLETS BY MOUTH ONCE WEEKLY 40 tablet 4    metoprolol succinate XL (TOPROL-XL) 100 MG 24 hr tablet TAKE ONE TABLET BY MOUTH EVERY DAY 30 tablet 0    montelukast (SINGULAIR) 10 MG tablet TAKE ONE TABLET BY MOUTH AT BEDTIME 30 tablet 0    rosuvastatin (CRESTOR) 5 MG tablet Take 1 tablet by mouth Daily. 30 tablet 3    vitamin B-12 (CYANOCOBALAMIN) 1000 MCG tablet Take 1 tablet by mouth Daily. 30 tablet 3     No current facility-administered medications on file prior to visit.     Review of Systems   HENT:  Positive for trouble swallowing.    Eyes: Negative.    Endocrine: Negative.    Psychiatric/Behavioral:  Positive for sleep disturbance.    All other systems reviewed and are negative.    /72 (BP Location: Left arm, Patient Position: Sitting, Cuff Size: Adult)   Pulse 106   Ht 172.7 cm (68\")   Wt 101 kg (221 lb 9.6 oz)   SpO2 93%   BMI 33.69 kg/mý      Physical Exam  Vitals reviewed.   Constitutional:       Appearance: Normal appearance.   Eyes:      Extraocular Movements: Extraocular movements intact.   Neck:      Thyroid: Thyromegaly present. No thyroid mass or thyroid tenderness.   Cardiovascular:      Rate and Rhythm: Normal rate.   Pulmonary:      Effort: Pulmonary effort is normal.   Skin:     General: Skin is warm.   Neurological:      Mental Status: He is alert and oriented to person, place, and time.   Psychiatric:         Mood and Affect: Mood normal.         Behavior: Behavior normal.         Thought Content: Thought content normal.         Judgment: Judgment normal.     Labs/Imaging  CMP  Lab Results   Component Value Date    GLUCOSE 247 (H) " 07/25/2023    BUN 13 07/25/2023    CREATININE 0.88 07/25/2023    EGFRIFNONA 106 02/24/2022    EGFRIFAFRI  09/13/2016      Comment:      <15 Indicative of kidney failure.    BCR 14.8 07/25/2023    K 4.6 07/25/2023    CO2 20.2 (L) 07/25/2023    CALCIUM 9.1 07/25/2023    ALBUMIN 4.2 07/25/2023    LABIL2 1.2 (L) 05/17/2016    AST 17 07/25/2023    ALT 24 07/25/2023        CBC w/DIFF   Lab Results   Component Value Date    WBC 8.96 07/25/2023    RBC 5.52 07/25/2023    HGB 17.5 07/25/2023    HCT 50.9 07/25/2023    MCV 92.2 07/25/2023    MCH 31.7 07/25/2023    MCHC 34.4 07/25/2023    RDW 11.7 (L) 07/25/2023    RDWSD 39.4 07/25/2023    MPV 10.4 07/25/2023     07/25/2023    NEUTRORELPCT 38.9 (L) 04/13/2023    LYMPHORELPCT 42.8 04/13/2023    MONORELPCT 12.2 (H) 04/13/2023    EOSRELPCT 4.3 04/13/2023    BASORELPCT 1.4 04/13/2023    AUTOIGPER 0.4 04/13/2023    NEUTROABS 4.76 07/25/2023    LYMPHSABS 3.45 (H) 04/13/2023    MONOSABS 0.98 (H) 04/13/2023    EOSABS 0.46 (H) 07/25/2023    BASOSABS 0.18 07/25/2023    AUTOIGNUM 0.03 04/13/2023    NRBC 0.0 04/13/2023       TSH  Lab Results   Component Value Date    TSH 1.900 07/25/2023    TSH 1.710 04/13/2023    TSH 5.230 (H) 01/13/2023       T4  Lab Results   Component Value Date    FREET4 1.51 07/25/2023    FREET4 1.71 (H) 04/13/2023    FREET4 1.21 05/26/2022     Lab Results   Component Value Date    X4BEGVU 8.7 04/27/2015       T3  No results found for: T3FREE  No results found for: O8QHGVZ    TRAb  No results found for: TSURCPAB    TPO  No results found for: THYROIDAB    No valid procedures specified.    Assessment and Plan    Diagnoses and all orders for this visit:    1. Postsurgical hypothyroidism (Primary)  Assessment & Plan:  -Clinically euthyroid.  -TFT's were above range.  Most recent TFT's in range.  Continue T4 112 mcg QD.  Reminded of proper administration including taking 7 pills per week on an empty stomach with no missed doses, waiting 30-60 minutes prior to other  medications or food.  -Will obtain US Thyroid with increasing compressive symptoms.  -Follow-up in 3 months.           Return in about 3 months (around 11/14/2023) for Follow-up appointment. The patient was instructed to contact the clinic with any interval questions or concerns.    This document has been electronically signed by ABDIRAHMAN Horan  August 20, 2023 19:55 EDT  Endocrinology

## 2023-09-05 ENCOUNTER — HOSPITAL ENCOUNTER (OUTPATIENT)
Dept: ULTRASOUND IMAGING | Facility: HOSPITAL | Age: 58
Discharge: HOME | End: 2023-09-05
Admitting: NURSE PRACTITIONER
Payer: COMMERCIAL

## 2023-09-05 PROCEDURE — 76536 US EXAM OF HEAD AND NECK: CPT

## 2023-09-19 DIAGNOSIS — M05.79 RHEUMATOID ARTHRITIS INVOLVING MULTIPLE SITES WITH POSITIVE RHEUMATOID FACTOR: Chronic | ICD-10-CM

## 2023-09-19 DIAGNOSIS — J30.9 CHRONIC ALLERGIC RHINITIS: Chronic | ICD-10-CM

## 2023-09-19 DIAGNOSIS — F51.04 CHRONIC INSOMNIA: Chronic | ICD-10-CM

## 2023-09-19 DIAGNOSIS — E78.2 MIXED DYSLIPIDEMIA: Chronic | ICD-10-CM

## 2023-09-19 RX ORDER — AMITRIPTYLINE HYDROCHLORIDE 100 MG/1
TABLET ORAL
Qty: 30 TABLET | Refills: 5 | Status: SHIPPED | OUTPATIENT
Start: 2023-09-19

## 2023-09-19 RX ORDER — LEVOCETIRIZINE DIHYDROCHLORIDE 5 MG/1
TABLET, FILM COATED ORAL
Qty: 30 TABLET | Refills: 2 | Status: SHIPPED | OUTPATIENT
Start: 2023-09-19

## 2023-09-19 RX ORDER — FOLIC ACID 1 MG/1
TABLET ORAL
Qty: 30 TABLET | Refills: 4 | Status: SHIPPED | OUTPATIENT
Start: 2023-09-19

## 2023-09-19 RX ORDER — ROSUVASTATIN CALCIUM 5 MG/1
TABLET, COATED ORAL
Qty: 30 TABLET | Refills: 4 | Status: SHIPPED | OUTPATIENT
Start: 2023-09-19

## 2023-10-17 DIAGNOSIS — K21.9 GASTROESOPHAGEAL REFLUX DISEASE WITHOUT ESOPHAGITIS: ICD-10-CM

## 2023-10-17 DIAGNOSIS — M05.79 RHEUMATOID ARTHRITIS INVOLVING MULTIPLE SITES WITH POSITIVE RHEUMATOID FACTOR: Chronic | ICD-10-CM

## 2023-10-17 RX ORDER — CYCLOBENZAPRINE HCL 10 MG
10 TABLET ORAL 2 TIMES DAILY PRN
Qty: 60 TABLET | Refills: 1 | Status: SHIPPED | OUTPATIENT
Start: 2023-10-17

## 2023-10-17 RX ORDER — LANSOPRAZOLE 30 MG/1
30 CAPSULE, DELAYED RELEASE ORAL DAILY
Qty: 30 CAPSULE | Refills: 4 | Status: SHIPPED | OUTPATIENT
Start: 2023-10-17

## 2023-11-09 ENCOUNTER — OFFICE VISIT (OUTPATIENT)
Dept: ENDOCRINOLOGY | Facility: CLINIC | Age: 58
End: 2023-11-09
Payer: COMMERCIAL

## 2023-11-09 VITALS
DIASTOLIC BLOOD PRESSURE: 86 MMHG | HEIGHT: 68 IN | SYSTOLIC BLOOD PRESSURE: 140 MMHG | BODY MASS INDEX: 34.04 KG/M2 | WEIGHT: 224.6 LBS | OXYGEN SATURATION: 95 % | HEART RATE: 93 BPM

## 2023-11-09 DIAGNOSIS — E89.0 POSTSURGICAL HYPOTHYROIDISM: Primary | ICD-10-CM

## 2023-11-09 LAB
T4 FREE SERPL-MCNC: 1.46 NG/DL (ref 0.93–1.7)
TSH SERPL DL<=0.05 MIU/L-ACNC: 4.45 UIU/ML (ref 0.27–4.2)

## 2023-11-09 PROCEDURE — 84443 ASSAY THYROID STIM HORMONE: CPT | Performed by: NURSE PRACTITIONER

## 2023-11-09 PROCEDURE — 84439 ASSAY OF FREE THYROXINE: CPT | Performed by: NURSE PRACTITIONER

## 2023-11-09 NOTE — ASSESSMENT & PLAN NOTE
-Clinically euthyroid.  -TFT's today with medication adjustment accordingly.  Reminded of proper administration including taking 7 pills per week on an empty stomach with no missed doses, waiting 30-60 minutes prior to other medications or food.  -Discussed following with PCP for further eval with ENT/GI for dysphagia.  -Follow-up in 1 year.

## 2023-11-09 NOTE — PROGRESS NOTES
Raimundo Munguia is a 58 y.o. male had concerns including Postsurgical hypothyroidism.   Post-surgical hypothyroidism.    His mother  this morning. He has been with his family recently.  He has been taking T4 112 mcg QD.  He has not been missing any doses.  His most recent labs were in range.  He reports that he is starting to have some increased compressive s/sx's.  He has not had any imaging since his last visit.    History:  Patient had a right hemithyroidectomy by Dr. Wynn in 2022 that was noted to be benign follicular nodule.   He had a CT Chest with note of a thyroid goiter with substernal extension in 2021.  Patient states that prior to this he was not aware of any prior thyroid issues.  His most recent TFT's were within normal range.    He states he has a hard time swallowing sometimes.  He feels like he has some mild compressive symptoms when he is trying to swallow something. Sometimes he has symptoms at night that wake him up, but that he also wears oxygen.  Has never been told that his thyroid was enlarged that he recalls.    His mother had graves disease with thyroidectomy in the past.    Birth state:[place]  Previous history of radiation to face/neck:none  Consuming foods high in iodine: none  Family history of thyroid complications: mom-graves diease, none other, no hx of thyroid CA in family    Patient was seen by Dr. Wynn, General Surgery, for surgical eval in 2022 to evaluate the need for removal.  He was informed that he does need surgery and that they will get him on the schedule.  He has not heard from them since that time.  Contacted the office today and they informed that the hospital facility that they use for surgery is just now allowing for them to perform surgeries in the last couple of weeks and are limiting how many they are allowed to do at this time.  They are working on getting him on the surgery schedule and they will be contacting him for surgical date.    The  "following portions of the patient's history were reviewed and updated as appropriate: allergies, current medications, past family history, past medical history, past social history, past surgical history and problem list.    Past Medical History:   Diagnosis Date    Asthma     inhlaer  prn    Cervicalgia     CVA (cerebral vascular accident)     around 2012; \"light\" residual dysarthria    Diabetes mellitus 2015    po meds as well as insulin daily; checks sugars at home    GERD (gastroesophageal reflux disease)     Helicobacter pylori (H. 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    Vitamin D deficiency      Past Surgical History:   Procedure Laterality Date    COLON RESECTION N/A 02/22/2018    Procedure: COLON RESECTION SIGMOID;  Surgeon: Ej Tinoco MD;  Location: [facility];  Service:     COLONOSCOPY N/A 02/07/2018    Procedure: COLONOSCOPY CPT CODE: 63140;  Surgeon: Teodoro Garg III, MD;  Location: [facility];  Service:     COLONOSCOPY N/A 03/07/2019    Procedure: COLONOSCOPY FOR SCREENING CPT CODE: ;  Surgeon: Ej Tinoco MD;  Location: [facility];  Service: Gastroenterology    ENDOSCOPY N/A 02/07/2018    Procedure: ESOPHAGOGASTRODUODENOSCOPY WITH DILATATION CPT CODE: 48401;  Surgeon: Teodoro Garg III, MD;  Location: [facility];  Service:     THYROIDECTOMY Right 4/15/2022    Procedure: THYROIDECTOMY RIGHT LOBECTOMY;  Surgeon: Fidencio Wynn MD;  Location:  [facility];  Service: General;  Laterality: Right;      Family History   Problem Relation Age of Onset    Diabetes Mother     Hypertension Mother     Hypertension Father     Hyperlipidemia Father     Diabetes Sister     Hyperlipidemia Sister     Hypertension Sister     " "Hypertension Brother     Hyperlipidemia Brother       Social History     Socioeconomic History    Marital status: Legally      Spouse name: tess    Number of children: 3    Years of education: 9   Tobacco Use    Smoking status: Never     Passive exposure: Never    Smokeless tobacco: Current     Types: Snuff    Tobacco comments:     one can every 2-3 days; 25-26 years of use   Vaping Use    Vaping Use: Never used   Substance and Sexual Activity    Alcohol use: No    Drug use: No    Sexual activity: Defer     Partners: Female      No Known Allergies   Current Outpatient Medications on File Prior to Visit   Medication Sig Dispense Refill    albuterol sulfate  (90 Base) MCG/ACT inhaler Inhale 2 puffs Every 4 (Four) Hours As Needed for Shortness of Air. 18 g 3    amitriptyline (ELAVIL) 100 MG tablet TAKE ONE TABLET BY MOUTH EVERY NIGHT AT BEDTIME 30 tablet 5    azelastine (ASTELIN) 0.1 % nasal spray 2 sprays both nostrils twice daily as needed 1 each 3    Cholecalciferol (D3 High Potency) 50 MCG (2000 UT) capsule Take 1 capsule by mouth Daily. 30 capsule 3    cyclobenzaprine (FLEXERIL) 10 MG tablet TAKE ONE TABLET BY MOUTH TWICE DAILY AS NEEDED FOR MUSCLE SPASMS 60 tablet 1    Diclofenac Sodium (VOLTAREN) 1 % gel gel apply 2-4 grams to appropriate area topically FOUR TIMES DAILY 100 g 4    fluticasone (FLONASE) 50 MCG/ACT nasal spray use 1 spray in each nostril TWICE DAILY AS DIRECTED 16 g 4    folic acid (FOLVITE) 1 MG tablet TAKE ONE TABLET BY MOUTH EVERY DAY 30 tablet 4    glucose blood (OneTouch Ultra) test strip 1 each by Other route 3 (Three) Times a Day. Test blood glucose 100 each 3    Insulin Degludec-Liraglutide (Xultophy) 100-3.6 UNIT-MG/ML solution pen-injector subcutaneous pen Inject 50 Units under the skin into the appropriate area as directed Daily. 45 mL 3    Insulin Pen Needle (Pen Needles 1/2\") 29G X 12MM misc 1 Device Daily. 100 each 0    Lancets Ultra Fine misc 1 Device 3 (Three) " "Times a Day. 100 each 3    lansoprazole (PREVACID) 30 MG capsule TAKE ONE CAPSULE BY MOUTH EVERY DAY 30 capsule 4    levocetirizine (XYZAL) 5 MG tablet TAKE ONE TABLET BY MOUTH EVERY EVENING 30 tablet 2    levothyroxine (SYNTHROID, LEVOTHROID) 112 MCG tablet TAKE ONE TABLET BY MOUTH EVERY DAY 30 tablet 5    metFORMIN (GLUCOPHAGE) 500 MG tablet TAKE TWO TABLETS BY MOUTH TWICE DAILY 120 tablet 4    methotrexate 2.5 MG tablet TAKE 10 TABLETS BY MOUTH ONCE WEEKLY 40 tablet 4    metoprolol succinate XL (TOPROL-XL) 100 MG 24 hr tablet TAKE ONE TABLET BY MOUTH EVERY DAY 30 tablet 0    montelukast (SINGULAIR) 10 MG tablet TAKE ONE TABLET BY MOUTH AT BEDTIME 30 tablet 0    rosuvastatin (CRESTOR) 5 MG tablet TAKE ONE TABLET BY MOUTH EVERY DAY 30 tablet 4    vitamin B-12 (CYANOCOBALAMIN) 1000 MCG tablet Take 1 tablet by mouth Daily. 30 tablet 3     No current facility-administered medications on file prior to visit.     Review of Systems   HENT:  Positive for trouble swallowing.    Eyes: Negative.    Endocrine: Negative.    Psychiatric/Behavioral:  Positive for sleep disturbance.    All other systems reviewed and are negative.      /86 (BP Location: Left arm, Patient Position: Sitting, Cuff Size: Adult)   Pulse 93   Ht 172.7 cm (68\")   Wt 102 kg (224 lb 9.6 oz)   SpO2 95%   BMI 34.15 kg/m²      Physical Exam  Vitals reviewed.   Constitutional:       Appearance: Normal appearance.   Eyes:      Extraocular Movements: Extraocular movements intact.   Neck:      Thyroid: No thyromegaly or thyroid tenderness.      Comments: No palpable tissue to the right. Thyromegaly to the left.  Cardiovascular:      Rate and Rhythm: Normal rate.   Pulmonary:      Effort: Pulmonary effort is normal.   Skin:     General: Skin is warm.   Neurological:      Mental Status: He is alert and oriented to person, place, and time.   Psychiatric:         Mood and Affect: Mood normal.         Behavior: Behavior normal.         Thought Content: " "Thought content normal.         Judgment: Judgment normal.       Labs/Imaging  CMP  Lab Results   Component Value Date    GLUCOSE 247 (H) 07/25/2023    BUN 13 07/25/2023    CREATININE 0.88 07/25/2023    EGFRIFNONA 106 02/24/2022    EGFRIFAFRI  09/13/2016      Comment:      <15 Indicative of kidney failure.    BCR 14.8 07/25/2023    K 4.6 07/25/2023    CO2 20.2 (L) 07/25/2023    CALCIUM 9.1 07/25/2023    ALBUMIN 4.2 07/25/2023    LABIL2 1.2 (L) 05/17/2016    AST 17 07/25/2023    ALT 24 07/25/2023        CBC w/DIFF   Lab Results   Component Value Date    WBC 8.96 07/25/2023    RBC 5.52 07/25/2023    HGB 17.5 07/25/2023    HCT 50.9 07/25/2023    MCV 92.2 07/25/2023    MCH 31.7 07/25/2023    MCHC 34.4 07/25/2023    RDW 11.7 (L) 07/25/2023    RDWSD 39.4 07/25/2023    MPV 10.4 07/25/2023     07/25/2023    NEUTRORELPCT 38.9 (L) 04/13/2023    LYMPHORELPCT 42.8 04/13/2023    MONORELPCT 12.2 (H) 04/13/2023    EOSRELPCT 4.3 04/13/2023    BASORELPCT 1.4 04/13/2023    AUTOIGPER 0.4 04/13/2023    NEUTROABS 4.76 07/25/2023    LYMPHSABS 3.45 (H) 04/13/2023    MONOSABS 0.98 (H) 04/13/2023    EOSABS 0.46 (H) 07/25/2023    BASOSABS 0.18 07/25/2023    AUTOIGNUM 0.03 04/13/2023    NRBC 0.0 04/13/2023       TSH  Lab Results   Component Value Date    TSH 4.450 (H) 11/09/2023    TSH 1.900 07/25/2023    TSH 1.710 04/13/2023       T4  Lab Results   Component Value Date    FREET4 1.46 11/09/2023    FREET4 1.51 07/25/2023    FREET4 1.71 (H) 04/13/2023     Lab Results   Component Value Date    R7TCDQU 8.7 04/27/2015       T3  No results found for: \"T3FREE\"  No results found for: \"Q6QTLIZ\"    TRAb  No results found for: \"TSURCPAB\"    TPO  No results found for: \"THYROIDAB\"    No valid procedures specified.    Assessment and Plan    Diagnoses and all orders for this visit:    1. Postsurgical hypothyroidism (Primary)  Assessment & Plan:  -Clinically euthyroid.  -TFT's today with medication adjustment accordingly.  Reminded of proper " administration including taking 7 pills per week on an empty stomach with no missed doses, waiting 30-60 minutes prior to other medications or food.  -Discussed following with PCP for further eval with ENT/GI for dysphagia.  -Follow-up in 1 year.    Orders:  -     TSH  -     T4, free           Return in about 1 year (around 11/9/2024) for Follow-up appointment. The patient was instructed to contact the clinic with any interval questions or concerns.    This document has been electronically signed by ABDIRAHMAN Horan  November 9, 2023 14:24 EST  Endocrinology

## 2023-11-22 DIAGNOSIS — J30.9 CHRONIC ALLERGIC RHINITIS: Chronic | ICD-10-CM

## 2023-11-22 RX ORDER — FLUTICASONE PROPIONATE 50 MCG
SPRAY, SUSPENSION (ML) NASAL
Qty: 16 G | Refills: 4 | Status: SHIPPED | OUTPATIENT
Start: 2023-11-22

## 2023-11-28 ENCOUNTER — OFFICE VISIT (OUTPATIENT)
Dept: FAMILY MEDICINE CLINIC | Facility: CLINIC | Age: 58
End: 2023-11-28
Payer: COMMERCIAL

## 2023-11-28 VITALS
RESPIRATION RATE: 14 BRPM | TEMPERATURE: 98.9 F | SYSTOLIC BLOOD PRESSURE: 120 MMHG | DIASTOLIC BLOOD PRESSURE: 80 MMHG | BODY MASS INDEX: 33.34 KG/M2 | WEIGHT: 220 LBS | HEIGHT: 68 IN | OXYGEN SATURATION: 95 % | HEART RATE: 105 BPM

## 2023-11-28 DIAGNOSIS — I10 BENIGN ESSENTIAL HYPERTENSION: Chronic | ICD-10-CM

## 2023-11-28 DIAGNOSIS — J30.9 CHRONIC ALLERGIC RHINITIS: Chronic | ICD-10-CM

## 2023-11-28 DIAGNOSIS — E55.9 VITAMIN D DEFICIENCY: Chronic | ICD-10-CM

## 2023-11-28 DIAGNOSIS — Z23 ENCOUNTER FOR IMMUNIZATION: ICD-10-CM

## 2023-11-28 DIAGNOSIS — E11.40 TYPE 2 DIABETES, CONTROLLED, WITH NEUROPATHY: Primary | Chronic | ICD-10-CM

## 2023-11-28 DIAGNOSIS — K21.9 GASTROESOPHAGEAL REFLUX DISEASE WITHOUT ESOPHAGITIS: Chronic | ICD-10-CM

## 2023-11-28 DIAGNOSIS — E53.8 VITAMIN B 12 DEFICIENCY: Chronic | ICD-10-CM

## 2023-11-28 DIAGNOSIS — E78.2 MIXED DYSLIPIDEMIA: Chronic | ICD-10-CM

## 2023-11-28 DIAGNOSIS — E89.0 POSTSURGICAL HYPOTHYROIDISM: Chronic | ICD-10-CM

## 2023-11-28 NOTE — PROGRESS NOTES
History of Present Illness  Raimundo Munguia is a 57 yo male who presents for follow up pertaining to his  DM, type 2, HTN, GERD, Rheumatoid Arthritis and Dyslipidemia. In addition, Raimundo has Insomnia and Vitamin deficiencies.    Diabetes   He has type 2 diabetes mellitus. His disease course has been improving since starting Xultophy. Risk factors for coronary artery disease include dyslipidemia, hypertension, male sex, sedentary lifestyle and tobacco exposure. Current diabetic treatment includes Metformin and Xultophy  He has had a previous visit with a dietitian. An ACE inhibitor/angiotensin II receptor blocker is not being taken due to S/e.     Lab Results   Component Value Date    HGBA1C 7.30 (H) 04/13/2023      Lab Results   Component Value Date    HGBA1C 7.10 (H) 07/25/2023      Hypertension   The current episode started more than 1 year ago.His BP is adequately controlled. His hypertensive medication is Toprol  mg which has improved his blood pressure and fast heart rate.   Lab Results   Component Value Date    GLUCOSE 247 (H) 07/25/2023    BUN 13 07/25/2023    CREATININE 0.88 07/25/2023    EGFR 100.9 07/25/2023    BCR 14.8 07/25/2023    K 4.6 07/25/2023    CO2 20.2 (L) 07/25/2023    CALCIUM 9.1 07/25/2023    ALBUMIN 4.2 07/25/2023    BILITOT 0.2 07/25/2023    AST 17 07/25/2023    ALT 24 07/25/2023       Dyslipidemia   This is a chronic problem. The current episode started more than 1 year ago. Factors aggravating his hyperlipidemia include fatty foods. Current antihyperlipidemic treatment includes statins.  Compliance problems include adherence to diet and adherence to exercise.  Risk factors for coronary artery disease include diabetes mellitus, dyslipidemia, hypertension, male sex and obesity.   Lab Results   Component Value Date    CHOL 104 07/25/2023    CHOL 102 04/13/2023    CHOL 118 01/13/2023     Lab Results   Component Value Date    TRIG 191 (H) 07/25/2023    TRIG 185 (H) 04/13/2023    TRIG  "118 01/13/2023     Lab Results   Component Value Date    HDL 24 (L) 07/25/2023    HDL 23 (L) 04/13/2023    HDL 26 (L) 01/13/2023     Lab Results   Component Value Date    LDL 48 07/25/2023    LDL 48 04/13/2023    LDL 70 01/13/2023        The following portions of the patient's history were reviewed and updated as appropriate: allergies, current medications, past family history, past medical history, past social history, past surgical history and problem list.    Review of Systems   Constitutional:  Negative for activity change, appetite change, chills, fatigue, fever and unexpected weight change.   HENT:  Positive for hearing loss (Chronic). Negative for voice change.    Eyes:  Negative for visual disturbance.   Respiratory:  Negative for cough, shortness of breath and wheezing.    Cardiovascular:  Negative for chest pain, palpitations and leg swelling.   Gastrointestinal:  Negative for nausea and vomiting.   Endocrine: Negative for cold intolerance, heat intolerance, polydipsia, polyphagia and polyuria.   Musculoskeletal:  Positive for arthralgias, back pain and joint swelling.   Skin:  Negative for color change and rash.   Allergic/Immunologic: Positive for environmental allergies and immunocompromised state.   Neurological:  Negative for dizziness, tremors, speech difficulty, weakness and light-headedness.   Hematological:  Negative for adenopathy.   Psychiatric/Behavioral:  Positive for sleep disturbance. Negative for confusion and decreased concentration. The patient is not nervous/anxious.    All other systems reviewed and are negative.    Vital signs:  /80 (BP Location: Left arm, Patient Position: Sitting, Cuff Size: Adult)   Pulse 105   Temp 98.9 °F (37.2 °C) (Temporal)   Resp 14   Ht 172.7 cm (67.99\")   Wt 99.8 kg (220 lb)   SpO2 95%   BMI 33.46 kg/m²     Physical Exam  Vitals and nursing note reviewed.   Constitutional:       General: He is not in acute distress.     Appearance: He is " well-developed.      Interventions: Face mask in place.   HENT:      Head: Normocephalic.      Nose: Nose normal.   Eyes:      General: No scleral icterus.        Right eye: No discharge.         Left eye: No discharge.      Conjunctiva/sclera: Conjunctivae normal.   Cardiovascular:      Rate and Rhythm: Normal rate and regular rhythm.      Heart sounds: Normal heart sounds. No murmur heard.     No friction rub.   Pulmonary:      Effort: Pulmonary effort is normal. No respiratory distress.      Breath sounds: No decreased breath sounds, wheezing, rhonchi or rales.   Abdominal:      General: Abdomen is protuberant. Bowel sounds are normal.      Palpations: Abdomen is soft.      Tenderness: There is no abdominal tenderness. There is no guarding.   Musculoskeletal:         General: Deformity (Multiple Rheumatoid nodules bilateral upper extremities) present. No tenderness.      Cervical back: Neck supple.      Right lower leg: No edema.      Left lower leg: No edema.   Lymphadenopathy:      Cervical: No cervical adenopathy.   Skin:     General: Skin is warm and dry.      Capillary Refill: Capillary refill takes less than 2 seconds.   Neurological:      Mental Status: He is alert and oriented to person, place, and time.      Gait: Gait abnormal.   Psychiatric:         Mood and Affect: Mood normal.         Speech: Speech normal.         Behavior: Behavior is cooperative.         Thought Content: Thought content normal.         Cognition and Memory: Cognition and memory normal.     Result Review Endocrinology consult note 11/9/2023  BMI is >= 30 and <35. (Class 1 Obesity). The following options were offered after discussion;: weight loss educational material (shared in after visit summary)      Assessment & Plan     Diagnoses and all orders for this visit:    1. Type 2 diabetes, controlled, with neuropathy (Primary)  Comments:  Continue Xultophy and Metformin.    2. Benign essential hypertension  Comments:  Continue  Metoprolol    3. Mixed dyslipidemia  Comments:  Continue Rosuvastatin    4. Postsurgical hypothyroidism  Comments:  Continue Levothyroxine and f/u with Endocrinology    5. Gastroesophageal reflux disease without esophagitis  Comments:  Continue Lansoprazole    6. Chronic allergic rhinitis  Comments:  Stable. Continue Azelastine NS, Levocetrizine and Montelukast    7. Vitamin B 12 deficiency  Comments:  Daily supplement    8. Vitamin D deficiency  Comments:  Daily supplement    9. Encounter for immunization  Comments:  Influenza vaccination  Orders:  -     Fluzone (or Fluarix & Flulaval for VFC) >6 Mos ([id number])      Follow Up In December after he has had labs  Findings and recommendations discussed with Raimundo. Lifestyle modifications reinforced including nutrition and activity recommendations. He will follow up in December sooner if problems/concerns occur.   Raimundo was given instructions and counseling regarding his condition or for health maintenance advice. Please see specific information pulled into the AVS if appropriate      This document has been electronically signed by:

## 2023-11-29 DIAGNOSIS — J98.4 RESTRICTIVE LUNG DISEASE: Chronic | ICD-10-CM

## 2023-11-29 RX ORDER — ALBUTEROL SULFATE 90 UG/1
AEROSOL, METERED RESPIRATORY (INHALATION)
Qty: 18 G | Refills: 3 | Status: SHIPPED | OUTPATIENT
Start: 2023-11-29

## 2023-12-08 ENCOUNTER — LAB (OUTPATIENT)
Dept: FAMILY MEDICINE CLINIC | Facility: CLINIC | Age: 58
End: 2023-12-08
Payer: COMMERCIAL

## 2023-12-08 DIAGNOSIS — E89.0 POSTSURGICAL HYPOTHYROIDISM: ICD-10-CM

## 2023-12-08 DIAGNOSIS — E78.2 MIXED DYSLIPIDEMIA: Chronic | ICD-10-CM

## 2023-12-08 DIAGNOSIS — M05.79 RHEUMATOID ARTHRITIS INVOLVING MULTIPLE SITES WITH POSITIVE RHEUMATOID FACTOR: Chronic | ICD-10-CM

## 2023-12-08 DIAGNOSIS — E55.9 VITAMIN D DEFICIENCY: ICD-10-CM

## 2023-12-08 DIAGNOSIS — K21.9 GASTROESOPHAGEAL REFLUX DISEASE WITHOUT ESOPHAGITIS: ICD-10-CM

## 2023-12-08 DIAGNOSIS — I10 BENIGN ESSENTIAL HYPERTENSION: Chronic | ICD-10-CM

## 2023-12-08 DIAGNOSIS — E11.40 TYPE 2 DIABETES, CONTROLLED, WITH NEUROPATHY: Chronic | ICD-10-CM

## 2023-12-08 PROCEDURE — 82306 VITAMIN D 25 HYDROXY: CPT | Performed by: NURSE PRACTITIONER

## 2023-12-08 PROCEDURE — 83735 ASSAY OF MAGNESIUM: CPT | Performed by: NURSE PRACTITIONER

## 2023-12-08 PROCEDURE — 86140 C-REACTIVE PROTEIN: CPT | Performed by: NURSE PRACTITIONER

## 2023-12-08 PROCEDURE — 82607 VITAMIN B-12: CPT | Performed by: NURSE PRACTITIONER

## 2023-12-08 PROCEDURE — 80061 LIPID PANEL: CPT | Performed by: NURSE PRACTITIONER

## 2023-12-08 PROCEDURE — 85652 RBC SED RATE AUTOMATED: CPT | Performed by: NURSE PRACTITIONER

## 2023-12-08 PROCEDURE — 84550 ASSAY OF BLOOD/URIC ACID: CPT | Performed by: NURSE PRACTITIONER

## 2023-12-08 PROCEDURE — 80050 GENERAL HEALTH PANEL: CPT | Performed by: NURSE PRACTITIONER

## 2023-12-08 PROCEDURE — 36415 COLL VENOUS BLD VENIPUNCTURE: CPT | Performed by: NURSE PRACTITIONER

## 2023-12-08 PROCEDURE — 83036 HEMOGLOBIN GLYCOSYLATED A1C: CPT | Performed by: NURSE PRACTITIONER

## 2023-12-08 PROCEDURE — 84439 ASSAY OF FREE THYROXINE: CPT | Performed by: NURSE PRACTITIONER

## 2023-12-09 LAB
25(OH)D3 SERPL-MCNC: 28.1 NG/ML (ref 30–100)
ALBUMIN SERPL-MCNC: 4 G/DL (ref 3.5–5.2)
ALBUMIN/GLOB SERPL: 1.4 G/DL
ALP SERPL-CCNC: 149 U/L (ref 39–117)
ALT SERPL W P-5'-P-CCNC: 23 U/L (ref 1–41)
ANION GAP SERPL CALCULATED.3IONS-SCNC: 13.9 MMOL/L (ref 5–15)
AST SERPL-CCNC: 19 U/L (ref 1–40)
BASOPHILS # BLD AUTO: 0.09 10*3/MM3 (ref 0–0.2)
BASOPHILS NFR BLD AUTO: 0.8 % (ref 0–1.5)
BILIRUB SERPL-MCNC: 0.4 MG/DL (ref 0–1.2)
BUN SERPL-MCNC: 12 MG/DL (ref 6–20)
BUN/CREAT SERPL: 14.5 (ref 7–25)
CALCIUM SPEC-SCNC: 9.4 MG/DL (ref 8.6–10.5)
CHLORIDE SERPL-SCNC: 103 MMOL/L (ref 98–107)
CHOLEST SERPL-MCNC: 94 MG/DL (ref 0–200)
CO2 SERPL-SCNC: 20.1 MMOL/L (ref 22–29)
CREAT SERPL-MCNC: 0.83 MG/DL (ref 0.76–1.27)
CRP SERPL-MCNC: <0.3 MG/DL (ref 0–0.5)
DEPRECATED RDW RBC AUTO: 38.8 FL (ref 37–54)
EGFRCR SERPLBLD CKD-EPI 2021: 102.1 ML/MIN/1.73
EOSINOPHIL # BLD AUTO: 0.43 10*3/MM3 (ref 0–0.4)
EOSINOPHIL NFR BLD AUTO: 3.6 % (ref 0.3–6.2)
ERYTHROCYTE [DISTWIDTH] IN BLOOD BY AUTOMATED COUNT: 12.1 % (ref 12.3–15.4)
ERYTHROCYTE [SEDIMENTATION RATE] IN BLOOD: 5 MM/HR (ref 0–20)
GLOBULIN UR ELPH-MCNC: 2.8 GM/DL
GLUCOSE SERPL-MCNC: 167 MG/DL (ref 65–99)
HBA1C MFR BLD: 7.9 % (ref 4.8–5.6)
HCT VFR BLD AUTO: 51.5 % (ref 37.5–51)
HDLC SERPL-MCNC: 23 MG/DL (ref 40–60)
HGB BLD-MCNC: 17.6 G/DL (ref 13–17.7)
IMM GRANULOCYTES # BLD AUTO: 0.05 10*3/MM3 (ref 0–0.05)
IMM GRANULOCYTES NFR BLD AUTO: 0.4 % (ref 0–0.5)
LDLC SERPL CALC-MCNC: 44 MG/DL (ref 0–100)
LDLC/HDLC SERPL: 1.72 {RATIO}
LYMPHOCYTES # BLD AUTO: 3.42 10*3/MM3 (ref 0.7–3.1)
LYMPHOCYTES NFR BLD AUTO: 28.5 % (ref 19.6–45.3)
MAGNESIUM SERPL-MCNC: 2.3 MG/DL (ref 1.6–2.6)
MCH RBC QN AUTO: 30.7 PG (ref 26.6–33)
MCHC RBC AUTO-ENTMCNC: 34.2 G/DL (ref 31.5–35.7)
MCV RBC AUTO: 89.7 FL (ref 79–97)
MONOCYTES # BLD AUTO: 1.07 10*3/MM3 (ref 0.1–0.9)
MONOCYTES NFR BLD AUTO: 8.9 % (ref 5–12)
NEUTROPHILS NFR BLD AUTO: 57.8 % (ref 42.7–76)
NEUTROPHILS NFR BLD AUTO: 6.93 10*3/MM3 (ref 1.7–7)
NRBC BLD AUTO-RTO: 0 /100 WBC (ref 0–0.2)
PLATELET # BLD AUTO: 340 10*3/MM3 (ref 140–450)
PMV BLD AUTO: 9.8 FL (ref 6–12)
POTASSIUM SERPL-SCNC: 4.4 MMOL/L (ref 3.5–5.2)
PROT SERPL-MCNC: 6.8 G/DL (ref 6–8.5)
RBC # BLD AUTO: 5.74 10*6/MM3 (ref 4.14–5.8)
SODIUM SERPL-SCNC: 137 MMOL/L (ref 136–145)
T4 FREE SERPL-MCNC: 1.52 NG/DL (ref 0.93–1.7)
TRIGL SERPL-MCNC: 157 MG/DL (ref 0–150)
TSH SERPL DL<=0.05 MIU/L-ACNC: 2.96 UIU/ML (ref 0.27–4.2)
URATE SERPL-MCNC: 3.6 MG/DL (ref 3.4–7)
VIT B12 BLD-MCNC: 1022 PG/ML (ref 211–946)
VLDLC SERPL-MCNC: 27 MG/DL (ref 5–40)
WBC NRBC COR # BLD AUTO: 11.99 10*3/MM3 (ref 3.4–10.8)

## 2023-12-10 DIAGNOSIS — M05.79 RHEUMATOID ARTHRITIS INVOLVING MULTIPLE SITES WITH POSITIVE RHEUMATOID FACTOR: Chronic | ICD-10-CM

## 2023-12-11 RX ORDER — METHOTREXATE 2.5 MG/1
TABLET ORAL
Qty: 40 TABLET | Refills: 4 | Status: SHIPPED | OUTPATIENT
Start: 2023-12-11

## 2023-12-12 ENCOUNTER — OFFICE VISIT (OUTPATIENT)
Dept: FAMILY MEDICINE CLINIC | Facility: CLINIC | Age: 58
End: 2023-12-12
Payer: COMMERCIAL

## 2023-12-12 VITALS
OXYGEN SATURATION: 95 % | TEMPERATURE: 97.5 F | WEIGHT: 220.46 LBS | BODY MASS INDEX: 33.41 KG/M2 | SYSTOLIC BLOOD PRESSURE: 120 MMHG | RESPIRATION RATE: 14 BRPM | HEIGHT: 68 IN | HEART RATE: 98 BPM | DIASTOLIC BLOOD PRESSURE: 82 MMHG

## 2023-12-12 DIAGNOSIS — E11.40 TYPE 2 DIABETES, CONTROLLED, WITH NEUROPATHY: Chronic | ICD-10-CM

## 2023-12-12 DIAGNOSIS — I10 BENIGN ESSENTIAL HYPERTENSION: Chronic | ICD-10-CM

## 2023-12-12 DIAGNOSIS — E78.2 MIXED DYSLIPIDEMIA: Chronic | ICD-10-CM

## 2023-12-12 DIAGNOSIS — J30.9 CHRONIC ALLERGIC RHINITIS: Chronic | ICD-10-CM

## 2023-12-12 DIAGNOSIS — R91.1 LUNG NODULE: ICD-10-CM

## 2023-12-12 DIAGNOSIS — E53.8 B12 DEFICIENCY: Chronic | ICD-10-CM

## 2023-12-12 DIAGNOSIS — J98.4 RESTRICTIVE LUNG DISEASE: Chronic | ICD-10-CM

## 2023-12-12 DIAGNOSIS — E53.8 B12 DEFICIENCY: ICD-10-CM

## 2023-12-12 DIAGNOSIS — M05.79 RHEUMATOID ARTHRITIS INVOLVING MULTIPLE SITES WITH POSITIVE RHEUMATOID FACTOR: Chronic | ICD-10-CM

## 2023-12-12 DIAGNOSIS — R00.2 HEART PALPITATIONS: Chronic | ICD-10-CM

## 2023-12-12 DIAGNOSIS — F51.04 CHRONIC INSOMNIA: Chronic | ICD-10-CM

## 2023-12-12 DIAGNOSIS — K21.9 GASTROESOPHAGEAL REFLUX DISEASE WITHOUT ESOPHAGITIS: Chronic | ICD-10-CM

## 2023-12-12 DIAGNOSIS — E11.40 TYPE 2 DIABETES, CONTROLLED, WITH NEUROPATHY: Primary | Chronic | ICD-10-CM

## 2023-12-12 DIAGNOSIS — E55.9 VITAMIN D DEFICIENCY: Chronic | ICD-10-CM

## 2023-12-12 DIAGNOSIS — E55.9 VITAMIN D DEFICIENCY: ICD-10-CM

## 2023-12-12 RX ORDER — LEVOCETIRIZINE DIHYDROCHLORIDE 5 MG/1
5 TABLET, FILM COATED ORAL EVERY EVENING
Qty: 30 TABLET | Refills: 3 | Status: SHIPPED | OUTPATIENT
Start: 2023-12-12

## 2023-12-12 RX ORDER — ACETAMINOPHEN 160 MG
2000 TABLET,DISINTEGRATING ORAL DAILY
Qty: 30 CAPSULE | Refills: 5 | OUTPATIENT
Start: 2023-12-12

## 2023-12-12 RX ORDER — LANCETS 28 GAUGE
1 EACH MISCELLANEOUS 3 TIMES DAILY
Qty: 100 EACH | Refills: 3 | Status: SHIPPED | OUTPATIENT
Start: 2023-12-12

## 2023-12-12 RX ORDER — (INSULIN DEGLUDEC AND LIRAGLUTIDE) 100; 3.6 [IU]/ML; MG/ML
50 INJECTION, SOLUTION SUBCUTANEOUS DAILY
Qty: 45 ML | Refills: 3 | Status: SHIPPED | OUTPATIENT
Start: 2023-12-12

## 2023-12-12 RX ORDER — PEN NEEDLE, DIABETIC 29 G X1/2"
1 NEEDLE, DISPOSABLE MISCELLANEOUS DAILY
Qty: 100 EACH | Refills: 0 | Status: SHIPPED | OUTPATIENT
Start: 2023-12-12

## 2023-12-12 RX ORDER — LANOLIN ALCOHOL/MO/W.PET/CERES
1000 CREAM (GRAM) TOPICAL DAILY
Qty: 30 TABLET | Refills: 3 | Status: SHIPPED | OUTPATIENT
Start: 2023-12-12

## 2023-12-12 RX ORDER — METOPROLOL SUCCINATE 100 MG/1
100 TABLET, EXTENDED RELEASE ORAL DAILY
Qty: 30 TABLET | Refills: 3 | Status: SHIPPED | OUTPATIENT
Start: 2023-12-12

## 2023-12-12 RX ORDER — LANOLIN ALCOHOL/MO/W.PET/CERES
1000 CREAM (GRAM) TOPICAL DAILY
Qty: 30 TABLET | Refills: 5 | OUTPATIENT
Start: 2023-12-12

## 2023-12-12 RX ORDER — BLOOD SUGAR DIAGNOSTIC
1 STRIP MISCELLANEOUS 3 TIMES DAILY
Qty: 100 EACH | Refills: 3 | Status: SHIPPED | OUTPATIENT
Start: 2023-12-12

## 2023-12-12 RX ORDER — ALBUTEROL SULFATE 90 UG/1
2 INHALANT RESPIRATORY (INHALATION) EVERY 6 HOURS PRN
Qty: 1 EACH | Refills: 3 | Status: SHIPPED | OUTPATIENT
Start: 2023-12-12

## 2023-12-12 RX ORDER — AMITRIPTYLINE HYDROCHLORIDE 100 MG/1
100 TABLET ORAL
Qty: 30 TABLET | Refills: 3 | Status: SHIPPED | OUTPATIENT
Start: 2023-12-12

## 2023-12-12 RX ORDER — MONTELUKAST SODIUM 10 MG/1
10 TABLET ORAL
Qty: 30 TABLET | Refills: 3 | Status: SHIPPED | OUTPATIENT
Start: 2023-12-12

## 2023-12-12 RX ORDER — CHOLECALCIFEROL (VITAMIN D3) 125 MCG
2000 CAPSULE ORAL DAILY
Qty: 30 CAPSULE | Refills: 3 | Status: SHIPPED | OUTPATIENT
Start: 2023-12-12

## 2023-12-12 RX ORDER — FOLIC ACID 1 MG/1
1000 TABLET ORAL DAILY
Qty: 30 TABLET | Refills: 3 | Status: SHIPPED | OUTPATIENT
Start: 2023-12-12

## 2023-12-12 RX ORDER — (INSULIN DEGLUDEC AND LIRAGLUTIDE) 100; 3.6 [IU]/ML; MG/ML
INJECTION, SOLUTION SUBCUTANEOUS
Qty: 45 ML | Refills: 1 | OUTPATIENT
Start: 2023-12-12

## 2023-12-12 RX ORDER — CYCLOBENZAPRINE HCL 10 MG
10 TABLET ORAL 2 TIMES DAILY PRN
Qty: 60 TABLET | Refills: 1 | Status: SHIPPED | OUTPATIENT
Start: 2023-12-12

## 2023-12-12 RX ORDER — ROSUVASTATIN CALCIUM 5 MG/1
5 TABLET, COATED ORAL DAILY
Qty: 30 TABLET | Refills: 3 | Status: SHIPPED | OUTPATIENT
Start: 2023-12-12

## 2023-12-12 RX ORDER — AZELASTINE 1 MG/ML
SPRAY, METERED NASAL
Qty: 1 EACH | Refills: 3 | Status: SHIPPED | OUTPATIENT
Start: 2023-12-12

## 2023-12-12 RX ORDER — CYCLOBENZAPRINE HCL 10 MG
10 TABLET ORAL 2 TIMES DAILY PRN
Qty: 60 TABLET | Refills: 1 | OUTPATIENT
Start: 2023-12-12

## 2023-12-12 RX ORDER — LANSOPRAZOLE 30 MG/1
30 CAPSULE, DELAYED RELEASE ORAL DAILY
Qty: 30 CAPSULE | Refills: 3 | Status: SHIPPED | OUTPATIENT
Start: 2023-12-12

## 2023-12-12 NOTE — PROGRESS NOTES
History of Present Illness  Raimundo Munguia is a 57 yo male who presents for follow up and to review labs pertaining to his  DM, type 2, HTN, GERD, Rheumatoid Arthritis and Dyslipidemia. In addition, Raimundo has Insomnia and Vitamin deficiencies.    Diabetes   He has type 2 diabetes mellitus. His disease course has been improving since starting Xultophy. Risk factors for coronary artery disease include dyslipidemia, hypertension, male sex, sedentary lifestyle and tobacco exposure. Current diabetic treatment includes Metformin and Xultophy  He has had a previous visit with a dietitian. An ACE inhibitor/angiotensin II receptor blocker is not being taken due to S/e.     Lab Results   Component Value Date    HGBA1C 7.30 (H) 04/13/2023      Lab Results   Component Value Date    HGBA1C 7.10 (H) 07/25/2023     Lab Results   Component Value Date    HGBA1C 7.90 (H) 12/08/2023      Hypertension   The current episode started more than 1 year ago.His BP is adequately controlled. His hypertensive medication is Toprol  mg which has improved his blood pressure and fast heart rate.   Lab Results   Component Value Date    GLUCOSE 167 (H) 12/08/2023    BUN 12 12/08/2023    CREATININE 0.83 12/08/2023    EGFR 102.1 12/08/2023    BCR 14.5 12/08/2023    K 4.4 12/08/2023    CO2 20.1 (L) 12/08/2023    CALCIUM 9.4 12/08/2023    ALBUMIN 4.0 12/08/2023    BILITOT 0.4 12/08/2023    AST 19 12/08/2023    ALT 23 12/08/2023     Dyslipidemia   This is a chronic problem. The current episode started more than 1 year ago. Factors aggravating his hyperlipidemia include fatty foods. Current antihyperlipidemic treatment includes statins.  Compliance problems include adherence to diet and adherence to exercise.  Risk factors for coronary artery disease include diabetes mellitus, dyslipidemia, hypertension, male sex and obesity.   Lab Results   Component Value Date    CHOL 94 12/08/2023    CHOL 104 07/25/2023    CHOL 102 04/13/2023     Lab Results  "  Component Value Date    TRIG 157 (H) 12/08/2023    TRIG 191 (H) 07/25/2023    TRIG 185 (H) 04/13/2023     Lab Results   Component Value Date    HDL 23 (L) 12/08/2023    HDL 24 (L) 07/25/2023    HDL 23 (L) 04/13/2023     Lab Results   Component Value Date    LDL 44 12/08/2023    LDL 48 07/25/2023    LDL 48 04/13/2023      The following portions of the patient's history were reviewed and updated as appropriate: allergies, current medications, past family history, past medical history, past social history, past surgical history and problem list.    Review of Systems   Constitutional:  Negative for activity change, appetite change, chills, fatigue, fever and unexpected weight change.   HENT:  Positive for hearing loss (Chronic). Negative for voice change.    Eyes:  Negative for visual disturbance.   Respiratory:  Negative for cough, shortness of breath and wheezing.    Cardiovascular:  Negative for chest pain, palpitations and leg swelling.   Gastrointestinal:  Negative for nausea and vomiting.   Endocrine: Negative for cold intolerance, heat intolerance, polydipsia, polyphagia and polyuria.   Musculoskeletal:  Positive for arthralgias, back pain and joint swelling.   Skin:  Negative for color change and rash.   Allergic/Immunologic: Positive for environmental allergies and immunocompromised state.   Neurological:  Negative for dizziness, tremors, speech difficulty, weakness and light-headedness.   Hematological:  Negative for adenopathy.   Psychiatric/Behavioral:  Positive for sleep disturbance. Negative for confusion and decreased concentration. The patient is not nervous/anxious.    All other systems reviewed and are negative.    Vital signs:  /82 (BP Location: Right arm, Patient Position: Sitting, Cuff Size: Adult)   Pulse 98   Temp 97.5 °F (36.4 °C) (Temporal)   Resp 14   Ht 172.7 cm (67.99\")   Wt 100 kg (220 lb 7.4 oz)   SpO2 95%   BMI 33.53 kg/m²     Physical Exam  Vitals and nursing note reviewed. "   Constitutional:       General: He is not in acute distress.     Appearance: He is well-developed.   HENT:      Head: Normocephalic.      Nose: Nose normal.   Eyes:      General: No scleral icterus.        Right eye: No discharge.         Left eye: No discharge.      Conjunctiva/sclera: Conjunctivae normal.   Cardiovascular:      Rate and Rhythm: Normal rate and regular rhythm.      Heart sounds: Normal heart sounds. No murmur heard.     No friction rub.   Pulmonary:      Effort: Pulmonary effort is normal. No respiratory distress.      Breath sounds: Decreased breath sounds present. No wheezing, rhonchi or rales.   Abdominal:      General: Abdomen is protuberant. Bowel sounds are normal.      Palpations: Abdomen is soft.      Tenderness: There is no abdominal tenderness. There is no guarding.   Musculoskeletal:         General: Deformity (Multiple Rheumatoid nodules bilateral upper extremities) present. No tenderness.      Cervical back: Neck supple.      Right lower leg: No edema.      Left lower leg: No edema.   Lymphadenopathy:      Cervical: No cervical adenopathy.   Skin:     General: Skin is warm and dry.      Capillary Refill: Capillary refill takes less than 2 seconds.   Neurological:      Mental Status: He is alert and oriented to person, place, and time.      Gait: Gait abnormal.   Psychiatric:         Mood and Affect: Mood normal.         Speech: Speech normal.         Behavior: Behavior is cooperative.         Thought Content: Thought content normal.         Cognition and Memory: Cognition and memory normal.       Result Review :  Results for orders placed or performed in visit on 12/08/23   Comprehensive Metabolic Panel    Specimen: Arm, Right; Blood   Result Value Ref Range    Glucose 167 (H) 65 - 99 mg/dL    BUN 12 6 - 20 mg/dL    Creatinine 0.83 0.76 - 1.27 mg/dL    Sodium 137 136 - 145 mmol/L    Potassium 4.4 3.5 - 5.2 mmol/L    Chloride 103 98 - 107 mmol/L    CO2 20.1 (L) 22.0 - 29.0 mmol/L     Calcium 9.4 8.6 - 10.5 mg/dL    Total Protein 6.8 6.0 - 8.5 g/dL    Albumin 4.0 3.5 - 5.2 g/dL    ALT (SGPT) 23 1 - 41 U/L    AST (SGOT) 19 1 - 40 U/L    Alkaline Phosphatase 149 (H) 39 - 117 U/L    Total Bilirubin 0.4 0.0 - 1.2 mg/dL    Globulin 2.8 gm/dL    A/G Ratio 1.4 g/dL    BUN/Creatinine Ratio 14.5 7.0 - 25.0    Anion Gap 13.9 5.0 - 15.0 mmol/L    eGFR 102.1 >60.0 mL/min/1.73   Lipid Panel    Specimen: Arm, Right; Blood   Result Value Ref Range    Total Cholesterol 94 0 - 200 mg/dL    Triglycerides 157 (H) 0 - 150 mg/dL    HDL Cholesterol 23 (L) 40 - 60 mg/dL    LDL Cholesterol  44 0 - 100 mg/dL    VLDL Cholesterol 27 5 - 40 mg/dL    LDL/HDL Ratio 1.72    TSH    Specimen: Arm, Right; Blood   Result Value Ref Range    TSH 2.960 0.270 - 4.200 uIU/mL   Hemoglobin A1c    Specimen: Arm, Right; Blood   Result Value Ref Range    Hemoglobin A1C 7.90 (H) 4.80 - 5.60 %   Vitamin D,25-Hydroxy    Specimen: Arm, Right; Blood   Result Value Ref Range    25 Hydroxy, Vitamin D 28.1 (L) 30.0 - 100.0 ng/ml   Uric Acid    Specimen: Arm, Right; Blood   Result Value Ref Range    Uric Acid 3.6 3.4 - 7.0 mg/dL   Vitamin B12    Specimen: Arm, Right; Blood   Result Value Ref Range    Vitamin B-12 1,022 (H) 211 - 946 pg/mL   T4, Free    Specimen: Arm, Right; Blood   Result Value Ref Range    Free T4 1.52 0.93 - 1.70 ng/dL   Magnesium    Specimen: Arm, Right; Blood   Result Value Ref Range    Magnesium 2.3 1.6 - 2.6 mg/dL   C-reactive Protein    Specimen: Arm, Right; Blood   Result Value Ref Range    C-Reactive Protein <0.30 0.00 - 0.50 mg/dL   Sedimentation Rate    Specimen: Arm, Right; Blood   Result Value Ref Range    Sed Rate 5 0 - 20 mm/hr   CBC Auto Differential    Specimen: Arm, Right; Blood   Result Value Ref Range    WBC 11.99 (H) 3.40 - 10.80 10*3/mm3    RBC 5.74 4.14 - 5.80 10*6/mm3    Hemoglobin 17.6 13.0 - 17.7 g/dL    Hematocrit 51.5 (H) 37.5 - 51.0 %    MCV 89.7 79.0 - 97.0 fL    MCH 30.7 26.6 - 33.0 pg    MCHC 34.2  "31.5 - 35.7 g/dL    RDW 12.1 (L) 12.3 - 15.4 %    RDW-SD 38.8 37.0 - 54.0 fl    MPV 9.8 6.0 - 12.0 fL    Platelets 340 140 - 450 10*3/mm3    Neutrophil % 57.8 42.7 - 76.0 %    Lymphocyte % 28.5 19.6 - 45.3 %    Monocyte % 8.9 5.0 - 12.0 %    Eosinophil % 3.6 0.3 - 6.2 %    Basophil % 0.8 0.0 - 1.5 %    Immature Grans % 0.4 0.0 - 0.5 %    Neutrophils, Absolute 6.93 1.70 - 7.00 10*3/mm3    Lymphocytes, Absolute 3.42 (H) 0.70 - 3.10 10*3/mm3    Monocytes, Absolute 1.07 (H) 0.10 - 0.90 10*3/mm3    Eosinophils, Absolute 0.43 (H) 0.00 - 0.40 10*3/mm3    Basophils, Absolute 0.09 0.00 - 0.20 10*3/mm3    Immature Grans, Absolute 0.05 0.00 - 0.05 10*3/mm3    nRBC 0.0 0.0 - 0.2 /100 WBC     Assessment & Plan     Diagnoses and all orders for this visit:    1. Type 2 diabetes, controlled, with neuropathy (Primary)  Comments:  Noted  A1c of 7.9.  He will continue Xultophy.  He had not been taking metformin.  I encouraged him to restart slowly  Orders:  -     glucose blood (OneTouch Ultra) test strip; 1 each by Other route 3 (Three) Times a Day. Test blood glucose  Dispense: 100 each; Refill: 3  -     Insulin Degludec-Liraglutide (Xultophy) 100-3.6 UNIT-MG/ML solution pen-injector subcutaneous pen; Inject 50 Units under the skin into the appropriate area as directed Daily.  Dispense: 45 mL; Refill: 3  -     Insulin Pen Needle (Pen Needles 1/2\") 29G X 12MM misc; Use 1 Device Daily.  Dispense: 100 each; Refill: 0  -     Lancets Ultra Fine misc; Use 1 Device 3 (Three) Times a Day.  Dispense: 100 each; Refill: 3  -     metFORMIN (GLUCOPHAGE) 500 MG tablet; Take 2 tablets by mouth 2 (Two) Times a Day.  Dispense: 120 tablet; Refill: 3    2. Benign essential hypertension  Comments:  Well controlled with Toprol XL  Orders:  -     metoprolol succinate XL (TOPROL-XL) 100 MG 24 hr tablet; Take 1 tablet by mouth Daily.  Dispense: 30 tablet; Refill: 3    3. Mixed dyslipidemia  Comments:  Continue rosuvastatin  Orders:  -     rosuvastatin " (CRESTOR) 5 MG tablet; Take 1 tablet by mouth Daily.  Dispense: 30 tablet; Refill: 3    4. Rheumatoid arthritis involving multiple sites with positive rheumatoid factor  Comments:  Stable with current medication he will continue methotrexate and folic acid.   Rheumatology referral discussed  Orders:  -     cyclobenzaprine (FLEXERIL) 10 MG tablet; Take 1 tablet by mouth 2 (Two) Times a Day As Needed for Muscle Spasms.  Dispense: 60 tablet; Refill: 1  -     Diclofenac Sodium (VOLTAREN) 1 % gel gel; Apply  topically to the appropriate area as directed 4 (Four) Times a Day.  Dispense: 150 g; Refill: 3  -     folic acid (FOLVITE) 1 MG tablet; Take 1 tablet by mouth Daily.  Dispense: 30 tablet; Refill: 3    5. Restrictive lung disease  Comments:  Ct of chest ordered  Orders:  -     Albuterol Sulfate, sensor, (ProAir Digihaler) 108 (90 Base) MCG/ACT aerosol powder ; Inhale 2 Inhalations Every 6 (Six) Hours As Needed (wheezing/shortness of breath).  Dispense: 1 each; Refill: 3  -     CT Chest With & Without Contrast; Future    6. Chronic insomnia  Comments:  Not at goal. Discussed options. Continue Amitriptyline 100 mg take 2-3 hours prior to bedtime  Orders:  -     amitriptyline (ELAVIL) 100 MG tablet; Take 1 tablet by mouth every night at bedtime.  Dispense: 30 tablet; Refill: 3    7. Heart palpitations  Comments:  EKG and Heart monitor  Orders:  -     Holter Monitor - 72 Hour Up To 15 Days; Future  -     ECG 12 Lead; Future    8. Gastroesophageal reflux disease without esophagitis  Comments:  Stable with Prevacid  Orders:  -     azelastine (ASTELIN) 0.1 % nasal spray; 2 sprays both nostrils twice daily as needed  Dispense: 1 each; Refill: 3  -     lansoprazole (PREVACID) 30 MG capsule; Take 1 capsule by mouth Daily.  Dispense: 30 capsule; Refill: 3    9. Chronic allergic rhinitis  Comments:  Continue Azelatine NS and Singulair  Orders:  -     levocetirizine (XYZAL) 5 MG tablet; Take 1 tablet by mouth Every Evening.   Dispense: 30 tablet; Refill: 3  -     montelukast (SINGULAIR) 10 MG tablet; Take 1 tablet by mouth every night at bedtime.  Dispense: 30 tablet; Refill: 3    10. Vitamin D deficiency  Comments:   Vit D daily supplement  Orders:  -     Cholecalciferol (D3 High Potency) 50 MCG (2000 UT) capsule; Take 1 capsule by mouth Daily.  Dispense: 30 capsule; Refill: 3    11. B12 deficiency  Comments:  Continue B12 supplementation  Orders:  -     vitamin B-12 (CYANOCOBALAMIN) 1000 MCG tablet; Take 1 tablet by mouth Daily.  Dispense: 30 tablet; Refill: 3    I spent 40 minutes caring for Raimundo on this date of service. This time includes time spent by me in the following activities:preparing for the visit, reviewing tests, obtaining and/or reviewing a separately obtained history, performing a medically appropriate examination and/or evaluation , counseling and educating the patient/family/caregiver, ordering medications, tests, or procedures, documenting information in the medical record, independently interpreting results and communicating that information with the patient/family/caregiver, and care coordination  Follow Up In January  Findings and recommendations discussed with Raimundo. Reviewed test results. Lifestyle modifications reinforced including nutrition and activity recommendations.  Raimundo will follow up in January sooner if problems/concerns occur.  Raimundo was given instructions and counseling regarding his condition or for health maintenance advice. Please see specific information pulled into the AVS if appropriate    This document has been electronically signed by:

## 2023-12-13 ENCOUNTER — HOSPITAL ENCOUNTER (OUTPATIENT)
Dept: RESPIRATORY THERAPY | Facility: HOSPITAL | Age: 58
Discharge: HOME | End: 2023-12-13
Admitting: NURSE PRACTITIONER
Payer: COMMERCIAL

## 2023-12-13 DIAGNOSIS — R00.2 HEART PALPITATIONS: Chronic | ICD-10-CM

## 2023-12-13 PROCEDURE — 93246 EXT ECG>7D<15D RECORDING: CPT

## 2023-12-21 DIAGNOSIS — E11.40 TYPE 2 DIABETES, CONTROLLED, WITH NEUROPATHY: Chronic | ICD-10-CM

## 2023-12-29 ENCOUNTER — HOSPITAL ENCOUNTER (OUTPATIENT)
Dept: CT IMAGING | Facility: HOSPITAL | Age: 58
Discharge: HOME | End: 2023-12-29
Admitting: NURSE PRACTITIONER
Payer: COMMERCIAL

## 2023-12-29 DIAGNOSIS — J98.4 RESTRICTIVE LUNG DISEASE: Chronic | ICD-10-CM

## 2023-12-29 PROCEDURE — 71270 CT THORAX DX C-/C+: CPT

## 2023-12-29 PROCEDURE — 25510000001 IOPAMIDOL 61 % SOLUTION: Performed by: NURSE PRACTITIONER

## 2023-12-29 RX ADMIN — IOPAMIDOL 90 ML: 612 INJECTION, SOLUTION INTRAVENOUS at 15:04

## 2024-01-23 ENCOUNTER — OFFICE VISIT (OUTPATIENT)
Dept: FAMILY MEDICINE CLINIC | Facility: CLINIC | Age: 59
End: 2024-01-23
Payer: COMMERCIAL

## 2024-01-23 VITALS
WEIGHT: 218.92 LBS | BODY MASS INDEX: 33.18 KG/M2 | OXYGEN SATURATION: 95 % | RESPIRATION RATE: 14 BRPM | TEMPERATURE: 97.6 F | HEIGHT: 68 IN | HEART RATE: 112 BPM | SYSTOLIC BLOOD PRESSURE: 120 MMHG | DIASTOLIC BLOOD PRESSURE: 80 MMHG

## 2024-01-23 DIAGNOSIS — E89.0 POSTSURGICAL HYPOTHYROIDISM: Chronic | ICD-10-CM

## 2024-01-23 DIAGNOSIS — M05.79 RHEUMATOID ARTHRITIS INVOLVING MULTIPLE SITES WITH POSITIVE RHEUMATOID FACTOR: Chronic | ICD-10-CM

## 2024-01-23 DIAGNOSIS — I10 BENIGN ESSENTIAL HYPERTENSION: Primary | Chronic | ICD-10-CM

## 2024-01-23 DIAGNOSIS — E11.40 TYPE 2 DIABETES, CONTROLLED, WITH NEUROPATHY: Chronic | ICD-10-CM

## 2024-01-23 DIAGNOSIS — E89.0 POSTSURGICAL HYPOTHYROIDISM: ICD-10-CM

## 2024-01-23 DIAGNOSIS — M05.7A RHEUMATOID ARTHRITIS OF OTHER SITE WITH POSITIVE RHEUMATOID FACTOR: Chronic | ICD-10-CM

## 2024-01-23 DIAGNOSIS — I10 BENIGN ESSENTIAL HYPERTENSION: Chronic | ICD-10-CM

## 2024-01-23 DIAGNOSIS — R00.2 PALPITATIONS: ICD-10-CM

## 2024-01-23 DIAGNOSIS — E78.2 MIXED DYSLIPIDEMIA: Chronic | ICD-10-CM

## 2024-01-23 DIAGNOSIS — F51.04 CHRONIC INSOMNIA: Chronic | ICD-10-CM

## 2024-01-23 DIAGNOSIS — J30.9 CHRONIC ALLERGIC RHINITIS: Chronic | ICD-10-CM

## 2024-01-23 RX ORDER — LEVOCETIRIZINE DIHYDROCHLORIDE 5 MG/1
5 TABLET, FILM COATED ORAL EVERY EVENING
Qty: 30 TABLET | Refills: 3 | Status: SHIPPED | OUTPATIENT
Start: 2024-01-23

## 2024-01-23 RX ORDER — METOPROLOL SUCCINATE 50 MG/1
50 TABLET, EXTENDED RELEASE ORAL DAILY
Qty: 30 TABLET | Refills: 0 | Status: SHIPPED | OUTPATIENT
Start: 2024-01-23

## 2024-01-23 RX ORDER — METOPROLOL SUCCINATE 100 MG/1
100 TABLET, EXTENDED RELEASE ORAL DAILY
Qty: 30 TABLET | Refills: 3 | Status: SHIPPED | OUTPATIENT
Start: 2024-01-23

## 2024-01-23 RX ORDER — AMITRIPTYLINE HYDROCHLORIDE 100 MG/1
100 TABLET ORAL
Qty: 30 TABLET | Refills: 3 | Status: SHIPPED | OUTPATIENT
Start: 2024-01-23

## 2024-01-23 RX ORDER — LEVOTHYROXINE SODIUM 112 UG/1
TABLET ORAL
Qty: 30 TABLET | Refills: 5 | Status: SHIPPED | OUTPATIENT
Start: 2024-01-23

## 2024-01-23 RX ORDER — ROSUVASTATIN CALCIUM 5 MG/1
5 TABLET, COATED ORAL DAILY
Qty: 30 TABLET | Refills: 3 | Status: SHIPPED | OUTPATIENT
Start: 2024-01-23

## 2024-01-23 RX ORDER — FOLIC ACID 1 MG/1
1000 TABLET ORAL DAILY
Qty: 30 TABLET | Refills: 3 | Status: SHIPPED | OUTPATIENT
Start: 2024-01-23

## 2024-01-23 RX ORDER — MONTELUKAST SODIUM 10 MG/1
10 TABLET ORAL
Qty: 30 TABLET | Refills: 3 | Status: SHIPPED | OUTPATIENT
Start: 2024-01-23

## 2024-01-23 NOTE — PROGRESS NOTES
History of Present Illness  Raimundo Munguia is a 57 yo male who presents for follow up pertaining to his  DM, type 2, HTN, GERD, Rheumatoid Arthritis and Dyslipidemia. In addition, Raimundo has Insomnia and Vitamin deficiencies.  He had a recent Holter monitor which will be reviewed with him.    Diabetes   He has type 2 diabetes mellitus. His disease course has been improving since starting Xultophy. Risk factors for coronary artery disease include dyslipidemia, hypertension, male sex, sedentary lifestyle and tobacco exposure. Current diabetic treatment includes Metformin and Xultophy  He has had a previous visit with a dietitian. An ACE inhibitor/angiotensin II receptor blocker is not being taken due to S/e.     Lab Results   Component Value Date    HGBA1C 7.30 (H) 04/13/2023      Lab Results   Component Value Date    HGBA1C 7.10 (H) 07/25/2023     Lab Results   Component Value Date    HGBA1C 7.90 (H) 12/08/2023         Hypertension   The current episode started more than 1 year ago.His BP is adequately controlled. His hypertensive medication is Toprol  mg.  Lab Results   Component Value Date    GLUCOSE 167 (H) 12/08/2023    BUN 12 12/08/2023    CREATININE 0.83 12/08/2023    EGFR 102.1 12/08/2023    BCR 14.5 12/08/2023    K 4.4 12/08/2023    CO2 20.1 (L) 12/08/2023    CALCIUM 9.4 12/08/2023    ALBUMIN 4.0 12/08/2023    BILITOT 0.4 12/08/2023    AST 19 12/08/2023    ALT 23 12/08/2023     Dyslipidemia   This is a chronic problem. The current episode started more than 1 year ago. Factors aggravating his hyperlipidemia include fatty foods. Current antihyperlipidemic treatment includes statins.  Compliance problems include adherence to diet and adherence to exercise.  Risk factors for coronary artery disease include diabetes mellitus, dyslipidemia, hypertension, male sex and obesity.   Lab Results   Component Value Date    CHOL 94 12/08/2023    CHOL 104 07/25/2023    CHOL 102 04/13/2023     Lab Results   Component  "Value Date    TRIG 157 (H) 12/08/2023    TRIG 191 (H) 07/25/2023    TRIG 185 (H) 04/13/2023     Lab Results   Component Value Date    HDL 23 (L) 12/08/2023    HDL 24 (L) 07/25/2023    HDL 23 (L) 04/13/2023     Lab Results   Component Value Date    LDL 44 12/08/2023    LDL 48 07/25/2023    LDL 48 04/13/2023     The following portions of the patient's history were reviewed and updated as appropriate: allergies, current medications, past family history, past medical history, past social history, past surgical history and problem list.    Review of Systems   Constitutional:  Negative for activity change, appetite change, chills, fatigue, fever and unexpected weight change.   HENT:  Positive for hearing loss (Chronic). Negative for voice change.    Eyes:  Negative for visual disturbance.   Respiratory:  Negative for cough, shortness of breath and wheezing.    Cardiovascular:  Positive for palpitations. Negative for chest pain and leg swelling.   Gastrointestinal:  Negative for nausea and vomiting.   Endocrine: Negative for cold intolerance, heat intolerance, polydipsia, polyphagia and polyuria.   Musculoskeletal:  Positive for arthralgias, back pain and joint swelling.   Skin:  Negative for color change and rash.   Allergic/Immunologic: Positive for environmental allergies and immunocompromised state.   Neurological:  Negative for dizziness, tremors, speech difficulty, weakness and light-headedness.   Hematological:  Negative for adenopathy.   Psychiatric/Behavioral:  Positive for sleep disturbance. Negative for confusion and decreased concentration. The patient is not nervous/anxious.    All other systems reviewed and are negative.    Vital signs:  /80 (BP Location: Left arm, Patient Position: Sitting, Cuff Size: Adult)   Pulse 112   Temp 97.6 °F (36.4 °C) (Temporal)   Resp 14   Ht 172.7 cm (67.99\")   Wt 99.3 kg (218 lb 14.7 oz)   SpO2 95%   BMI 33.29 kg/m²     Physical Exam  Vitals and nursing note " reviewed.   Constitutional:       General: He is not in acute distress.     Appearance: He is well-developed.   HENT:      Head: Normocephalic.      Nose: Nose normal.   Eyes:      General: No scleral icterus.        Right eye: No discharge.         Left eye: No discharge.      Conjunctiva/sclera: Conjunctivae normal.   Cardiovascular:      Rate and Rhythm: Normal rate and regular rhythm.      Heart sounds: Normal heart sounds. No murmur heard.     No friction rub.   Pulmonary:      Effort: Pulmonary effort is normal. No respiratory distress.      Breath sounds: Decreased breath sounds present. No wheezing, rhonchi or rales.   Abdominal:      General: Abdomen is protuberant. Bowel sounds are normal.      Palpations: Abdomen is soft.      Tenderness: There is no abdominal tenderness. There is no guarding.   Musculoskeletal:         General: Deformity (Multiple Rheumatoid nodules bilateral upper extremities) present. No tenderness.      Cervical back: Neck supple.      Right lower leg: No edema.      Left lower leg: No edema.   Lymphadenopathy:      Cervical: No cervical adenopathy.   Skin:     General: Skin is warm and dry.      Capillary Refill: Capillary refill takes less than 2 seconds.   Neurological:      Mental Status: He is alert and oriented to person, place, and time.      Gait: Gait abnormal.   Psychiatric:         Mood and Affect: Mood normal.         Speech: Speech normal.         Behavior: Behavior is cooperative.         Thought Content: Thought content normal.         Cognition and Memory: Cognition and memory normal.     Result Review : Holter monitor reviewed     A relatively benign monitor study.    The patient was monitored for 7 days 12 hours and 59 minutes.    The predominant rhythm noted during the testing period was sinus rhythm.    Average HR: 94. Min HR: 68. Max HR: 140.    Sinus tachycardia up to 240 bpm was noted.    No atrial fibrillation/flutter detected.  No pause noted    No significant  arrhythmia detected.   BMI is >= 30 and <35. (Class 1 Obesity). The following options were offered after discussion;: weight loss educational material (shared in after visit summary)     Assessment & Plan     Diagnoses and all orders for this visit:    1. Benign essential hypertension (Primary)  Comments:  Changed Toprol to 100 mg at night and 50 mg in the morning  Orders:  -     metoprolol succinate XL (Toprol XL) 50 MG 24 hr tablet; Take 1 tablet by mouth Daily.  Dispense: 30 tablet; Refill: 0    2. Palpitations  Comments:  Reviewed Holter monitor    3. Type 2 diabetes, controlled, with neuropathy  Comments:  Continue Xultophy and metformin    4. Mixed dyslipidemia  Comments:  Continue rosuvastatin    5. Chronic allergic rhinitis  Comments:  Continue Astelin nasal spray, levocetirizine, and montelukast    6. Postsurgical hypothyroidism  Comments:  Continue levothyroxine 112 mcg    7. Rheumatoid arthritis of other site with positive rheumatoid factor  Comments:  Continue current regimen of methotrexate and uric acid and folic acid.  Declines rheumatology referral    I spent 40 minutes caring for Raimundo on this date of service. This time includes time spent by me in the following activities:preparing for the visit, reviewing tests, obtaining and/or reviewing a separately obtained history, performing a medically appropriate examination and/or evaluation , counseling and educating the patient/family/caregiver, ordering medications, tests, or procedures, documenting information in the medical record, independently interpreting results and communicating that information with the patient/family/caregiver, and care coordination  Follow Up In February  Findings and recommendations discussed with Raimundo. Reviewed Holter results. Lifestyle modifications reinforced including nutrition and activity recommendations.  Also discussed the avoidance of decongestants and caffeine.  He will follow up in February sooner if  problems/concerns occur.   Raimundo was given instructions and counseling regarding his condition or for health maintenance advice. Please see specific information pulled into the AVS if appropriate    This document has been electronically signed by:

## 2024-01-24 RX ORDER — CHOLECALCIFEROL (VITAMIN D3) 125 MCG
CAPSULE ORAL
Qty: 30 CAPSULE | Refills: 4 | OUTPATIENT
Start: 2024-01-24

## 2024-01-24 RX ORDER — BLOOD SUGAR DIAGNOSTIC
1 STRIP MISCELLANEOUS 3 TIMES DAILY
Refills: 6 | OUTPATIENT
Start: 2024-01-24

## 2024-01-24 RX ORDER — LANCETS 30 GAUGE
EACH MISCELLANEOUS
Qty: 100 EACH | Refills: 6 | OUTPATIENT
Start: 2024-01-24

## 2024-02-19 DIAGNOSIS — K21.9 GASTROESOPHAGEAL REFLUX DISEASE WITHOUT ESOPHAGITIS: Chronic | ICD-10-CM

## 2024-02-19 DIAGNOSIS — M05.79 RHEUMATOID ARTHRITIS INVOLVING MULTIPLE SITES WITH POSITIVE RHEUMATOID FACTOR: Chronic | ICD-10-CM

## 2024-02-19 RX ORDER — LANSOPRAZOLE 30 MG/1
30 CAPSULE, DELAYED RELEASE ORAL DAILY
Qty: 30 CAPSULE | Refills: 3 | Status: SHIPPED | OUTPATIENT
Start: 2024-02-19

## 2024-02-19 RX ORDER — CYCLOBENZAPRINE HCL 10 MG
10 TABLET ORAL 2 TIMES DAILY PRN
Qty: 60 TABLET | Refills: 1 | Status: SHIPPED | OUTPATIENT
Start: 2024-02-19

## 2024-02-22 ENCOUNTER — OFFICE VISIT (OUTPATIENT)
Dept: FAMILY MEDICINE CLINIC | Facility: CLINIC | Age: 59
End: 2024-02-22
Payer: COMMERCIAL

## 2024-02-22 VITALS
HEIGHT: 68 IN | HEART RATE: 88 BPM | RESPIRATION RATE: 14 BRPM | WEIGHT: 220 LBS | SYSTOLIC BLOOD PRESSURE: 110 MMHG | DIASTOLIC BLOOD PRESSURE: 70 MMHG | BODY MASS INDEX: 33.34 KG/M2 | OXYGEN SATURATION: 94 % | TEMPERATURE: 97.1 F

## 2024-02-22 DIAGNOSIS — R07.9 CHEST PAIN WITH HIGH RISK FOR CARDIAC ETIOLOGY: Primary | ICD-10-CM

## 2024-02-22 DIAGNOSIS — Z85.038 HISTORY OF COLON CANCER: ICD-10-CM

## 2024-02-22 DIAGNOSIS — E89.0 POSTSURGICAL HYPOTHYROIDISM: Chronic | ICD-10-CM

## 2024-02-22 DIAGNOSIS — I10 BENIGN ESSENTIAL HYPERTENSION: Chronic | ICD-10-CM

## 2024-02-22 DIAGNOSIS — E78.2 MIXED DYSLIPIDEMIA: Chronic | ICD-10-CM

## 2024-02-22 DIAGNOSIS — E55.9 VITAMIN D DEFICIENCY: Chronic | ICD-10-CM

## 2024-02-22 DIAGNOSIS — E11.40 TYPE 2 DIABETES, CONTROLLED, WITH NEUROPATHY: Chronic | ICD-10-CM

## 2024-02-22 DIAGNOSIS — K21.9 GASTROESOPHAGEAL REFLUX DISEASE WITHOUT ESOPHAGITIS: Chronic | ICD-10-CM

## 2024-02-22 DIAGNOSIS — R00.2 PALPITATIONS: Chronic | ICD-10-CM

## 2024-02-22 DIAGNOSIS — M05.79 RHEUMATOID ARTHRITIS INVOLVING MULTIPLE SITES WITH POSITIVE RHEUMATOID FACTOR: Chronic | ICD-10-CM

## 2024-02-22 DIAGNOSIS — E53.8 VITAMIN B 12 DEFICIENCY: Chronic | ICD-10-CM

## 2024-02-22 NOTE — PROGRESS NOTES
History of Present Illness     Raimundo Munguia is a 57 yo male who presents to the office today pertaining to his tachycardia.  At his last appointment January 23, an additional dose of metoprolol 50 mg was added to his morning medications.  He was to continue the metoprolol 100 mg at night.  He does feel that his heart rate has improved with this additional dose.  He is complaining of chest pain today which started approximately 7 days ago.  Additional medical problems include DM, type 2, HTN, GERD, Rheumatoid Arthritis and Dyslipidemia.Raimundo has been diagnosed with insomnia and Vitamin deficiencies.     Chest Pain   This is a new problem. The current episode started in the past 7 days. The problem occurs daily. The pain is present in the right lateral region. The quality of the pain is described as sharp. The pain radiates to the right shoulder and right arm. Associated symptoms include nausea.  The pain is aggravated by lifting. He has tried rest for the symptoms. Risk factors include smoking/tobacco exposure and male gender.   His past medical history is significant for diabetes, hypertension, dyslipidemia and thyroid problem.   His family medical history is significant for diabetes, heart disease, hyperlipidemia and hypertension.     Diabetes   He has type 2 diabetes mellitus. His disease course has been improving since starting Xultophy. Risk factors for coronary artery disease include dyslipidemia, hypertension, male sex, sedentary lifestyle and tobacco exposure. Current diabetic treatment includes Metformin and Xultophy  He has had a previous visit with a dietitian. An ACE inhibitor/angiotensin II receptor blocker is not being taken due to S/e.     Lab Results   Component Value Date    HGBA1C 7.30 (H) 04/13/2023      Lab Results   Component Value Date    HGBA1C 7.10 (H) 07/25/2023     Lab Results   Component Value Date    HGBA1C 7.90 (H) 12/08/2023     Hypertension   The current episode started more  than 1 year ago.His BP is adequately controlled. His hypertensive medication is Toprol  mg.  Lab Results   Component Value Date    GLUCOSE 167 (H) 12/08/2023    BUN 12 12/08/2023    CREATININE 0.83 12/08/2023    EGFR 102.1 12/08/2023    BCR 14.5 12/08/2023    K 4.4 12/08/2023    CO2 20.1 (L) 12/08/2023    CALCIUM 9.4 12/08/2023    ALBUMIN 4.0 12/08/2023    BILITOT 0.4 12/08/2023    AST 19 12/08/2023    ALT 23 12/08/2023     Dyslipidemia   This is a chronic problem. The current episode started more than 1 year ago. Factors aggravating his hyperlipidemia include fatty foods. Current antihyperlipidemic treatment includes statins.  Compliance problems include adherence to diet and adherence to exercise.  Risk factors for coronary artery disease include diabetes mellitus, dyslipidemia, hypertension, male sex and obesity.   Lab Results   Component Value Date    CHOL 94 12/08/2023    CHOL 104 07/25/2023    CHOL 102 04/13/2023     Lab Results   Component Value Date    TRIG 157 (H) 12/08/2023    TRIG 191 (H) 07/25/2023    TRIG 185 (H) 04/13/2023     Lab Results   Component Value Date    HDL 23 (L) 12/08/2023    HDL 24 (L) 07/25/2023    HDL 23 (L) 04/13/2023     Lab Results   Component Value Date    LDL 44 12/08/2023    LDL 48 07/25/2023    LDL 48 04/13/2023       The following portions of the patient's history were reviewed and updated as appropriate: allergies, current medications, past family history, past medical history, past social history, past surgical history and problem list.    Review of Systems   Constitutional:  Negative for activity change, appetite change, chills, fatigue, fever and unexpected weight change.   HENT:  Positive for hearing loss (Chronic). Negative for voice change.    Eyes:  Negative for visual disturbance.   Respiratory:  Negative for cough, shortness of breath and wheezing.    Cardiovascular:  Positive for chest pain and palpitations (Improving with additional metoprolol). Negative for leg  "swelling.   Gastrointestinal:  Positive for nausea. Negative for vomiting.   Endocrine: Negative for cold intolerance, heat intolerance, polydipsia, polyphagia and polyuria.   Musculoskeletal:  Positive for arthralgias, back pain and joint swelling.   Skin:  Negative for color change and rash.   Allergic/Immunologic: Positive for environmental allergies and immunocompromised state.   Neurological:  Positive for dizziness. Negative for tremors, speech difficulty, weakness and light-headedness.   Hematological:  Negative for adenopathy.   Psychiatric/Behavioral:  Positive for sleep disturbance. Negative for confusion and decreased concentration. The patient is not nervous/anxious.    All other systems reviewed and are negative.    Vital signs:  /70 (BP Location: Right arm, Patient Position: Sitting, Cuff Size: Adult)   Pulse 88   Temp 97.1 °F (36.2 °C) (Temporal)   Resp 14   Ht 172.7 cm (67.99\")   Wt 99.8 kg (220 lb)   SpO2 94%   BMI 33.46 kg/m²     Physical Exam  Vitals and nursing note reviewed.   Constitutional:       General: He is not in acute distress.     Appearance: He is well-developed.   HENT:      Head: Normocephalic.      Nose: Nose normal.   Eyes:      General: No scleral icterus.        Right eye: No discharge.         Left eye: No discharge.      Conjunctiva/sclera: Conjunctivae normal.   Cardiovascular:      Rate and Rhythm: Normal rate and regular rhythm.      Heart sounds: Normal heart sounds. No murmur heard.     No friction rub.   Pulmonary:      Effort: Pulmonary effort is normal. No respiratory distress.      Breath sounds: Decreased breath sounds present. No wheezing, rhonchi or rales.   Abdominal:      General: Abdomen is protuberant. Bowel sounds are normal.      Palpations: Abdomen is soft.      Tenderness: There is no abdominal tenderness. There is no guarding.   Musculoskeletal:         General: Deformity (Multiple Rheumatoid nodules bilateral upper extremities) present. No " tenderness.      Cervical back: Neck supple.      Right lower leg: No edema.      Left lower leg: No edema.   Lymphadenopathy:      Cervical: No cervical adenopathy.   Skin:     General: Skin is warm and dry.      Capillary Refill: Capillary refill takes less than 2 seconds.   Neurological:      Mental Status: He is alert and oriented to person, place, and time.      Gait: Gait abnormal.   Psychiatric:         Mood and Affect: Mood normal.         Speech: Speech normal.         Behavior: Behavior is cooperative.         Thought Content: Thought content normal.         Cognition and Memory: Cognition and memory normal.       BMI is >= 30 and <35. (Class 1 Obesity). The following options were offered after discussion;: weight loss educational material (shared in after visit summary)    Assessment & Plan     Diagnoses and all orders for this visit:    1. Chest pain with high risk for cardiac etiology (Primary)  Comments:  Signs and symptoms of concern reviewed and if occur he is to go immediately to the closest emergency department.  Stress test ordered  Orders:  -     Stress Test With Myocardial Perfusion - One Day; Future    2. Palpitations  Comments:  Continue metoprolol    3. Type 2 diabetes, controlled, with neuropathy  Comments:  Continue Xultophy and metformin  Orders:  -     Comprehensive Metabolic Panel; Future  -     Lipid Panel; Future  -     TSH; Future  -     Hemoglobin A1c; Future  -     MicroAlbumin, Urine, Random - Urine, Clean Catch; Future    4. Mixed dyslipidemia  Comments:  Continue rosuvastatin  Orders:  -     Comprehensive Metabolic Panel; Future  -     Lipid Panel; Future  -     TSH; Future    5. Benign essential hypertension  Comments:  Continue metoprolol  Orders:  -     Comprehensive Metabolic Panel; Future  -     Lipid Panel; Future  -     TSH; Future  -     Magnesium; Future    6. Rheumatoid arthritis involving multiple sites with positive rheumatoid factor  Comments:  Continue current  regimen of methotrexate, folic acid  Orders:  -     Uric Acid; Future  -     Sedimentation Rate; Future  -     C-reactive Protein; Future    7. Postsurgical hypothyroidism  Comments:  Continue levothyroxine 112 mcg and follow-up with endocrinology  Orders:  -     TSH; Future  -     T4, Free; Future    8. Gastroesophageal reflux disease without esophagitis  Comments:  Continue Prevacid  Orders:  -     CBC & Differential; Future    9. Vitamin D deficiency  Comments:  Daily vitamin D  Orders:  -     Vitamin D,25-Hydroxy; Future    10. Vitamin B 12 deficiency  Comments:  Daily vitamin B12  Orders:  -     Vitamin B12; Future    11. History of colon cancer  -     Ambulatory Referral to General Surgery    I spent 45 minutes caring for Raimundo on this date of service. This time includes time spent by me in the following activities:preparing for the visit, obtaining and/or reviewing a separately obtained history, performing a medically appropriate examination and/or evaluation , counseling and educating the patient/family/caregiver, ordering medications, tests, or procedures, documenting information in the medical record, and care coordination  Follow Up In March  Findings and recommendations discussed with Raimundo. Reviewed treatment options. Lifestyle modifications reinforced including nutrition and activity recommendations.  Raimundo will follow up in March sooner if problems/concerns occur.  Raimundo was given instructions and counseling regarding his condition or for health maintenance advice. Please see specific information pulled into the AVS if appropriate.    This document has been electronically signed by:

## 2024-03-01 DIAGNOSIS — I10 BENIGN ESSENTIAL HYPERTENSION: Chronic | ICD-10-CM

## 2024-03-01 RX ORDER — METOPROLOL SUCCINATE 50 MG/1
50 TABLET, EXTENDED RELEASE ORAL DAILY
Qty: 30 TABLET | Refills: 0 | Status: SHIPPED | OUTPATIENT
Start: 2024-03-01

## 2024-03-15 ENCOUNTER — HOSPITAL ENCOUNTER (OUTPATIENT)
Dept: NUCLEAR MEDICINE | Facility: HOSPITAL | Age: 59
End: 2024-03-15
Payer: COMMERCIAL

## 2024-03-15 ENCOUNTER — HOSPITAL ENCOUNTER (OUTPATIENT)
Dept: NUCLEAR MEDICINE | Facility: HOSPITAL | Age: 59
Discharge: HOME | End: 2024-03-15
Payer: COMMERCIAL

## 2024-03-15 ENCOUNTER — HOSPITAL ENCOUNTER (OUTPATIENT)
Dept: CARDIOLOGY | Facility: HOSPITAL | Age: 59
End: 2024-03-15
Payer: COMMERCIAL

## 2024-03-15 DIAGNOSIS — R07.9 CHEST PAIN WITH HIGH RISK FOR CARDIAC ETIOLOGY: ICD-10-CM

## 2024-03-18 ENCOUNTER — OFFICE VISIT (OUTPATIENT)
Dept: SURGERY | Facility: CLINIC | Age: 59
End: 2024-03-18
Payer: COMMERCIAL

## 2024-03-18 VITALS — BODY MASS INDEX: 32.14 KG/M2 | WEIGHT: 217 LBS | HEIGHT: 69 IN

## 2024-03-18 DIAGNOSIS — Z90.49 STATUS POST COLECTOMY: Primary | ICD-10-CM

## 2024-03-18 PROCEDURE — 1159F MED LIST DOCD IN RCRD: CPT | Performed by: SURGERY

## 2024-03-18 PROCEDURE — 99202 OFFICE O/P NEW SF 15 MIN: CPT | Performed by: SURGERY

## 2024-03-18 PROCEDURE — 1160F RVW MEDS BY RX/DR IN RCRD: CPT | Performed by: SURGERY

## 2024-03-18 RX ORDER — POLYETHYLENE GLYCOL 3350, SODIUM CHLORIDE, SODIUM BICARBONATE, POTASSIUM CHLORIDE 420; 11.2; 5.72; 1.48 G/4L; G/4L; G/4L; G/4L
4000 POWDER, FOR SOLUTION ORAL ONCE
Qty: 4000 ML | Refills: 0 | Status: SHIPPED | OUTPATIENT
Start: 2024-03-18 | End: 2024-03-18

## 2024-03-18 NOTE — PROGRESS NOTES
Subjective   Raimundo Munguia is a 58 y.o. male.     Chief Complaint: post colon resection for polyp and prolapse    History of Present Illness He is a 59 yo who had a sigmoid resection for prolapse and polyp with cancer in 2018. His last colonoscopy was 2019.     The following portions of the patient's history were reviewed and updated as appropriate: current medications, past family history, past medical history, past social history, past surgical history and problem list.    Review of Systems   Constitutional:  Negative for activity change, appetite change, chills, fever and unexpected weight change.   HENT:  Negative for congestion, facial swelling and sore throat.    Eyes:  Negative for photophobia and visual disturbance.   Respiratory:  Negative for chest tightness, shortness of breath and wheezing.    Cardiovascular:  Negative for chest pain, palpitations and leg swelling.   Gastrointestinal:  Negative for abdominal distention, abdominal pain, anal bleeding, blood in stool, constipation, diarrhea, nausea, rectal pain and vomiting.   Endocrine: Negative for cold intolerance, heat intolerance, polydipsia and polyuria.   Genitourinary:  Negative for difficulty urinating, dysuria, flank pain and urgency.   Musculoskeletal:  Negative for back pain and myalgias.   Skin:  Negative for rash and wound.   Allergic/Immunologic: Negative for immunocompromised state.   Neurological:  Negative for dizziness, seizures, syncope, light-headedness, numbness and headaches.   Hematological:  Negative for adenopathy. Does not bruise/bleed easily.   Psychiatric/Behavioral:  Negative for behavioral problems and confusion. The patient is not nervous/anxious.        Objective   Physical Exam  Vitals reviewed.   Constitutional:       General: He is not in acute distress.     Appearance: He is well-developed. He is not ill-appearing.   HENT:      Head: Normocephalic. No laceration. Hair is normal.      Right Ear: Hearing and ear canal  "normal.      Left Ear: Hearing and ear canal normal.      Nose: Nose normal.      Right Sinus: No maxillary sinus tenderness or frontal sinus tenderness.      Left Sinus: No maxillary sinus tenderness or frontal sinus tenderness.   Eyes:      General: Lids are normal.      Conjunctiva/sclera: Conjunctivae normal.      Pupils: Pupils are equal, round, and reactive to light.   Neck:      Thyroid: No thyroid mass or thyromegaly.      Vascular: No JVD.      Trachea: No tracheal tenderness or tracheal deviation.   Cardiovascular:      Rate and Rhythm: Normal rate and regular rhythm.      Heart sounds: No murmur heard.     No gallop.   Pulmonary:      Effort: Pulmonary effort is normal.      Breath sounds: Normal breath sounds. No stridor. No wheezing.   Chest:      Chest wall: No tenderness.   Abdominal:      General: Bowel sounds are normal. There is no distension.      Palpations: Abdomen is soft. There is no mass.      Tenderness: There is no abdominal tenderness. There is no guarding or rebound.      Hernia: No hernia is present.   Musculoskeletal:         General: No deformity.      Cervical back: Normal range of motion.   Lymphadenopathy:      Cervical: No cervical adenopathy.      Upper Body:      Right upper body: No supraclavicular adenopathy.      Left upper body: No supraclavicular adenopathy.   Skin:     General: Skin is warm and dry.      Coloration: Skin is not pale.      Findings: No erythema or rash.   Neurological:      Mental Status: He is alert and oriented to person, place, and time.      Motor: No abnormal muscle tone.   Psychiatric:         Behavior: Behavior normal.         Thought Content: Thought content normal.         Past Medical History:   Diagnosis Date    Asthma     inhlaer  prn    Cervicalgia     CVA (cerebral vascular accident)     around 2012; \"light\" residual dysarthria    Diabetes mellitus 2015    po meds as well as insulin daily; checks sugars at home    GERD (gastroesophageal reflux " disease)     Helicobacter pylori (H. 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    Vitamin D deficiency        Family History   Problem Relation Age of Onset    Diabetes Mother     Hypertension Mother     Hypertension Father     Hyperlipidemia Father     Diabetes Sister     Hyperlipidemia Sister     Hypertension Sister     Hypertension Brother     Hyperlipidemia Brother        Social History     Tobacco Use    Smoking status: Never     Passive exposure: Never    Smokeless tobacco: Current     Types: Snuff    Tobacco comments:     one can every 2-3 days; 25-26 years of use   Vaping Use    Vaping status: Never Used   Substance Use Topics    Alcohol use: No    Drug use: No       Past Surgical History:   Procedure Laterality Date    COLON RESECTION N/A 02/22/2018    Procedure: COLON RESECTION SIGMOID;  Surgeon: Ej Tinoco MD;  Location: [facility] OR;  Service:     COLONOSCOPY N/A 02/07/2018    Procedure: COLONOSCOPY CPT CODE: 65201;  Surgeon: Teodoro Garg III, MD;  Location: [facility] OR;  Service:     COLONOSCOPY N/A 03/07/2019    Procedure: COLONOSCOPY FOR SCREENING CPT CODE: ;  Surgeon: Ej Tinoco MD;  Location: [facility] OR;  Service: Gastroenterology    ENDOSCOPY N/A 02/07/2018    Procedure: ESOPHAGOGASTRODUODENOSCOPY WITH DILATATION CPT CODE: 07377;  Surgeon: Teodoro Garg III, MD;  Location: [facility] OR;  Service:     THYROIDECTOMY Right 4/15/2022    Procedure: THYROIDECTOMY RIGHT LOBECTOMY;  Surgeon: Fidencio Wynn MD;  Location:  [facility] OR;  Service: General;  Laterality: Right;       Current Outpatient Medications   Medication Instructions    Albuterol Sulfate, sensor, (ProAir Digihaler) 108 (90 Base) MCG/ACT aerosol powder  2  "Inhalations, Inhalation, Every 6 Hours PRN    amitriptyline (ELAVIL) 100 mg, Oral, Every Night at Bedtime    azelastine (ASTELIN) 0.1 % nasal spray 2 sprays both nostrils twice daily as needed    cyclobenzaprine (FLEXERIL) 10 mg, Oral, 2 Times Daily PRN    D3 High Potency 2,000 Units, Oral, Daily    Diclofenac Sodium (VOLTAREN) 1 % gel gel Topical, 4 Times Daily    folic acid (FOLVITE) 1,000 mcg, Oral, Daily    glucose blood (OneTouch Ultra) test strip 1 each, Other, 3 Times Daily, Test blood glucose    Insulin Degludec-Liraglutide (Xultophy) 100-3.6 UNIT-MG/ML solution pen-injector subcutaneous pen 50 Units, Subcutaneous, Daily    Insulin Pen Needle (Pen Needles 1/2\") 29G X 12MM misc 1 Device, Does not apply, Daily    Lancets Ultra Fine misc 1 Device, Does not apply, 3 Times Daily    lansoprazole (PREVACID) 30 mg, Oral, Daily    levocetirizine (XYZAL) 5 mg, Oral, Every Evening    levothyroxine (SYNTHROID, LEVOTHROID) 112 MCG tablet TAKE ONE TABLET BY MOUTH EVERY DAY    metFORMIN (GLUCOPHAGE) 1,000 mg, Oral, 2 Times Daily    methotrexate 2.5 MG tablet TAKE 10 TABLETS BY MOUTH ONCE WEEKLY    metoprolol succinate XL (TOPROL-XL) 100 mg, Oral, Daily    metoprolol succinate XL (TOPROL-XL) 50 mg, Oral, Daily    montelukast (SINGULAIR) 10 mg, Oral, Every Night at Bedtime    rosuvastatin (CRESTOR) 5 mg, Oral, Daily    vitamin B-12 (CYANOCOBALAMIN) 1,000 mcg, Oral, Daily         Assessment & Plan   Diagnoses and all orders for this visit:    1. Status post colectomy (Primary)             "

## 2024-03-20 ENCOUNTER — HOSPITAL ENCOUNTER (OUTPATIENT)
Dept: NUCLEAR MEDICINE | Facility: HOSPITAL | Age: 59
Discharge: HOME | End: 2024-03-20
Payer: COMMERCIAL

## 2024-03-20 ENCOUNTER — HOSPITAL ENCOUNTER (OUTPATIENT)
Dept: CARDIOLOGY | Facility: HOSPITAL | Age: 59
Discharge: HOME | End: 2024-03-20
Payer: COMMERCIAL

## 2024-03-20 LAB
BH CV NUCLEAR PRIOR STUDY: 3
BH CV REST NUCLEAR ISOTOPE DOSE: 10.2 MCI
BH CV STRESS BP STAGE 1: NORMAL
BH CV STRESS COMMENTS STAGE 1: NORMAL
BH CV STRESS DOSE REGADENOSON STAGE 1: 0.4
BH CV STRESS DURATION MIN STAGE 1: 0
BH CV STRESS DURATION SEC STAGE 1: 10
BH CV STRESS HR STAGE 1: 83
BH CV STRESS NUCLEAR ISOTOPE DOSE: 32.9 MCI
BH CV STRESS PROTOCOL 1: NORMAL
BH CV STRESS RECOVERY BP: NORMAL MMHG
BH CV STRESS RECOVERY HR: 84 BPM
BH CV STRESS STAGE 1: 1
LV EF NUC BP: 68 %
MAXIMAL PREDICTED HEART RATE: 163 BPM
PERCENT MAX PREDICTED HR: 50.92 %
STRESS BASELINE BP: NORMAL MMHG
STRESS BASELINE HR: 80 BPM
STRESS PERCENT HR: 60 %
STRESS POST PEAK BP: NORMAL MMHG
STRESS POST PEAK HR: 83 BPM
STRESS TARGET HR: 139 BPM

## 2024-03-20 PROCEDURE — 0 TECHNETIUM SESTAMIBI: Performed by: NURSE PRACTITIONER

## 2024-03-20 PROCEDURE — A9500 TC99M SESTAMIBI: HCPCS | Performed by: NURSE PRACTITIONER

## 2024-03-20 PROCEDURE — 93017 CV STRESS TEST TRACING ONLY: CPT

## 2024-03-20 PROCEDURE — 78452 HT MUSCLE IMAGE SPECT MULT: CPT

## 2024-03-20 PROCEDURE — 25010000002 REGADENOSON 0.4 MG/5ML SOLUTION: Performed by: NURSE PRACTITIONER

## 2024-03-20 RX ORDER — REGADENOSON 0.08 MG/ML
0.4 INJECTION, SOLUTION INTRAVENOUS
Status: COMPLETED | OUTPATIENT
Start: 2024-03-20 | End: 2024-03-20

## 2024-03-20 RX ADMIN — TECHNETIUM TC 99M SESTAMIBI 1 DOSE: 1 INJECTION INTRAVENOUS at 09:55

## 2024-03-20 RX ADMIN — TECHNETIUM TC 99M SESTAMIBI 1 DOSE: 1 INJECTION INTRAVENOUS at 10:55

## 2024-03-20 RX ADMIN — REGADENOSON 0.4 MG: 0.08 INJECTION, SOLUTION INTRAVENOUS at 10:55

## 2024-03-21 ENCOUNTER — LAB (OUTPATIENT)
Dept: FAMILY MEDICINE CLINIC | Facility: CLINIC | Age: 59
End: 2024-03-21
Payer: COMMERCIAL

## 2024-03-22 ENCOUNTER — LAB (OUTPATIENT)
Dept: FAMILY MEDICINE CLINIC | Facility: CLINIC | Age: 59
End: 2024-03-22
Payer: COMMERCIAL

## 2024-03-22 DIAGNOSIS — E55.9 VITAMIN D DEFICIENCY: Chronic | ICD-10-CM

## 2024-03-22 DIAGNOSIS — E78.2 MIXED DYSLIPIDEMIA: Chronic | ICD-10-CM

## 2024-03-22 DIAGNOSIS — E53.8 VITAMIN B 12 DEFICIENCY: Chronic | ICD-10-CM

## 2024-03-22 DIAGNOSIS — K21.9 GASTROESOPHAGEAL REFLUX DISEASE WITHOUT ESOPHAGITIS: ICD-10-CM

## 2024-03-22 DIAGNOSIS — E89.0 POSTSURGICAL HYPOTHYROIDISM: Chronic | ICD-10-CM

## 2024-03-22 DIAGNOSIS — E11.40 TYPE 2 DIABETES, CONTROLLED, WITH NEUROPATHY: Chronic | ICD-10-CM

## 2024-03-22 DIAGNOSIS — I10 BENIGN ESSENTIAL HYPERTENSION: Chronic | ICD-10-CM

## 2024-03-22 DIAGNOSIS — M05.79 RHEUMATOID ARTHRITIS INVOLVING MULTIPLE SITES WITH POSITIVE RHEUMATOID FACTOR: Chronic | ICD-10-CM

## 2024-03-22 PROCEDURE — 82043 UR ALBUMIN QUANTITATIVE: CPT | Performed by: NURSE PRACTITIONER

## 2024-03-22 PROCEDURE — 83036 HEMOGLOBIN GLYCOSYLATED A1C: CPT | Performed by: NURSE PRACTITIONER

## 2024-03-22 PROCEDURE — 86140 C-REACTIVE PROTEIN: CPT | Performed by: NURSE PRACTITIONER

## 2024-03-22 PROCEDURE — 85652 RBC SED RATE AUTOMATED: CPT | Performed by: NURSE PRACTITIONER

## 2024-03-22 PROCEDURE — 84439 ASSAY OF FREE THYROXINE: CPT | Performed by: NURSE PRACTITIONER

## 2024-03-22 PROCEDURE — 84550 ASSAY OF BLOOD/URIC ACID: CPT | Performed by: NURSE PRACTITIONER

## 2024-03-22 PROCEDURE — 82607 VITAMIN B-12: CPT | Performed by: NURSE PRACTITIONER

## 2024-03-22 PROCEDURE — 36415 COLL VENOUS BLD VENIPUNCTURE: CPT | Performed by: NURSE PRACTITIONER

## 2024-03-22 PROCEDURE — 82306 VITAMIN D 25 HYDROXY: CPT | Performed by: NURSE PRACTITIONER

## 2024-03-22 PROCEDURE — 83735 ASSAY OF MAGNESIUM: CPT | Performed by: NURSE PRACTITIONER

## 2024-03-22 PROCEDURE — 80050 GENERAL HEALTH PANEL: CPT | Performed by: NURSE PRACTITIONER

## 2024-03-22 PROCEDURE — 80061 LIPID PANEL: CPT | Performed by: NURSE PRACTITIONER

## 2024-03-23 LAB
25(OH)D3 SERPL-MCNC: 31.1 NG/ML (ref 30–100)
ALBUMIN SERPL-MCNC: 4.3 G/DL (ref 3.5–5.2)
ALBUMIN UR-MCNC: <1.2 MG/DL
ALBUMIN/GLOB SERPL: 1.5 G/DL
ALP SERPL-CCNC: 146 U/L (ref 39–117)
ALT SERPL W P-5'-P-CCNC: 28 U/L (ref 1–41)
ANION GAP SERPL CALCULATED.3IONS-SCNC: 12.4 MMOL/L (ref 5–15)
AST SERPL-CCNC: 23 U/L (ref 1–40)
BASOPHILS # BLD AUTO: 0.09 10*3/MM3 (ref 0–0.2)
BASOPHILS NFR BLD AUTO: 0.9 % (ref 0–1.5)
BILIRUB SERPL-MCNC: 0.4 MG/DL (ref 0–1.2)
BUN SERPL-MCNC: 12 MG/DL (ref 6–20)
BUN/CREAT SERPL: 15.6 (ref 7–25)
CALCIUM SPEC-SCNC: 9.4 MG/DL (ref 8.6–10.5)
CHLORIDE SERPL-SCNC: 102 MMOL/L (ref 98–107)
CHOLEST SERPL-MCNC: 116 MG/DL (ref 0–200)
CO2 SERPL-SCNC: 23.6 MMOL/L (ref 22–29)
CREAT SERPL-MCNC: 0.77 MG/DL (ref 0.76–1.27)
CRP SERPL-MCNC: <0.3 MG/DL (ref 0–0.5)
DEPRECATED RDW RBC AUTO: 40.5 FL (ref 37–54)
EGFRCR SERPLBLD CKD-EPI 2021: 104.4 ML/MIN/1.73
EOSINOPHIL # BLD AUTO: 0.33 10*3/MM3 (ref 0–0.4)
EOSINOPHIL NFR BLD AUTO: 3.5 % (ref 0.3–6.2)
ERYTHROCYTE [DISTWIDTH] IN BLOOD BY AUTOMATED COUNT: 12 % (ref 12.3–15.4)
ERYTHROCYTE [SEDIMENTATION RATE] IN BLOOD: 12 MM/HR (ref 0–20)
GLOBULIN UR ELPH-MCNC: 2.9 GM/DL
GLUCOSE SERPL-MCNC: 149 MG/DL (ref 65–99)
HBA1C MFR BLD: 9 % (ref 4.8–5.6)
HCT VFR BLD AUTO: 53.4 % (ref 37.5–51)
HDLC SERPL-MCNC: 29 MG/DL (ref 40–60)
HGB BLD-MCNC: 18.2 G/DL (ref 13–17.7)
IMM GRANULOCYTES # BLD AUTO: 0.03 10*3/MM3 (ref 0–0.05)
IMM GRANULOCYTES NFR BLD AUTO: 0.3 % (ref 0–0.5)
LDLC SERPL CALC-MCNC: 57 MG/DL (ref 0–100)
LDLC/HDLC SERPL: 1.77 {RATIO}
LYMPHOCYTES # BLD AUTO: 3.39 10*3/MM3 (ref 0.7–3.1)
LYMPHOCYTES NFR BLD AUTO: 35.5 % (ref 19.6–45.3)
MAGNESIUM SERPL-MCNC: 2.5 MG/DL (ref 1.6–2.6)
MCH RBC QN AUTO: 31.2 PG (ref 26.6–33)
MCHC RBC AUTO-ENTMCNC: 34.1 G/DL (ref 31.5–35.7)
MCV RBC AUTO: 91.6 FL (ref 79–97)
MONOCYTES # BLD AUTO: 0.82 10*3/MM3 (ref 0.1–0.9)
MONOCYTES NFR BLD AUTO: 8.6 % (ref 5–12)
NEUTROPHILS NFR BLD AUTO: 4.89 10*3/MM3 (ref 1.7–7)
NEUTROPHILS NFR BLD AUTO: 51.2 % (ref 42.7–76)
NRBC BLD AUTO-RTO: 0 /100 WBC (ref 0–0.2)
PLATELET # BLD AUTO: 348 10*3/MM3 (ref 140–450)
PMV BLD AUTO: 10.2 FL (ref 6–12)
POTASSIUM SERPL-SCNC: 4.7 MMOL/L (ref 3.5–5.2)
PROT SERPL-MCNC: 7.2 G/DL (ref 6–8.5)
RBC # BLD AUTO: 5.83 10*6/MM3 (ref 4.14–5.8)
SODIUM SERPL-SCNC: 138 MMOL/L (ref 136–145)
T4 FREE SERPL-MCNC: 1.57 NG/DL (ref 0.93–1.7)
TRIGL SERPL-MCNC: 179 MG/DL (ref 0–150)
TSH SERPL DL<=0.05 MIU/L-ACNC: 2.38 UIU/ML (ref 0.27–4.2)
URATE SERPL-MCNC: 3.3 MG/DL (ref 3.4–7)
VIT B12 BLD-MCNC: 1080 PG/ML (ref 211–946)
VLDLC SERPL-MCNC: 30 MG/DL (ref 5–40)
WBC NRBC COR # BLD AUTO: 9.55 10*3/MM3 (ref 3.4–10.8)

## 2024-03-28 ENCOUNTER — OFFICE VISIT (OUTPATIENT)
Dept: FAMILY MEDICINE CLINIC | Facility: CLINIC | Age: 59
End: 2024-03-28
Payer: COMMERCIAL

## 2024-03-28 VITALS
BODY MASS INDEX: 32.29 KG/M2 | DIASTOLIC BLOOD PRESSURE: 70 MMHG | SYSTOLIC BLOOD PRESSURE: 110 MMHG | TEMPERATURE: 96.8 F | HEART RATE: 94 BPM | RESPIRATION RATE: 14 BRPM | WEIGHT: 218 LBS | OXYGEN SATURATION: 94 % | HEIGHT: 69 IN

## 2024-03-28 DIAGNOSIS — E53.8 VITAMIN B 12 DEFICIENCY: Chronic | ICD-10-CM

## 2024-03-28 DIAGNOSIS — M05.79 RHEUMATOID ARTHRITIS INVOLVING MULTIPLE SITES WITH POSITIVE RHEUMATOID FACTOR: Chronic | ICD-10-CM

## 2024-03-28 DIAGNOSIS — E78.2 MIXED DYSLIPIDEMIA: Chronic | ICD-10-CM

## 2024-03-28 DIAGNOSIS — K21.9 GASTROESOPHAGEAL REFLUX DISEASE WITHOUT ESOPHAGITIS: Chronic | ICD-10-CM

## 2024-03-28 DIAGNOSIS — I10 BENIGN ESSENTIAL HYPERTENSION: Chronic | ICD-10-CM

## 2024-03-28 DIAGNOSIS — F51.04 CHRONIC INSOMNIA: Chronic | ICD-10-CM

## 2024-03-28 DIAGNOSIS — E55.9 VITAMIN D DEFICIENCY: Chronic | ICD-10-CM

## 2024-03-28 DIAGNOSIS — E11.65 TYPE 2 DIABETES MELLITUS WITH HYPERGLYCEMIA, WITH LONG-TERM CURRENT USE OF INSULIN: Primary | Chronic | ICD-10-CM

## 2024-03-28 DIAGNOSIS — Z79.4 TYPE 2 DIABETES MELLITUS WITH HYPERGLYCEMIA, WITH LONG-TERM CURRENT USE OF INSULIN: Primary | Chronic | ICD-10-CM

## 2024-03-28 DIAGNOSIS — E89.0 POSTSURGICAL HYPOTHYROIDISM: Chronic | ICD-10-CM

## 2024-03-28 RX ORDER — ACYCLOVIR 400 MG/1
1 TABLET ORAL DAILY
Qty: 1 EACH | Refills: 0 | Status: SHIPPED | OUTPATIENT
Start: 2024-03-28

## 2024-03-28 RX ORDER — EMPAGLIFLOZIN, LINAGLIPTIN, METFORMIN HYDROCHLORIDE 25; 5; 1000 MG/1; MG/1; MG/1
1 TABLET, EXTENDED RELEASE ORAL EVERY MORNING
Qty: 30 TABLET | Refills: 0 | Status: SHIPPED | OUTPATIENT
Start: 2024-03-28

## 2024-03-28 RX ORDER — METOPROLOL TARTRATE 100 MG/1
100 TABLET ORAL 2 TIMES DAILY
Qty: 60 TABLET | Refills: 0 | Status: SHIPPED | OUTPATIENT
Start: 2024-03-28

## 2024-03-28 RX ORDER — ACYCLOVIR 400 MG/1
1 TABLET ORAL
Qty: 9 EACH | Refills: 3 | Status: SHIPPED | OUTPATIENT
Start: 2024-03-28

## 2024-03-28 NOTE — PROGRESS NOTES
History of Present Illness  Raimundo Munguia is a 59 yo male who presents to the office today pertaining to his DM, type 2, HTN, GERD, Rheumatoid Arthritis and Dyslipidemia.  In addition, he has been diagnosed with insomnia and Vitamin deficiencies.     Diabetes   He has type 2 diabetes mellitus. His disease course has been improving since starting Xultophy. Risk factors for coronary artery disease include dyslipidemia, hypertension, male sex, sedentary lifestyle and tobacco exposure. Current diabetic treatment includes Metformin and Xultophy. An ACE inhibitor/angiotensin II receptor blocker is not being taken due to S/e.     Lab Results   Component Value Date    HGBA1C 7.30 (H) 04/13/2023      Lab Results   Component Value Date    HGBA1C 7.10 (H) 07/25/2023     Lab Results   Component Value Date    HGBA1C 7.90 (H) 12/08/2023     Lab Results   Component Value Date    HGBA1C 9.00 (H) 03/22/2024      Hypertension   The current episode started more than 1 year ago.His BP is adequately controlled. His hypertensive medication is Toprol  mg.  Lab Results   Component Value Date    GLUCOSE 149 (H) 03/22/2024    BUN 12 03/22/2024    CREATININE 0.77 03/22/2024    EGFR 104.4 03/22/2024    BCR 15.6 03/22/2024    K 4.7 03/22/2024    CO2 23.6 03/22/2024    CALCIUM 9.4 03/22/2024    ALBUMIN 4.3 03/22/2024    BILITOT 0.4 03/22/2024    AST 23 03/22/2024    ALT 28 03/22/2024      Dyslipidemia   This is a chronic problem. The current episode started more than 1 year ago. Factors aggravating his hyperlipidemia include fatty foods. Current antihyperlipidemic treatment includes statins.  Compliance problems include adherence to diet and adherence to exercise.  Risk factors for coronary artery disease include diabetes mellitus, dyslipidemia, hypertension, male sex and obesity.   Lab Results   Component Value Date    CHOL 116 03/22/2024    CHOL 94 12/08/2023    CHOL 104 07/25/2023     Lab Results   Component Value Date    TRIG  "179 (H) 03/22/2024    TRIG 157 (H) 12/08/2023    TRIG 191 (H) 07/25/2023     Lab Results   Component Value Date    HDL 29 (L) 03/22/2024    HDL 23 (L) 12/08/2023    HDL 24 (L) 07/25/2023     Lab Results   Component Value Date    LDL 57 03/22/2024    LDL 44 12/08/2023    LDL 48 07/25/2023        The following portions of the patient's history were reviewed and updated as appropriate: allergies, current medications, past family history, past medical history, past social history, past surgical history and problem list.    Review of Systems   Constitutional:  Negative for activity change, appetite change, chills, fatigue, fever and unexpected weight change.   HENT:  Positive for hearing loss (Chronic). Negative for voice change.    Eyes:  Negative for visual disturbance.   Respiratory:  Negative for cough, shortness of breath and wheezing.    Cardiovascular:  Positive for chest pain and palpitations (Improving with additional metoprolol). Negative for leg swelling.   Gastrointestinal:  Positive for nausea. Negative for vomiting.   Endocrine: Negative for cold intolerance, heat intolerance, polydipsia, polyphagia and polyuria.   Musculoskeletal:  Positive for arthralgias, back pain and joint swelling.   Skin:  Negative for color change and rash.   Allergic/Immunologic: Positive for environmental allergies and immunocompromised state.   Neurological:  Positive for dizziness. Negative for tremors, speech difficulty, weakness and light-headedness.   Hematological:  Negative for adenopathy.   Psychiatric/Behavioral:  Positive for sleep disturbance. Negative for confusion and decreased concentration. The patient is not nervous/anxious.    All other systems reviewed and are negative.      Vital signs:  /70 (BP Location: Left arm, Patient Position: Sitting, Cuff Size: Adult)   Pulse 94   Temp 96.8 °F (36 °C) (Temporal)   Resp 14   Ht 175.3 cm (69.02\")   Wt 98.9 kg (218 lb)   SpO2 94%   BMI 32.18 kg/m²     Physical " Exam  Vitals and nursing note reviewed.   Constitutional:       General: He is not in acute distress.     Appearance: He is well-developed.   HENT:      Head: Normocephalic.      Nose: Nose normal.   Eyes:      General: No scleral icterus.        Right eye: No discharge.         Left eye: No discharge.      Conjunctiva/sclera: Conjunctivae normal.   Cardiovascular:      Rate and Rhythm: Normal rate and regular rhythm.      Heart sounds: Normal heart sounds. No murmur heard.     No friction rub.   Pulmonary:      Effort: Pulmonary effort is normal. No respiratory distress.      Breath sounds: Decreased breath sounds present. No wheezing, rhonchi or rales.   Abdominal:      General: Abdomen is protuberant. Bowel sounds are normal.      Palpations: Abdomen is soft.      Tenderness: There is no abdominal tenderness. There is no guarding.   Musculoskeletal:         General: Deformity (Multiple Rheumatoid nodules bilateral upper extremities) present. No tenderness.      Cervical back: Neck supple.      Right lower leg: No edema.      Left lower leg: No edema.   Lymphadenopathy:      Cervical: No cervical adenopathy.   Skin:     General: Skin is warm and dry.      Capillary Refill: Capillary refill takes less than 2 seconds.   Neurological:      Mental Status: He is alert and oriented to person, place, and time.      Gait: Gait abnormal.   Psychiatric:         Mood and Affect: Mood normal.         Speech: Speech normal.         Behavior: Behavior is cooperative.         Thought Content: Thought content normal.         Cognition and Memory: Cognition and memory normal.       Result Review :  Results for orders placed or performed in visit on 03/22/24   Comprehensive Metabolic Panel    Specimen: Arm, Right; Blood   Result Value Ref Range    Glucose 149 (H) 65 - 99 mg/dL    BUN 12 6 - 20 mg/dL    Creatinine 0.77 0.76 - 1.27 mg/dL    Sodium 138 136 - 145 mmol/L    Potassium 4.7 3.5 - 5.2 mmol/L    Chloride 102 98 - 107 mmol/L     CO2 23.6 22.0 - 29.0 mmol/L    Calcium 9.4 8.6 - 10.5 mg/dL    Total Protein 7.2 6.0 - 8.5 g/dL    Albumin 4.3 3.5 - 5.2 g/dL    ALT (SGPT) 28 1 - 41 U/L    AST (SGOT) 23 1 - 40 U/L    Alkaline Phosphatase 146 (H) 39 - 117 U/L    Total Bilirubin 0.4 0.0 - 1.2 mg/dL    Globulin 2.9 gm/dL    A/G Ratio 1.5 g/dL    BUN/Creatinine Ratio 15.6 7.0 - 25.0    Anion Gap 12.4 5.0 - 15.0 mmol/L    eGFR 104.4 >60.0 mL/min/1.73   Lipid Panel    Specimen: Arm, Right; Blood   Result Value Ref Range    Total Cholesterol 116 0 - 200 mg/dL    Triglycerides 179 (H) 0 - 150 mg/dL    HDL Cholesterol 29 (L) 40 - 60 mg/dL    LDL Cholesterol  57 0 - 100 mg/dL    VLDL Cholesterol 30 5 - 40 mg/dL    LDL/HDL Ratio 1.77    TSH    Specimen: Arm, Right; Blood   Result Value Ref Range    TSH 2.380 0.270 - 4.200 uIU/mL   Hemoglobin A1c    Specimen: Arm, Right; Blood   Result Value Ref Range    Hemoglobin A1C 9.00 (H) 4.80 - 5.60 %   Vitamin D,25-Hydroxy    Specimen: Arm, Right; Blood   Result Value Ref Range    25 Hydroxy, Vitamin D 31.1 30.0 - 100.0 ng/ml   Uric Acid    Specimen: Arm, Right; Blood   Result Value Ref Range    Uric Acid 3.3 (L) 3.4 - 7.0 mg/dL   MicroAlbumin, Urine, Random - Urine, Clean Catch    Specimen: Urine, Clean Catch   Result Value Ref Range    Microalbumin, Urine <1.2 mg/dL   T4, Free    Specimen: Arm, Right; Blood   Result Value Ref Range    Free T4 1.57 0.93 - 1.70 ng/dL   Vitamin B12    Specimen: Arm, Right; Blood   Result Value Ref Range    Vitamin B-12 1,080 (H) 211 - 946 pg/mL   Magnesium    Specimen: Arm, Right; Blood   Result Value Ref Range    Magnesium 2.5 1.6 - 2.6 mg/dL   Sedimentation Rate    Specimen: Arm, Right; Blood   Result Value Ref Range    Sed Rate 12 0 - 20 mm/hr   C-reactive Protein    Specimen: Arm, Right; Blood   Result Value Ref Range    C-Reactive Protein <0.30 0.00 - 0.50 mg/dL   CBC Auto Differential    Specimen: Arm, Right; Blood   Result Value Ref Range    WBC 9.55 3.40 - 10.80 10*3/mm3     RBC 5.83 (H) 4.14 - 5.80 10*6/mm3    Hemoglobin 18.2 (H) 13.0 - 17.7 g/dL    Hematocrit 53.4 (H) 37.5 - 51.0 %    MCV 91.6 79.0 - 97.0 fL    MCH 31.2 26.6 - 33.0 pg    MCHC 34.1 31.5 - 35.7 g/dL    RDW 12.0 (L) 12.3 - 15.4 %    RDW-SD 40.5 37.0 - 54.0 fl    MPV 10.2 6.0 - 12.0 fL    Platelets 348 140 - 450 10*3/mm3    Neutrophil % 51.2 42.7 - 76.0 %    Lymphocyte % 35.5 19.6 - 45.3 %    Monocyte % 8.6 5.0 - 12.0 %    Eosinophil % 3.5 0.3 - 6.2 %    Basophil % 0.9 0.0 - 1.5 %    Immature Grans % 0.3 0.0 - 0.5 %    Neutrophils, Absolute 4.89 1.70 - 7.00 10*3/mm3    Lymphocytes, Absolute 3.39 (H) 0.70 - 3.10 10*3/mm3    Monocytes, Absolute 0.82 0.10 - 0.90 10*3/mm3    Eosinophils, Absolute 0.33 0.00 - 0.40 10*3/mm3    Basophils, Absolute 0.09 0.00 - 0.20 10*3/mm3    Immature Grans, Absolute 0.03 0.00 - 0.05 10*3/mm3    nRBC 0.0 0.0 - 0.2 /100 WBC     BMI is >= 30 and <35. (Class 1 Obesity). The following options were offered after discussion;: weight loss educational material (shared in after visit summary)    Assessment & Plan     Diagnoses and all orders for this visit:    1. Type 2 diabetes mellitus with hyperglycemia, with long-term current use of insulin (Primary)  Comments:  Reviewed recent labs with him with an A1c of 9.  Treatment options discussed.  Trial of Trijardy 25 - 5 - 1000 every morning.  Continues Xultophy  Orders:  -     Empagliflozin-Linaglip-Metform (Trijardy XR) 25-5-1000 MG tablet sustained-release 24 hour; Take 1 tablet by mouth Every Morning.  Dispense: 30 tablet; Refill: 0  -     Continuous Blood Gluc Sensor (Dexcom G7 Sensor) misc; Use 1 Device Every 10 (Ten) Days.  Dispense: 9 each; Refill: 3  -     Continuous Blood Gluc  (Dexcom G7 ) device; Use 1 Device Daily.  Dispense: 1 each; Refill: 0    2. Benign essential hypertension  Comments:  Well-controlled.  Tolerating metoprolol 150 mg but reports he continues to have a fast heartbeat. Increase and change to metoprolol tartrate  100 twice daily  Orders:  -     metoprolol tartrate (LOPRESSOR) 100 MG tablet; Take 1 tablet by mouth 2 (Two) Times a Day.  Dispense: 60 tablet; Refill: 0    3. Postsurgical hypothyroidism  Comments:  Continue levothyroxine 112 mcg.  Follow-up with endocrinology    4. Rheumatoid arthritis involving multiple sites with positive rheumatoid factor  Comments:  Continue current regimen of methotrexate and folic acid    5. Mixed dyslipidemia  Comments:  Continue rosuvastatin    6. Gastroesophageal reflux disease without esophagitis  Comments:  Continue lansoprazole    7. Vitamin B 12 deficiency  Comments:  Continue vitamin B-12 daily    8. Vitamin D deficiency  Comments:  Continue daily supplement    9. Chronic insomnia  Comments:  Continue amitriptyline 100 mg    I spent 49 minutes caring for Raimundo on this date of service. This time includes time spent by me in the following activities:preparing for the visit, reviewing tests, obtaining and/or reviewing a separately obtained history, performing a medically appropriate examination and/or evaluation , counseling and educating the patient/family/caregiver, ordering medications, tests, or procedures, documenting information in the medical record, independently interpreting results and communicating that information with the patient/family/caregiver, and care coordination    Follow Up In April    Findings and recommendations discussed with Raimundo. Reviewed test results.  Raimundo did have an appointment with  in regards to his colonoscopy will be scheduled in May 2024 per insurance requirement.  Lifestyle modifications reinforced including nutrition and activity recommendations.  He will follow up in 4 weeks sooner if problems/concerns occur.  Raimundo was given instructions and counseling regarding his condition or for health maintenance advice. Please see specific information pulled into the AVS if appropriate.    This document has been electronically signed by:

## 2024-04-09 ENCOUNTER — PRIOR AUTHORIZATION (OUTPATIENT)
Dept: FAMILY MEDICINE CLINIC | Facility: CLINIC | Age: 59
End: 2024-04-09
Payer: COMMERCIAL

## 2024-04-09 ENCOUNTER — CLINICAL SUPPORT (OUTPATIENT)
Dept: FAMILY MEDICINE CLINIC | Facility: CLINIC | Age: 59
End: 2024-04-09
Payer: COMMERCIAL

## 2024-04-09 NOTE — PROGRESS NOTES
Patient came in for Dexcom training. Patient understood the training well. I let him know to come back in 10 days and I will show him again how to change and reconnect his sensor. Patient is in understanding.

## 2024-04-15 DIAGNOSIS — M05.79 RHEUMATOID ARTHRITIS INVOLVING MULTIPLE SITES WITH POSITIVE RHEUMATOID FACTOR: Chronic | ICD-10-CM

## 2024-04-15 DIAGNOSIS — J30.9 CHRONIC ALLERGIC RHINITIS: Chronic | ICD-10-CM

## 2024-04-15 DIAGNOSIS — E55.9 VITAMIN D DEFICIENCY: Chronic | ICD-10-CM

## 2024-04-15 DIAGNOSIS — E53.8 B12 DEFICIENCY: Chronic | ICD-10-CM

## 2024-04-18 DIAGNOSIS — E11.40 TYPE 2 DIABETES, CONTROLLED, WITH NEUROPATHY: Chronic | ICD-10-CM

## 2024-04-19 RX ORDER — BLOOD SUGAR DIAGNOSTIC
STRIP MISCELLANEOUS
Qty: 100 EACH | Refills: 3 | Status: SHIPPED | OUTPATIENT
Start: 2024-04-19

## 2024-04-24 ENCOUNTER — OFFICE VISIT (OUTPATIENT)
Dept: FAMILY MEDICINE CLINIC | Facility: CLINIC | Age: 59
End: 2024-04-24
Payer: COMMERCIAL

## 2024-04-24 DIAGNOSIS — J98.4 RESTRICTIVE LUNG DISEASE: ICD-10-CM

## 2024-04-24 DIAGNOSIS — M05.79 RHEUMATOID ARTHRITIS INVOLVING MULTIPLE SITES WITH POSITIVE RHEUMATOID FACTOR: Chronic | ICD-10-CM

## 2024-04-24 DIAGNOSIS — E11.65 TYPE 2 DIABETES MELLITUS WITH HYPERGLYCEMIA, WITH LONG-TERM CURRENT USE OF INSULIN: Primary | Chronic | ICD-10-CM

## 2024-04-24 DIAGNOSIS — I10 BENIGN ESSENTIAL HYPERTENSION: Chronic | ICD-10-CM

## 2024-04-24 DIAGNOSIS — E78.2 MIXED DYSLIPIDEMIA: Chronic | ICD-10-CM

## 2024-04-24 DIAGNOSIS — K21.9 GASTROESOPHAGEAL REFLUX DISEASE WITHOUT ESOPHAGITIS: ICD-10-CM

## 2024-04-24 DIAGNOSIS — Z79.4 TYPE 2 DIABETES MELLITUS WITH HYPERGLYCEMIA, WITH LONG-TERM CURRENT USE OF INSULIN: Primary | Chronic | ICD-10-CM

## 2024-04-24 DIAGNOSIS — E55.9 VITAMIN D DEFICIENCY: ICD-10-CM

## 2024-04-24 DIAGNOSIS — R00.2 PALPITATIONS: Chronic | ICD-10-CM

## 2024-04-24 DIAGNOSIS — J30.9 CHRONIC ALLERGIC RHINITIS: Chronic | ICD-10-CM

## 2024-04-24 DIAGNOSIS — E89.0 POSTSURGICAL HYPOTHYROIDISM: ICD-10-CM

## 2024-04-24 PROCEDURE — 99214 OFFICE O/P EST MOD 30 MIN: CPT | Performed by: NURSE PRACTITIONER

## 2024-04-24 PROCEDURE — 3052F HG A1C>EQUAL 8.0%<EQUAL 9.0%: CPT | Performed by: NURSE PRACTITIONER

## 2024-04-24 PROCEDURE — 1159F MED LIST DOCD IN RCRD: CPT | Performed by: NURSE PRACTITIONER

## 2024-04-24 PROCEDURE — 3078F DIAST BP <80 MM HG: CPT | Performed by: NURSE PRACTITIONER

## 2024-04-24 PROCEDURE — 1160F RVW MEDS BY RX/DR IN RCRD: CPT | Performed by: NURSE PRACTITIONER

## 2024-04-24 PROCEDURE — 3074F SYST BP LT 130 MM HG: CPT | Performed by: NURSE PRACTITIONER

## 2024-04-24 RX ORDER — FLUTICASONE PROPIONATE 50 MCG
SPRAY, SUSPENSION (ML) NASAL
Qty: 16 G | Refills: 4 | Status: SHIPPED | OUTPATIENT
Start: 2024-04-24

## 2024-04-24 RX ORDER — LANOLIN ALCOHOL/MO/W.PET/CERES
1000 CREAM (GRAM) TOPICAL DAILY
Qty: 30 TABLET | Refills: 3 | Status: SHIPPED | OUTPATIENT
Start: 2024-04-24

## 2024-04-24 RX ORDER — CYCLOBENZAPRINE HCL 10 MG
10 TABLET ORAL 2 TIMES DAILY PRN
Qty: 60 TABLET | Refills: 1 | Status: SHIPPED | OUTPATIENT
Start: 2024-04-24

## 2024-04-24 RX ORDER — (INSULIN DEGLUDEC AND LIRAGLUTIDE) 100; 3.6 [IU]/ML; MG/ML
50 INJECTION, SOLUTION SUBCUTANEOUS DAILY
Qty: 45 ML | Refills: 2 | Status: SHIPPED | OUTPATIENT
Start: 2024-04-24

## 2024-04-24 RX ORDER — ACETAMINOPHEN 160 MG
2000 TABLET,DISINTEGRATING ORAL DAILY
Qty: 30 CAPSULE | Refills: 3 | Status: SHIPPED | OUTPATIENT
Start: 2024-04-24

## 2024-04-24 RX ORDER — EMPAGLIFLOZIN, LINAGLIPTIN, METFORMIN HYDROCHLORIDE 25; 5; 1000 MG/1; MG/1; MG/1
1 TABLET, EXTENDED RELEASE ORAL EVERY MORNING
Qty: 30 TABLET | Refills: 2 | Status: SHIPPED | OUTPATIENT
Start: 2024-04-24

## 2024-04-24 NOTE — PROGRESS NOTES
History of Present Illness  Raimundo Munguia is a 57 yo male who presents to the office today pertaining to his DM, type 2, HTN, GERD, Rheumatoid Arthritis and Dyslipidemia.  In addition, he has been diagnosed with insomnia and Vitamin deficiencies.     Diabetes   He has type 2 diabetes mellitus. His disease course has been improving since starting Xultophy. Risk factors for coronary artery disease include dyslipidemia, hypertension, male sex, sedentary lifestyle and tobacco exposure. Current diabetic treatment includes Trijardy and Xultophy. An ACE inhibitor/angiotensin II receptor blocker is not being taken due to S/e.     Lab Results   Component Value Date    HGBA1C 7.30 (H) 04/13/2023      Lab Results   Component Value Date    HGBA1C 7.10 (H) 07/25/2023     Lab Results   Component Value Date    HGBA1C 7.90 (H) 12/08/2023     Lab Results   Component Value Date    HGBA1C 9.00 (H) 03/22/2024      Hypertension   The current episode started more than 1 year ago.His BP is adequately controlled. His hypertensive medication is Toprol  mg bid.  Lab Results   Component Value Date    GLUCOSE 149 (H) 03/22/2024    BUN 12 03/22/2024    CREATININE 0.77 03/22/2024    EGFR 104.4 03/22/2024    BCR 15.6 03/22/2024    K 4.7 03/22/2024    CO2 23.6 03/22/2024    CALCIUM 9.4 03/22/2024    ALBUMIN 4.3 03/22/2024    BILITOT 0.4 03/22/2024    AST 23 03/22/2024    ALT 28 03/22/2024      Dyslipidemia   This is a chronic problem. The current episode started more than 1 year ago. Factors aggravating his hyperlipidemia include fatty foods. Current antihyperlipidemic treatment includes statins.  Compliance problems include adherence to diet and adherence to exercise.  Risk factors for coronary artery disease include diabetes mellitus, dyslipidemia, hypertension, male sex and obesity.   Lab Results   Component Value Date    CHOL 116 03/22/2024    CHOL 94 12/08/2023    CHOL 104 07/25/2023     Lab Results   Component Value Date    TRIG  "179 (H) 03/22/2024    TRIG 157 (H) 12/08/2023    TRIG 191 (H) 07/25/2023     Lab Results   Component Value Date    HDL 29 (L) 03/22/2024    HDL 23 (L) 12/08/2023    HDL 24 (L) 07/25/2023     Lab Results   Component Value Date    LDL 57 03/22/2024    LDL 44 12/08/2023    LDL 48 07/25/2023     The following portions of the patient's history were reviewed and updated as appropriate: allergies, current medications, past family history, past medical history, past social history, past surgical history and problem list.    Review of Systems   Constitutional:  Negative for activity change, appetite change, chills, fatigue, fever and unexpected weight change.   HENT:  Positive for hearing loss (Chronic). Negative for voice change.    Eyes:  Negative for visual disturbance.   Respiratory:  Negative for cough, shortness of breath and wheezing.    Cardiovascular:  Positive for chest pain and palpitations (Improving with additional metoprolol). Negative for leg swelling.   Gastrointestinal:  Positive for nausea. Negative for vomiting.   Endocrine: Negative for cold intolerance, heat intolerance, polydipsia, polyphagia and polyuria.   Musculoskeletal:  Positive for arthralgias, back pain and joint swelling.   Skin:  Negative for color change and rash.   Allergic/Immunologic: Positive for environmental allergies and immunocompromised state.   Neurological:  Positive for dizziness. Negative for tremors, speech difficulty, weakness and light-headedness.   Hematological:  Negative for adenopathy.   Psychiatric/Behavioral:  Positive for sleep disturbance. Negative for confusion and decreased concentration. The patient is not nervous/anxious.    All other systems reviewed and are negative.    Vital signs:  /70   Pulse 92   Temp 97 °F (36.1 °C) (Temporal)   Resp 14   Ht 175.3 cm (69.02\")   Wt 97.5 kg (214 lb 15.2 oz)   SpO2 95%   BMI 31.73 kg/m²     Physical Exam  Vitals and nursing note reviewed.   Constitutional:       " General: He is not in acute distress.     Appearance: He is well-developed.   HENT:      Head: Normocephalic.      Nose: Nose normal.   Eyes:      General: No scleral icterus.        Right eye: No discharge.         Left eye: No discharge.      Conjunctiva/sclera: Conjunctivae normal.   Cardiovascular:      Rate and Rhythm: Normal rate and regular rhythm.      Heart sounds: Normal heart sounds. No murmur heard.     No friction rub.   Pulmonary:      Effort: Pulmonary effort is normal. No respiratory distress.      Breath sounds: Decreased breath sounds present. No wheezing, rhonchi or rales.   Abdominal:      General: Abdomen is protuberant. Bowel sounds are normal.      Palpations: Abdomen is soft.      Tenderness: There is no abdominal tenderness. There is no guarding.   Musculoskeletal:         General: Deformity (Multiple Rheumatoid nodules bilateral upper extremities) present. No tenderness.      Cervical back: Neck supple.      Right lower leg: No edema.      Left lower leg: No edema.   Lymphadenopathy:      Cervical: No cervical adenopathy.   Skin:     General: Skin is warm and dry.      Capillary Refill: Capillary refill takes less than 2 seconds.   Neurological:      Mental Status: He is alert and oriented to person, place, and time.      Gait: Gait abnormal.   Psychiatric:         Mood and Affect: Mood normal.         Speech: Speech normal.         Behavior: Behavior is cooperative.         Thought Content: Thought content normal.         Cognition and Memory: Cognition and memory normal.       BMI is >= 30 and <35. (Class 1 Obesity). The following options were offered after discussion;: weight loss educational material (shared in after visit summary)      Assessment & Plan     Diagnoses and all orders for this visit:    1. Type 2 diabetes mellitus with hyperglycemia, with long-term current use of insulin (Primary)  Comments:  Reviewed recent labs with him with an A1c of 9. Treatment options discussed.  Trial of Trijardy 25 - 5 - 1000 every morning. Continues Xultophy  Orders:  -     Insulin Degludec-Liraglutide (Xultophy) 100-3.6 UNIT-MG/ML solution pen-injector subcutaneous pen; Inject 50 Units under the skin into the appropriate area as directed Daily.  Dispense: 45 mL; Refill: 2  -     Empagliflozin-Linaglip-Metform (Trijardy XR) 25-5-1000 MG tablet sustained-release 24 hour; Take 1 tablet by mouth Every Morning.  Dispense: 30 tablet; Refill: 2  -     Comprehensive Metabolic Panel; Future  -     Lipid Panel; Future  -     TSH; Future  -     Hemoglobin A1c; Future  -     Vitamin B12; Future    2. Benign essential hypertension  Comments:  Adequate control.  Continue metoprolol  Orders:  -     Comprehensive Metabolic Panel; Future  -     Lipid Panel; Future  -     Magnesium; Future    3. Palpitations  Comments:  Improved with additional metoprolol.  Encouraged him to avoid caffeine and nicotine    4. Mixed dyslipidemia  Comments:  Continue rosuvastatin  Orders:  -     Comprehensive Metabolic Panel; Future  -     Lipid Panel; Future    5. Gastroesophageal reflux disease without esophagitis  Comments:  Continue lansoprazole  Orders:  -     CBC & Differential; Future    6. Rheumatoid arthritis involving multiple sites with positive rheumatoid factor  Comments:  Stable with current regimen of methotrexate, folic acid  Orders:  -     Uric Acid; Future  -     Sedimentation Rate; Future  -     C-reactive Protein; Future    7. Restrictive lung disease  Comments:  Stable.  Continue albuterol inhaler  Orders:  -     CBC & Differential; Future    8. Chronic allergic rhinitis  Comments:  Continue montelukast, levocetirizine and Astelin nasal spray    9. Postsurgical hypothyroidism  -     TSH; Future  -     T4, Free; Future    10. Vitamin D deficiency  -     Vitamin D,25-Hydroxy; Future    Follow Up In June.  Findings and recommendations discussed with Raimundo. Reviewed treatment options. Lifestyle modifications reinforced  including nutrition and activity recommendations.  Raimundo will follow up in June sooner if problems/concerns occur.  Raimundo was given instructions and counseling regarding his condition or for health maintenance advice. Please see specific information pulled into the AVS if appropriate.    This document has been electronically signed by:

## 2024-04-25 ENCOUNTER — TELEPHONE (OUTPATIENT)
Dept: FAMILY MEDICINE CLINIC | Facility: CLINIC | Age: 59
End: 2024-04-25
Payer: COMMERCIAL

## 2024-04-25 NOTE — TELEPHONE ENCOUNTER
RELAY    SF: Would you call Raimundo and tell him that I do have some DexCom Sensors if he needs a few to do him.    AM: Left him a voicemail with this information.

## 2024-04-26 NOTE — TELEPHONE ENCOUNTER
Name: Raimundo Munguia CHASE    Relationship: Self    Best Callback Number: [phone]     HUB PROVIDED THE RELAY MESSAGE FROM THE OFFICE   PATIENT VOICED UNDERSTANDING AND HAS NO FURTHER QUESTIONS AT THIS TIME    ADDITIONAL INFORMATION: PATIENT WILL BE COMING TO THE OFFICE TO GET THE SENSORS.

## 2024-04-27 VITALS
DIASTOLIC BLOOD PRESSURE: 70 MMHG | SYSTOLIC BLOOD PRESSURE: 110 MMHG | RESPIRATION RATE: 14 BRPM | HEART RATE: 92 BPM | HEIGHT: 69 IN | OXYGEN SATURATION: 95 % | WEIGHT: 214.95 LBS | TEMPERATURE: 97 F | BODY MASS INDEX: 31.84 KG/M2

## 2024-05-02 DIAGNOSIS — I10 BENIGN ESSENTIAL HYPERTENSION: Chronic | ICD-10-CM

## 2024-05-02 RX ORDER — METOPROLOL TARTRATE 100 MG/1
100 TABLET ORAL 2 TIMES DAILY
Qty: 60 TABLET | Refills: 0 | Status: SHIPPED | OUTPATIENT
Start: 2024-05-02

## 2024-05-06 ENCOUNTER — OFFICE VISIT (OUTPATIENT)
Dept: SURGERY | Facility: CLINIC | Age: 59
End: 2024-05-06
Payer: COMMERCIAL

## 2024-05-06 VITALS — HEIGHT: 69 IN | WEIGHT: 219 LBS | BODY MASS INDEX: 32.44 KG/M2

## 2024-05-06 DIAGNOSIS — E66.09 CLASS 1 OBESITY DUE TO EXCESS CALORIES WITH SERIOUS COMORBIDITY AND BODY MASS INDEX (BMI) OF 32.0 TO 32.9 IN ADULT: Primary | ICD-10-CM

## 2024-05-06 DIAGNOSIS — Z90.49 STATUS POST COLECTOMY: ICD-10-CM

## 2024-05-06 PROCEDURE — 1159F MED LIST DOCD IN RCRD: CPT | Performed by: SURGERY

## 2024-05-06 PROCEDURE — 99213 OFFICE O/P EST LOW 20 MIN: CPT | Performed by: SURGERY

## 2024-05-06 PROCEDURE — 1160F RVW MEDS BY RX/DR IN RCRD: CPT | Performed by: SURGERY

## 2024-05-06 RX ORDER — POLYETHYLENE GLYCOL 3350, SODIUM CHLORIDE, SODIUM BICARBONATE, POTASSIUM CHLORIDE 420; 11.2; 5.72; 1.48 G/4L; G/4L; G/4L; G/4L
4000 POWDER, FOR SOLUTION ORAL ONCE
Qty: 4000 ML | Refills: 0 | Status: SHIPPED | OUTPATIENT
Start: 2024-05-06 | End: 2024-05-06

## 2024-05-06 NOTE — H&P (VIEW-ONLY)
Subjective   Raimundo Munguia is a 58 y.o. male.     Chief Complaint:  colonoscopy    History of Present Illness He is a 59 yo who has had a past colectomy for a polyp with cancer and is due for a colonoscopy. His last colonoscopy was 2019.    The following portions of the patient's history were reviewed and updated as appropriate: current medications, past family history, past medical history, past social history, past surgical history and problem list.    Review of Systems   Constitutional:  Negative for activity change, appetite change, chills, fever and unexpected weight change.   HENT:  Negative for congestion, facial swelling and sore throat.    Eyes:  Negative for photophobia and visual disturbance.   Respiratory:  Negative for chest tightness, shortness of breath and wheezing.    Cardiovascular:  Negative for chest pain, palpitations and leg swelling.   Gastrointestinal:  Negative for abdominal distention, abdominal pain, anal bleeding, blood in stool, constipation, diarrhea, nausea, rectal pain and vomiting.   Endocrine: Negative for cold intolerance, heat intolerance, polydipsia and polyuria.   Genitourinary:  Negative for difficulty urinating, dysuria, flank pain and urgency.   Musculoskeletal:  Negative for back pain and myalgias.   Skin:  Negative for rash and wound.   Allergic/Immunologic: Negative for immunocompromised state.   Neurological:  Negative for dizziness, seizures, syncope, light-headedness, numbness and headaches.   Hematological:  Negative for adenopathy. Does not bruise/bleed easily.   Psychiatric/Behavioral:  Negative for behavioral problems and confusion. The patient is not nervous/anxious.        Objective   Physical Exam  Vitals reviewed.   Constitutional:       General: He is not in acute distress.     Appearance: He is well-developed. He is not ill-appearing.   HENT:      Head: Normocephalic. No laceration. Hair is normal.      Right Ear: Hearing and ear canal normal.      Left Ear:  "Hearing and ear canal normal.      Nose: Nose normal.      Right Sinus: No maxillary sinus tenderness or frontal sinus tenderness.      Left Sinus: No maxillary sinus tenderness or frontal sinus tenderness.   Eyes:      General: Lids are normal.      Conjunctiva/sclera: Conjunctivae normal.      Pupils: Pupils are equal, round, and reactive to light.   Neck:      Thyroid: No thyroid mass or thyromegaly.      Vascular: No JVD.      Trachea: No tracheal tenderness or tracheal deviation.   Cardiovascular:      Rate and Rhythm: Normal rate and regular rhythm.      Heart sounds: No murmur heard.     No gallop.   Pulmonary:      Effort: Pulmonary effort is normal.      Breath sounds: Normal breath sounds. No stridor. No wheezing.   Chest:      Chest wall: No tenderness.   Abdominal:      General: Bowel sounds are normal. There is no distension.      Palpations: Abdomen is soft. There is no mass.      Tenderness: There is no abdominal tenderness. There is no guarding or rebound.      Hernia: No hernia is present.   Musculoskeletal:         General: No deformity.      Cervical back: Normal range of motion.   Lymphadenopathy:      Cervical: No cervical adenopathy.      Upper Body:      Right upper body: No supraclavicular adenopathy.      Left upper body: No supraclavicular adenopathy.   Skin:     General: Skin is warm and dry.      Coloration: Skin is not pale.      Findings: No erythema or rash.   Neurological:      Mental Status: He is alert and oriented to person, place, and time.      Motor: No abnormal muscle tone.   Psychiatric:         Behavior: Behavior normal.         Thought Content: Thought content normal.         Past Medical History:   Diagnosis Date    Asthma     inhlaer  prn    Cervicalgia     CVA (cerebral vascular accident)     around 2012; \"light\" residual dysarthria    Diabetes mellitus 2015    po meds as well as insulin daily; checks sugars at home    GERD (gastroesophageal reflux disease)     " Helicobacter pylori (H. 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    Vitamin D deficiency        Family History   Problem Relation Age of Onset    Diabetes Mother     Hypertension Mother     Hypertension Father     Hyperlipidemia Father     Diabetes Sister     Hyperlipidemia Sister     Hypertension Sister     Hypertension Brother     Hyperlipidemia Brother        Social History     Tobacco Use    Smoking status: Never     Passive exposure: Current    Smokeless tobacco: Current     Types: Snuff, Chew    Tobacco comments:     one can every 2-3 days; 25-26 years of use   Vaping Use    Vaping status: Never Used   Substance Use Topics    Alcohol use: No    Drug use: No       Past Surgical History:   Procedure Laterality Date    COLON RESECTION N/A 02/22/2018    Procedure: COLON RESECTION SIGMOID;  Surgeon: Ej Tinoco MD;  Location: [facility] OR;  Service:     COLONOSCOPY N/A 02/07/2018    Procedure: COLONOSCOPY CPT CODE: 24221;  Surgeon: Teodoro Garg III, MD;  Location: [facility] OR;  Service:     COLONOSCOPY N/A 03/07/2019    Procedure: COLONOSCOPY FOR SCREENING CPT CODE: ;  Surgeon: Ej Tinoco MD;  Location: [facility] OR;  Service: Gastroenterology    ENDOSCOPY N/A 02/07/2018    Procedure: ESOPHAGOGASTRODUODENOSCOPY WITH DILATATION CPT CODE: 97996;  Surgeon: Teodoro Garg III, MD;  Location: [facility] OR;  Service:     THYROIDECTOMY Right 4/15/2022    Procedure: THYROIDECTOMY RIGHT LOBECTOMY;  Surgeon: Fidencio Wynn MD;  Location:  [facility] OR;  Service: General;  Laterality: Right;       Current Outpatient Medications   Medication Instructions    Albuterol Sulfate, sensor, (ProAir Digihaler) 108 (90 Base) MCG/ACT aerosol powder  2 Inhalations,  "Inhalation, Every 6 Hours PRN    amitriptyline (ELAVIL) 100 mg, Oral, Every Night at Bedtime    azelastine (ASTELIN) 0.1 % nasal spray 2 sprays both nostrils twice daily as needed    Continuous Blood Gluc  (Dexcom G7 ) device 1 Device, Does not apply, Daily    Continuous Blood Gluc Sensor (Dexcom G7 Sensor) misc 1 Device, Does not apply, Every 10 Days    cyclobenzaprine (FLEXERIL) 10 mg, Oral, 2 Times Daily PRN    Diclofenac Sodium (VOLTAREN) 1 % gel gel Topical, 4 Times Daily    Empagliflozin-Linaglip-Metform (Trijardy XR) 25-5-1000 MG tablet sustained-release 24 hour 1 tablet, Oral, Every Morning    fluticasone (FLONASE) 50 MCG/ACT nasal spray INSTILL 1 SPRAY INTO EACH NOSTRIL(S) TWICE DAILY AS DIRECTED    folic acid (FOLVITE) 1,000 mcg, Oral, Daily    glucose blood (OneTouch Ultra) test strip USE TO TEST BLOOD GLUCOSE THREE TIMES DAILY    Insulin Degludec-Liraglutide (Xultophy) 100-3.6 UNIT-MG/ML solution pen-injector subcutaneous pen 50 Units, Subcutaneous, Daily    Insulin Pen Needle (Pen Needles 1/2\") 29G X 12MM misc 1 Device, Does not apply, Daily    Lancets Ultra Fine misc 1 Device, Does not apply, 3 Times Daily    lansoprazole (PREVACID) 30 mg, Oral, Daily    levocetirizine (XYZAL) 5 mg, Oral, Every Evening    levothyroxine (SYNTHROID, LEVOTHROID) 112 MCG tablet TAKE ONE TABLET BY MOUTH EVERY DAY    methotrexate 2.5 MG tablet TAKE 10 TABLETS BY MOUTH ONCE WEEKLY    metoprolol tartrate (LOPRESSOR) 100 mg, Oral, 2 Times Daily    montelukast (SINGULAIR) 10 mg, Oral, Every Night at Bedtime    rosuvastatin (CRESTOR) 5 mg, Oral, Daily    vitamin B-12 (CYANOCOBALAMIN) 1,000 mcg, Oral, Daily    Vitamin D3 2,000 Units, Oral, Daily         Assessment & Plan   Diagnoses and all orders for this visit:    1. Class 1 obesity due to excess calories with serious comorbidity and body mass index (BMI) of 32.0 to 32.9 in adult (Primary)    2. Status post colectomy    colonoscopy         "

## 2024-05-07 ENCOUNTER — PRIOR AUTHORIZATION (OUTPATIENT)
Dept: FAMILY MEDICINE CLINIC | Facility: CLINIC | Age: 59
End: 2024-05-07
Payer: COMMERCIAL

## 2024-05-08 ENCOUNTER — TELEPHONE (OUTPATIENT)
Dept: FAMILY MEDICINE CLINIC | Facility: CLINIC | Age: 59
End: 2024-05-08
Payer: COMMERCIAL

## 2024-05-09 DIAGNOSIS — J98.4 RESTRICTIVE LUNG DISEASE: Chronic | ICD-10-CM

## 2024-05-09 RX ORDER — ALBUTEROL SULFATE 90 UG/1
AEROSOL, METERED RESPIRATORY (INHALATION)
Qty: 18 G | Refills: 3 | OUTPATIENT
Start: 2024-05-09

## 2024-05-10 ENCOUNTER — TELEPHONE (OUTPATIENT)
Dept: SURGERY | Facility: CLINIC | Age: 59
End: 2024-05-10
Payer: COMMERCIAL

## 2024-05-10 DIAGNOSIS — Z85.038 HISTORY OF COLON CANCER: Primary | ICD-10-CM

## 2024-05-22 DIAGNOSIS — M05.79 RHEUMATOID ARTHRITIS INVOLVING MULTIPLE SITES WITH POSITIVE RHEUMATOID FACTOR: Chronic | ICD-10-CM

## 2024-05-22 DIAGNOSIS — J30.9 CHRONIC ALLERGIC RHINITIS: Chronic | ICD-10-CM

## 2024-05-22 DIAGNOSIS — E78.2 MIXED DYSLIPIDEMIA: Chronic | ICD-10-CM

## 2024-05-22 DIAGNOSIS — F51.04 CHRONIC INSOMNIA: Chronic | ICD-10-CM

## 2024-05-22 RX ORDER — AMITRIPTYLINE HYDROCHLORIDE 100 MG/1
100 TABLET ORAL
Qty: 30 TABLET | Refills: 3 | Status: SHIPPED | OUTPATIENT
Start: 2024-05-22

## 2024-05-22 RX ORDER — FOLIC ACID 1 MG/1
1000 TABLET ORAL DAILY
Qty: 30 TABLET | Refills: 3 | Status: SHIPPED | OUTPATIENT
Start: 2024-05-22

## 2024-05-22 RX ORDER — ROSUVASTATIN CALCIUM 5 MG/1
5 TABLET, COATED ORAL DAILY
Qty: 30 TABLET | Refills: 3 | Status: SHIPPED | OUTPATIENT
Start: 2024-05-22

## 2024-05-22 RX ORDER — MONTELUKAST SODIUM 10 MG/1
10 TABLET ORAL
Qty: 30 TABLET | Refills: 3 | Status: SHIPPED | OUTPATIENT
Start: 2024-05-22

## 2024-05-22 RX ORDER — LEVOCETIRIZINE DIHYDROCHLORIDE 5 MG/1
5 TABLET, FILM COATED ORAL EVERY EVENING
Qty: 30 TABLET | Refills: 3 | Status: SHIPPED | OUTPATIENT
Start: 2024-05-22

## 2024-05-24 ENCOUNTER — ANESTHESIA (OUTPATIENT)
Dept: PERIOP | Facility: HOSPITAL | Age: 59
End: 2024-05-24
Payer: COMMERCIAL

## 2024-05-24 ENCOUNTER — ANESTHESIA EVENT (OUTPATIENT)
Dept: PERIOP | Facility: HOSPITAL | Age: 59
End: 2024-05-24
Payer: COMMERCIAL

## 2024-05-24 ENCOUNTER — HOSPITAL ENCOUNTER (OUTPATIENT)
Facility: HOSPITAL | Age: 59
Setting detail: HOSPITAL OUTPATIENT SURGERY
Discharge: HOME | End: 2024-05-24
Attending: SURGERY | Admitting: SURGERY
Payer: COMMERCIAL

## 2024-05-24 VITALS
RESPIRATION RATE: 16 BRPM | HEIGHT: 69 IN | TEMPERATURE: 97.9 F | OXYGEN SATURATION: 92 % | HEART RATE: 107 BPM | BODY MASS INDEX: 31.84 KG/M2 | WEIGHT: 215 LBS | SYSTOLIC BLOOD PRESSURE: 138 MMHG | DIASTOLIC BLOOD PRESSURE: 67 MMHG

## 2024-05-24 DIAGNOSIS — Z85.038 HISTORY OF COLON CANCER: ICD-10-CM

## 2024-05-24 LAB — GLUCOSE BLDC GLUCOMTR-MCNC: 127 MG/DL (ref 70–130)

## 2024-05-24 PROCEDURE — 45380 COLONOSCOPY AND BIOPSY: CPT | Performed by: SURGERY

## 2024-05-24 PROCEDURE — 82948 REAGENT STRIP/BLOOD GLUCOSE: CPT

## 2024-05-24 PROCEDURE — 25010000002 PROPOFOL 10 MG/ML EMULSION: Performed by: NURSE ANESTHETIST, CERTIFIED REGISTERED

## 2024-05-24 RX ORDER — SODIUM CHLORIDE, SODIUM LACTATE, POTASSIUM CHLORIDE, CALCIUM CHLORIDE 600; 310; 30; 20 MG/100ML; MG/100ML; MG/100ML; MG/100ML
100 INJECTION, SOLUTION INTRAVENOUS ONCE AS NEEDED
Status: DISCONTINUED | OUTPATIENT
Start: 2024-05-24 | End: 2024-05-24 | Stop reason: HOSPADM

## 2024-05-24 RX ORDER — MEPERIDINE HYDROCHLORIDE 25 MG/ML
12.5 INJECTION INTRAMUSCULAR; INTRAVENOUS; SUBCUTANEOUS
Status: DISCONTINUED | OUTPATIENT
Start: 2024-05-24 | End: 2024-05-24 | Stop reason: HOSPADM

## 2024-05-24 RX ORDER — MIDAZOLAM HYDROCHLORIDE 1 MG/ML
1 INJECTION INTRAMUSCULAR; INTRAVENOUS
Status: DISCONTINUED | OUTPATIENT
Start: 2024-05-24 | End: 2024-05-24 | Stop reason: HOSPADM

## 2024-05-24 RX ORDER — SODIUM CHLORIDE 0.9 % (FLUSH) 0.9 %
10 SYRINGE (ML) INJECTION AS NEEDED
Status: DISCONTINUED | OUTPATIENT
Start: 2024-05-24 | End: 2024-05-24 | Stop reason: HOSPADM

## 2024-05-24 RX ORDER — PROPOFOL 10 MG/ML
VIAL (ML) INTRAVENOUS CONTINUOUS PRN
Status: DISCONTINUED | OUTPATIENT
Start: 2024-05-24 | End: 2024-05-24 | Stop reason: SURG

## 2024-05-24 RX ORDER — OXYCODONE HYDROCHLORIDE AND ACETAMINOPHEN 5; 325 MG/1; MG/1
1 TABLET ORAL ONCE AS NEEDED
Status: DISCONTINUED | OUTPATIENT
Start: 2024-05-24 | End: 2024-05-24 | Stop reason: HOSPADM

## 2024-05-24 RX ORDER — FENTANYL CITRATE 50 UG/ML
50 INJECTION, SOLUTION INTRAMUSCULAR; INTRAVENOUS
Status: DISCONTINUED | OUTPATIENT
Start: 2024-05-24 | End: 2024-05-24 | Stop reason: HOSPADM

## 2024-05-24 RX ORDER — SODIUM CHLORIDE, SODIUM LACTATE, POTASSIUM CHLORIDE, CALCIUM CHLORIDE 600; 310; 30; 20 MG/100ML; MG/100ML; MG/100ML; MG/100ML
125 INJECTION, SOLUTION INTRAVENOUS ONCE
Status: DISCONTINUED | OUTPATIENT
Start: 2024-05-24 | End: 2024-05-24 | Stop reason: HOSPADM

## 2024-05-24 RX ORDER — ONDANSETRON 2 MG/ML
4 INJECTION INTRAMUSCULAR; INTRAVENOUS AS NEEDED
Status: DISCONTINUED | OUTPATIENT
Start: 2024-05-24 | End: 2024-05-24 | Stop reason: HOSPADM

## 2024-05-24 RX ORDER — SODIUM CHLORIDE 0.9 % (FLUSH) 0.9 %
10 SYRINGE (ML) INJECTION EVERY 12 HOURS SCHEDULED
Status: DISCONTINUED | OUTPATIENT
Start: 2024-05-24 | End: 2024-05-24 | Stop reason: HOSPADM

## 2024-05-24 RX ORDER — IPRATROPIUM BROMIDE AND ALBUTEROL SULFATE 2.5; .5 MG/3ML; MG/3ML
3 SOLUTION RESPIRATORY (INHALATION) ONCE AS NEEDED
Status: DISCONTINUED | OUTPATIENT
Start: 2024-05-24 | End: 2024-05-24 | Stop reason: HOSPADM

## 2024-05-24 RX ORDER — SODIUM CHLORIDE 9 MG/ML
40 INJECTION, SOLUTION INTRAVENOUS AS NEEDED
Status: DISCONTINUED | OUTPATIENT
Start: 2024-05-24 | End: 2024-05-24 | Stop reason: HOSPADM

## 2024-05-24 RX ADMIN — PROPOFOL 200 MCG/KG/MIN: 10 INJECTION, EMULSION INTRAVENOUS at 11:20

## 2024-05-24 NOTE — ANESTHESIA PREPROCEDURE EVALUATION
Anesthesia Evaluation     Patient summary reviewed and Nursing notes reviewed   no history of anesthetic complications:   NPO Solid Status: > 8 hours  NPO Liquid Status: > 8 hours           Airway   Mallampati: II  TM distance: >3 FB  Neck ROM: full  Dental - normal exam   (+) poor dentition    Comment: Multiple teeth in very poor condition    Pulmonary    (+) COPD, asthma,home oxygen (2L @ night), shortness of breath, sleep apnea, decreased breath sounds  (-) not a smoker  Cardiovascular     Patient on routine beta blocker and Beta blocker not taken-may be given intraoperatively  Rhythm: regular  Rate: abnormal    (+) hypertension, dysrhythmias Tachycardia, PVD, hyperlipidemia    ROS comment:   Echo 11/19: normal EF, no significant valve disease  PE comment: tachycardic    Neuro/Psych  (+) CVA (no residual)  GI/Hepatic/Renal/Endo    (+) obesity, morbid obesity, GERD, diabetes mellitus, thyroid problem hypothyroidism    Musculoskeletal     (+) neck pain  Abdominal   (+) obese    Abdomen: soft.   Substance History - negative use     OB/GYN negative ob/gyn ROS         Other   arthritis,   history of cancer (colon) remission    ROS/Med Hx Other: On metoprolol                Anesthesia Plan    ASA 3     general     intravenous induction     Anesthetic plan, risks, benefits, and alternatives have been provided, discussed and informed consent has been obtained with: patient.  Pre-procedure education provided  Use of blood products discussed with  Consented to blood products.    Plan discussed with CRNA.      CODE STATUS:

## 2024-05-24 NOTE — OP NOTE
COLONOSCOPY  Procedure Note    Raimundo Munguia  5/24/2024    Pre-op Diagnosis:   History of colon cancer [Z85.038]    Post-op Diagnosis: polyps        Procedure(s):  COLONOSCOPY    Surgeon(s):  Ej Tinoco MD    Anesthesia: General    Staff:   Circulator: Amelia Gonzalez, RN  Endo Technician: Rizwan Levine    Estimated Blood Loss: minimal    Specimens:                Order Name Source Comment Collection Info Order Time   TISSUE EXAM, [facility] ([facility]) Large Intestine, Transverse Colon  Collected By: Ej Tinoco MD 5/24/2024 11:37 AM     Release to patient   Routine Release              Drains: * No LDAs found *    Procedure: The scope passed from the rectum to the cecum. The prep was poor and there was a lot of liquid stool with larger pieces of vegetable matter in it. The ileocecal valve was unremarkable. The scope was slowly with drawn and a small polyp was removed with the biopsy forceps in the transverse colon. There was a small polyp on the staple line in the lower sigmoid area. It was removed with biopsy forceps.     Findings:  polyps, poor prep           Complications: none   Grafts / Implants N/A    Ej Tinoco MD     Date: 5/24/2024  Time: 12:12 EDT

## 2024-05-24 NOTE — ANESTHESIA POSTPROCEDURE EVALUATION
Patient: Raimundo Munguia    Procedure Summary       Date: 05/24/24 Room / Location: [facility] OR [address] [place] [address] [facility] OR    Anesthesia Start: 1118 Anesthesia Stop: 1150    Procedure: COLONOSCOPY Diagnosis:       History of colon cancer      (History of colon cancer [Z85.038])    Surgeons: Ej Tinoco MD Provider: Robert Agudelo MD    Anesthesia Type: general ASA Status: 3            Anesthesia Type: general    Vitals  Vitals Value Taken Time   /67 05/24/24 1221   Temp 97.9 °F (36.6 °C) 05/24/24 1153   Pulse 107 05/24/24 1221   Resp 16 05/24/24 1221   SpO2 92 % 05/24/24 1221           Post Anesthesia Care and Evaluation    Patient location during evaluation: PACU  Patient participation: complete - patient participated  Level of consciousness: awake  Pain score: 0  Pain management: satisfactory to patient    Airway patency: patent  Anesthetic complications: No anesthetic complications  PONV Status: none  Cardiovascular status: hemodynamically stable  Respiratory status: nasal cannula

## 2024-05-29 LAB — REF LAB TEST METHOD: NORMAL

## 2024-05-30 DIAGNOSIS — J98.4 RESTRICTIVE LUNG DISEASE: Chronic | ICD-10-CM

## 2024-05-30 DIAGNOSIS — I10 BENIGN ESSENTIAL HYPERTENSION: Chronic | ICD-10-CM

## 2024-05-30 RX ORDER — METOPROLOL TARTRATE 100 MG/1
100 TABLET ORAL 2 TIMES DAILY
Qty: 60 TABLET | Refills: 3 | OUTPATIENT
Start: 2024-05-30

## 2024-05-30 RX ORDER — ALBUTEROL SULFATE 90 UG/1
AEROSOL, METERED RESPIRATORY (INHALATION)
Qty: 18 G | Refills: 3 | OUTPATIENT
Start: 2024-05-30

## 2024-06-03 ENCOUNTER — OFFICE VISIT (OUTPATIENT)
Dept: SURGERY | Facility: CLINIC | Age: 59
End: 2024-06-03
Payer: COMMERCIAL

## 2024-06-03 VITALS — HEIGHT: 69 IN | WEIGHT: 215 LBS | BODY MASS INDEX: 31.84 KG/M2

## 2024-06-03 DIAGNOSIS — K63.5 HYPERPLASTIC COLONIC POLYP, UNSPECIFIED PART OF COLON: ICD-10-CM

## 2024-06-03 DIAGNOSIS — Z90.49 STATUS POST COLECTOMY: Primary | ICD-10-CM

## 2024-06-03 PROCEDURE — 1160F RVW MEDS BY RX/DR IN RCRD: CPT | Performed by: SURGERY

## 2024-06-03 PROCEDURE — 1159F MED LIST DOCD IN RCRD: CPT | Performed by: SURGERY

## 2024-06-03 PROCEDURE — 99212 OFFICE O/P EST SF 10 MIN: CPT | Performed by: SURGERY

## 2024-06-03 NOTE — PROGRESS NOTES
"Subjective   Raimundo Munguia is a 58 y.o. male.     Chief Complaint: polyps    History of Present Illness He is a 57 yo who had a colon resection in the past for colon polyps and had a colonoscopy recently with a couple of hyperplastic polyps. No GI issues.    The following portions of the patient's history were reviewed and updated as appropriate: current medications, past family history, past medical history, past social history, past surgical history and problem list.    Review of Systems    Objective   Physical Exam abdomen soft and not tender.    Past Medical History:   Diagnosis Date    Arthritis     Asthma     inhlaer  prn    [facility]    Cervicalgia     CVA (cerebral vascular accident)     around 2012; \"light\" residual dysarthria    Diabetes mellitus 2015    po meds as well as insulin daily; checks sugars at home    Elevated cholesterol     GERD (gastroesophageal reflux disease)     Helicobacter pylori (H. 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    Vitamin D deficiency        Family History   Problem Relation Age of Onset    Diabetes Mother     Hypertension Mother     Hypertension Father     Hyperlipidemia Father     Diabetes Sister     Hyperlipidemia Sister     Hypertension Sister     Hypertension Brother     Hyperlipidemia Brother        Social History     Tobacco Use    Smoking status: Never     Passive exposure: Current    Smokeless tobacco: Current     Types: Snuff, Chew    Tobacco comments:     one can every 2-3 days; 25-26 years of use   Vaping Use    Vaping status: Never Used   Substance Use Topics    Alcohol use: No    Drug use: No       Past Surgical History:   Procedure Laterality Date    COLON " RESECTION N/A 02/22/2018    Procedure: COLON RESECTION SIGMOID;  Surgeon: Ej Tinoco MD;  [facility];  Service:     COLONOSCOPY N/A 02/07/2018    Procedure: COLONOSCOPY CPT CODE: 39780;  Surgeon: Teodoro Garg III, MD;  [facility];  Service:     COLONOSCOPY N/A 03/07/2019    Procedure: COLONOSCOPY FOR SCREENING CPT CODE: ;  Surgeon: Ej Tinoco MD;  [facility]  [facility];  Service: Gastroenterology    COLONOSCOPY N/A 5/24/2024    Procedure: COLONOSCOPY;  Surgeon: Ej Tinoco MD;  [facility];  Service: Gastroenterology;  Laterality: N/A;    ENDOSCOPY N/A 02/07/2018    Procedure: ESOPHAGOGASTRODUODENOSCOPY WITH DILATATION CPT CODE: 49378;  Surgeon: Teodoro Garg III, MD;  Location[facility];  Service:     THYROIDECTOMY Right 4/15/2022    Procedure: THYROIDECTOMY RIGHT LOBECTOMY;  Surgeon: Fidencio Wynn MD;  Location:  [facility] OR;  Service: General;  Laterality: Right;       Current Outpatient Medications   Medication Instructions    Albuterol Sulfate, sensor, (ProAir Digihaler) 108 (90 Base) MCG/ACT aerosol powder  2 Inhalations, Inhalation, Every 6 Hours PRN    amitriptyline (ELAVIL) 100 mg, Oral, Every Night at Bedtime    azelastine (ASTELIN) 0.1 % nasal spray 2 sprays both nostrils twice daily as needed    Continuous Blood Gluc  (Dexcom G7 ) device 1 Device, Does not apply, Daily    Continuous Blood Gluc Sensor (Dexcom G7 Sensor) misc 1 Device, Does not apply, Every 10 Days    cyclobenzaprine (FLEXERIL) 10 mg, Oral, 2 Times Daily PRN    Diclofenac Sodium (VOLTAREN) 1 % gel gel Topical, 4 Times Daily    Empagliflozin-Linaglip-Metform (Trijardy XR) 25-5-1000 MG tablet sustained-release 24 hour 1 tablet, Oral, Every Morning    fluticasone (FLONASE) 50 MCG/ACT nasal spray INSTILL 1 SPRAY INTO EACH NOSTRIL(S) TWICE DAILY AS DIRECTED    folic acid (FOLVITE) 1,000 mcg, Oral, Daily    glucose blood (OneTouch Ultra) test strip USE TO TEST BLOOD  "GLUCOSE THREE TIMES DAILY    Insulin Degludec-Liraglutide (Xultophy) 100-3.6 UNIT-MG/ML solution pen-injector subcutaneous pen 50 Units, Subcutaneous, Daily    Insulin Pen Needle (Pen Needles 1/2\") 29G X 12MM misc 1 Device, Does not apply, Daily    Lancets Ultra Fine misc 1 Device, Does not apply, 3 Times Daily    lansoprazole (PREVACID) 30 mg, Oral, Daily    levocetirizine (XYZAL) 5 mg, Oral, Every Evening    levothyroxine (SYNTHROID, LEVOTHROID) 112 MCG tablet TAKE ONE TABLET BY MOUTH EVERY DAY    methotrexate 2.5 MG tablet TAKE 10 TABLETS BY MOUTH ONCE WEEKLY    metoprolol tartrate (LOPRESSOR) 100 mg, Oral, 2 Times Daily    montelukast (SINGULAIR) 10 mg, Oral, Every Night at Bedtime    rosuvastatin (CRESTOR) 5 mg, Oral, Daily    vitamin B-12 (CYANOCOBALAMIN) 1,000 mcg, Oral, Daily    Vitamin D3 2,000 Units, Oral, Daily         Assessment & Plan   Diagnoses and all orders for this visit:    1. Status post colectomy (Primary)    2. Hyperplastic colonic polyp, unspecified part of colon    Repeat colonoscopy in 2 years             This document has been electronically signed by Ej Tinoco MD   Lyric 3, 2024 09:51 EDT  "

## 2024-06-19 DIAGNOSIS — K21.9 GASTROESOPHAGEAL REFLUX DISEASE WITHOUT ESOPHAGITIS: Chronic | ICD-10-CM

## 2024-06-19 DIAGNOSIS — M05.79 RHEUMATOID ARTHRITIS INVOLVING MULTIPLE SITES WITH POSITIVE RHEUMATOID FACTOR: Chronic | ICD-10-CM

## 2024-06-28 ENCOUNTER — OFFICE VISIT (OUTPATIENT)
Dept: FAMILY MEDICINE CLINIC | Facility: CLINIC | Age: 59
End: 2024-06-28
Payer: COMMERCIAL

## 2024-06-28 VITALS
WEIGHT: 213 LBS | SYSTOLIC BLOOD PRESSURE: 110 MMHG | RESPIRATION RATE: 14 BRPM | HEART RATE: 116 BPM | DIASTOLIC BLOOD PRESSURE: 70 MMHG | HEIGHT: 69 IN | TEMPERATURE: 97.8 F | BODY MASS INDEX: 31.55 KG/M2 | OXYGEN SATURATION: 92 %

## 2024-06-28 DIAGNOSIS — E53.8 VITAMIN B 12 DEFICIENCY: Chronic | ICD-10-CM

## 2024-06-28 DIAGNOSIS — I10 BENIGN ESSENTIAL HYPERTENSION: Chronic | ICD-10-CM

## 2024-06-28 DIAGNOSIS — K21.9 GASTROESOPHAGEAL REFLUX DISEASE WITHOUT ESOPHAGITIS: ICD-10-CM

## 2024-06-28 DIAGNOSIS — J98.4 RESTRICTIVE LUNG DISEASE: ICD-10-CM

## 2024-06-28 DIAGNOSIS — E89.0 POSTSURGICAL HYPOTHYROIDISM: Chronic | ICD-10-CM

## 2024-06-28 DIAGNOSIS — Z79.4 TYPE 2 DIABETES MELLITUS WITH HYPERGLYCEMIA, WITH LONG-TERM CURRENT USE OF INSULIN: Chronic | ICD-10-CM

## 2024-06-28 DIAGNOSIS — Z97.8 USES SELF-APPLIED CONTINUOUS GLUCOSE MONITORING DEVICE: Primary | ICD-10-CM

## 2024-06-28 DIAGNOSIS — E11.65 TYPE 2 DIABETES MELLITUS WITH HYPERGLYCEMIA, WITH LONG-TERM CURRENT USE OF INSULIN: Chronic | ICD-10-CM

## 2024-06-28 DIAGNOSIS — E55.9 VITAMIN D DEFICIENCY: Chronic | ICD-10-CM

## 2024-06-28 DIAGNOSIS — M05.79 RHEUMATOID ARTHRITIS INVOLVING MULTIPLE SITES WITH POSITIVE RHEUMATOID FACTOR: Chronic | ICD-10-CM

## 2024-06-28 DIAGNOSIS — E78.2 MIXED DYSLIPIDEMIA: Chronic | ICD-10-CM

## 2024-06-28 PROCEDURE — 80061 LIPID PANEL: CPT | Performed by: NURSE PRACTITIONER

## 2024-06-28 PROCEDURE — 86140 C-REACTIVE PROTEIN: CPT | Performed by: NURSE PRACTITIONER

## 2024-06-28 PROCEDURE — 80050 GENERAL HEALTH PANEL: CPT | Performed by: NURSE PRACTITIONER

## 2024-06-28 PROCEDURE — 85652 RBC SED RATE AUTOMATED: CPT | Performed by: NURSE PRACTITIONER

## 2024-06-28 PROCEDURE — 1125F AMNT PAIN NOTED PAIN PRSNT: CPT | Performed by: NURSE PRACTITIONER

## 2024-06-28 PROCEDURE — 82607 VITAMIN B-12: CPT | Performed by: NURSE PRACTITIONER

## 2024-06-28 PROCEDURE — 3051F HG A1C>EQUAL 7.0%<8.0%: CPT | Performed by: NURSE PRACTITIONER

## 2024-06-28 PROCEDURE — 3078F DIAST BP <80 MM HG: CPT | Performed by: NURSE PRACTITIONER

## 2024-06-28 PROCEDURE — 84550 ASSAY OF BLOOD/URIC ACID: CPT | Performed by: NURSE PRACTITIONER

## 2024-06-28 PROCEDURE — 83735 ASSAY OF MAGNESIUM: CPT | Performed by: NURSE PRACTITIONER

## 2024-06-28 PROCEDURE — 1160F RVW MEDS BY RX/DR IN RCRD: CPT | Performed by: NURSE PRACTITIONER

## 2024-06-28 PROCEDURE — 84439 ASSAY OF FREE THYROXINE: CPT | Performed by: NURSE PRACTITIONER

## 2024-06-28 PROCEDURE — 83036 HEMOGLOBIN GLYCOSYLATED A1C: CPT | Performed by: NURSE PRACTITIONER

## 2024-06-28 PROCEDURE — 1159F MED LIST DOCD IN RCRD: CPT | Performed by: NURSE PRACTITIONER

## 2024-06-28 PROCEDURE — 82306 VITAMIN D 25 HYDROXY: CPT | Performed by: NURSE PRACTITIONER

## 2024-06-28 PROCEDURE — 99214 OFFICE O/P EST MOD 30 MIN: CPT | Performed by: NURSE PRACTITIONER

## 2024-06-28 PROCEDURE — 3074F SYST BP LT 130 MM HG: CPT | Performed by: NURSE PRACTITIONER

## 2024-06-28 RX ORDER — LANSOPRAZOLE 30 MG/1
30 CAPSULE, DELAYED RELEASE ORAL DAILY
Qty: 30 CAPSULE | Refills: 3 | Status: SHIPPED | OUTPATIENT
Start: 2024-06-28

## 2024-06-28 RX ORDER — CYCLOBENZAPRINE HCL 10 MG
10 TABLET ORAL 2 TIMES DAILY PRN
Qty: 60 TABLET | Refills: 1 | Status: SHIPPED | OUTPATIENT
Start: 2024-06-28

## 2024-06-28 RX ORDER — PROCHLORPERAZINE 25 MG/1
1 SUPPOSITORY RECTAL DAILY
Qty: 1 EACH | Refills: 0 | Status: SHIPPED | OUTPATIENT
Start: 2024-06-28

## 2024-06-28 RX ORDER — PROCHLORPERAZINE 25 MG/1
SUPPOSITORY RECTAL
Qty: 9 EACH | Refills: 3 | Status: SHIPPED | OUTPATIENT
Start: 2024-06-28

## 2024-06-28 RX ORDER — (INSULIN DEGLUDEC AND LIRAGLUTIDE) 100; 3.6 [IU]/ML; MG/ML
50 INJECTION, SOLUTION SUBCUTANEOUS DAILY
Qty: 45 ML | Refills: 2 | Status: SHIPPED | OUTPATIENT
Start: 2024-06-28

## 2024-06-28 RX ORDER — PROCHLORPERAZINE 25 MG/1
1 SUPPOSITORY RECTAL DAILY
Qty: 1 EACH | Refills: 3 | Status: SHIPPED | OUTPATIENT
Start: 2024-06-28 | End: 2024-09-26

## 2024-06-28 RX ORDER — EMPAGLIFLOZIN, LINAGLIPTIN, METFORMIN HYDROCHLORIDE 25; 5; 1000 MG/1; MG/1; MG/1
1 TABLET, EXTENDED RELEASE ORAL EVERY MORNING
Qty: 90 TABLET | Refills: 0 | Status: SHIPPED | OUTPATIENT
Start: 2024-06-28

## 2024-06-28 NOTE — PROGRESS NOTES
History of Present Illness  Raimundo Munguia is a 59 yo male who presents to the office today pertaining to his DM, type 2, HTN, GERD, Rheumatoid Arthritis and Dyslipidemia.  In addition, he has been diagnosed with insomnia and Vitamin deficiencies.     Diabetes   He has type 2 diabetes mellitus. His disease course has been improving since starting Xultophy. Risk factors for coronary artery disease include dyslipidemia, hypertension, male sex, sedentary lifestyle and tobacco exposure. Current diabetic treatment includes Trijardy and Xultophy. An ACE inhibitor/angiotensin II receptor blocker is not being taken due to S/e.     Lab Results   Component Value Date    HGBA1C 9.00 (H) 03/22/2024     Lab Results   Component Value Date    HGBA1C 7.20 (H) 06/28/2024     Hypertension   The current episode started more than 1 year ago.His BP is adequately controlled. His hypertensive medication is Toprol  mg bid.  Lab Results   Component Value Date    GLUCOSE 160 (H) 06/28/2024    BUN 9 06/28/2024    CREATININE 0.85 06/28/2024    EGFR 101.4 06/28/2024    BCR 10.6 06/28/2024    K 4.5 06/28/2024    CO2 22.5 06/28/2024    CALCIUM 9.7 06/28/2024    ALBUMIN 4.2 06/28/2024    BILITOT 0.3 06/28/2024    AST 23 06/28/2024    ALT 31 06/28/2024     Dyslipidemia   This is a chronic problem. The current episode started more than 1 year ago. Factors aggravating his hyperlipidemia include fatty foods. Current antihyperlipidemic treatment includes statins.  Compliance problems include adherence to diet and adherence to exercise.  Risk factors for coronary artery disease include diabetes mellitus, dyslipidemia, hypertension, male sex and obesity.   Lab Results   Component Value Date    CHOL 136 06/28/2024    CHOL 116 03/22/2024    CHOL 94 12/08/2023     Lab Results   Component Value Date    TRIG 178 (H) 06/28/2024    TRIG 179 (H) 03/22/2024    TRIG 157 (H) 12/08/2023     Lab Results   Component Value Date    HDL 35 (L) 06/28/2024     "HDL 29 (L) 03/22/2024    HDL 23 (L) 12/08/2023     Lab Results   Component Value Date    LDL 71 06/28/2024    LDL 57 03/22/2024    LDL 44 12/08/2023      The following portions of the patient's history were reviewed and updated as appropriate: allergies, current medications, past family history, past medical history, past social history, past surgical history and problem list.    Review of Systems   Constitutional:  Negative for activity change, appetite change, chills, fatigue, fever and unexpected weight change.   HENT:  Positive for hearing loss (Chronic). Negative for voice change.    Eyes:  Negative for visual disturbance.   Respiratory:  Negative for cough, shortness of breath and wheezing.    Cardiovascular:  Negative for chest pain, palpitations (Improving with additional metoprolol) and leg swelling.   Gastrointestinal:  Positive for nausea (Intermittent). Negative for vomiting.   Endocrine: Negative for cold intolerance, heat intolerance, polydipsia, polyphagia and polyuria.   Musculoskeletal:  Positive for arthralgias, back pain and joint swelling.   Skin:  Negative for color change and rash.   Allergic/Immunologic: Positive for environmental allergies and immunocompromised state.   Neurological:  Negative for dizziness, tremors, speech difficulty, weakness and light-headedness.   Hematological:  Negative for adenopathy.   Psychiatric/Behavioral:  Positive for sleep disturbance. Negative for confusion and decreased concentration. The patient is not nervous/anxious.    All other systems reviewed and are negative.    Vital signs:  /70 (BP Location: Left arm, Patient Position: Sitting, Cuff Size: Adult)   Pulse 116   Temp 97.8 °F (36.6 °C) (Temporal)   Resp 14   Ht 175.3 cm (69.02\")   Wt 96.6 kg (213 lb)   SpO2 92%   BMI 31.44 kg/m²     Physical Exam  Vitals and nursing note reviewed.   Constitutional:       General: He is not in acute distress.     Appearance: He is well-developed.   HENT:      " Head: Normocephalic.      Nose: Nose normal.   Eyes:      General: No scleral icterus.        Right eye: No discharge.         Left eye: No discharge.      Conjunctiva/sclera: Conjunctivae normal.   Cardiovascular:      Rate and Rhythm: Normal rate and regular rhythm.      Heart sounds: Normal heart sounds. No murmur heard.     No friction rub.   Pulmonary:      Effort: Pulmonary effort is normal. No respiratory distress.      Breath sounds: Decreased breath sounds present. No wheezing, rhonchi or rales.   Abdominal:      General: Abdomen is protuberant. Bowel sounds are normal.      Palpations: Abdomen is soft.      Tenderness: There is no abdominal tenderness. There is no guarding.   Musculoskeletal:         General: Deformity (Multiple Rheumatoid nodules bilateral upper extremities) present. No tenderness.      Cervical back: Neck supple.      Right lower leg: No edema.      Left lower leg: No edema.   Lymphadenopathy:      Cervical: No cervical adenopathy.   Skin:     General: Skin is warm and dry.      Capillary Refill: Capillary refill takes less than 2 seconds.   Neurological:      Mental Status: He is alert and oriented to person, place, and time.      Gait: Gait abnormal.   Psychiatric:         Mood and Affect: Mood normal.         Speech: Speech normal.         Behavior: Behavior is cooperative.         Thought Content: Thought content normal.         Cognition and Memory: Cognition and memory normal.       Result Review :   Results for orders placed or performed in visit on 06/28/24   Comprehensive Metabolic Panel    Specimen: Arm, Right; Blood   Result Value Ref Range    Glucose 160 (H) 65 - 99 mg/dL    BUN 9 6 - 20 mg/dL    Creatinine 0.85 0.76 - 1.27 mg/dL    Sodium 138 136 - 145 mmol/L    Potassium 4.5 3.5 - 5.2 mmol/L    Chloride 100 98 - 107 mmol/L    CO2 22.5 22.0 - 29.0 mmol/L    Calcium 9.7 8.6 - 10.5 mg/dL    Total Protein 7.2 6.0 - 8.5 g/dL    Albumin 4.2 3.5 - 5.2 g/dL    ALT (SGPT) 31 1 - 41  U/L    AST (SGOT) 23 1 - 40 U/L    Alkaline Phosphatase 139 (H) 39 - 117 U/L    Total Bilirubin 0.3 0.0 - 1.2 mg/dL    Globulin 3.0 gm/dL    A/G Ratio 1.4 g/dL    BUN/Creatinine Ratio 10.6 7.0 - 25.0    Anion Gap 15.5 (H) 5.0 - 15.0 mmol/L    eGFR 101.4 >60.0 mL/min/1.73   Lipid Panel    Specimen: Arm, Right; Blood   Result Value Ref Range    Total Cholesterol 136 0 - 200 mg/dL    Triglycerides 178 (H) 0 - 150 mg/dL    HDL Cholesterol 35 (L) 40 - 60 mg/dL    LDL Cholesterol  71 0 - 100 mg/dL    VLDL Cholesterol 30 5 - 40 mg/dL    LDL/HDL Ratio 1.87    TSH    Specimen: Arm, Right; Blood   Result Value Ref Range    TSH 1.130 0.270 - 4.200 uIU/mL   Hemoglobin A1c    Specimen: Arm, Right; Blood   Result Value Ref Range    Hemoglobin A1C 7.20 (H) 4.80 - 5.60 %   Vitamin D,25-Hydroxy    Specimen: Arm, Right; Blood   Result Value Ref Range    25 Hydroxy, Vitamin D 24.2 (L) 30.0 - 100.0 ng/ml   Uric Acid    Specimen: Arm, Right; Blood   Result Value Ref Range    Uric Acid 3.1 (L) 3.4 - 7.0 mg/dL   T4, Free    Specimen: Arm, Right; Blood   Result Value Ref Range    Free T4 1.71 (H) 0.92 - 1.68 ng/dL   Vitamin B12    Specimen: Arm, Right; Blood   Result Value Ref Range    Vitamin B-12 910 211 - 946 pg/mL   Magnesium    Specimen: Arm, Right; Blood   Result Value Ref Range    Magnesium 2.2 1.6 - 2.6 mg/dL   Sedimentation Rate    Specimen: Arm, Right; Blood   Result Value Ref Range    Sed Rate 7 0 - 20 mm/hr   C-reactive Protein    Specimen: Arm, Right; Blood   Result Value Ref Range    C-Reactive Protein <0.30 0.00 - 0.50 mg/dL   CBC Auto Differential    Specimen: Arm, Right; Blood   Result Value Ref Range    WBC 11.59 (H) 3.40 - 10.80 10*3/mm3    RBC 5.74 4.14 - 5.80 10*6/mm3    Hemoglobin 18.2 (H) 13.0 - 17.7 g/dL    Hematocrit 52.8 (H) 37.5 - 51.0 %    MCV 92.0 79.0 - 97.0 fL    MCH 31.7 26.6 - 33.0 pg    MCHC 34.5 31.5 - 35.7 g/dL    RDW 11.6 (L) 12.3 - 15.4 %    RDW-SD 39.1 37.0 - 54.0 fl    MPV 10.7 6.0 - 12.0 fL     Platelets 284 140 - 450 10*3/mm3    Neutrophil % 50.8 42.7 - 76.0 %    Lymphocyte % 36.9 19.6 - 45.3 %    Monocyte % 8.6 5.0 - 12.0 %    Eosinophil % 2.7 0.3 - 6.2 %    Basophil % 0.7 0.0 - 1.5 %    Immature Grans % 0.3 0.0 - 0.5 %    Neutrophils, Absolute 5.88 1.70 - 7.00 10*3/mm3    Lymphocytes, Absolute 4.28 (H) 0.70 - 3.10 10*3/mm3    Monocytes, Absolute 1.00 (H) 0.10 - 0.90 10*3/mm3    Eosinophils, Absolute 0.31 0.00 - 0.40 10*3/mm3    Basophils, Absolute 0.08 0.00 - 0.20 10*3/mm3    Immature Grans, Absolute 0.04 0.00 - 0.05 10*3/mm3    nRBC 0.0 0.0 - 0.2 /100 WBC     Labs completed today which will be reviewed with.this  him at his follow-up visit in July    BMI is >= 30 and <35. (Class 1 Obesity). The following options were offered after discussion;: Information on healthy weight added to patient's after visit summary.      Assessment & Plan     Diagnoses and all orders for this visit:    1. Uses self-applied continuous glucose monitoring device (Primary)  Comments:  Would like to switch from Dexcom 6 to Dexcom 7    2. Type 2 diabetes mellitus with hyperglycemia, with long-term current use of insulin  Comments:  Continue Trijardy 25 - 5 - 1000 every morning and. Xultophy  Orders:  -     Continuous Glucose Transmitter (Dexcom G6 Transmitter) misc; Use 1 Device Daily for 90 days.  Dispense: 1 each; Refill: 3  -     Continuous Glucose Sensor (Dexcom G6 Sensor); Use Every 10 (Ten) Days.  Dispense: 9 each; Refill: 3  -     Continuous Glucose  (Dexcom G6 ) device; Use 1 Device Daily.  Dispense: 1 each; Refill: 0  -     Empagliflozin-Linaglip-Metform (Trijardy XR) 25-5-1000 MG tablet sustained-release 24 hour; Take 1 tablet by mouth Every Morning.  Dispense: 90 tablet; Refill: 0  -     Insulin Degludec-Liraglutide (Xultophy) 100-3.6 UNIT-MG/ML solution pen-injector subcutaneous pen; Inject 50 Units under the skin into the appropriate area as directed Daily.  Dispense: 45 mL; Refill: 2  -      Comprehensive Metabolic Panel  -     Lipid Panel  -     TSH  -     Hemoglobin A1c  -     Vitamin B12    3. Benign essential hypertension  Comments:  Adequate control.  Continue metoprolol  Orders:  -     Comprehensive Metabolic Panel  -     Lipid Panel  -     Magnesium    4. Mixed dyslipidemia  Comments:  Continue rosuvastatin  Orders:  -     Comprehensive Metabolic Panel  -     Lipid Panel    5. Restrictive lung disease  Comments:  Stable.  Continue albuterol inhaler  Orders:  -     CBC & Differential    6. Gastroesophageal reflux disease without esophagitis  Comments:  Continue lansoprazole  Orders:  -     CBC & Differential    7. Postsurgical hypothyroidism  Comments:  Continue levothyroxine 112 mcg and follow-up with endocrinology  Orders:  -     TSH  -     T4, Free    8. Rheumatoid arthritis involving multiple sites with positive rheumatoid factor  Comments:  Stable with current regimen of methotrexate, folic acid  Orders:  -     Uric Acid  -     Sedimentation Rate  -     C-reactive Protein    9. Vitamin D deficiency  Comments:  Continue daily vitamin D  Orders:  -     Vitamin D,25-Hydroxy    10. Vitamin B 12 deficiency  Comments:  Continue daily vitamin B12      Follow Up  In July  Findings and recommendations discussed with Raimundo. Reviewed treatment options.  Lifestyle modifications reinforced including nutrition and activity recommendations.  Raimundo will follow up July sooner if problems/concerns occur  Raimundo was given instructions and counseling regarding his condition or for health maintenance advice. Please see specific information pulled into the AVS if appropriate.    This document has been electronically signed by:

## 2024-06-29 PROBLEM — Z97.8 USES SELF-APPLIED CONTINUOUS GLUCOSE MONITORING DEVICE: Status: ACTIVE | Noted: 2024-06-29

## 2024-06-29 LAB
25(OH)D3 SERPL-MCNC: 24.2 NG/ML (ref 30–100)
ALBUMIN SERPL-MCNC: 4.2 G/DL (ref 3.5–5.2)
ALBUMIN/GLOB SERPL: 1.4 G/DL
ALP SERPL-CCNC: 139 U/L (ref 39–117)
ALT SERPL W P-5'-P-CCNC: 31 U/L (ref 1–41)
ANION GAP SERPL CALCULATED.3IONS-SCNC: 15.5 MMOL/L (ref 5–15)
AST SERPL-CCNC: 23 U/L (ref 1–40)
BASOPHILS # BLD AUTO: 0.08 10*3/MM3 (ref 0–0.2)
BASOPHILS NFR BLD AUTO: 0.7 % (ref 0–1.5)
BILIRUB SERPL-MCNC: 0.3 MG/DL (ref 0–1.2)
BUN SERPL-MCNC: 9 MG/DL (ref 6–20)
BUN/CREAT SERPL: 10.6 (ref 7–25)
CALCIUM SPEC-SCNC: 9.7 MG/DL (ref 8.6–10.5)
CHLORIDE SERPL-SCNC: 100 MMOL/L (ref 98–107)
CHOLEST SERPL-MCNC: 136 MG/DL (ref 0–200)
CO2 SERPL-SCNC: 22.5 MMOL/L (ref 22–29)
CREAT SERPL-MCNC: 0.85 MG/DL (ref 0.76–1.27)
CRP SERPL-MCNC: <0.3 MG/DL (ref 0–0.5)
DEPRECATED RDW RBC AUTO: 39.1 FL (ref 37–54)
EGFRCR SERPLBLD CKD-EPI 2021: 101.4 ML/MIN/1.73
EOSINOPHIL # BLD AUTO: 0.31 10*3/MM3 (ref 0–0.4)
EOSINOPHIL NFR BLD AUTO: 2.7 % (ref 0.3–6.2)
ERYTHROCYTE [DISTWIDTH] IN BLOOD BY AUTOMATED COUNT: 11.6 % (ref 12.3–15.4)
ERYTHROCYTE [SEDIMENTATION RATE] IN BLOOD: 7 MM/HR (ref 0–20)
GLOBULIN UR ELPH-MCNC: 3 GM/DL
GLUCOSE SERPL-MCNC: 160 MG/DL (ref 65–99)
HBA1C MFR BLD: 7.2 % (ref 4.8–5.6)
HCT VFR BLD AUTO: 52.8 % (ref 37.5–51)
HDLC SERPL-MCNC: 35 MG/DL (ref 40–60)
HGB BLD-MCNC: 18.2 G/DL (ref 13–17.7)
IMM GRANULOCYTES # BLD AUTO: 0.04 10*3/MM3 (ref 0–0.05)
IMM GRANULOCYTES NFR BLD AUTO: 0.3 % (ref 0–0.5)
LDLC SERPL CALC-MCNC: 71 MG/DL (ref 0–100)
LDLC/HDLC SERPL: 1.87 {RATIO}
LYMPHOCYTES # BLD AUTO: 4.28 10*3/MM3 (ref 0.7–3.1)
LYMPHOCYTES NFR BLD AUTO: 36.9 % (ref 19.6–45.3)
MAGNESIUM SERPL-MCNC: 2.2 MG/DL (ref 1.6–2.6)
MCH RBC QN AUTO: 31.7 PG (ref 26.6–33)
MCHC RBC AUTO-ENTMCNC: 34.5 G/DL (ref 31.5–35.7)
MCV RBC AUTO: 92 FL (ref 79–97)
MONOCYTES # BLD AUTO: 1 10*3/MM3 (ref 0.1–0.9)
MONOCYTES NFR BLD AUTO: 8.6 % (ref 5–12)
NEUTROPHILS NFR BLD AUTO: 5.88 10*3/MM3 (ref 1.7–7)
NEUTROPHILS NFR BLD AUTO: 50.8 % (ref 42.7–76)
NRBC BLD AUTO-RTO: 0 /100 WBC (ref 0–0.2)
PLATELET # BLD AUTO: 284 10*3/MM3 (ref 140–450)
PMV BLD AUTO: 10.7 FL (ref 6–12)
POTASSIUM SERPL-SCNC: 4.5 MMOL/L (ref 3.5–5.2)
PROT SERPL-MCNC: 7.2 G/DL (ref 6–8.5)
RBC # BLD AUTO: 5.74 10*6/MM3 (ref 4.14–5.8)
SODIUM SERPL-SCNC: 138 MMOL/L (ref 136–145)
T4 FREE SERPL-MCNC: 1.71 NG/DL (ref 0.92–1.68)
TRIGL SERPL-MCNC: 178 MG/DL (ref 0–150)
TSH SERPL DL<=0.05 MIU/L-ACNC: 1.13 UIU/ML (ref 0.27–4.2)
URATE SERPL-MCNC: 3.1 MG/DL (ref 3.4–7)
VIT B12 BLD-MCNC: 910 PG/ML (ref 211–946)
VLDLC SERPL-MCNC: 30 MG/DL (ref 5–40)
WBC NRBC COR # BLD AUTO: 11.59 10*3/MM3 (ref 3.4–10.8)

## 2024-07-17 DIAGNOSIS — Z79.4 TYPE 2 DIABETES MELLITUS WITH HYPERGLYCEMIA, WITH LONG-TERM CURRENT USE OF INSULIN: Chronic | ICD-10-CM

## 2024-07-17 DIAGNOSIS — E89.0 POSTSURGICAL HYPOTHYROIDISM: ICD-10-CM

## 2024-07-17 DIAGNOSIS — E11.65 TYPE 2 DIABETES MELLITUS WITH HYPERGLYCEMIA, WITH LONG-TERM CURRENT USE OF INSULIN: Chronic | ICD-10-CM

## 2024-07-17 RX ORDER — LEVOTHYROXINE SODIUM 112 UG/1
TABLET ORAL
Qty: 30 TABLET | Refills: 5 | Status: SHIPPED | OUTPATIENT
Start: 2024-07-17

## 2024-07-17 RX ORDER — EMPAGLIFLOZIN, LINAGLIPTIN, METFORMIN HYDROCHLORIDE 25; 5; 1000 MG/1; MG/1; MG/1
1 TABLET, EXTENDED RELEASE ORAL EVERY MORNING
Qty: 30 TABLET | Refills: 2 | OUTPATIENT
Start: 2024-07-17

## 2024-07-26 ENCOUNTER — OFFICE VISIT (OUTPATIENT)
Dept: FAMILY MEDICINE CLINIC | Facility: CLINIC | Age: 59
End: 2024-07-26
Payer: COMMERCIAL

## 2024-07-26 VITALS
SYSTOLIC BLOOD PRESSURE: 118 MMHG | HEART RATE: 108 BPM | TEMPERATURE: 96.9 F | BODY MASS INDEX: 31.4 KG/M2 | WEIGHT: 212 LBS | OXYGEN SATURATION: 95 % | DIASTOLIC BLOOD PRESSURE: 72 MMHG | HEIGHT: 69 IN | RESPIRATION RATE: 14 BRPM

## 2024-07-26 DIAGNOSIS — Z97.8 USES SELF-APPLIED CONTINUOUS GLUCOSE MONITORING DEVICE: ICD-10-CM

## 2024-07-26 DIAGNOSIS — I10 BENIGN ESSENTIAL HYPERTENSION: Chronic | ICD-10-CM

## 2024-07-26 DIAGNOSIS — K21.9 GASTROESOPHAGEAL REFLUX DISEASE WITHOUT ESOPHAGITIS: Chronic | ICD-10-CM

## 2024-07-26 DIAGNOSIS — E78.2 MIXED DYSLIPIDEMIA: ICD-10-CM

## 2024-07-26 DIAGNOSIS — E53.8 B12 DEFICIENCY: Chronic | ICD-10-CM

## 2024-07-26 DIAGNOSIS — M05.79 RHEUMATOID ARTHRITIS INVOLVING MULTIPLE SITES WITH POSITIVE RHEUMATOID FACTOR: Chronic | ICD-10-CM

## 2024-07-26 DIAGNOSIS — F51.04 CHRONIC INSOMNIA: Chronic | ICD-10-CM

## 2024-07-26 DIAGNOSIS — J30.9 CHRONIC ALLERGIC RHINITIS: Chronic | ICD-10-CM

## 2024-07-26 DIAGNOSIS — J98.4 RESTRICTIVE LUNG DISEASE: Chronic | ICD-10-CM

## 2024-07-26 DIAGNOSIS — E89.0 POSTSURGICAL HYPOTHYROIDISM: ICD-10-CM

## 2024-07-26 DIAGNOSIS — E11.40 TYPE 2 DIABETES, CONTROLLED, WITH NEUROPATHY: Primary | Chronic | ICD-10-CM

## 2024-07-26 DIAGNOSIS — E55.9 VITAMIN D DEFICIENCY: Chronic | ICD-10-CM

## 2024-07-26 PROCEDURE — 1160F RVW MEDS BY RX/DR IN RCRD: CPT | Performed by: NURSE PRACTITIONER

## 2024-07-26 PROCEDURE — 3074F SYST BP LT 130 MM HG: CPT | Performed by: NURSE PRACTITIONER

## 2024-07-26 PROCEDURE — 1159F MED LIST DOCD IN RCRD: CPT | Performed by: NURSE PRACTITIONER

## 2024-07-26 PROCEDURE — 3051F HG A1C>EQUAL 7.0%<8.0%: CPT | Performed by: NURSE PRACTITIONER

## 2024-07-26 PROCEDURE — 1125F AMNT PAIN NOTED PAIN PRSNT: CPT | Performed by: NURSE PRACTITIONER

## 2024-07-26 PROCEDURE — 99215 OFFICE O/P EST HI 40 MIN: CPT | Performed by: NURSE PRACTITIONER

## 2024-07-26 PROCEDURE — 3078F DIAST BP <80 MM HG: CPT | Performed by: NURSE PRACTITIONER

## 2024-07-26 RX ORDER — METHOTREXATE 2.5 MG/1
25 TABLET ORAL WEEKLY
Qty: 40 TABLET | Refills: 4 | Status: SHIPPED | OUTPATIENT
Start: 2024-07-26

## 2024-07-26 RX ORDER — LEVOCETIRIZINE DIHYDROCHLORIDE 5 MG/1
5 TABLET, FILM COATED ORAL EVERY EVENING
Qty: 90 TABLET | Refills: 0 | Status: SHIPPED | OUTPATIENT
Start: 2024-07-26

## 2024-07-26 RX ORDER — ALBUTEROL SULFATE 90 UG/1
2 INHALANT RESPIRATORY (INHALATION) EVERY 6 HOURS PRN
Qty: 1 EACH | Refills: 3 | Status: SHIPPED | OUTPATIENT
Start: 2024-07-26

## 2024-07-26 RX ORDER — METOPROLOL TARTRATE 100 MG/1
100 TABLET ORAL 2 TIMES DAILY
Qty: 180 TABLET | Refills: 0 | Status: SHIPPED | OUTPATIENT
Start: 2024-07-26

## 2024-07-26 RX ORDER — AZELASTINE 1 MG/ML
SPRAY, METERED NASAL
Qty: 1 EACH | Refills: 3 | Status: SHIPPED | OUTPATIENT
Start: 2024-07-26

## 2024-07-26 RX ORDER — FOLIC ACID 1 MG/1
1000 TABLET ORAL DAILY
Qty: 90 TABLET | Refills: 0 | Status: SHIPPED | OUTPATIENT
Start: 2024-07-26

## 2024-07-26 RX ORDER — CYCLOBENZAPRINE HCL 10 MG
10 TABLET ORAL 2 TIMES DAILY PRN
Qty: 60 TABLET | Refills: 1 | Status: SHIPPED | OUTPATIENT
Start: 2024-07-26

## 2024-07-26 RX ORDER — AMITRIPTYLINE HYDROCHLORIDE 100 MG/1
100 TABLET ORAL
Qty: 30 TABLET | Refills: 3 | Status: SHIPPED | OUTPATIENT
Start: 2024-07-26

## 2024-07-26 RX ORDER — BLOOD SUGAR DIAGNOSTIC
STRIP MISCELLANEOUS
Qty: 100 EACH | Refills: 3 | Status: SHIPPED | OUTPATIENT
Start: 2024-07-26

## 2024-07-26 RX ORDER — ACETAMINOPHEN 160 MG
2000 TABLET,DISINTEGRATING ORAL DAILY
Qty: 90 CAPSULE | Refills: 0 | Status: SHIPPED | OUTPATIENT
Start: 2024-07-26

## 2024-07-26 RX ORDER — LANOLIN ALCOHOL/MO/W.PET/CERES
1000 CREAM (GRAM) TOPICAL DAILY
Qty: 90 TABLET | Refills: 0 | Status: SHIPPED | OUTPATIENT
Start: 2024-07-26

## 2024-07-26 NOTE — PROGRESS NOTES
History of Present Illness  Raimundo Munguia is a 59 yo male who presents to the office today pertaining to his DM, type 2, HTN, GERD, Rheumatoid Arthritis and Dyslipidemia.  In addition, he has been diagnosed with insomnia and Vitamin deficiencies.  He did have recent labs which will be reviewed with him today.    Diabetes   He has type 2 diabetes mellitus. His disease course has been improving since starting Xultophy. Risk factors for coronary artery disease include dyslipidemia, hypertension, male sex, sedentary lifestyle and tobacco exposure. Current diabetic treatment includes Trijardy and Xultophy. An ACE inhibitor/angiotensin II receptor blocker is not being taken due to S/e.  He had been utilizing a Dexcom 7 but is changing back to the Dexcom 6 due to the issue of the sensors and not sticking very well to his skin.    Lab Results   Component Value Date    HGBA1C 9.00 (H) 03/22/2024     Lab Results   Component Value Date    HGBA1C 7.20 (H) 06/28/2024     Hypertension/Tachycardia  The current episode started more than 1 year ago.His BP is adequately controlled. His hypertensive medication is Toprol  mg bid.  Lab Results   Component Value Date    GLUCOSE 160 (H) 06/28/2024    BUN 9 06/28/2024    CREATININE 0.85 06/28/2024    EGFR 101.4 06/28/2024    BCR 10.6 06/28/2024    K 4.5 06/28/2024    CO2 22.5 06/28/2024    CALCIUM 9.7 06/28/2024    ALBUMIN 4.2 06/28/2024    BILITOT 0.3 06/28/2024    AST 23 06/28/2024    ALT 31 06/28/2024     Dyslipidemia   This is a chronic problem. The current episode started more than 1 year ago. Factors aggravating his hyperlipidemia include fatty foods. Current antihyperlipidemic treatment includes statins.  Compliance problems include adherence to diet and adherence to exercise.  Risk factors for coronary artery disease include diabetes mellitus, dyslipidemia, hypertension, male sex and obesity.   Lab Results   Component Value Date    CHOL 136 06/28/2024    CHOL 116  03/22/2024    CHOL 94 12/08/2023     Lab Results   Component Value Date    TRIG 178 (H) 06/28/2024    TRIG 179 (H) 03/22/2024    TRIG 157 (H) 12/08/2023     Lab Results   Component Value Date    HDL 35 (L) 06/28/2024    HDL 29 (L) 03/22/2024    HDL 23 (L) 12/08/2023     Lab Results   Component Value Date    LDL 71 06/28/2024    LDL 57 03/22/2024    LDL 44 12/08/2023     The following portions of the patient's history were reviewed and updated as appropriate: allergies, current medications, past family history, past medical history, past social history, past surgical history and problem list.    Review of Systems   Constitutional:  Negative for activity change, appetite change, chills, fatigue, fever and unexpected weight change.   HENT:  Positive for hearing loss (Chronic). Negative for voice change.    Eyes:  Negative for visual disturbance.   Respiratory:  Negative for cough, shortness of breath and wheezing.    Cardiovascular:  Positive for palpitations (Improving with metoprolol). Negative for chest pain and leg swelling.   Gastrointestinal:  Positive for nausea (Intermittent). Negative for vomiting.   Endocrine: Negative for cold intolerance, heat intolerance, polydipsia, polyphagia and polyuria.   Musculoskeletal:  Positive for arthralgias, back pain and joint swelling.   Skin:  Negative for color change and rash.   Allergic/Immunologic: Positive for environmental allergies and immunocompromised state.   Neurological:  Negative for dizziness, tremors, speech difficulty, weakness and light-headedness.   Hematological:  Negative for adenopathy.   Psychiatric/Behavioral:  Positive for sleep disturbance. Negative for confusion and decreased concentration. The patient is not nervous/anxious.    All other systems reviewed and are negative.    Vital signs:  /72 (BP Location: Left arm, Patient Position: Sitting, Cuff Size: Adult)   Pulse 108   Temp 96.9 °F (36.1 °C) (Temporal)   Resp 14   Ht 175.3 cm  "(69.02\")   Wt 96.2 kg (212 lb)   SpO2 95%   BMI 31.29 kg/m²     Physical Exam  Vitals and nursing note reviewed.   Constitutional:       General: He is not in acute distress.     Appearance: He is well-developed.   HENT:      Head: Normocephalic.      Nose: Nose normal.   Eyes:      General: No scleral icterus.        Right eye: No discharge.         Left eye: No discharge.      Conjunctiva/sclera: Conjunctivae normal.   Cardiovascular:      Rate and Rhythm: Normal rate and regular rhythm.      Heart sounds: Normal heart sounds. No murmur heard.     No friction rub.   Pulmonary:      Effort: Pulmonary effort is normal. No respiratory distress.      Breath sounds: Decreased breath sounds present. No wheezing, rhonchi or rales.   Abdominal:      General: Abdomen is protuberant. Bowel sounds are normal.      Palpations: Abdomen is soft.      Tenderness: There is no abdominal tenderness. There is no guarding.   Musculoskeletal:         General: Deformity (Multiple Rheumatoid nodules bilateral upper extremities) present. No tenderness.      Cervical back: Neck supple.      Right lower leg: No edema.      Left lower leg: No edema.   Lymphadenopathy:      Cervical: No cervical adenopathy.   Skin:     General: Skin is warm and dry.      Capillary Refill: Capillary refill takes less than 2 seconds.   Neurological:      Mental Status: He is alert and oriented to person, place, and time.      Gait: Gait abnormal.   Psychiatric:         Mood and Affect: Mood normal.         Speech: Speech normal.         Behavior: Behavior is cooperative.         Thought Content: Thought content normal.         Cognition and Memory: Cognition and memory normal.       Result Review :  Results for orders placed or performed in visit on 06/28/24   Comprehensive Metabolic Panel    Specimen: Arm, Right; Blood   Result Value Ref Range    Glucose 160 (H) 65 - 99 mg/dL    BUN 9 6 - 20 mg/dL    Creatinine 0.85 0.76 - 1.27 mg/dL    Sodium 138 136 - " 145 mmol/L    Potassium 4.5 3.5 - 5.2 mmol/L    Chloride 100 98 - 107 mmol/L    CO2 22.5 22.0 - 29.0 mmol/L    Calcium 9.7 8.6 - 10.5 mg/dL    Total Protein 7.2 6.0 - 8.5 g/dL    Albumin 4.2 3.5 - 5.2 g/dL    ALT (SGPT) 31 1 - 41 U/L    AST (SGOT) 23 1 - 40 U/L    Alkaline Phosphatase 139 (H) 39 - 117 U/L    Total Bilirubin 0.3 0.0 - 1.2 mg/dL    Globulin 3.0 gm/dL    A/G Ratio 1.4 g/dL    BUN/Creatinine Ratio 10.6 7.0 - 25.0    Anion Gap 15.5 (H) 5.0 - 15.0 mmol/L    eGFR 101.4 >60.0 mL/min/1.73   Lipid Panel    Specimen: Arm, Right; Blood   Result Value Ref Range    Total Cholesterol 136 0 - 200 mg/dL    Triglycerides 178 (H) 0 - 150 mg/dL    HDL Cholesterol 35 (L) 40 - 60 mg/dL    LDL Cholesterol  71 0 - 100 mg/dL    VLDL Cholesterol 30 5 - 40 mg/dL    LDL/HDL Ratio 1.87    TSH    Specimen: Arm, Right; Blood   Result Value Ref Range    TSH 1.130 0.270 - 4.200 uIU/mL   Hemoglobin A1c    Specimen: Arm, Right; Blood   Result Value Ref Range    Hemoglobin A1C 7.20 (H) 4.80 - 5.60 %   Vitamin D,25-Hydroxy    Specimen: Arm, Right; Blood   Result Value Ref Range    25 Hydroxy, Vitamin D 24.2 (L) 30.0 - 100.0 ng/ml   Uric Acid    Specimen: Arm, Right; Blood   Result Value Ref Range    Uric Acid 3.1 (L) 3.4 - 7.0 mg/dL   T4, Free    Specimen: Arm, Right; Blood   Result Value Ref Range    Free T4 1.71 (H) 0.92 - 1.68 ng/dL   Vitamin B12    Specimen: Arm, Right; Blood   Result Value Ref Range    Vitamin B-12 910 211 - 946 pg/mL   Magnesium    Specimen: Arm, Right; Blood   Result Value Ref Range    Magnesium 2.2 1.6 - 2.6 mg/dL   Sedimentation Rate    Specimen: Arm, Right; Blood   Result Value Ref Range    Sed Rate 7 0 - 20 mm/hr   C-reactive Protein    Specimen: Arm, Right; Blood   Result Value Ref Range    C-Reactive Protein <0.30 0.00 - 0.50 mg/dL   CBC Auto Differential    Specimen: Arm, Right; Blood   Result Value Ref Range    WBC 11.59 (H) 3.40 - 10.80 10*3/mm3    RBC 5.74 4.14 - 5.80 10*6/mm3    Hemoglobin 18.2 (H)  13.0 - 17.7 g/dL    Hematocrit 52.8 (H) 37.5 - 51.0 %    MCV 92.0 79.0 - 97.0 fL    MCH 31.7 26.6 - 33.0 pg    MCHC 34.5 31.5 - 35.7 g/dL    RDW 11.6 (L) 12.3 - 15.4 %    RDW-SD 39.1 37.0 - 54.0 fl    MPV 10.7 6.0 - 12.0 fL    Platelets 284 140 - 450 10*3/mm3    Neutrophil % 50.8 42.7 - 76.0 %    Lymphocyte % 36.9 19.6 - 45.3 %    Monocyte % 8.6 5.0 - 12.0 %    Eosinophil % 2.7 0.3 - 6.2 %    Basophil % 0.7 0.0 - 1.5 %    Immature Grans % 0.3 0.0 - 0.5 %    Neutrophils, Absolute 5.88 1.70 - 7.00 10*3/mm3    Lymphocytes, Absolute 4.28 (H) 0.70 - 3.10 10*3/mm3    Monocytes, Absolute 1.00 (H) 0.10 - 0.90 10*3/mm3    Eosinophils, Absolute 0.31 0.00 - 0.40 10*3/mm3    Basophils, Absolute 0.08 0.00 - 0.20 10*3/mm3    Immature Grans, Absolute 0.04 0.00 - 0.05 10*3/mm3    nRBC 0.0 0.0 - 0.2 /100 WBC     BMI is >= 30 and <35. (Class 1 Obesity). The following options were offered after discussion;: Information on healthy weight added to patient's after visit summary.    Assessment & Plan     Diagnoses and all orders for this visit:    1. Type 2 diabetes, controlled, with neuropathy (Primary)  Comments:  Noted  A1c of 7.2 which is a significant improvement.  He will continue Xultophy and Trijardy  Orders:  -     glucose blood (OneTouch Ultra) test strip; Use as instructed  Dispense: 100 each; Refill: 3  -     Comprehensive Metabolic Panel; Future  -     Lipid Panel; Future  -     TSH; Future  -     Hemoglobin A1c; Future  -     Vitamin B12; Future    2. Uses self-applied continuous glucose monitoring device  Comments:  Is transitioning back to Dexcom 6    3. Benign essential hypertension  Comments:  Well-controlled.  Tolerating metoprolol 150 mg but reports he continues to have a fast heartbeat. Increase and change to metoprolol tartrate 100 twice daily  Orders:  -     metoprolol tartrate (LOPRESSOR) 100 MG tablet; Take 1 tablet by mouth 2 (Two) Times a Day.  Dispense: 180 tablet; Refill: 0    4. Mixed dyslipidemia    5.  Rheumatoid arthritis involving multiple sites with positive rheumatoid factor  Comments:  Stable with current medication he will continue methotrexate and folic acid. Declines Rheumatology referral   Orders:  -     cyclobenzaprine (FLEXERIL) 10 MG tablet; Take 1 tablet by mouth 2 (Two) Times a Day As Needed for Muscle Spasms.  Dispense: 60 tablet; Refill: 1  -     Diclofenac Sodium (VOLTAREN) 1 % gel gel; Apply  topically to the appropriate area as directed 4 (Four) Times a Day.  Dispense: 150 g; Refill: 3  -     folic acid (FOLVITE) 1 MG tablet; Take 1 tablet by mouth Daily.  Dispense: 90 tablet; Refill: 0  -     methotrexate 2.5 MG tablet; Take 10 tablets by mouth 1 (One) Time Per Week.  Dispense: 40 tablet; Refill: 4  -     CBC & Differential; Future  -     Sedimentation Rate; Future  -     C-reactive Protein; Future    6. Chronic insomnia  Comments:  Continue Amitriptyline 100 mg take 2-3 hours prior to bedtime  Orders:  -     amitriptyline (ELAVIL) 100 MG tablet; Take 1 tablet by mouth every night at bedtime.  Dispense: 30 tablet; Refill: 3    7. Gastroesophageal reflux disease without esophagitis  Comments:  Stable with Prevacid  Orders:  -     azelastine (ASTELIN) 0.1 % nasal spray; 2 sprays both nostrils twice daily as needed  Dispense: 1 each; Refill: 3  -     CBC & Differential; Future    8. Restrictive lung disease  Comments:  Continue use of albuterol inhaler.  Consider returning to pulmonology  Orders:  -     Albuterol Sulfate, sensor, (ProAir Digihaler) 108 (90 Base) MCG/ACT aerosol powder ; Inhale 2 Inhalations Every 6 (Six) Hours As Needed (wheezing/shortness of breath).  Dispense: 1 each; Refill: 3  -     CBC & Differential; Future    9. Vitamin D deficiency  Comments:   Vit D daily supplement  Orders:  -     Cholecalciferol (Vitamin D3) 50 MCG (2000 UT) capsule; Take 1 capsule by mouth Daily.  Dispense: 90 capsule; Refill: 0  -     Vitamin D,25-Hydroxy; Future    10. Chronic allergic  rhinitis  Comments:  Continue Azelatine NS and Singulair  Orders:  -     levocetirizine (XYZAL) 5 MG tablet; Take 1 tablet by mouth Every Evening.  Dispense: 90 tablet; Refill: 0    11. B12 deficiency  Comments:  Continue B12 supplementation  Orders:  -     vitamin B-12 (CYANOCOBALAMIN) 1000 MCG tablet; Take 1 tablet by mouth Daily.  Dispense: 90 tablet; Refill: 0    12. Postsurgical hypothyroidism  -     TSH; Future  -     T4, Free; Future    I spent 43 minutes caring for Raimundo on this date of service. This time includes time spent by me in the following activities:preparing for the visit, reviewing tests, obtaining and/or reviewing a separately obtained history, performing a medically appropriate examination and/or evaluation , counseling and educating the patient/family/caregiver, ordering medications, tests, or procedures, documenting information in the medical record, independently interpreting results and communicating that information with the patient/family/caregiver, and care coordination    Follow Up In October    Findings and recommendations discussed with Raimundo. Reviewed test results. Lifestyle modifications reinforced including nutrition and activity recommendations.  He will follow up in October sooner if problems/concerns occur.  Raimundo was given instructions and counseling regarding his condition or for health maintenance advice. Please see specific information pulled into the AVS if appropriate.      This document has been electronically signed by:

## 2024-08-21 DIAGNOSIS — E55.9 VITAMIN D DEFICIENCY: Chronic | ICD-10-CM

## 2024-08-21 DIAGNOSIS — E11.40 TYPE 2 DIABETES, CONTROLLED, WITH NEUROPATHY: Chronic | ICD-10-CM

## 2024-08-21 DIAGNOSIS — E53.8 B12 DEFICIENCY: Chronic | ICD-10-CM

## 2024-08-21 RX ORDER — LANOLIN ALCOHOL/MO/W.PET/CERES
1000 CREAM (GRAM) TOPICAL DAILY
Qty: 30 TABLET | Refills: 3 | OUTPATIENT
Start: 2024-08-21

## 2024-08-21 RX ORDER — ACETAMINOPHEN 160 MG
2000 TABLET,DISINTEGRATING ORAL DAILY
Qty: 30 CAPSULE | Refills: 3 | OUTPATIENT
Start: 2024-08-21

## 2024-09-16 DIAGNOSIS — M05.79 RHEUMATOID ARTHRITIS INVOLVING MULTIPLE SITES WITH POSITIVE RHEUMATOID FACTOR: Chronic | ICD-10-CM

## 2024-09-16 RX ORDER — METHOTREXATE 2.5 MG/1
TABLET ORAL
Qty: 40 TABLET | Refills: 4 | OUTPATIENT
Start: 2024-09-16

## 2024-09-18 DIAGNOSIS — J30.9 CHRONIC ALLERGIC RHINITIS: Chronic | ICD-10-CM

## 2024-09-18 DIAGNOSIS — E78.2 MIXED DYSLIPIDEMIA: Chronic | ICD-10-CM

## 2024-09-18 DIAGNOSIS — M05.79 RHEUMATOID ARTHRITIS INVOLVING MULTIPLE SITES WITH POSITIVE RHEUMATOID FACTOR: Chronic | ICD-10-CM

## 2024-09-18 DIAGNOSIS — F51.04 CHRONIC INSOMNIA: Chronic | ICD-10-CM

## 2024-09-18 RX ORDER — FOLIC ACID 1 MG/1
1000 TABLET ORAL DAILY
Qty: 30 TABLET | Refills: 0 | Status: SHIPPED | OUTPATIENT
Start: 2024-09-18

## 2024-09-18 RX ORDER — ROSUVASTATIN CALCIUM 5 MG/1
5 TABLET, COATED ORAL DAILY
Qty: 30 TABLET | Refills: 0 | Status: SHIPPED | OUTPATIENT
Start: 2024-09-18

## 2024-09-18 RX ORDER — AMITRIPTYLINE HYDROCHLORIDE 100 MG/1
100 TABLET ORAL
Qty: 30 TABLET | Refills: 0 | Status: SHIPPED | OUTPATIENT
Start: 2024-09-18

## 2024-09-18 RX ORDER — LEVOCETIRIZINE DIHYDROCHLORIDE 5 MG/1
5 TABLET, FILM COATED ORAL EVERY EVENING
Qty: 30 TABLET | Refills: 0 | Status: SHIPPED | OUTPATIENT
Start: 2024-09-18

## 2024-09-18 RX ORDER — FLUTICASONE PROPIONATE 50 MCG
SPRAY, SUSPENSION (ML) NASAL
Qty: 16 G | Refills: 0 | Status: SHIPPED | OUTPATIENT
Start: 2024-09-18

## 2024-10-11 ENCOUNTER — LAB (OUTPATIENT)
Dept: FAMILY MEDICINE CLINIC | Facility: CLINIC | Age: 59
End: 2024-10-11
Payer: COMMERCIAL

## 2024-10-11 DIAGNOSIS — J98.4 RESTRICTIVE LUNG DISEASE: ICD-10-CM

## 2024-10-11 DIAGNOSIS — E55.9 VITAMIN D DEFICIENCY: Chronic | ICD-10-CM

## 2024-10-11 DIAGNOSIS — K21.9 GASTROESOPHAGEAL REFLUX DISEASE WITHOUT ESOPHAGITIS: ICD-10-CM

## 2024-10-11 DIAGNOSIS — E89.0 POSTSURGICAL HYPOTHYROIDISM: ICD-10-CM

## 2024-10-11 DIAGNOSIS — M05.79 RHEUMATOID ARTHRITIS INVOLVING MULTIPLE SITES WITH POSITIVE RHEUMATOID FACTOR: ICD-10-CM

## 2024-10-11 DIAGNOSIS — E11.40 TYPE 2 DIABETES, CONTROLLED, WITH NEUROPATHY: Chronic | ICD-10-CM

## 2024-10-11 PROCEDURE — 80061 LIPID PANEL: CPT | Performed by: NURSE PRACTITIONER

## 2024-10-11 PROCEDURE — 82306 VITAMIN D 25 HYDROXY: CPT | Performed by: NURSE PRACTITIONER

## 2024-10-11 PROCEDURE — 85652 RBC SED RATE AUTOMATED: CPT | Performed by: NURSE PRACTITIONER

## 2024-10-11 PROCEDURE — 83036 HEMOGLOBIN GLYCOSYLATED A1C: CPT | Performed by: NURSE PRACTITIONER

## 2024-10-11 PROCEDURE — 80050 GENERAL HEALTH PANEL: CPT | Performed by: NURSE PRACTITIONER

## 2024-10-11 PROCEDURE — 82607 VITAMIN B-12: CPT | Performed by: NURSE PRACTITIONER

## 2024-10-11 PROCEDURE — 84439 ASSAY OF FREE THYROXINE: CPT | Performed by: NURSE PRACTITIONER

## 2024-10-11 PROCEDURE — 86140 C-REACTIVE PROTEIN: CPT | Performed by: NURSE PRACTITIONER

## 2024-10-12 LAB
25(OH)D3 SERPL-MCNC: 18.5 NG/ML (ref 30–100)
ALBUMIN SERPL-MCNC: 3.9 G/DL (ref 3.5–5.2)
ALBUMIN/GLOB SERPL: 1.4 G/DL
ALP SERPL-CCNC: 109 U/L (ref 39–117)
ALT SERPL W P-5'-P-CCNC: 22 U/L (ref 1–41)
ANION GAP SERPL CALCULATED.3IONS-SCNC: 9 MMOL/L (ref 5–15)
AST SERPL-CCNC: 19 U/L (ref 1–40)
BASOPHILS # BLD AUTO: 0.08 10*3/MM3 (ref 0–0.2)
BASOPHILS NFR BLD AUTO: 0.9 % (ref 0–1.5)
BILIRUB SERPL-MCNC: 0.4 MG/DL (ref 0–1.2)
BUN SERPL-MCNC: 14 MG/DL (ref 6–20)
BUN/CREAT SERPL: 15.6 (ref 7–25)
CALCIUM SPEC-SCNC: 9.2 MG/DL (ref 8.6–10.5)
CHLORIDE SERPL-SCNC: 105 MMOL/L (ref 98–107)
CHOLEST SERPL-MCNC: 110 MG/DL (ref 0–200)
CO2 SERPL-SCNC: 24 MMOL/L (ref 22–29)
CREAT SERPL-MCNC: 0.9 MG/DL (ref 0.76–1.27)
CRP SERPL-MCNC: <0.3 MG/DL (ref 0–0.5)
DEPRECATED RDW RBC AUTO: 39.2 FL (ref 37–54)
EGFRCR SERPLBLD CKD-EPI 2021: 99 ML/MIN/1.73
EOSINOPHIL # BLD AUTO: 0.31 10*3/MM3 (ref 0–0.4)
EOSINOPHIL NFR BLD AUTO: 3.6 % (ref 0.3–6.2)
ERYTHROCYTE [DISTWIDTH] IN BLOOD BY AUTOMATED COUNT: 11.8 % (ref 12.3–15.4)
ERYTHROCYTE [SEDIMENTATION RATE] IN BLOOD: 5 MM/HR (ref 0–20)
GLOBULIN UR ELPH-MCNC: 2.8 GM/DL
GLUCOSE SERPL-MCNC: 127 MG/DL (ref 65–99)
HBA1C MFR BLD: 6.4 % (ref 4.8–5.6)
HCT VFR BLD AUTO: 52.7 % (ref 37.5–51)
HDLC SERPL-MCNC: 23 MG/DL (ref 40–60)
HGB BLD-MCNC: 18.2 G/DL (ref 13–17.7)
IMM GRANULOCYTES # BLD AUTO: 0.03 10*3/MM3 (ref 0–0.05)
IMM GRANULOCYTES NFR BLD AUTO: 0.3 % (ref 0–0.5)
LDLC SERPL CALC-MCNC: 69 MG/DL (ref 0–100)
LDLC/HDLC SERPL: 2.97 {RATIO}
LYMPHOCYTES # BLD AUTO: 2.86 10*3/MM3 (ref 0.7–3.1)
LYMPHOCYTES NFR BLD AUTO: 33.1 % (ref 19.6–45.3)
MCH RBC QN AUTO: 31.5 PG (ref 26.6–33)
MCHC RBC AUTO-ENTMCNC: 34.5 G/DL (ref 31.5–35.7)
MCV RBC AUTO: 91.3 FL (ref 79–97)
MONOCYTES # BLD AUTO: 0.93 10*3/MM3 (ref 0.1–0.9)
MONOCYTES NFR BLD AUTO: 10.8 % (ref 5–12)
NEUTROPHILS NFR BLD AUTO: 4.44 10*3/MM3 (ref 1.7–7)
NEUTROPHILS NFR BLD AUTO: 51.3 % (ref 42.7–76)
NRBC BLD AUTO-RTO: 0 /100 WBC (ref 0–0.2)
PLATELET # BLD AUTO: 305 10*3/MM3 (ref 140–450)
PMV BLD AUTO: 10.2 FL (ref 6–12)
POTASSIUM SERPL-SCNC: 4.8 MMOL/L (ref 3.5–5.2)
PROT SERPL-MCNC: 6.7 G/DL (ref 6–8.5)
RBC # BLD AUTO: 5.77 10*6/MM3 (ref 4.14–5.8)
SODIUM SERPL-SCNC: 138 MMOL/L (ref 136–145)
T4 FREE SERPL-MCNC: 1.41 NG/DL (ref 0.92–1.68)
TRIGL SERPL-MCNC: 94 MG/DL (ref 0–150)
TSH SERPL DL<=0.05 MIU/L-ACNC: 1.77 UIU/ML (ref 0.27–4.2)
VIT B12 BLD-MCNC: 911 PG/ML (ref 211–946)
VLDLC SERPL-MCNC: 18 MG/DL (ref 5–40)
WBC NRBC COR # BLD AUTO: 8.65 10*3/MM3 (ref 3.4–10.8)

## 2024-10-16 DIAGNOSIS — K21.9 GASTROESOPHAGEAL REFLUX DISEASE WITHOUT ESOPHAGITIS: Chronic | ICD-10-CM

## 2024-10-16 RX ORDER — LANSOPRAZOLE 30 MG/1
30 CAPSULE, DELAYED RELEASE ORAL DAILY
Qty: 30 CAPSULE | Refills: 3 | Status: SHIPPED | OUTPATIENT
Start: 2024-10-16

## 2024-10-18 ENCOUNTER — OFFICE VISIT (OUTPATIENT)
Dept: FAMILY MEDICINE CLINIC | Facility: CLINIC | Age: 59
End: 2024-10-18
Payer: COMMERCIAL

## 2024-10-18 VITALS
TEMPERATURE: 96.9 F | BODY MASS INDEX: 30.66 KG/M2 | WEIGHT: 207 LBS | OXYGEN SATURATION: 96 % | HEART RATE: 102 BPM | HEIGHT: 69 IN | RESPIRATION RATE: 14 BRPM | DIASTOLIC BLOOD PRESSURE: 66 MMHG | SYSTOLIC BLOOD PRESSURE: 100 MMHG

## 2024-10-18 DIAGNOSIS — M05.79 RHEUMATOID ARTHRITIS INVOLVING MULTIPLE SITES WITH POSITIVE RHEUMATOID FACTOR: Chronic | ICD-10-CM

## 2024-10-18 DIAGNOSIS — Z79.4 TYPE 2 DIABETES MELLITUS WITH HYPERGLYCEMIA, WITH LONG-TERM CURRENT USE OF INSULIN: Primary | Chronic | ICD-10-CM

## 2024-10-18 DIAGNOSIS — E53.8 B12 DEFICIENCY: Chronic | ICD-10-CM

## 2024-10-18 DIAGNOSIS — J98.4 RESTRICTIVE LUNG DISEASE: Chronic | ICD-10-CM

## 2024-10-18 DIAGNOSIS — E11.65 TYPE 2 DIABETES MELLITUS WITH HYPERGLYCEMIA, WITH LONG-TERM CURRENT USE OF INSULIN: Primary | Chronic | ICD-10-CM

## 2024-10-18 DIAGNOSIS — Z23 ENCOUNTER FOR IMMUNIZATION: ICD-10-CM

## 2024-10-18 DIAGNOSIS — E55.9 VITAMIN D DEFICIENCY: Chronic | ICD-10-CM

## 2024-10-18 DIAGNOSIS — E78.2 MIXED DYSLIPIDEMIA: Chronic | ICD-10-CM

## 2024-10-18 DIAGNOSIS — I10 BENIGN ESSENTIAL HYPERTENSION: Chronic | ICD-10-CM

## 2024-10-18 DIAGNOSIS — J30.9 CHRONIC ALLERGIC RHINITIS: Chronic | ICD-10-CM

## 2024-10-18 DIAGNOSIS — F51.04 CHRONIC INSOMNIA: Chronic | ICD-10-CM

## 2024-10-18 PROCEDURE — 1159F MED LIST DOCD IN RCRD: CPT | Performed by: NURSE PRACTITIONER

## 2024-10-18 PROCEDURE — 1160F RVW MEDS BY RX/DR IN RCRD: CPT | Performed by: NURSE PRACTITIONER

## 2024-10-18 PROCEDURE — 1125F AMNT PAIN NOTED PAIN PRSNT: CPT | Performed by: NURSE PRACTITIONER

## 2024-10-18 PROCEDURE — 90471 IMMUNIZATION ADMIN: CPT | Performed by: NURSE PRACTITIONER

## 2024-10-18 PROCEDURE — 3078F DIAST BP <80 MM HG: CPT | Performed by: NURSE PRACTITIONER

## 2024-10-18 PROCEDURE — 82550 ASSAY OF CK (CPK): CPT | Performed by: NURSE PRACTITIONER

## 2024-10-18 PROCEDURE — 86038 ANTINUCLEAR ANTIBODIES: CPT | Performed by: NURSE PRACTITIONER

## 2024-10-18 PROCEDURE — 3074F SYST BP LT 130 MM HG: CPT | Performed by: NURSE PRACTITIONER

## 2024-10-18 PROCEDURE — 99214 OFFICE O/P EST MOD 30 MIN: CPT | Performed by: NURSE PRACTITIONER

## 2024-10-18 PROCEDURE — 90656 IIV3 VACC NO PRSV 0.5 ML IM: CPT | Performed by: NURSE PRACTITIONER

## 2024-10-18 PROCEDURE — 3044F HG A1C LEVEL LT 7.0%: CPT | Performed by: NURSE PRACTITIONER

## 2024-10-18 PROCEDURE — 86431 RHEUMATOID FACTOR QUANT: CPT | Performed by: NURSE PRACTITIONER

## 2024-10-18 RX ORDER — LANOLIN ALCOHOL/MO/W.PET/CERES
1000 CREAM (GRAM) TOPICAL DAILY
Qty: 90 TABLET | Refills: 0 | Status: SHIPPED | OUTPATIENT
Start: 2024-10-18

## 2024-10-18 RX ORDER — (INSULIN DEGLUDEC AND LIRAGLUTIDE) 100; 3.6 [IU]/ML; MG/ML
50 INJECTION, SOLUTION SUBCUTANEOUS DAILY
Qty: 45 ML | Refills: 2 | Status: SHIPPED | OUTPATIENT
Start: 2024-10-18

## 2024-10-18 RX ORDER — FOLIC ACID 1 MG/1
1000 TABLET ORAL DAILY
Qty: 90 TABLET | Refills: 0 | Status: SHIPPED | OUTPATIENT
Start: 2024-10-18

## 2024-10-18 RX ORDER — ACETAMINOPHEN 160 MG
2000 TABLET,DISINTEGRATING ORAL DAILY
Qty: 90 CAPSULE | Refills: 0 | Status: SHIPPED | OUTPATIENT
Start: 2024-10-18

## 2024-10-18 RX ORDER — AMITRIPTYLINE HYDROCHLORIDE 100 MG/1
100 TABLET ORAL
Qty: 30 TABLET | Refills: 3 | Status: SHIPPED | OUTPATIENT
Start: 2024-10-18

## 2024-10-18 RX ORDER — EMPAGLIFLOZIN, LINAGLIPTIN, METFORMIN HYDROCHLORIDE 25; 5; 1000 MG/1; MG/1; MG/1
1 TABLET, EXTENDED RELEASE ORAL EVERY MORNING
Qty: 90 TABLET | Refills: 0 | Status: SHIPPED | OUTPATIENT
Start: 2024-10-18

## 2024-10-18 RX ORDER — ROSUVASTATIN CALCIUM 5 MG/1
5 TABLET, COATED ORAL DAILY
Qty: 30 TABLET | Refills: 3 | Status: SHIPPED | OUTPATIENT
Start: 2024-10-18

## 2024-10-18 RX ORDER — ALBUTEROL SULFATE 90 UG/1
2 INHALANT RESPIRATORY (INHALATION) EVERY 6 HOURS PRN
Qty: 1 EACH | Refills: 3 | Status: SHIPPED | OUTPATIENT
Start: 2024-10-18

## 2024-10-18 RX ORDER — LEVOCETIRIZINE DIHYDROCHLORIDE 5 MG/1
5 TABLET, FILM COATED ORAL EVERY EVENING
Qty: 90 TABLET | Refills: 0 | Status: SHIPPED | OUTPATIENT
Start: 2024-10-18

## 2024-10-18 RX ORDER — MONTELUKAST SODIUM 10 MG/1
10 TABLET ORAL
Qty: 90 TABLET | Refills: 0 | Status: SHIPPED | OUTPATIENT
Start: 2024-10-18

## 2024-10-18 RX ORDER — METOPROLOL TARTRATE 100 MG
100 TABLET ORAL 2 TIMES DAILY
Qty: 180 TABLET | Refills: 0 | Status: SHIPPED | OUTPATIENT
Start: 2024-10-18

## 2024-10-18 NOTE — PROGRESS NOTES
History of Present Illness  Raimundo Munguia is a 60 yo male who presents to [facility] today pertaining to his DM, type 2, HTN, GERD, Rheumatoid Arthritis and Dyslipidemia.  In addition, he has been diagnosed with insomnia and Vitamin deficiencies.  He did have recent labs which will be reviewed with him today.    Diabetes   He has type 2 diabetes mellitus. His disease course has been improving since starting Xultophy. Risk factors for coronary artery disease include dyslipidemia, hypertension, male sex, sedentary lifestyle and tobacco exposure. Current diabetic treatment includes Trijardy and Xultophy. An ACE inhibitor/angiotensin II receptor blocker is not being taken due to S/e.  He is utilizing a Dexcom 6 for continuous glucose monitoring.    Lab Results   Component Value Date    HGBA1C 9.00 (H) 03/22/2024     Lab Results   Component Value Date    HGBA1C 7.20 (H) 06/28/2024     Lab Results   Component Value Date    HGBA1C 6.40 (H) 10/11/2024      Hypertension/Tachycardia  The current episode started more than 1 year ago.His BP is adequately controlled. His hypertensive medication is Toprol  mg bid.  Lab Results   Component Value Date    GLUCOSE 127 (H) 10/11/2024    BUN 14 10/11/2024    CREATININE 0.90 10/11/2024     10/11/2024    K 4.8 10/11/2024     10/11/2024    CALCIUM 9.2 10/11/2024    PROTEINTOT 6.7 10/11/2024    ALBUMIN 3.9 10/11/2024    ALT 22 10/11/2024    AST 19 10/11/2024    ALKPHOS 109 10/11/2024    BILITOT 0.4 10/11/2024    GLOB 2.8 10/11/2024    AGRATIO 1.4 10/11/2024    BCR 15.6 10/11/2024    ANIONGAP 9.0 10/11/2024    EGFR 99.0 10/11/2024     Dyslipidemia   This is a chronic problem. The current episode started more than 1 year ago. Factors aggravating his hyperlipidemia include fatty foods. Current antihyperlipidemic treatment includes statins.  Compliance problems include adherence to diet and adherence to exercise.  Risk factors for  Onset: 6/20   Location / description: Patient notes loose stool - has had 3 episodes today since 0900. States over the last few days she can have up to 6 or more episodes per day. Also notes nausea (patient states it is constant) and makes it hard to eat. Has only vomited once on Saturday. Is able to drink some fluids. Has still been urinating but a smaller amount. No fever. Denies dizziness, states she feels groggy and tired all the time but can't sleep. Denies abdominal pain. Patient states she has had a history of similar symptoms since April but over the last week symptoms have worsened. Precipitating Factors: Patient has one kidney. Pain Scale (1-10), 10 highest: Denies  Associated Symptoms: NA  What improves/worsens symptoms: NA  Symptom specific medications: None   LMP : No LMP recorded. Patient is postmenopausal.  Are you pregnant or breast feeding: NA  Recent visits (last 3-4 weeks) for same reason or recent surgery: NA     PLAN: Recommended evaluation within 24 hours. Patient verified understanding. Patient states she has an INR check tomorrow and will go to urgent care after that visit.        Reason for Disposition  â¢ [1] MODERATE diarrhea (e.g., 4-6 times / day more than normal) AND [2] present > 48 hours (2 days)    Protocols used: Taisha Chen coronary artery disease include diabetes mellitus, dyslipidemia, hypertension, male sex and obesity.   Lab Results   Component Value Date    CHOL 110 10/11/2024    CHOL 136 06/28/2024    CHOL 116 03/22/2024     Lab Results   Component Value Date    TRIG 94 10/11/2024    TRIG 178 (H) 06/28/2024    TRIG 179 (H) 03/22/2024     Lab Results   Component Value Date    HDL 23 (L) 10/11/2024    HDL 35 (L) 06/28/2024    HDL 29 (L) 03/22/2024     Lab Results   Component Value Date    LDL 69 10/11/2024    LDL 71 06/28/2024    LDL 57 03/22/2024      The following portions of the patient's history were reviewed and updated as appropriate: allergies, current medications, past family history, past medical history, past social history, past surgical history and problem list.    Review of Systems   Constitutional:  Negative for activity change, appetite change, chills, fatigue, fever and unexpected weight change.   HENT:  Positive for hearing loss (Chronic). Negative for voice change.    Eyes:  Negative for visual disturbance.   Respiratory:  Negative for cough, shortness of breath and wheezing.    Cardiovascular:  Positive for palpitations (Improving with metoprolol). Negative for chest pain and leg swelling.   Gastrointestinal:  Positive for nausea (Intermittent). Negative for vomiting.   Endocrine: Negative for cold intolerance, heat intolerance, polydipsia, polyphagia and polyuria.   Musculoskeletal:  Positive for arthralgias, back pain and joint swelling.   Skin:  Negative for color change and rash.   Allergic/Immunologic: Positive for environmental allergies and immunocompromised state.   Neurological:  Negative for dizziness, tremors, speech difficulty, weakness and light-headedness.   Hematological:  Negative for adenopathy.   Psychiatric/Behavioral:  Positive for sleep disturbance. Negative for confusion and decreased concentration. The patient is not nervous/anxious.    All other systems reviewed and are negative.    Vital  "signs:  /66 (BP Location: Right arm, Patient Position: Sitting, Cuff Size: Adult)   Pulse 102   Temp 96.9 °F (36.1 °C) (Temporal)   Resp 14   Ht 175.3 cm (69.02\")   Wt 93.9 kg (207 lb)   SpO2 96%   BMI 30.55 kg/m²     Physical Exam  Vitals and nursing note reviewed.   Constitutional:       General: He is not in acute distress.     Appearance: He is well-developed.   HENT:      Head: Normocephalic.      Nose: Nose normal.   Eyes:      General: No scleral icterus.        Right eye: No discharge.         Left eye: No discharge.      Conjunctiva/sclera: Conjunctivae normal.   Cardiovascular:      Rate and Rhythm: Normal rate and regular rhythm.      Heart sounds: Normal heart sounds. No murmur heard.     No friction rub.   Pulmonary:      Effort: Pulmonary effort is normal. No respiratory distress.      Breath sounds: Decreased breath sounds present. No wheezing, rhonchi or rales.   Abdominal:      General: Abdomen is protuberant. Bowel sounds are normal.      Palpations: Abdomen is soft.      Tenderness: There is no abdominal tenderness. There is no guarding.   Musculoskeletal:         General: Deformity (Multiple Rheumatoid nodules bilateral upper extremities) present. No tenderness.      Cervical back: Neck supple.      Right lower leg: No edema.      Left lower leg: No edema.   Lymphadenopathy:      Cervical: No cervical adenopathy.   Skin:     General: Skin is warm and dry.      Capillary Refill: Capillary refill takes less than 2 seconds.   Neurological:      Mental Status: He is alert and oriented to person, place, and time.      Gait: Gait abnormal.   Psychiatric:         Mood and Affect: Mood normal.         Speech: Speech normal.         Behavior: Behavior is cooperative.         Thought Content: Thought content normal.         Cognition and Memory: Cognition and memory normal.     Result Review :  Results for orders placed or performed in visit on 10/18/24   Rheumatoid Factor    Collection Time: " 10/18/24  2:52 PM    Specimen: Arm, Left; Blood   Result Value Ref Range    Rheumatoid Factor Quantitative 43.0 (H) 0.0 - 14.0 IU/mL   CK    Collection Time: 10/18/24  2:52 PM    Specimen: Arm, Left; Blood   Result Value Ref Range    Creatine Kinase 42 20 - 200 U/L     BMI is >= 30 and <35. (Class 1 Obesity). The following options were offered after discussion;: Information on healthy weight added to patient's after visit summary.    Assessment & Plan     Diagnoses and all orders for this visit:    1. Type 2 diabetes mellitus with hyperglycemia, with long-term current use of insulin (Primary)  Comments:  Continue Trijardy 25 - 5 - 1000 every morning and. Xultophy  Orders:  -     Empagliflozin-Linaglip-Metform (Trijardy XR) 25-5-1000 MG tablet sustained-release 24 hour; Take 1 tablet by mouth Every Morning.  Dispense: 90 tablet; Refill: 0  -     Insulin Degludec-Liraglutide (Xultophy) 100-3.6 UNIT-MG/ML solution pen-injector subcutaneous pen; Inject 50 Units under the skin into the appropriate area as directed Daily.  Dispense: 45 mL; Refill: 2    2. Benign essential hypertension  Comments:  Well-controlled.  Tolerating metoprolol tartrate 100 twice daily  Orders:  -     metoprolol tartrate (LOPRESSOR) 100 MG tablet; Take 1 tablet by mouth 2 (Two) Times a Day.  Dispense: 180 tablet; Refill: 0    3. Mixed dyslipidemia  Comments:  Continue rosuvastatin  Orders:  -     rosuvastatin (CRESTOR) 5 MG tablet; Take 1 tablet by mouth Daily.  Dispense: 30 tablet; Refill: 3    4. Rheumatoid arthritis involving multiple sites with positive rheumatoid factor  Comments:  Reports he has put methotrexate on hold  Previously declined Rheumatology referral but at this time receptive  Orders:  -     Rheumatoid Factor  -     KEHINDE  -     CK  -     folic acid (FOLVITE) 1 MG tablet; Take 1 tablet by mouth Daily.  Dispense: 90 tablet; Refill: 0  -     Ambulatory Referral to Rheumatology    5. Restrictive lung disease  Comments:  Continue use  of albuterol inhaler.  Consider returning to pulmonology  Orders:  -     Albuterol Sulfate, sensor, (ProAir Digihaler) 108 (90 Base) MCG/ACT aerosol powder ; Inhale 2 Inhalations Every 6 (Six) Hours As Needed (wheezing/shortness of breath).  Dispense: 1 each; Refill: 3    6. Chronic insomnia  Comments:  Continue Amitriptyline 100 mg take 2-3 hours prior to bedtime  Orders:  -     amitriptyline (ELAVIL) 100 MG tablet; Take 1 tablet by mouth every night at bedtime.  Dispense: 30 tablet; Refill: 3    7. Chronic allergic rhinitis  Comments:  Continue Azelatine NS and Singulair  Orders:  -     levocetirizine (XYZAL) 5 MG tablet; Take 1 tablet by mouth Every Evening.  Dispense: 90 tablet; Refill: 0  -     montelukast (SINGULAIR) 10 MG tablet; Take 1 tablet by mouth every night at bedtime.  Dispense: 90 tablet; Refill: 0    8. Vitamin D deficiency  Comments:   Vit D daily supplement  Orders:  -     Cholecalciferol (Vitamin D3) 50 MCG (2000 UT) capsule; Take 1 capsule by mouth Daily.  Dispense: 90 capsule; Refill: 0    9. B12 deficiency  Comments:  Continue B12 supplementation  Orders:  -     vitamin B-12 (CYANOCOBALAMIN) 1000 MCG tablet; Take 1 tablet by mouth Daily.  Dispense: 90 tablet; Refill: 0    10. Encounter for immunization  Comments:  Influenza vaccination given today  Orders:  -     Fluzone >6mos ([id number])    Follow Up In February  Findings and recommendations discussed with Raimundo. Reviewed test results. Lifestyle modifications reinforced including nutrition and activity recommendations.  He will follow up in February sooner if problems/concerns occur.  Raimundo was given instructions and counseling regarding his condition or for health maintenance advice. Please see specific information pulled into the AVS if appropriate.    This document has been electronically signed by:

## 2024-10-19 DIAGNOSIS — E89.0 POSTSURGICAL HYPOTHYROIDISM: ICD-10-CM

## 2024-10-19 DIAGNOSIS — E11.65 TYPE 2 DIABETES MELLITUS WITH HYPERGLYCEMIA, WITH LONG-TERM CURRENT USE OF INSULIN: ICD-10-CM

## 2024-10-19 DIAGNOSIS — M05.79 RHEUMATOID ARTHRITIS INVOLVING MULTIPLE SITES WITH POSITIVE RHEUMATOID FACTOR: ICD-10-CM

## 2024-10-19 DIAGNOSIS — K21.9 GASTROESOPHAGEAL REFLUX DISEASE WITHOUT ESOPHAGITIS: ICD-10-CM

## 2024-10-19 DIAGNOSIS — Z97.8 USES SELF-APPLIED CONTINUOUS GLUCOSE MONITORING DEVICE: ICD-10-CM

## 2024-10-19 DIAGNOSIS — E11.40 TYPE 2 DIABETES, CONTROLLED, WITH NEUROPATHY: Primary | ICD-10-CM

## 2024-10-19 DIAGNOSIS — M62.838 MUSCLE SPASMS OF BOTH LOWER EXTREMITIES: ICD-10-CM

## 2024-10-19 DIAGNOSIS — Z79.4 TYPE 2 DIABETES MELLITUS WITH HYPERGLYCEMIA, WITH LONG-TERM CURRENT USE OF INSULIN: ICD-10-CM

## 2024-10-19 DIAGNOSIS — E55.9 VITAMIN D DEFICIENCY: ICD-10-CM

## 2024-10-19 DIAGNOSIS — E78.2 MIXED DYSLIPIDEMIA: ICD-10-CM

## 2024-10-19 LAB
CHROMATIN AB SERPL-ACNC: 43 IU/ML (ref 0–14)
CK SERPL-CCNC: 42 U/L (ref 20–200)

## 2024-10-21 LAB — ANA SER QL: NEGATIVE

## 2024-10-24 DIAGNOSIS — J30.9 CHRONIC ALLERGIC RHINITIS: Chronic | ICD-10-CM

## 2024-10-24 RX ORDER — FLUTICASONE PROPIONATE 50 UG/1
SPRAY, METERED NASAL
Qty: 16 G | Refills: 0 | OUTPATIENT
Start: 2024-10-24

## 2024-10-28 ENCOUNTER — PATIENT ROUNDING (BHMG ONLY) (OUTPATIENT)
Dept: FAMILY MEDICINE CLINIC | Facility: CLINIC | Age: 59
End: 2024-10-28
Payer: COMMERCIAL

## 2024-10-28 NOTE — PROGRESS NOTES
October 28, 2024    Hello, may I speak with Raimundo Munguia?    My name is marvin       I am  with [facility]  [address] [place] [id number] [phone].    Before we get started may I verify your date of birth? 1965    I am calling to officially welcome you to our practice and ask about your recent visit. Is this a good time to talk? yes    Tell me about your visit with us. What things went well?  ana ariza is good provider        We're always looking for ways to make our patients' experiences even better. Do you have recommendations on ways we may improve?  no    Overall were you satisfied with your first visit to our practice? yes       I appreciate you taking the time to speak with me today. Is there anything else I can do for you? no      Thank you, and have a great day.

## 2024-11-08 ENCOUNTER — OFFICE VISIT (OUTPATIENT)
Dept: ENDOCRINOLOGY | Facility: CLINIC | Age: 59
End: 2024-11-08
Payer: COMMERCIAL

## 2024-11-08 VITALS
HEART RATE: 91 BPM | BODY MASS INDEX: 30.35 KG/M2 | DIASTOLIC BLOOD PRESSURE: 75 MMHG | WEIGHT: 205.6 LBS | OXYGEN SATURATION: 96 % | SYSTOLIC BLOOD PRESSURE: 107 MMHG

## 2024-11-08 DIAGNOSIS — E89.0 POSTSURGICAL HYPOTHYROIDISM: Primary | ICD-10-CM

## 2024-11-08 PROCEDURE — 84432 ASSAY OF THYROGLOBULIN: CPT | Performed by: NURSE PRACTITIONER

## 2024-11-08 PROCEDURE — 84443 ASSAY THYROID STIM HORMONE: CPT | Performed by: NURSE PRACTITIONER

## 2024-11-08 PROCEDURE — 84439 ASSAY OF FREE THYROXINE: CPT | Performed by: NURSE PRACTITIONER

## 2024-11-08 NOTE — ASSESSMENT & PLAN NOTE
-Clinically euthyroid.  -TFT's today with medication adjustment accordingly.  Reminded of proper administration including taking 7 pills per week on an empty stomach with no missed doses, waiting 30-60 minutes prior to other medications or food.  -Follow-up in 1 year.

## 2024-11-08 NOTE — PROGRESS NOTES
Raimundo Munguia is a 59 y.o. male had concerns including Follow-up (1yr follow up, last labs 10/19/2024).   Post-surgical hypothyroidism.    He has been doing well.  He denies any changes recently.  He has been taking T4 112 mcg QD.  He has not been missing any doses.  His most recent labs were in range.  He reports that he is starting to have some increased compressive s/sx's.  He has not had any imaging since his last visit.    History:  Patient had a right hemithyroidectomy by Dr. Wynn in 4/2022 that was noted to be benign follicular nodule.   He had a CT Chest with note of a thyroid goiter with substernal extension in 03/2021.  Patient states that prior to this he was not aware of any prior thyroid issues.  His most recent TFT's were within normal range.    He states he has a hard time swallowing sometimes.  He feels like he has some mild compressive symptoms when he is trying to swallow something. Sometimes he has symptoms at night that wake him up, but that he also wears oxygen.  Has never been told that his thyroid was enlarged that he recalls.    His mother had graves disease with thyroidectomy in the past.    Birth state:[place]  Previous history of radiation to face/neck:none  Consuming foods high in iodine: none  Family history of thyroid complications: mom-graves diease, none other, no hx of thyroid CA in family    Patient was seen by Dr. Wynn, General Surgery, for surgical eval in Jan 2022 to evaluate the need for removal.  He was informed that he does need surgery and that they will get him on the schedule.  He has not heard from them since that time.  Contacted the office today and they informed that the hospital facility that they use for surgery is just now allowing for them to perform surgeries in the last couple of weeks and are limiting how many they are allowed to do at this time.  They are working on getting him on the surgery schedule and they will be contacting him for surgical  "date.    The following portions of the patient's history were reviewed and updated as appropriate: allergies, current medications, past family history, past medical history, past social history, past surgical history and problem list.    Past Medical History:   Diagnosis Date    Arthritis     Asthma     inhlaer  prn    Cancer     \"[facility]\" PT HAD COLON RESECTION    Cervicalgia     CVA (cerebral vascular accident)     around 2012; \"light\" residual dysarthria    Diabetes mellitus 2015    po meds as well as insulin daily; checks sugars at home    Elevated cholesterol     GERD (gastroesophageal reflux disease)     Helicobacter pylori (H. 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    Vitamin D deficiency      Past Surgical History:   Procedure Laterality Date    COLON RESECTION N/A 02/22/2018    Procedure: COLON RESECTION SIGMOID;  Surgeon: Ej Tinoco MD;  Location: [facility] OR;  Service:     COLONOSCOPY N/A 02/07/2018    Procedure: COLONOSCOPY CPT CODE: 98591;  Surgeon: Teodoro Garg III, MD;  Location: [facility] OR;  Service:     COLONOSCOPY N/A 03/07/2019    Procedure: COLONOSCOPY FOR SCREENING CPT CODE: ;  Surgeon: Ej Tinoco MD;  Location: [facility] OR;  Service: Gastroenterology    COLONOSCOPY N/A 5/24/2024    Procedure: COLONOSCOPY;  Surgeon: Ej Tinoco MD;  Location:  [facility] OR;  Service: Gastroenterology;  Laterality: N/A;    ENDOSCOPY N/A 02/07/2018    Procedure: ESOPHAGOGASTRODUODENOSCOPY WITH DILATATION CPT CODE: 71259;  Surgeon: Teodoro Garg III, MD;  Location:  [facility] OR;  Service:     THYROIDECTOMY Right 4/15/2022    Procedure: THYROIDECTOMY RIGHT LOBECTOMY;  Surgeon: Fidencio Wynn MD;  Location:  [facility] OR;  " Service: General;  Laterality: Right;      Family History   Problem Relation Age of Onset    Diabetes Mother     Hypertension Mother     Hypertension Father     Hyperlipidemia Father     Diabetes Sister     Hyperlipidemia Sister     Hypertension Sister     Hypertension Brother     Hyperlipidemia Brother       Social History     Socioeconomic History    Marital status: Legally      Spouse name: tess    Number of children: 3    Years of education: 9   Tobacco Use    Smoking status: Never     Passive exposure: Current    Smokeless tobacco: Current     Types: Snuff, Chew    Tobacco comments:     one can every 2-3 days; 25-26 years of use   Vaping Use    Vaping status: Never Used   Substance and Sexual Activity    Alcohol use: No    Drug use: No    Sexual activity: Defer     Partners: Female      No Known Allergies   Current Outpatient Medications on File Prior to Visit   Medication Sig Dispense Refill    Albuterol Sulfate, sensor, (ProAir Digihaler) 108 (90 Base) MCG/ACT aerosol powder  Inhale 2 Inhalations Every 6 (Six) Hours As Needed (wheezing/shortness of breath). 1 each 3    Cholecalciferol (Vitamin D3) 50 MCG (2000 UT) capsule Take 1 capsule by mouth Daily. 90 capsule 0    Continuous Glucose  (Dexcom G6 ) device Use 1 Device Daily. 1 each 0    Continuous Glucose Sensor (Dexcom G6 Sensor) Use Every 10 (Ten) Days. 9 each 3    cyclobenzaprine (FLEXERIL) 10 MG tablet Take 1 tablet by mouth 2 (Two) Times a Day As Needed for Muscle Spasms. 60 tablet 1    Diclofenac Sodium (VOLTAREN) 1 % gel gel Apply  topically to the appropriate area as directed 4 (Four) Times a Day. 150 g 3    Empagliflozin-Linaglip-Metform (Trijardy XR) 25-5-1000 MG tablet sustained-release 24 hour Take 1 tablet by mouth Every Morning. 90 tablet 0    folic acid (FOLVITE) 1 MG tablet Take 1 tablet by mouth Daily. 90 tablet 0    glucose blood (OneTouch Ultra) test strip Use as instructed 100 each 3    Insulin  "Degludec-Liraglutide (Xultophy) 100-3.6 UNIT-MG/ML solution pen-injector subcutaneous pen Inject 50 Units under the skin into the appropriate area as directed Daily. 45 mL 2    Insulin Pen Needle (Pen Needles 1/2\") 29G X 12MM misc Use 1 Device Daily. 100 each 0    Lancets Ultra Fine misc Use 1 Device 3 (Three) Times a Day. 100 each 3    lansoprazole (PREVACID) 30 MG capsule TAKE ONE CAPSULE BY MOUTH EVERY DAY 30 capsule 3    levocetirizine (XYZAL) 5 MG tablet Take 1 tablet by mouth Every Evening. 90 tablet 0    levothyroxine (SYNTHROID, LEVOTHROID) 112 MCG tablet TAKE ONE TABLET BY MOUTH EVERY DAY 30 tablet 5    methotrexate 2.5 MG tablet Take 10 tablets by mouth 1 (One) Time Per Week. 40 tablet 4    metoprolol tartrate (LOPRESSOR) 100 MG tablet Take 1 tablet by mouth 2 (Two) Times a Day. 180 tablet 0    montelukast (SINGULAIR) 10 MG tablet Take 1 tablet by mouth every night at bedtime. 90 tablet 0    rosuvastatin (CRESTOR) 5 MG tablet Take 1 tablet by mouth Daily. 30 tablet 3    vitamin B-12 (CYANOCOBALAMIN) 1000 MCG tablet Take 1 tablet by mouth Daily. 90 tablet 0    amitriptyline (ELAVIL) 100 MG tablet Take 1 tablet by mouth every night at bedtime. 30 tablet 3     No current facility-administered medications on file prior to visit.     Review of Systems   Eyes: Negative.    Endocrine: Negative.    Psychiatric/Behavioral:  Positive for sleep disturbance.    All other systems reviewed and are negative.    /75 (BP Location: Right arm, Patient Position: Sitting, Cuff Size: Adult)   Pulse 91   Wt 93.3 kg (205 lb 9.6 oz)   SpO2 96%   BMI 30.35 kg/m²      Physical Exam  Vitals reviewed.   Constitutional:       Appearance: Normal appearance.   Eyes:      Extraocular Movements: Extraocular movements intact.   Neck:      Thyroid: No thyromegaly or thyroid tenderness.      Comments: No palpable tissue to the right. Thyromegaly to the left.  Cardiovascular:      Rate and Rhythm: Normal rate.   Pulmonary:      " "Effort: Pulmonary effort is normal.   Skin:     General: Skin is warm.   Neurological:      Mental Status: He is alert and oriented to person, place, and time.   Psychiatric:         Mood and Affect: Mood normal.         Behavior: Behavior normal.         Thought Content: Thought content normal.         Judgment: Judgment normal.       Labs/Imaging  CMP  Lab Results   Component Value Date    GLUCOSE 127 (H) 10/11/2024    BUN 14 10/11/2024    CREATININE 0.90 10/11/2024    EGFRIFNONA 106 02/24/2022    EGFRIFAFRI  09/13/2016      Comment:      <15 Indicative of kidney failure.    BCR 15.6 10/11/2024    K 4.8 10/11/2024    CO2 24.0 10/11/2024    CALCIUM 9.2 10/11/2024    ALBUMIN 3.9 10/11/2024    LABIL2 1.2 (L) 05/17/2016    AST 19 10/11/2024    ALT 22 10/11/2024        CBC w/DIFF   Lab Results   Component Value Date    WBC 8.65 10/11/2024    RBC 5.77 10/11/2024    HGB 18.2 (H) 10/11/2024    HCT 52.7 (H) 10/11/2024    MCV 91.3 10/11/2024    MCH 31.5 10/11/2024    MCHC 34.5 10/11/2024    RDW 11.8 (L) 10/11/2024    RDWSD 39.2 10/11/2024    MPV 10.2 10/11/2024     10/11/2024    NEUTRORELPCT 51.3 10/11/2024    LYMPHORELPCT 33.1 10/11/2024    MONORELPCT 10.8 10/11/2024    EOSRELPCT 3.6 10/11/2024    BASORELPCT 0.9 10/11/2024    AUTOIGPER 0.3 10/11/2024    NEUTROABS 4.44 10/11/2024    LYMPHSABS 2.86 10/11/2024    MONOSABS 0.93 (H) 10/11/2024    EOSABS 0.31 10/11/2024    BASOSABS 0.08 10/11/2024    AUTOIGNUM 0.03 10/11/2024    NRBC 0.0 10/11/2024       TSH  Lab Results   Component Value Date    TSH 1.770 10/11/2024    TSH 1.130 06/28/2024    TSH 2.380 03/22/2024       T4  Lab Results   Component Value Date    FREET4 1.41 10/11/2024    FREET4 1.71 (H) 06/28/2024    FREET4 1.57 03/22/2024     Lab Results   Component Value Date    W2KMSPU 8.7 04/27/2015       T3  No results found for: \"T3FREE\"  No results found for: \"Q4TEKPP\"    TRAb  No results found for: \"TSURCPAB\"    TPO  No results found for: \"THYROIDAB\"    No valid " procedures specified.    Assessment and Plan    Diagnoses and all orders for this visit:    1. Postsurgical hypothyroidism (Primary)  Assessment & Plan:  -Clinically euthyroid.  -TFT's today with medication adjustment accordingly.  Reminded of proper administration including taking 7 pills per week on an empty stomach with no missed doses, waiting 30-60 minutes prior to other medications or food.  -Follow-up in 1 year.    Orders:  -     TSH  -     T4, free  -     Thyroglobulin           Return in about 1 year (around 11/8/2025) for Follow-up appointment. The patient was instructed to contact the clinic with any interval questions or concerns.    This document has been electronically signed by ABDIRAHMAN Horan  November 8, 2024 12:09 EST  Endocrinology    Please note that portions of this document were completed with a voice recognition program. Efforts were made to edit the dictations, but occasionally words are mis-transcribed.

## 2024-11-09 LAB
T4 FREE SERPL-MCNC: 1.63 NG/DL (ref 0.92–1.68)
TSH SERPL DL<=0.05 MIU/L-ACNC: 0.88 UIU/ML (ref 0.27–4.2)

## 2024-11-11 DIAGNOSIS — E89.0 POSTSURGICAL HYPOTHYROIDISM: ICD-10-CM

## 2024-11-11 RX ORDER — LEVOTHYROXINE SODIUM 112 UG/1
112 TABLET ORAL DAILY
Qty: 30 TABLET | Refills: 5 | Status: SHIPPED | OUTPATIENT
Start: 2024-11-11

## 2024-11-24 LAB — THYROGLOB SERPL-MCNC: 12 NG/ML

## 2024-12-16 DIAGNOSIS — J30.9 CHRONIC ALLERGIC RHINITIS: Chronic | ICD-10-CM

## 2024-12-16 DIAGNOSIS — E11.40 TYPE 2 DIABETES, CONTROLLED, WITH NEUROPATHY: Chronic | ICD-10-CM

## 2024-12-16 DIAGNOSIS — I10 BENIGN ESSENTIAL HYPERTENSION: Chronic | ICD-10-CM

## 2024-12-16 RX ORDER — MONTELUKAST SODIUM 10 MG/1
10 TABLET ORAL
Qty: 30 TABLET | Refills: 3 | OUTPATIENT
Start: 2024-12-16

## 2024-12-16 RX ORDER — BLOOD SUGAR DIAGNOSTIC
STRIP MISCELLANEOUS
Refills: 3 | OUTPATIENT
Start: 2024-12-16

## 2024-12-16 RX ORDER — METOPROLOL SUCCINATE 100 MG/1
100 TABLET, EXTENDED RELEASE ORAL DAILY
Qty: 30 TABLET | Refills: 3 | OUTPATIENT
Start: 2024-12-16

## 2024-12-17 RX ORDER — MONTELUKAST SODIUM 10 MG/1
10 TABLET ORAL
Qty: 90 TABLET | Refills: 0 | Status: SHIPPED | OUTPATIENT
Start: 2024-12-17

## 2024-12-17 RX ORDER — BLOOD SUGAR DIAGNOSTIC
STRIP MISCELLANEOUS
Qty: 100 EACH | Refills: 3 | Status: SHIPPED | OUTPATIENT
Start: 2024-12-17

## 2024-12-23 DIAGNOSIS — I10 BENIGN ESSENTIAL HYPERTENSION: Chronic | ICD-10-CM

## 2024-12-23 DIAGNOSIS — E55.9 VITAMIN D DEFICIENCY: Chronic | ICD-10-CM

## 2024-12-26 RX ORDER — METOPROLOL SUCCINATE 100 MG/1
100 TABLET, EXTENDED RELEASE ORAL DAILY
Qty: 30 TABLET | Refills: 3 | OUTPATIENT
Start: 2024-12-26

## 2024-12-26 RX ORDER — ACETAMINOPHEN 160 MG
2000 TABLET,DISINTEGRATING ORAL DAILY
Qty: 30 CAPSULE | Refills: 3 | OUTPATIENT
Start: 2024-12-26

## 2024-12-26 NOTE — TELEPHONE ENCOUNTER
I spoke with the patient and he said he thinks he has enough to do him until his appointment in February. He said if he doesn't he will give me a call back.

## 2024-12-29 DIAGNOSIS — M05.79 RHEUMATOID ARTHRITIS INVOLVING MULTIPLE SITES WITH POSITIVE RHEUMATOID FACTOR: Chronic | ICD-10-CM

## 2024-12-31 RX ORDER — CYCLOBENZAPRINE HCL 10 MG
10 TABLET ORAL 2 TIMES DAILY PRN
Qty: 60 TABLET | Refills: 1 | Status: SHIPPED | OUTPATIENT
Start: 2024-12-31

## 2025-01-12 DIAGNOSIS — F51.04 CHRONIC INSOMNIA: Chronic | ICD-10-CM

## 2025-01-12 DIAGNOSIS — E89.0 POSTSURGICAL HYPOTHYROIDISM: ICD-10-CM

## 2025-01-13 RX ORDER — LEVOTHYROXINE SODIUM 112 UG/1
112 TABLET ORAL DAILY
Qty: 30 TABLET | Refills: 5 | Status: SHIPPED | OUTPATIENT
Start: 2025-01-13

## 2025-01-13 RX ORDER — AMITRIPTYLINE HYDROCHLORIDE 100 MG/1
100 TABLET ORAL
Qty: 30 TABLET | Refills: 0 | Status: SHIPPED | OUTPATIENT
Start: 2025-01-13

## 2025-02-18 ENCOUNTER — OFFICE VISIT (OUTPATIENT)
Dept: FAMILY MEDICINE CLINIC | Facility: CLINIC | Age: 60
End: 2025-02-18
Payer: COMMERCIAL

## 2025-02-18 VITALS
HEIGHT: 69 IN | SYSTOLIC BLOOD PRESSURE: 110 MMHG | BODY MASS INDEX: 29.77 KG/M2 | RESPIRATION RATE: 14 BRPM | DIASTOLIC BLOOD PRESSURE: 80 MMHG | HEART RATE: 91 BPM | WEIGHT: 201 LBS | TEMPERATURE: 97.6 F | OXYGEN SATURATION: 94 %

## 2025-02-18 DIAGNOSIS — M05.79 RHEUMATOID ARTHRITIS INVOLVING MULTIPLE SITES WITH POSITIVE RHEUMATOID FACTOR: Chronic | ICD-10-CM

## 2025-02-18 DIAGNOSIS — Z79.4 TYPE 2 DIABETES MELLITUS WITH HYPERGLYCEMIA, WITH LONG-TERM CURRENT USE OF INSULIN: Primary | Chronic | ICD-10-CM

## 2025-02-18 DIAGNOSIS — K21.9 GASTROESOPHAGEAL REFLUX DISEASE WITHOUT ESOPHAGITIS: Chronic | ICD-10-CM

## 2025-02-18 DIAGNOSIS — F51.04 CHRONIC INSOMNIA: Chronic | ICD-10-CM

## 2025-02-18 DIAGNOSIS — I10 BENIGN ESSENTIAL HYPERTENSION: Chronic | ICD-10-CM

## 2025-02-18 DIAGNOSIS — E11.65 TYPE 2 DIABETES MELLITUS WITH HYPERGLYCEMIA, WITH LONG-TERM CURRENT USE OF INSULIN: Primary | Chronic | ICD-10-CM

## 2025-02-18 DIAGNOSIS — E53.8 B12 DEFICIENCY: Chronic | ICD-10-CM

## 2025-02-18 DIAGNOSIS — E55.9 VITAMIN D DEFICIENCY: Chronic | ICD-10-CM

## 2025-02-18 DIAGNOSIS — B99.9 INFECTION: ICD-10-CM

## 2025-02-18 DIAGNOSIS — E78.2 MIXED DYSLIPIDEMIA: Chronic | ICD-10-CM

## 2025-02-18 DIAGNOSIS — J98.4 RESTRICTIVE LUNG DISEASE: Chronic | ICD-10-CM

## 2025-02-18 DIAGNOSIS — J30.9 CHRONIC ALLERGIC RHINITIS: Chronic | ICD-10-CM

## 2025-02-18 PROCEDURE — 1125F AMNT PAIN NOTED PAIN PRSNT: CPT | Performed by: NURSE PRACTITIONER

## 2025-02-18 PROCEDURE — 99215 OFFICE O/P EST HI 40 MIN: CPT | Performed by: NURSE PRACTITIONER

## 2025-02-18 PROCEDURE — 1159F MED LIST DOCD IN RCRD: CPT | Performed by: NURSE PRACTITIONER

## 2025-02-18 PROCEDURE — 3074F SYST BP LT 130 MM HG: CPT | Performed by: NURSE PRACTITIONER

## 2025-02-18 PROCEDURE — 3079F DIAST BP 80-89 MM HG: CPT | Performed by: NURSE PRACTITIONER

## 2025-02-18 PROCEDURE — 1160F RVW MEDS BY RX/DR IN RCRD: CPT | Performed by: NURSE PRACTITIONER

## 2025-02-18 RX ORDER — AMITRIPTYLINE HYDROCHLORIDE 100 MG/1
100 TABLET ORAL
Qty: 30 TABLET | Refills: 0 | Status: SHIPPED | OUTPATIENT
Start: 2025-02-18

## 2025-02-18 RX ORDER — ACETAMINOPHEN 160 MG
2000 TABLET,DISINTEGRATING ORAL DAILY
Qty: 90 CAPSULE | Refills: 0 | Status: SHIPPED | OUTPATIENT
Start: 2025-02-18

## 2025-02-18 RX ORDER — METHOTREXATE 2.5 MG/1
25 TABLET ORAL WEEKLY
Qty: 40 TABLET | Refills: 4 | Status: SHIPPED | OUTPATIENT
Start: 2025-02-18

## 2025-02-18 RX ORDER — (INSULIN DEGLUDEC AND LIRAGLUTIDE) 100; 3.6 [IU]/ML; MG/ML
50 INJECTION, SOLUTION SUBCUTANEOUS DAILY
Qty: 45 ML | Refills: 2 | Status: SHIPPED | OUTPATIENT
Start: 2025-02-18

## 2025-02-18 RX ORDER — PROCHLORPERAZINE 25 MG/1
1 SUPPOSITORY RECTAL DAILY
Qty: 1 EACH | Refills: 3 | Status: SHIPPED | OUTPATIENT
Start: 2025-02-18 | End: 2025-05-19

## 2025-02-18 RX ORDER — ALBUTEROL SULFATE 90 UG/1
2 INHALANT RESPIRATORY (INHALATION) EVERY 6 HOURS PRN
Qty: 1 EACH | Refills: 3 | Status: SHIPPED | OUTPATIENT
Start: 2025-02-18

## 2025-02-18 RX ORDER — LEVOCETIRIZINE DIHYDROCHLORIDE 5 MG/1
5 TABLET, FILM COATED ORAL EVERY EVENING
Qty: 90 TABLET | Refills: 0 | Status: SHIPPED | OUTPATIENT
Start: 2025-02-18

## 2025-02-18 RX ORDER — METOPROLOL TARTRATE 100 MG/1
100 TABLET ORAL 2 TIMES DAILY
Qty: 180 TABLET | Refills: 0 | Status: SHIPPED | OUTPATIENT
Start: 2025-02-18

## 2025-02-18 RX ORDER — ROSUVASTATIN CALCIUM 5 MG/1
5 TABLET, COATED ORAL DAILY
Qty: 30 TABLET | Refills: 3 | Status: SHIPPED | OUTPATIENT
Start: 2025-02-18

## 2025-02-18 RX ORDER — EMPAGLIFLOZIN, LINAGLIPTIN, METFORMIN HYDROCHLORIDE 25; 5; 1000 MG/1; MG/1; MG/1
1 TABLET, EXTENDED RELEASE ORAL EVERY MORNING
Qty: 90 TABLET | Refills: 0 | Status: SHIPPED | OUTPATIENT
Start: 2025-02-18

## 2025-02-18 RX ORDER — MONTELUKAST SODIUM 10 MG/1
10 TABLET ORAL
Qty: 90 TABLET | Refills: 0 | Status: SHIPPED | OUTPATIENT
Start: 2025-02-18

## 2025-02-18 RX ORDER — FOLIC ACID 1 MG/1
1000 TABLET ORAL DAILY
Qty: 90 TABLET | Refills: 0 | Status: SHIPPED | OUTPATIENT
Start: 2025-02-18

## 2025-02-18 RX ORDER — DOXYCYCLINE 100 MG/1
100 CAPSULE ORAL 2 TIMES DAILY
Qty: 20 CAPSULE | Refills: 0 | Status: SHIPPED | OUTPATIENT
Start: 2025-02-18

## 2025-02-18 RX ORDER — LANOLIN ALCOHOL/MO/W.PET/CERES
1000 CREAM (GRAM) TOPICAL DAILY
Qty: 90 TABLET | Refills: 0 | Status: SHIPPED | OUTPATIENT
Start: 2025-02-18

## 2025-02-18 RX ORDER — OMEPRAZOLE 40 MG/1
40 CAPSULE, DELAYED RELEASE ORAL DAILY
Qty: 30 CAPSULE | Refills: 5 | Status: SHIPPED | OUTPATIENT
Start: 2025-02-18

## 2025-02-18 RX ORDER — BLOOD SUGAR DIAGNOSTIC
STRIP MISCELLANEOUS
Qty: 100 EACH | Refills: 3 | Status: SHIPPED | OUTPATIENT
Start: 2025-02-18

## 2025-02-18 RX ORDER — CYCLOBENZAPRINE HCL 10 MG
10 TABLET ORAL 2 TIMES DAILY PRN
Qty: 60 TABLET | Refills: 1 | Status: SHIPPED | OUTPATIENT
Start: 2025-02-18

## 2025-02-18 NOTE — PROGRESS NOTES
History of Present Illness  Raimundo Munguia is a 58 yo male who presents to [facility] today pertaining to his DM, type 2, HTN, GERD, Rheumatoid Arthritis and Dyslipidemia.  In addition, he has been diagnosed with insomnia and Vitamin deficiencies.  Raimundo had a right Thyroid lobectomy April 2022 by Dr. Wynn and is being followed by endocrinology.  He is complaining of upper respiratory symptoms which started over a week ago and worsening.    Diabetes   He has type 2 diabetes mellitus. His disease course has been improving since starting Xultophy. Risk factors for coronary artery disease include dyslipidemia, hypertension, male sex, sedentary lifestyle and tobacco exposure. Current diabetic treatment includes Trijardy and Xultophy. An ACE inhibitor/angiotensin II receptor blocker is not being taken due to S/e.  He is utilizing a Dexcom 6 for continuous glucose monitoring.    Lab Results   Component Value Date    HGBA1C 9.00 (H) 03/22/2024     Lab Results   Component Value Date    HGBA1C 7.20 (H) 06/28/2024     Lab Results   Component Value Date    HGBA1C 6.40 (H) 10/11/2024        Hypertension/Tachycardia  The current episode started more than 1 year ago.His BP is adequately controlled. His hypertensive medication is Toprol  mg bid.  Lab Results   Component Value Date    GLUCOSE 127 (H) 10/11/2024    BUN 14 10/11/2024    CREATININE 0.90 10/11/2024     10/11/2024    K 4.8 10/11/2024     10/11/2024    CALCIUM 9.2 10/11/2024    PROTEINTOT 6.7 10/11/2024    ALBUMIN 3.9 10/11/2024    ALT 22 10/11/2024    AST 19 10/11/2024    ALKPHOS 109 10/11/2024    BILITOT 0.4 10/11/2024    GLOB 2.8 10/11/2024    AGRATIO 1.4 10/11/2024    BCR 15.6 10/11/2024    ANIONGAP 9.0 10/11/2024    EGFR 99.0 10/11/2024     Dyslipidemia   This is a chronic problem. The current episode started more than 1 year ago. Factors aggravating his hyperlipidemia include fatty foods. Current  antihyperlipidemic treatment includes statins.  Compliance problems include adherence to diet and adherence to exercise.  Risk factors for coronary artery disease include diabetes mellitus, dyslipidemia, hypertension, male sex and obesity.   Lab Results   Component Value Date    CHOL 110 10/11/2024    CHOL 136 06/28/2024    CHOL 116 03/22/2024     Lab Results   Component Value Date    TRIG 94 10/11/2024    TRIG 178 (H) 06/28/2024    TRIG 179 (H) 03/22/2024     Lab Results   Component Value Date    HDL 23 (L) 10/11/2024    HDL 35 (L) 06/28/2024    HDL 29 (L) 03/22/2024     Lab Results   Component Value Date    LDL 69 10/11/2024    LDL 71 06/28/2024    LDL 57 03/22/2024     The following portions of the patient's history were reviewed and updated as appropriate: allergies, current medications, past family history, past medical history, past social history, past surgical history and problem list.    Review of Systems   Constitutional:  Negative for activity change, appetite change, chills, fatigue, fever and unexpected weight change.   HENT:  Positive for hearing loss (Chronic), rhinorrhea, sinus pressure and sinus pain. Negative for ear discharge, ear pain, facial swelling, tinnitus and voice change.    Eyes:  Negative for pain, discharge, redness, itching and visual disturbance.   Respiratory:  Positive for cough (Productive cough). Negative for shortness of breath and wheezing.    Cardiovascular:  Positive for palpitations (Improving with metoprolol). Negative for chest pain and leg swelling.   Gastrointestinal:  Negative for nausea (Intermittent) and vomiting.        Gerd worsening.    Endocrine: Negative for cold intolerance, heat intolerance, polydipsia, polyphagia and polyuria.   Musculoskeletal:  Positive for arthralgias, back pain and joint swelling.   Skin:  Negative for color change and rash.   Allergic/Immunologic: Positive for environmental allergies and immunocompromised state.   Neurological:  Negative  "for dizziness, tremors, speech difficulty, weakness and light-headedness.   Hematological:  Negative for adenopathy.   Psychiatric/Behavioral:  Positive for sleep disturbance. Negative for confusion and decreased concentration. The patient is not nervous/anxious.    All other systems reviewed and are negative.      Vital signs:  /80 (BP Location: Right arm, Patient Position: Sitting, Cuff Size: Adult)   Pulse 91   Temp 97.6 °F (36.4 °C) (Temporal)   Resp 14   Ht 175.3 cm (69.02\")   Wt 91.2 kg (201 lb)   SpO2 94%   BMI 29.67 kg/m²     Physical Exam  Vitals and nursing note reviewed.   Constitutional:       General: He is not in acute distress.     Appearance: He is well-developed.   HENT:      Head: Normocephalic.      Nose:      Right Turbinates: Not swollen.      Left Turbinates: Not swollen.      Right Sinus: Maxillary sinus tenderness present. No frontal sinus tenderness.      Left Sinus: Maxillary sinus tenderness present. No frontal sinus tenderness.      Mouth/Throat:      Dentition: Dental caries present.   Eyes:      General: No scleral icterus.        Right eye: No discharge.         Left eye: No discharge.      Conjunctiva/sclera: Conjunctivae normal.   Cardiovascular:      Rate and Rhythm: Normal rate and regular rhythm.      Heart sounds: Normal heart sounds. No murmur heard.     No friction rub.   Pulmonary:      Effort: Pulmonary effort is normal. No respiratory distress.      Breath sounds: Decreased breath sounds present. No wheezing, rhonchi or rales.   Abdominal:      General: Abdomen is protuberant. Bowel sounds are normal.      Palpations: Abdomen is soft.      Tenderness: There is no abdominal tenderness. There is no guarding.   Musculoskeletal:         General: Deformity (Multiple Rheumatoid nodules bilateral upper extremities) present. No tenderness.      Cervical back: Neck supple.      Right lower leg: No edema.      Left lower leg: No edema.   Lymphadenopathy:      Cervical: No " cervical adenopathy.   Skin:     General: Skin is warm and dry.      Capillary Refill: Capillary refill takes less than 2 seconds.   Neurological:      Mental Status: He is alert and oriented to person, place, and time.      Gait: Gait abnormal.   Psychiatric:         Mood and Affect: Mood normal.         Speech: Speech normal.         Behavior: Behavior is cooperative.         Thought Content: Thought content normal.       Reviewed: Endocrinology consult note    Assessment & Plan     Diagnoses and all orders for this visit:    1. Type 2 diabetes mellitus with hyperglycemia, with long-term current use of insulin (Primary)  Comments:  Continue Trijardy 25 - 5 - 1000 every morning and. Xultophy.  CGM: Dexcom 6  Orders:  -     Microalbumin / Creatinine Urine Ratio - Urine, Clean Catch; Future  -     Continuous Glucose Transmitter (Dexcom G6 Transmitter) misc; Use 1 Device Daily for 90 days.  Dispense: 1 each; Refill: 3  -     Empagliflozin-Linaglip-Metform (Trijardy XR) 25-5-1000 MG tablet sustained-release 24 hour; Take 1 tablet by mouth Every Morning.  Dispense: 90 tablet; Refill: 0  -     glucose blood (OneTouch Ultra) test strip; Use as instructed  Dispense: 100 each; Refill: 3  -     Insulin Degludec-Liraglutide (Xultophy) 100-3.6 UNIT-MG/ML solution pen-injector subcutaneous pen; Inject 50 Units under the skin into the appropriate area as directed Daily.  Dispense: 45 mL; Refill: 2    2. Benign essential hypertension  Comments:  Well-controlled.  Tolerating metoprolol tartrate 100 twice daily  Orders:  -     metoprolol tartrate (LOPRESSOR) 100 MG tablet; Take 1 tablet by mouth 2 (Two) Times a Day.  Dispense: 180 tablet; Refill: 0    3. Mixed dyslipidemia  Comments:  Continue rosuvastatin  Orders:  -     rosuvastatin (CRESTOR) 5 MG tablet; Take 1 tablet by mouth Daily.  Dispense: 30 tablet; Refill: 3    4. Rheumatoid arthritis involving multiple sites with positive rheumatoid factor  Comments:  Stable with current  medication he will continue methotrexate and folic acid. Declines Rheumatology referral   Orders:  -     cyclobenzaprine (FLEXERIL) 10 MG tablet; Take 1 tablet by mouth 2 (Two) Times a Day As Needed for Muscle Spasms.  Dispense: 60 tablet; Refill: 1  -     Diclofenac Sodium (VOLTAREN) 1 % gel gel; Apply  topically to the appropriate area as directed 4 (Four) Times a Day.  Dispense: 150 g; Refill: 3  -     folic acid (FOLVITE) 1 MG tablet; Take 1 tablet by mouth Daily.  Dispense: 90 tablet; Refill: 0  -     methotrexate 2.5 MG tablet; Take 10 tablets by mouth 1 (One) Time Per Week.  Dispense: 40 tablet; Refill: 4    5. Gastroesophageal reflux disease without esophagitis  Comments:  Changed PPI to omeprazole  Orders:  -     omeprazole (priLOSEC) 40 MG capsule; Take 1 capsule by mouth Daily.  Dispense: 30 capsule; Refill: 5    6. Restrictive lung disease  Comments:  Continue use of albuterol inhaler.  Consider returning to pulmonology  Orders:  -     Albuterol Sulfate, sensor, (ProAir Digihaler) 108 (90 Base) MCG/ACT aerosol powder ; Inhale 2 Inhalations Every 6 (Six) Hours As Needed (wheezing/shortness of breath).  Dispense: 1 each; Refill: 3    7. Chronic insomnia  Comments:  Continue Amitriptyline 100 mg take 2-3 hours prior to bedtime  Orders:  -     amitriptyline (ELAVIL) 100 MG tablet; Take 1 tablet by mouth every night at bedtime.  Dispense: 30 tablet; Refill: 0    8. Vitamin D deficiency  Comments:   Vit D daily supplement  Orders:  -     Cholecalciferol (Vitamin D3) 50 MCG (2000 UT) capsule; Take 1 capsule by mouth Daily.  Dispense: 90 capsule; Refill: 0    9. Chronic allergic rhinitis  Comments:  Continue Azelatine NS and Singulair  Orders:  -     levocetirizine (XYZAL) 5 MG tablet; Take 1 tablet by mouth Every Evening.  Dispense: 90 tablet; Refill: 0  -     montelukast (SINGULAIR) 10 MG tablet; Take 1 tablet by mouth every night at bedtime.  Dispense: 90 tablet; Refill: 0    10. B12  deficiency  Comments:  Continue B12 supplementation  Orders:  -     vitamin B-12 (CYANOCOBALAMIN) 1000 MCG tablet; Take 1 tablet by mouth Daily.  Dispense: 90 tablet; Refill: 0    11. Infection  Comments:  Doxycycline 100 mg twice daily for 10 days  Orders:  -     doxycycline (VIBRAMYCIN) 100 MG capsule; Take 1 capsule by mouth 2 (Two) Times a Day.  Dispense: 20 capsule; Refill: 0    I spent 45 minutes caring for Raimundo on this date of service. This time includes time spent by me in the following activities:preparing for the visit, obtaining and/or reviewing a separately obtained history, performing a medically appropriate examination and/or evaluation , counseling and educating the patient/family/caregiver, ordering medications, tests, or procedures, documenting information in the medical record, and care coordination    Follow Up In May    Findings and recommendations discussed with Raimundo. Reviewed treatment option.  Lifestyle modifications reinforced including nutrition and activity recommendations.  Raimundo will follow up in May sooner if problems/concerns occur.  Raimundo was given instructions and counseling regarding his condition or for health maintenance advice. Please see specific information pulled into the AVS if appropriate.      This document has been electronically signed by:

## 2025-02-22 DIAGNOSIS — M05.79 RHEUMATOID ARTHRITIS INVOLVING MULTIPLE SITES WITH POSITIVE RHEUMATOID FACTOR: Chronic | ICD-10-CM

## 2025-02-24 DIAGNOSIS — K21.9 GASTROESOPHAGEAL REFLUX DISEASE WITHOUT ESOPHAGITIS: Chronic | ICD-10-CM

## 2025-02-24 RX ORDER — LANSOPRAZOLE 30 MG/1
30 CAPSULE, DELAYED RELEASE ORAL DAILY
Qty: 30 CAPSULE | Refills: 3 | OUTPATIENT
Start: 2025-02-24

## 2025-02-24 RX ORDER — METHOTREXATE 2.5 MG/1
TABLET ORAL
Qty: 40 TABLET | Refills: 4 | OUTPATIENT
Start: 2025-02-24

## 2025-03-23 DIAGNOSIS — M05.79 RHEUMATOID ARTHRITIS INVOLVING MULTIPLE SITES WITH POSITIVE RHEUMATOID FACTOR: Chronic | ICD-10-CM

## 2025-03-24 RX ORDER — CYCLOBENZAPRINE HCL 10 MG
10 TABLET ORAL 2 TIMES DAILY PRN
Qty: 60 TABLET | Refills: 1 | Status: SHIPPED | OUTPATIENT
Start: 2025-03-24

## 2025-04-20 DIAGNOSIS — E11.65 TYPE 2 DIABETES MELLITUS WITH HYPERGLYCEMIA, WITH LONG-TERM CURRENT USE OF INSULIN: Chronic | ICD-10-CM

## 2025-04-20 DIAGNOSIS — Z79.4 TYPE 2 DIABETES MELLITUS WITH HYPERGLYCEMIA, WITH LONG-TERM CURRENT USE OF INSULIN: Chronic | ICD-10-CM

## 2025-04-21 RX ORDER — (INSULIN DEGLUDEC AND LIRAGLUTIDE) 100; 3.6 [IU]/ML; MG/ML
INJECTION, SOLUTION SUBCUTANEOUS
Qty: 45 ML | Refills: 0 | Status: SHIPPED | OUTPATIENT
Start: 2025-04-21

## 2025-05-16 ENCOUNTER — LAB (OUTPATIENT)
Dept: FAMILY MEDICINE CLINIC | Facility: CLINIC | Age: 60
End: 2025-05-16
Payer: COMMERCIAL

## 2025-05-16 DIAGNOSIS — Z79.4 TYPE 2 DIABETES MELLITUS WITH HYPERGLYCEMIA, WITH LONG-TERM CURRENT USE OF INSULIN: Chronic | ICD-10-CM

## 2025-05-16 DIAGNOSIS — E78.2 MIXED DYSLIPIDEMIA: ICD-10-CM

## 2025-05-16 DIAGNOSIS — M62.838 MUSCLE SPASMS OF BOTH LOWER EXTREMITIES: ICD-10-CM

## 2025-05-16 DIAGNOSIS — E89.0 POSTSURGICAL HYPOTHYROIDISM: ICD-10-CM

## 2025-05-16 DIAGNOSIS — E55.9 VITAMIN D DEFICIENCY: ICD-10-CM

## 2025-05-16 DIAGNOSIS — Z79.4 TYPE 2 DIABETES MELLITUS WITH HYPERGLYCEMIA, WITH LONG-TERM CURRENT USE OF INSULIN: ICD-10-CM

## 2025-05-16 DIAGNOSIS — E11.65 TYPE 2 DIABETES MELLITUS WITH HYPERGLYCEMIA, WITH LONG-TERM CURRENT USE OF INSULIN: Chronic | ICD-10-CM

## 2025-05-16 DIAGNOSIS — E11.65 TYPE 2 DIABETES MELLITUS WITH HYPERGLYCEMIA, WITH LONG-TERM CURRENT USE OF INSULIN: ICD-10-CM

## 2025-05-16 DIAGNOSIS — K21.9 GASTROESOPHAGEAL REFLUX DISEASE WITHOUT ESOPHAGITIS: ICD-10-CM

## 2025-05-16 DIAGNOSIS — M05.79 RHEUMATOID ARTHRITIS INVOLVING MULTIPLE SITES WITH POSITIVE RHEUMATOID FACTOR: ICD-10-CM

## 2025-05-16 LAB
25(OH)D3 SERPL-MCNC: 22.2 NG/ML (ref 30–100)
ALBUMIN SERPL-MCNC: 4.4 G/DL (ref 3.5–5.2)
ALBUMIN/GLOB SERPL: 1.5 G/DL
ALP SERPL-CCNC: 122 U/L (ref 39–117)
ALT SERPL W P-5'-P-CCNC: 21 U/L (ref 1–41)
ANION GAP SERPL CALCULATED.3IONS-SCNC: 12 MMOL/L (ref 5–15)
AST SERPL-CCNC: 21 U/L (ref 1–40)
BASOPHILS # BLD AUTO: 0.09 10*3/MM3 (ref 0–0.2)
BASOPHILS NFR BLD AUTO: 1 % (ref 0–1.5)
BILIRUB SERPL-MCNC: 0.2 MG/DL (ref 0–1.2)
BUN SERPL-MCNC: 12 MG/DL (ref 6–20)
BUN/CREAT SERPL: 12.5 (ref 7–25)
CALCIUM SPEC-SCNC: 9.4 MG/DL (ref 8.6–10.5)
CHLORIDE SERPL-SCNC: 102 MMOL/L (ref 98–107)
CHOLEST SERPL-MCNC: 104 MG/DL (ref 0–200)
CO2 SERPL-SCNC: 26 MMOL/L (ref 22–29)
CREAT SERPL-MCNC: 0.96 MG/DL (ref 0.76–1.27)
CRP SERPL-MCNC: <0.3 MG/DL (ref 0–0.5)
DEPRECATED RDW RBC AUTO: 42.3 FL (ref 37–54)
EGFRCR SERPLBLD CKD-EPI 2021: 91.6 ML/MIN/1.73
EOSINOPHIL # BLD AUTO: 0.33 10*3/MM3 (ref 0–0.4)
EOSINOPHIL NFR BLD AUTO: 3.5 % (ref 0.3–6.2)
ERYTHROCYTE [DISTWIDTH] IN BLOOD BY AUTOMATED COUNT: 12.1 % (ref 12.3–15.4)
ERYTHROCYTE [SEDIMENTATION RATE] IN BLOOD: 3 MM/HR (ref 0–20)
GLOBULIN UR ELPH-MCNC: 2.9 GM/DL
GLUCOSE SERPL-MCNC: 144 MG/DL (ref 65–99)
HBA1C MFR BLD: 6.6 % (ref 4.8–5.6)
HCT VFR BLD AUTO: 57.1 % (ref 37.5–51)
HDLC SERPL-MCNC: 30 MG/DL (ref 40–60)
HGB BLD-MCNC: 19.2 G/DL (ref 13–17.7)
IMM GRANULOCYTES # BLD AUTO: 0.01 10*3/MM3 (ref 0–0.05)
IMM GRANULOCYTES NFR BLD AUTO: 0.1 % (ref 0–0.5)
LDLC SERPL CALC-MCNC: 55 MG/DL (ref 0–100)
LDLC/HDLC SERPL: 1.79 {RATIO}
LYMPHOCYTES # BLD AUTO: 3.99 10*3/MM3 (ref 0.7–3.1)
LYMPHOCYTES NFR BLD AUTO: 42.5 % (ref 19.6–45.3)
MAGNESIUM SERPL-MCNC: 2.6 MG/DL (ref 1.6–2.6)
MCH RBC QN AUTO: 32.2 PG (ref 26.6–33)
MCHC RBC AUTO-ENTMCNC: 33.6 G/DL (ref 31.5–35.7)
MCV RBC AUTO: 95.8 FL (ref 79–97)
MONOCYTES # BLD AUTO: 0.96 10*3/MM3 (ref 0.1–0.9)
MONOCYTES NFR BLD AUTO: 10.2 % (ref 5–12)
NEUTROPHILS NFR BLD AUTO: 4 10*3/MM3 (ref 1.7–7)
NEUTROPHILS NFR BLD AUTO: 42.7 % (ref 42.7–76)
NRBC BLD AUTO-RTO: 0 /100 WBC (ref 0–0.2)
PLATELET # BLD AUTO: 314 10*3/MM3 (ref 140–450)
PMV BLD AUTO: 10.6 FL (ref 6–12)
POTASSIUM SERPL-SCNC: 4.2 MMOL/L (ref 3.5–5.2)
PROT SERPL-MCNC: 7.3 G/DL (ref 6–8.5)
RBC # BLD AUTO: 5.96 10*6/MM3 (ref 4.14–5.8)
SODIUM SERPL-SCNC: 140 MMOL/L (ref 136–145)
T4 FREE SERPL-MCNC: 1.44 NG/DL (ref 0.92–1.68)
TRIGL SERPL-MCNC: 101 MG/DL (ref 0–150)
TSH SERPL DL<=0.05 MIU/L-ACNC: 3.12 UIU/ML (ref 0.27–4.2)
URATE SERPL-MCNC: 4.4 MG/DL (ref 3.4–7)
VIT B12 BLD-MCNC: 1302 PG/ML (ref 211–946)
VLDLC SERPL-MCNC: 19 MG/DL (ref 5–40)
WBC NRBC COR # BLD AUTO: 9.38 10*3/MM3 (ref 3.4–10.8)

## 2025-05-16 PROCEDURE — 86140 C-REACTIVE PROTEIN: CPT | Performed by: NURSE PRACTITIONER

## 2025-05-16 PROCEDURE — 83735 ASSAY OF MAGNESIUM: CPT | Performed by: NURSE PRACTITIONER

## 2025-05-16 PROCEDURE — 82607 VITAMIN B-12: CPT | Performed by: NURSE PRACTITIONER

## 2025-05-16 PROCEDURE — 82306 VITAMIN D 25 HYDROXY: CPT | Performed by: NURSE PRACTITIONER

## 2025-05-16 PROCEDURE — 80061 LIPID PANEL: CPT | Performed by: NURSE PRACTITIONER

## 2025-05-16 PROCEDURE — 84550 ASSAY OF BLOOD/URIC ACID: CPT | Performed by: NURSE PRACTITIONER

## 2025-05-16 PROCEDURE — 36415 COLL VENOUS BLD VENIPUNCTURE: CPT | Performed by: NURSE PRACTITIONER

## 2025-05-16 PROCEDURE — 80050 GENERAL HEALTH PANEL: CPT | Performed by: NURSE PRACTITIONER

## 2025-05-16 PROCEDURE — 85652 RBC SED RATE AUTOMATED: CPT | Performed by: NURSE PRACTITIONER

## 2025-05-16 PROCEDURE — 84439 ASSAY OF FREE THYROXINE: CPT | Performed by: NURSE PRACTITIONER

## 2025-05-16 PROCEDURE — 83036 HEMOGLOBIN GLYCOSYLATED A1C: CPT | Performed by: NURSE PRACTITIONER

## 2025-05-18 DIAGNOSIS — F51.04 CHRONIC INSOMNIA: Chronic | ICD-10-CM

## 2025-05-18 DIAGNOSIS — M05.79 RHEUMATOID ARTHRITIS INVOLVING MULTIPLE SITES WITH POSITIVE RHEUMATOID FACTOR: Chronic | ICD-10-CM

## 2025-05-19 RX ORDER — AMITRIPTYLINE HYDROCHLORIDE 100 MG/1
100 TABLET ORAL
Qty: 30 TABLET | Refills: 3 | Status: SHIPPED | OUTPATIENT
Start: 2025-05-19

## 2025-05-19 RX ORDER — CYCLOBENZAPRINE HCL 10 MG
10 TABLET ORAL 2 TIMES DAILY PRN
Qty: 60 TABLET | Refills: 1 | Status: SHIPPED | OUTPATIENT
Start: 2025-05-19

## 2025-05-22 ENCOUNTER — TELEPHONE (OUTPATIENT)
Dept: FAMILY MEDICINE CLINIC | Facility: CLINIC | Age: 60
End: 2025-05-22
Payer: COMMERCIAL

## 2025-05-22 NOTE — TELEPHONE ENCOUNTER
I spoke with the patient and let him know that we received paperwork from [facility] about his night time oxygen. I asked him if this was something he was still using. He said that he will only use it when he is smothering. I told him that he will need to make an appointment with Aden so she can get him referred back to Pulmonology since they are thee ones who got this ordered for him. Patient is in understanding of this information.

## 2025-05-27 DIAGNOSIS — E53.8 B12 DEFICIENCY: Chronic | ICD-10-CM

## 2025-05-28 RX ORDER — LANOLIN ALCOHOL/MO/W.PET/CERES
1000 CREAM (GRAM) TOPICAL DAILY
Qty: 30 TABLET | Refills: 0 | Status: SHIPPED | OUTPATIENT
Start: 2025-05-28

## 2025-05-30 ENCOUNTER — RESULTS FOLLOW-UP (OUTPATIENT)
Dept: FAMILY MEDICINE CLINIC | Facility: CLINIC | Age: 60
End: 2025-05-30
Payer: COMMERCIAL

## 2025-06-02 NOTE — TELEPHONE ENCOUNTER
Hub to relay     I left a voice mail for Raimundo Samaniego would like to see him in office to review lab results     I have scheduled him for Tomorrow Tuesday June 3rd at 10:15 am

## 2025-06-12 ENCOUNTER — OFFICE VISIT (OUTPATIENT)
Dept: FAMILY MEDICINE CLINIC | Facility: CLINIC | Age: 60
End: 2025-06-12
Payer: COMMERCIAL

## 2025-06-12 VITALS
HEIGHT: 69 IN | HEART RATE: 87 BPM | BODY MASS INDEX: 28.88 KG/M2 | SYSTOLIC BLOOD PRESSURE: 100 MMHG | RESPIRATION RATE: 14 BRPM | DIASTOLIC BLOOD PRESSURE: 70 MMHG | OXYGEN SATURATION: 95 % | TEMPERATURE: 96.5 F | WEIGHT: 195 LBS

## 2025-06-12 DIAGNOSIS — E55.9 VITAMIN D DEFICIENCY: ICD-10-CM

## 2025-06-12 DIAGNOSIS — K21.9 GASTROESOPHAGEAL REFLUX DISEASE WITHOUT ESOPHAGITIS: Chronic | ICD-10-CM

## 2025-06-12 DIAGNOSIS — M05.79 RHEUMATOID ARTHRITIS INVOLVING MULTIPLE SITES WITH POSITIVE RHEUMATOID FACTOR: Chronic | ICD-10-CM

## 2025-06-12 DIAGNOSIS — I10 PRIMARY HYPERTENSION: Chronic | ICD-10-CM

## 2025-06-12 DIAGNOSIS — E78.2 MIXED DYSLIPIDEMIA: Chronic | ICD-10-CM

## 2025-06-12 DIAGNOSIS — K63.5 POLYP OF COLON, UNSPECIFIED PART OF COLON, UNSPECIFIED TYPE: ICD-10-CM

## 2025-06-12 DIAGNOSIS — J30.9 CHRONIC ALLERGIC RHINITIS: Chronic | ICD-10-CM

## 2025-06-12 DIAGNOSIS — E11.40 TYPE 2 DIABETES, CONTROLLED, WITH NEUROPATHY: Primary | Chronic | ICD-10-CM

## 2025-06-12 DIAGNOSIS — R79.89 ABNORMAL CBC: ICD-10-CM

## 2025-06-12 DIAGNOSIS — J98.4 RESTRICTIVE LUNG DISEASE: Chronic | ICD-10-CM

## 2025-06-12 DIAGNOSIS — Z97.8 USES SELF-APPLIED CONTINUOUS GLUCOSE MONITORING DEVICE: ICD-10-CM

## 2025-06-12 DIAGNOSIS — E53.8 VITAMIN B 12 DEFICIENCY: ICD-10-CM

## 2025-06-12 DIAGNOSIS — E89.0 POSTSURGICAL HYPOTHYROIDISM: Chronic | ICD-10-CM

## 2025-06-12 LAB
BASOPHILS # BLD AUTO: 0.09 10*3/MM3 (ref 0–0.2)
BASOPHILS NFR BLD AUTO: 1 % (ref 0–1.5)
DEPRECATED RDW RBC AUTO: 42.9 FL (ref 37–54)
EOSINOPHIL # BLD AUTO: 0.34 10*3/MM3 (ref 0–0.4)
EOSINOPHIL NFR BLD AUTO: 3.9 % (ref 0.3–6.2)
ERYTHROCYTE [DISTWIDTH] IN BLOOD BY AUTOMATED COUNT: 12.5 % (ref 12.3–15.4)
FERRITIN SERPL-MCNC: 133 NG/ML (ref 30–400)
HCT VFR BLD AUTO: 54.2 % (ref 37.5–51)
HGB BLD-MCNC: 17.9 G/DL (ref 13–17.7)
IMM GRANULOCYTES # BLD AUTO: 0.03 10*3/MM3 (ref 0–0.05)
IMM GRANULOCYTES NFR BLD AUTO: 0.3 % (ref 0–0.5)
IRON 24H UR-MRATE: 100 MCG/DL (ref 59–158)
IRON SATN MFR SERPL: 29 % (ref 20–50)
LYMPHOCYTES # BLD AUTO: 3.19 10*3/MM3 (ref 0.7–3.1)
LYMPHOCYTES NFR BLD AUTO: 36.7 % (ref 19.6–45.3)
MCH RBC QN AUTO: 31.2 PG (ref 26.6–33)
MCHC RBC AUTO-ENTMCNC: 33 G/DL (ref 31.5–35.7)
MCV RBC AUTO: 94.4 FL (ref 79–97)
MONOCYTES # BLD AUTO: 0.86 10*3/MM3 (ref 0.1–0.9)
MONOCYTES NFR BLD AUTO: 9.9 % (ref 5–12)
NEUTROPHILS NFR BLD AUTO: 4.18 10*3/MM3 (ref 1.7–7)
NEUTROPHILS NFR BLD AUTO: 48.2 % (ref 42.7–76)
NRBC BLD AUTO-RTO: 0 /100 WBC (ref 0–0.2)
PLATELET # BLD AUTO: 306 10*3/MM3 (ref 140–450)
PMV BLD AUTO: 10.7 FL (ref 6–12)
RBC # BLD AUTO: 5.74 10*6/MM3 (ref 4.14–5.8)
TIBC SERPL-MCNC: 346 MCG/DL (ref 298–536)
TRANSFERRIN SERPL-MCNC: 232 MG/DL (ref 200–360)
WBC NRBC COR # BLD AUTO: 8.69 10*3/MM3 (ref 3.4–10.8)

## 2025-06-12 PROCEDURE — 99214 OFFICE O/P EST MOD 30 MIN: CPT | Performed by: NURSE PRACTITIONER

## 2025-06-12 PROCEDURE — 1159F MED LIST DOCD IN RCRD: CPT | Performed by: NURSE PRACTITIONER

## 2025-06-12 PROCEDURE — 83540 ASSAY OF IRON: CPT | Performed by: NURSE PRACTITIONER

## 2025-06-12 PROCEDURE — 1125F AMNT PAIN NOTED PAIN PRSNT: CPT | Performed by: NURSE PRACTITIONER

## 2025-06-12 PROCEDURE — 3074F SYST BP LT 130 MM HG: CPT | Performed by: NURSE PRACTITIONER

## 2025-06-12 PROCEDURE — 3044F HG A1C LEVEL LT 7.0%: CPT | Performed by: NURSE PRACTITIONER

## 2025-06-12 PROCEDURE — 84466 ASSAY OF TRANSFERRIN: CPT | Performed by: NURSE PRACTITIONER

## 2025-06-12 PROCEDURE — 1160F RVW MEDS BY RX/DR IN RCRD: CPT | Performed by: NURSE PRACTITIONER

## 2025-06-12 PROCEDURE — 36415 COLL VENOUS BLD VENIPUNCTURE: CPT | Performed by: NURSE PRACTITIONER

## 2025-06-12 PROCEDURE — 82728 ASSAY OF FERRITIN: CPT | Performed by: NURSE PRACTITIONER

## 2025-06-12 PROCEDURE — 85025 COMPLETE CBC W/AUTO DIFF WBC: CPT | Performed by: NURSE PRACTITIONER

## 2025-06-12 PROCEDURE — 3078F DIAST BP <80 MM HG: CPT | Performed by: NURSE PRACTITIONER

## 2025-06-12 RX ORDER — ACYCLOVIR 400 MG/1
1 TABLET ORAL DAILY
Qty: 1 EACH | Refills: 0 | Status: SHIPPED | OUTPATIENT
Start: 2025-06-12

## 2025-06-12 RX ORDER — ACYCLOVIR 400 MG/1
1 TABLET ORAL
Qty: 9 EACH | Refills: 3 | Status: SHIPPED | OUTPATIENT
Start: 2025-06-12

## 2025-06-12 NOTE — PROGRESS NOTES
History of Present Illness  Raimundo Munguia is a 58 yo male who presents to [facility] today pertaining to his DM, type 2, HTN, GERD, Rheumatoid Arthritis and Dyslipidemia.  In addition, he has been diagnosed with insomnia and Vitamin deficiencies.  Raimundo had a right Thyroid lobectomy April 2022 by Dr. Wynn and is being followed by endocrinology.      Diabetes   He has type 2 diabetes mellitus. His disease course has been improving since starting Xultophy. Risk factors for coronary artery disease include dyslipidemia, hypertension, male sex, sedentary lifestyle and tobacco exposure. Current diabetic treatment includes Trijardy and Xultophy. An ACE inhibitor/angiotensin II receptor blocker is not being taken due to S/e.  He is utilizing a Dexcom 6 for continuous glucose monitoring.    Lab Results   Component Value Date    HGBA1C 6.40 (H) 10/11/2024     Lab Results   Component Value Date    HGBA1C 6.60 (H) 05/16/2025         Hypertension/Tachycardia  The current episode started more than 1 year ago.His BP is adequately controlled. His hypertensive medication is Toprol  mg bid.  Lab Results   Component Value Date    GLUCOSE 144 (H) 05/16/2025    BUN 12 05/16/2025    CREATININE 0.96 05/16/2025     05/16/2025    K 4.2 05/16/2025     05/16/2025    CALCIUM 9.4 05/16/2025    PROTEINTOT 7.3 05/16/2025    ALBUMIN 4.4 05/16/2025    ALT 21 05/16/2025    AST 21 05/16/2025    ALKPHOS 122 (H) 05/16/2025    BILITOT 0.2 05/16/2025    GLOB 2.9 05/16/2025    AGRATIO 1.5 05/16/2025    BCR 12.5 05/16/2025    ANIONGAP 12.0 05/16/2025    EGFR 91.6 05/16/2025       Dyslipidemia   This is a chronic problem. The current episode started more than 1 year ago. Factors aggravating his hyperlipidemia include fatty foods. Current antihyperlipidemic treatment includes statins.  Compliance problems include adherence to diet and adherence to exercise.  Risk factors for coronary artery  disease include diabetes mellitus, dyslipidemia, hypertension, male sex and obesity.   Lab Results   Component Value Date    CHOL 104 05/16/2025    CHOL 110 10/11/2024    CHOL 136 06/28/2024     Lab Results   Component Value Date    TRIG 101 05/16/2025    TRIG 94 10/11/2024    TRIG 178 (H) 06/28/2024     Lab Results   Component Value Date    HDL 30 (L) 05/16/2025    HDL 23 (L) 10/11/2024    HDL 35 (L) 06/28/2024     Lab Results   Component Value Date    LDL 55 05/16/2025    LDL 69 10/11/2024    LDL 71 06/28/2024     Rheumatoid arthritis  Diagnosed with rheumatoid arthritis years ago while he was in [facility].  That time he was started on methotrexate and folic acid by his PCP.  This had been continued when he transitioned his care to our clinic.  At this time he wishes to put methotrexate on hold due to side effects.  Multiple rheumatology appointments have been attempted but have not been successful and connecting him with a rheumatologist.  Complains of pain and stiffness. The symptoms have been stable. Associated symptoms include fatigue. Pertinent negatives include no diarrhea, dry eyes, dry mouth, dysuria, fever, uveitis or weight loss.     The following portions of the patient's history were reviewed and updated as appropriate: allergies, current medications, past family history, past medical history, past social history, past surgical history and problem list.    Review of Systems   Constitutional:  Positive for fatigue. Negative for activity change, appetite change, chills, fever and unexpected weight change.   HENT:  Positive for hearing loss (Chronic). Negative for voice change.    Eyes:  Negative for visual disturbance.   Respiratory:  Negative for cough, shortness of breath and wheezing.    Cardiovascular:  Positive for palpitations (Improving with metoprolol). Negative for chest pain and leg swelling.   Gastrointestinal:  Positive for nausea (Intermittent). Negative for diarrhea and vomiting.  "  Endocrine: Negative for cold intolerance, heat intolerance, polydipsia, polyphagia and polyuria.   Genitourinary:  Negative for dysuria.   Musculoskeletal:  Positive for arthralgias, back pain and joint swelling.   Skin:  Negative for color change and rash.   Allergic/Immunologic: Positive for environmental allergies and immunocompromised state.   Neurological:  Negative for dizziness, tremors, speech difficulty, weakness and light-headedness.   Hematological:  Negative for adenopathy.   Psychiatric/Behavioral:  Positive for sleep disturbance. Negative for confusion and decreased concentration. The patient is not nervous/anxious.    All other systems reviewed and are negative.    Vital signs:  /70 (BP Location: Left arm, Patient Position: Sitting, Cuff Size: Adult)   Pulse 87   Temp 96.5 °F (35.8 °C) (Temporal)   Resp 14   Ht 175.3 cm (69.02\")   Wt 88.5 kg (195 lb)   SpO2 95%   BMI 28.78 kg/m²     Physical Exam  Vitals and nursing note reviewed.   Constitutional:       General: He is not in acute distress.     Appearance: He is well-developed.   HENT:      Head: Normocephalic.      Nose:      Right Turbinates: Not swollen.      Left Turbinates: Not swollen.      Right Sinus: No maxillary sinus tenderness or frontal sinus tenderness.      Left Sinus: No maxillary sinus tenderness or frontal sinus tenderness.      Mouth/Throat:      Dentition: Dental caries present.   Eyes:      General: No scleral icterus.        Right eye: No discharge.         Left eye: No discharge.      Conjunctiva/sclera: Conjunctivae normal.   Cardiovascular:      Rate and Rhythm: Normal rate and regular rhythm.      Heart sounds: Normal heart sounds. No murmur heard.     No friction rub.   Pulmonary:      Effort: Pulmonary effort is normal. No respiratory distress.      Breath sounds: Decreased breath sounds present. No wheezing, rhonchi or rales.   Abdominal:      General: Abdomen is protuberant. Bowel sounds are normal.      " Palpations: Abdomen is soft.      Tenderness: There is no abdominal tenderness. There is no guarding.   Musculoskeletal:         General: Deformity (Multiple Rheumatoid nodules bilateral upper extremities) present. No tenderness.      Cervical back: Neck supple.      Right lower leg: No edema.      Left lower leg: No edema.   Lymphadenopathy:      Cervical: No cervical adenopathy.   Skin:     General: Skin is warm and dry.      Capillary Refill: Capillary refill takes less than 2 seconds.   Neurological:      Mental Status: He is alert and oriented to person, place, and time.      Gait: Gait abnormal.   Psychiatric:         Mood and Affect: Mood normal.         Speech: Speech normal.         Behavior: Behavior is cooperative.         Thought Content: Thought content normal.       Result Review :  Results for orders placed or performed in visit on 06/12/25   Iron Profile w/o Ferritin    Collection Time: 06/12/25  9:59 AM    Specimen: Arm, Right; Blood   Result Value Ref Range    Iron 100 59 - 158 mcg/dL    Iron Saturation (TSAT) 29 20 - 50 %    Transferrin 232 200 - 360 mg/dL    TIBC 346 298 - 536 mcg/dL   Ferritin    Collection Time: 06/12/25  9:59 AM    Specimen: Arm, Right; Blood   Result Value Ref Range    Ferritin 133.00 30.00 - 400.00 ng/mL   CBC Auto Differential    Collection Time: 06/12/25  9:59 AM    Specimen: Arm, Right; Blood   Result Value Ref Range    WBC 8.69 3.40 - 10.80 10*3/mm3    RBC 5.74 4.14 - 5.80 10*6/mm3    Hemoglobin 17.9 (H) 13.0 - 17.7 g/dL    Hematocrit 54.2 (H) 37.5 - 51.0 %    MCV 94.4 79.0 - 97.0 fL    MCH 31.2 26.6 - 33.0 pg    MCHC 33.0 31.5 - 35.7 g/dL    RDW 12.5 12.3 - 15.4 %    RDW-SD 42.9 37.0 - 54.0 fl    MPV 10.7 6.0 - 12.0 fL    Platelets 306 140 - 450 10*3/mm3    Neutrophil % 48.2 42.7 - 76.0 %    Lymphocyte % 36.7 19.6 - 45.3 %    Monocyte % 9.9 5.0 - 12.0 %    Eosinophil % 3.9 0.3 - 6.2 %    Basophil % 1.0 0.0 - 1.5 %    Immature Grans % 0.3 0.0 - 0.5 %    Neutrophils,  Absolute 4.18 1.70 - 7.00 10*3/mm3    Lymphocytes, Absolute 3.19 (H) 0.70 - 3.10 10*3/mm3    Monocytes, Absolute 0.86 0.10 - 0.90 10*3/mm3    Eosinophils, Absolute 0.34 0.00 - 0.40 10*3/mm3    Basophils, Absolute 0.09 0.00 - 0.20 10*3/mm3    Immature Grans, Absolute 0.03 0.00 - 0.05 10*3/mm3    nRBC 0.0 0.0 - 0.2 /100 WBC     Assessment & Plan     Diagnoses and all orders for this visit:    1. Type 2 diabetes, controlled, with neuropathy (Primary)  Comments:  Continue Xultophy and Trijardy.  Orders:  -     Continuous Glucose  (Dexcom G7 ) device; Use 1 Device Daily.  Dispense: 1 each; Refill: 0  -     Continuous Glucose Sensor (Dexcom G7 Sensor) misc; Use 1 Device Every 10 (Ten) Days.  Dispense: 9 each; Refill: 3    2. Uses self-applied continuous glucose monitoring device  Comments:  Continue CGM    3. Primary hypertension  Comments:  Continue metoprolol    4. Mixed dyslipidemia  Comments:  Rosuvastatin continue    5. Rheumatoid arthritis involving multiple sites with positive rheumatoid factor  Comments:  Methotrexate put on hold.  Offered rheumatology which he will consider    6. Postsurgical hypothyroidism  Comments:  Continue levothyroxine 112 mcg.  Follow-up with endocrinology    7. Chronic allergic rhinitis  Comments:  Continue montelukast and Leviocetirizine    8. Gastroesophageal reflux disease without esophagitis  Comments:  Continue omeprazole    9. Vitamin B 12 deficiency    10. Restrictive lung disease  Comments:  Continue night oxygen.  Consider pulmonology referral  Orders:  -     Albuterol Sulfate, sensor, (ProAir Digihaler) 108 (90 Base) MCG/ACT aerosol powder ; Inhale 2 Inhalations Every 6 (Six) Hours As Needed (wheezing/shortness of breath).  Dispense: 1 each; Refill: 3    11. Vitamin D deficiency    12. Polyp of colon, unspecified part of colon, unspecified type  Comments:  Colonoscopy complete in May 2024    13. Abnormal CBC  Comments:  Repeat labs in one month  Orders:  -      CBC & Differential  -     Iron Profile w/o Ferritin  -     Ferritin      Follow Up In July to repeat labs  Findings and recommendations discussed with Raimundo. Reviewed test results. Lifestyle modifications reinforced including nutrition and activity recommendations.  He will follow up in months sooner if problems/concerns occur.  Raimundo was given instructions and counseling regarding his condition or for health maintenance advice.  He will continue his night oxygen due to his restrictive lung disease and abnormal pulmonary tests.  Please see specific information pulled into the AVS if appropriate.    This document has been electronically signed by:

## 2025-06-15 RX ORDER — ALBUTEROL SULFATE 90 UG/1
2 INHALANT RESPIRATORY (INHALATION) EVERY 6 HOURS PRN
Qty: 1 EACH | Refills: 3 | Status: SHIPPED | OUTPATIENT
Start: 2025-06-15

## 2025-06-16 DIAGNOSIS — E78.2 MIXED DYSLIPIDEMIA: Chronic | ICD-10-CM

## 2025-06-16 DIAGNOSIS — E11.40 TYPE 2 DIABETES, CONTROLLED, WITH NEUROPATHY: Chronic | ICD-10-CM

## 2025-06-16 DIAGNOSIS — K21.9 GASTROESOPHAGEAL REFLUX DISEASE WITHOUT ESOPHAGITIS: Chronic | ICD-10-CM

## 2025-06-17 ENCOUNTER — TELEPHONE (OUTPATIENT)
Dept: FAMILY MEDICINE CLINIC | Facility: CLINIC | Age: 60
End: 2025-06-17
Payer: COMMERCIAL

## 2025-06-17 RX ORDER — LANSOPRAZOLE 30 MG/1
30 CAPSULE, DELAYED RELEASE ORAL DAILY
Qty: 30 CAPSULE | Refills: 0 | Status: SHIPPED | OUTPATIENT
Start: 2025-06-17

## 2025-06-17 RX ORDER — ROSUVASTATIN CALCIUM 5 MG/1
5 TABLET, COATED ORAL DAILY
Qty: 90 TABLET | Refills: 0 | Status: SHIPPED | OUTPATIENT
Start: 2025-06-17

## 2025-06-17 RX ORDER — PROCHLORPERAZINE 25 MG/1
SUPPOSITORY RECTAL
Qty: 3 EACH | Refills: 0 | Status: SHIPPED | OUTPATIENT
Start: 2025-06-17

## 2025-06-17 NOTE — TELEPHONE ENCOUNTER
RELAY    Left patient a voicemail to give us a call back.    Would you call Raimundo and check to see if he is taking both Prevacid and Prilosec. He should only be taking 1. In addition you may discussed with him that the Dexcom 6 is going out. If he wants to continue just let me know when he needs the Dexcom 7.

## 2025-06-17 NOTE — TELEPHONE ENCOUNTER
RELAY     Left patient a voicemail to give us a call back.     Would you call Raimundo and check to see if he is taking both Prevacid and Prilosec. He should only be taking 1. In addition you may discussed with him that the Dexcom 6 is going out. If he wants to continue just let me know when he needs the Dexcom 7.          Name: Raimundo Munguia    Relationship: Self    Best Callback Number: [phone]     HUB PROVIDED THE RELAY MESSAGE FROM THE OFFICE   PATIENT HAS FURTHER QUESTIONS AND WOULD LIKE A CALL BACK    ADDITIONAL INFORMATION:  ONLY TAKING ONE MEDICATION BUT CAN'T REMEMBER WHICH.  WILL LET US KNOW WHEN HE NEEDS DEXCOM 7.

## 2025-06-19 DIAGNOSIS — Z79.4 TYPE 2 DIABETES MELLITUS WITH HYPERGLYCEMIA, WITH LONG-TERM CURRENT USE OF INSULIN: Chronic | ICD-10-CM

## 2025-06-19 DIAGNOSIS — E11.65 TYPE 2 DIABETES MELLITUS WITH HYPERGLYCEMIA, WITH LONG-TERM CURRENT USE OF INSULIN: Chronic | ICD-10-CM

## 2025-06-19 RX ORDER — PROCHLORPERAZINE 25 MG/1
1 SUPPOSITORY RECTAL TAKE AS DIRECTED
Qty: 1 EACH | Refills: 3 | OUTPATIENT
Start: 2025-06-19

## 2025-06-19 NOTE — TELEPHONE ENCOUNTER
Patient is aware that they do not make these anymore and he has to transition to the G7. He will let us know when he wants to do this.

## 2025-06-23 DIAGNOSIS — E53.8 B12 DEFICIENCY: Chronic | ICD-10-CM

## 2025-06-23 RX ORDER — LANOLIN ALCOHOL/MO/W.PET/CERES
CREAM (GRAM) TOPICAL
Qty: 90 TABLET | Refills: 0 | Status: SHIPPED | OUTPATIENT
Start: 2025-06-23

## 2025-07-11 DIAGNOSIS — M05.79 RHEUMATOID ARTHRITIS INVOLVING MULTIPLE SITES WITH POSITIVE RHEUMATOID FACTOR: Chronic | ICD-10-CM

## 2025-07-11 RX ORDER — METHOTREXATE 2.5 MG/1
TABLET ORAL
Qty: 40 TABLET | Refills: 4 | OUTPATIENT
Start: 2025-07-11

## 2025-07-13 DIAGNOSIS — E89.0 POSTSURGICAL HYPOTHYROIDISM: ICD-10-CM

## 2025-07-13 DIAGNOSIS — M05.79 RHEUMATOID ARTHRITIS INVOLVING MULTIPLE SITES WITH POSITIVE RHEUMATOID FACTOR: Chronic | ICD-10-CM

## 2025-07-14 RX ORDER — CYCLOBENZAPRINE HCL 10 MG
10 TABLET ORAL 2 TIMES DAILY PRN
Qty: 60 TABLET | Refills: 1 | Status: SHIPPED | OUTPATIENT
Start: 2025-07-14

## 2025-07-14 RX ORDER — LEVOTHYROXINE SODIUM 112 UG/1
112 TABLET ORAL DAILY
Qty: 30 TABLET | Refills: 5 | Status: SHIPPED | OUTPATIENT
Start: 2025-07-14

## 2025-07-21 DIAGNOSIS — J30.9 CHRONIC ALLERGIC RHINITIS: Chronic | ICD-10-CM

## 2025-07-21 DIAGNOSIS — K21.9 GASTROESOPHAGEAL REFLUX DISEASE WITHOUT ESOPHAGITIS: Chronic | ICD-10-CM

## 2025-07-21 DIAGNOSIS — M05.79 RHEUMATOID ARTHRITIS INVOLVING MULTIPLE SITES WITH POSITIVE RHEUMATOID FACTOR: Chronic | ICD-10-CM

## 2025-07-22 RX ORDER — LANSOPRAZOLE 30 MG/1
30 CAPSULE, DELAYED RELEASE ORAL DAILY
Qty: 90 CAPSULE | Refills: 0 | Status: SHIPPED | OUTPATIENT
Start: 2025-07-22

## 2025-07-22 RX ORDER — LEVOCETIRIZINE DIHYDROCHLORIDE 5 MG/1
5 TABLET, FILM COATED ORAL EVERY EVENING
Qty: 90 TABLET | Refills: 0 | Status: SHIPPED | OUTPATIENT
Start: 2025-07-22

## 2025-07-22 RX ORDER — FOLIC ACID 1 MG/1
1000 TABLET ORAL DAILY
Qty: 30 TABLET | Refills: 0 | OUTPATIENT
Start: 2025-07-22

## 2025-07-22 RX ORDER — METHOTREXATE 2.5 MG/1
TABLET ORAL
Qty: 40 TABLET | Refills: 0 | OUTPATIENT
Start: 2025-07-22

## 2025-07-24 ENCOUNTER — OFFICE VISIT (OUTPATIENT)
Dept: FAMILY MEDICINE CLINIC | Facility: CLINIC | Age: 60
End: 2025-07-24
Payer: COMMERCIAL

## 2025-07-24 VITALS
HEART RATE: 104 BPM | TEMPERATURE: 98 F | RESPIRATION RATE: 14 BRPM | SYSTOLIC BLOOD PRESSURE: 110 MMHG | WEIGHT: 189 LBS | OXYGEN SATURATION: 99 % | DIASTOLIC BLOOD PRESSURE: 60 MMHG | HEIGHT: 69 IN | BODY MASS INDEX: 27.99 KG/M2

## 2025-07-24 DIAGNOSIS — Z97.8 USES SELF-APPLIED CONTINUOUS GLUCOSE MONITORING DEVICE: ICD-10-CM

## 2025-07-24 DIAGNOSIS — E55.9 VITAMIN D DEFICIENCY: Chronic | ICD-10-CM

## 2025-07-24 DIAGNOSIS — E11.40 TYPE 2 DIABETES, CONTROLLED, WITH NEUROPATHY: Chronic | ICD-10-CM

## 2025-07-24 DIAGNOSIS — M05.79 RHEUMATOID ARTHRITIS INVOLVING MULTIPLE SITES WITH POSITIVE RHEUMATOID FACTOR: Chronic | ICD-10-CM

## 2025-07-24 DIAGNOSIS — E78.2 MIXED DYSLIPIDEMIA: Chronic | ICD-10-CM

## 2025-07-24 DIAGNOSIS — R79.89 ABNORMAL CBC: Primary | ICD-10-CM

## 2025-07-24 DIAGNOSIS — E89.0 POSTSURGICAL HYPOTHYROIDISM: Chronic | ICD-10-CM

## 2025-07-24 LAB
BASOPHILS # BLD AUTO: 0.09 10*3/MM3 (ref 0–0.2)
BASOPHILS NFR BLD AUTO: 0.9 % (ref 0–1.5)
DEPRECATED RDW RBC AUTO: 43.6 FL (ref 37–54)
EOSINOPHIL # BLD AUTO: 0.24 10*3/MM3 (ref 0–0.4)
EOSINOPHIL NFR BLD AUTO: 2.4 % (ref 0.3–6.2)
ERYTHROCYTE [DISTWIDTH] IN BLOOD BY AUTOMATED COUNT: 12.7 % (ref 12.3–15.4)
FERRITIN SERPL-MCNC: 149 NG/ML (ref 30–400)
HCT VFR BLD AUTO: 54.9 % (ref 37.5–51)
HGB BLD-MCNC: 18.9 G/DL (ref 13–17.7)
IMM GRANULOCYTES # BLD AUTO: 0.04 10*3/MM3 (ref 0–0.05)
IMM GRANULOCYTES NFR BLD AUTO: 0.4 % (ref 0–0.5)
IRON 24H UR-MRATE: 72 MCG/DL (ref 59–158)
IRON SATN MFR SERPL: 21 % (ref 20–50)
LYMPHOCYTES # BLD AUTO: 3.6 10*3/MM3 (ref 0.7–3.1)
LYMPHOCYTES NFR BLD AUTO: 36.6 % (ref 19.6–45.3)
MCH RBC QN AUTO: 31.9 PG (ref 26.6–33)
MCHC RBC AUTO-ENTMCNC: 34.4 G/DL (ref 31.5–35.7)
MCV RBC AUTO: 92.7 FL (ref 79–97)
MONOCYTES # BLD AUTO: 0.99 10*3/MM3 (ref 0.1–0.9)
MONOCYTES NFR BLD AUTO: 10.1 % (ref 5–12)
NEUTROPHILS NFR BLD AUTO: 4.87 10*3/MM3 (ref 1.7–7)
NEUTROPHILS NFR BLD AUTO: 49.6 % (ref 42.7–76)
NRBC BLD AUTO-RTO: 0 /100 WBC (ref 0–0.2)
PLATELET # BLD AUTO: 313 10*3/MM3 (ref 140–450)
PMV BLD AUTO: 10.5 FL (ref 6–12)
RBC # BLD AUTO: 5.92 10*6/MM3 (ref 4.14–5.8)
TIBC SERPL-MCNC: 350 MCG/DL (ref 298–536)
TRANSFERRIN SERPL-MCNC: 235 MG/DL (ref 200–360)
WBC NRBC COR # BLD AUTO: 9.83 10*3/MM3 (ref 3.4–10.8)

## 2025-07-24 PROCEDURE — 3044F HG A1C LEVEL LT 7.0%: CPT | Performed by: NURSE PRACTITIONER

## 2025-07-24 PROCEDURE — 3074F SYST BP LT 130 MM HG: CPT | Performed by: NURSE PRACTITIONER

## 2025-07-24 PROCEDURE — 99214 OFFICE O/P EST MOD 30 MIN: CPT | Performed by: NURSE PRACTITIONER

## 2025-07-24 PROCEDURE — 83540 ASSAY OF IRON: CPT | Performed by: NURSE PRACTITIONER

## 2025-07-24 PROCEDURE — 82728 ASSAY OF FERRITIN: CPT | Performed by: NURSE PRACTITIONER

## 2025-07-24 PROCEDURE — 3078F DIAST BP <80 MM HG: CPT | Performed by: NURSE PRACTITIONER

## 2025-07-24 PROCEDURE — 1160F RVW MEDS BY RX/DR IN RCRD: CPT | Performed by: NURSE PRACTITIONER

## 2025-07-24 PROCEDURE — 1159F MED LIST DOCD IN RCRD: CPT | Performed by: NURSE PRACTITIONER

## 2025-07-24 PROCEDURE — 1125F AMNT PAIN NOTED PAIN PRSNT: CPT | Performed by: NURSE PRACTITIONER

## 2025-07-24 PROCEDURE — 85025 COMPLETE CBC W/AUTO DIFF WBC: CPT | Performed by: NURSE PRACTITIONER

## 2025-07-24 PROCEDURE — 36415 COLL VENOUS BLD VENIPUNCTURE: CPT | Performed by: NURSE PRACTITIONER

## 2025-07-24 PROCEDURE — 84466 ASSAY OF TRANSFERRIN: CPT | Performed by: NURSE PRACTITIONER

## 2025-07-24 NOTE — PROGRESS NOTES
History of Present Illness  Raimundo Munguia is a 58 yo male who presents to [facility] today pertaining to his DM, type 2, HTN, GERD, Rheumatoid Arthritis and Dyslipidemia.  In addition, he has been diagnosed with insomnia and Vitamin deficiencies.  Raimundo had a right Thyroid lobectomy April 2022 by Dr. Wynn and is being followed by endocrinology.  Within his previous labs, he had a abnormal CBC resulted.  Will repeat CBC, iron profile and ferritin today    Diabetes   He has type 2 diabetes mellitus. His disease course has been improving since starting Xultophy. Risk factors for coronary artery disease include dyslipidemia, hypertension, male sex, sedentary lifestyle and tobacco exposure. Current diabetic treatment includes Trijardy and Xultophy. An ACE inhibitor/angiotensin II receptor blocker is not being taken due to S/e.  He is utilizing a Dexcom 7 for continuous glucose monitoring.    Lab Results   Component Value Date    HGBA1C 6.40 (H) 10/11/2024     Lab Results   Component Value Date    HGBA1C 6.60 (H) 05/16/2025       Hypertension/Tachycardia  The current episode started more than 1 year ago.His BP is adequately controlled. His hypertensive medication is Toprol  mg bid.  Lab Results   Component Value Date    GLUCOSE 144 (H) 05/16/2025    BUN 12 05/16/2025    CREATININE 0.96 05/16/2025     05/16/2025    K 4.2 05/16/2025     05/16/2025    CALCIUM 9.4 05/16/2025    PROTEINTOT 7.3 05/16/2025    ALBUMIN 4.4 05/16/2025    ALT 21 05/16/2025    AST 21 05/16/2025    ALKPHOS 122 (H) 05/16/2025    BILITOT 0.2 05/16/2025    GLOB 2.9 05/16/2025    AGRATIO 1.5 05/16/2025    BCR 12.5 05/16/2025    ANIONGAP 12.0 05/16/2025    EGFR 91.6 05/16/2025       Dyslipidemia   This is a chronic problem. The current episode started more than 1 year ago. Factors aggravating his hyperlipidemia include fatty foods. Current antihyperlipidemic treatment includes statins.   "Compliance problems include adherence to diet and adherence to exercise.  Risk factors for coronary artery disease include diabetes mellitus, dyslipidemia, hypertension, male sex and obesity.   Lab Results   Component Value Date    CHOL 104 05/16/2025    CHOL 110 10/11/2024    CHOL 136 06/28/2024     Lab Results   Component Value Date    TRIG 101 05/16/2025    TRIG 94 10/11/2024    TRIG 178 (H) 06/28/2024     Lab Results   Component Value Date    HDL 30 (L) 05/16/2025    HDL 23 (L) 10/11/2024    HDL 35 (L) 06/28/2024     Lab Results   Component Value Date    LDL 55 05/16/2025    LDL 69 10/11/2024    LDL 71 06/28/2024     Rheumatoid arthritis  Diagnosed with rheumatoid arthritis years ago while he was in [facility].  At that time he was started on prednisone which he could not tolerate due to side effects and then was placed on methotrexate and folic acid by his PCP.  This had been continued when he transitioned his care to our clinic.  At this time he wishes to continue to  put methotrexate on hold due to side effects.  Multiple rheumatology appointments have been attempted but have not been successful and connecting him with a rheumatologist.  Complains of pain and stiffness which have actually improved since his weight loss.     The following portions of the patient's history were reviewed and updated as appropriate: allergies, current medications, past family history, past medical history, past social history, past surgical history and problem list.    Review of Systems    Vital signs:  /60 (BP Location: Right arm, Patient Position: Sitting, Cuff Size: Adult)   Pulse 104   Temp 98 °F (36.7 °C) (Temporal)   Resp 14   Ht 175.3 cm (69.02\")   Wt 85.7 kg (189 lb)   SpO2 99%   BMI 27.90 kg/m²     Physical Exam  Vitals and nursing note reviewed.   Constitutional:       General: He is not in acute distress.     Appearance: He is well-developed.   HENT:      Head: Normocephalic.      Nose:      Right " Turbinates: Not swollen.      Left Turbinates: Not swollen.      Right Sinus: No maxillary sinus tenderness or frontal sinus tenderness.      Left Sinus: No maxillary sinus tenderness or frontal sinus tenderness.      Mouth/Throat:      Dentition: Dental caries present.   Eyes:      General: No scleral icterus.        Right eye: No discharge.         Left eye: No discharge.      Conjunctiva/sclera: Conjunctivae normal.   Cardiovascular:      Rate and Rhythm: Regular rhythm. Tachycardia present.      Heart sounds: Normal heart sounds. No murmur heard.     No friction rub.   Pulmonary:      Effort: Pulmonary effort is normal. No respiratory distress.      Breath sounds: Decreased breath sounds present. No wheezing, rhonchi or rales.   Abdominal:      General: Abdomen is protuberant. Bowel sounds are normal.      Palpations: Abdomen is soft.      Tenderness: There is no abdominal tenderness. There is no guarding.   Musculoskeletal:         General: Deformity (Multiple Rheumatoid nodules bilateral upper extremities) present. No tenderness.      Cervical back: Neck supple.      Right lower leg: No edema.      Left lower leg: No edema.   Lymphadenopathy:      Cervical: No cervical adenopathy.   Skin:     General: Skin is warm and dry.      Capillary Refill: Capillary refill takes less than 2 seconds.   Neurological:      Mental Status: He is alert and oriented to person, place, and time.      Gait: Gait abnormal.   Psychiatric:         Mood and Affect: Mood normal.         Speech: Speech normal.         Behavior: Behavior is cooperative.         Thought Content: Thought content normal.     Result Review :    Assessment & Plan     Diagnoses and all orders for this visit:    1. Abnormal CBC (Primary)  Comments:  Repeat labs today  Orders:  -     CBC & Differential  -     Ferritin  -     Iron Profile w/o Ferritin  -     CBC & Differential; Future    2. Type 2 diabetes, controlled, with neuropathy  Comments:  Continue  Xultophy and Trijardy  Orders:  -     Comprehensive Metabolic Panel; Future  -     Lipid Panel; Future  -     TSH; Future  -     Hemoglobin A1c; Future  -     Vitamin B12; Future    3. Uses self-applied continuous glucose monitoring device  Comments:  Continue Dexcom 7    4. Postsurgical hypothyroidism  Comments:  Continue levothyroxine.  Follow-up with Endo  Orders:  -     TSH; Future  -     T4, Free; Future    5. Rheumatoid arthritis involving multiple sites with positive rheumatoid factor  Comments:  Stable.  Repeat labs ordered.  Again discussed referral to rheumatology  Orders:  -     Uric Acid; Future  -     Sedimentation Rate; Future  -     C-reactive Protein  -     CK  -     Rheumatoid Factor; Future    6. Mixed dyslipidemia  Comments:  Continue rosuvastatin    7. Vitamin D deficiency  Comments:  Continue daily supplement  Orders:  -     Vitamin D,25-Hydroxy; Future      Follow Up In September with labs prior  Findings and recommendations discussed with Raimundo. Reviewed treatment options.  Lifestyle modifications reinforced including nutrition and activity recommendations.  Raimundo will follow up in September sooner if problems/concerns occur.  Raimundo was given instructions and counseling regarding his condition or for health maintenance advice. Please see specific information pulled into the AVS if appropriate.      This document has been electronically signed by:

## 2025-08-06 ENCOUNTER — LAB (OUTPATIENT)
Dept: FAMILY MEDICINE CLINIC | Facility: CLINIC | Age: 60
End: 2025-08-06
Payer: COMMERCIAL

## 2025-08-06 DIAGNOSIS — E11.40 TYPE 2 DIABETES, CONTROLLED, WITH NEUROPATHY: Primary | ICD-10-CM

## 2025-08-06 PROCEDURE — 82570 ASSAY OF URINE CREATININE: CPT | Performed by: NURSE PRACTITIONER

## 2025-08-06 PROCEDURE — 82043 UR ALBUMIN QUANTITATIVE: CPT | Performed by: NURSE PRACTITIONER

## 2025-08-07 LAB
ALBUMIN UR-MCNC: <1.2 MG/DL
CREAT UR-MCNC: 56.8 MG/DL
MICROALBUMIN/CREAT UR: NORMAL MG/G{CREAT}

## (undated) DEVICE — Device

## (undated) DEVICE — WOUND RETRACTOR AND PROTECTOR: Brand: ALEXIS O WOUND PROTECTOR-RETRACTOR

## (undated) DEVICE — TUBING, SUCTION, 1/4" X 20', STRAIGHT: Brand: MEDLINE INDUSTRIES, INC.

## (undated) DEVICE — HDRST PAD EA/20PC

## (undated) DEVICE — ENDOGATOR AUXILIARY WATER JET CONNECTOR: Brand: ENDOGATOR

## (undated) DEVICE — ENCORE® LATEX MICRO SIZE 7.5, STERILE LATEX POWDER-FREE SURGICAL GLOVE: Brand: ENCORE

## (undated) DEVICE — SYR LUERLOK 30CC

## (undated) DEVICE — CATHETER,FOLEY,SILI-ELAST,LTX,24FR,10ML: Brand: MEDLINE

## (undated) DEVICE — HARMONIC FOCUS SHEARS 9CM LENGTH + ADAPTIVE TISSUE TECHNOLOGY FOR USE WITH BLUE HAND PIECE ONLY: Brand: HARMONIC FOCUS

## (undated) DEVICE — INTENDED USE FOR SURGICAL MARKING ON INTACT SKIN, ALSO PROVIDES A PERMANENT METHOD OF IDENTIFYING OBJECTS IN THE OPERATING ROOM: Brand: WRITESITE® REGULAR TIP SKIN MARKER

## (undated) DEVICE — HOLDER: Brand: DEROYAL

## (undated) DEVICE — TRAP FLD MINIVAC MEGADYNE 100ML

## (undated) DEVICE — DRSNG WND BORDR/ADHS NONADHR/GZ LF 4X10IN STRL

## (undated) DEVICE — TUBING, SUCTION, 3/16" X 10', STRAIGHT: Brand: MEDLINE

## (undated) DEVICE — KT EXP CATH ONQ SILVERSOAKER 5IN

## (undated) DEVICE — SUT MNCRYL PLS ANTIB UD 4/0 PS2 18IN

## (undated) DEVICE — LARYNG GLIDESCOPE LOPRO S4 TI SPECTR 1/PU

## (undated) DEVICE — TBG PENCL TELESCP MEGADYNE SMOKE EVAC 10FT

## (undated) DEVICE — TOTAL TRAY, 16FR 10ML SIL FOLEY, URN: Brand: MEDLINE

## (undated) DEVICE — ENDOCUFF VISION PRP SM 10.4

## (undated) DEVICE — DRAPE, LAVH, STERILE: Brand: MEDLINE

## (undated) DEVICE — ELECTRD BLD EZ CLN MOD XLNG 2.75IN

## (undated) DEVICE — APPL CHLORAPREP W/TINT 26ML ORNG

## (undated) DEVICE — DBD-DRAPE,LAP,CHOLE,W/TROUGHS,STERILE: Brand: MEDLINE

## (undated) DEVICE — ELECTRODE 8227410 PAIRED 2 CH SET ROHS

## (undated) DEVICE — PAD GRND REM POLYHESIVE A/ DISP

## (undated) DEVICE — 3M™ STERI-STRIP™ REINFORCED ADHESIVE SKIN CLOSURES, R1547, 1/2 IN X 4 IN (12 MM X 100 MM), 6 STRIPS/ENVELOPE: Brand: 3M™ STERI-STRIP™

## (undated) DEVICE — FRCP BX RADJAW4 NDL 2.8 240CM LG OG BX40

## (undated) DEVICE — PK MINOR SPLT 10

## (undated) DEVICE — GOWN,REINF,POLY,ECL,PP SLV,XL: Brand: MEDLINE

## (undated) DEVICE — UNDERGLV SURG BIOGEL INDICATOR LF PF 7.5

## (undated) DEVICE — POOLE SUCTION INSTRUMENT WITH REMOVABLE SHEATH: Brand: POOLE

## (undated) DEVICE — SUT PDS 1 TP1 48IN Z880G BX/12

## (undated) DEVICE — SUT PROLN 3/0 8832H

## (undated) DEVICE — DRSNG WND BORDR/ADHS NONADHR/GZ LF 4X14IN STRL

## (undated) DEVICE — PROXIMATE RH ROTATING HEAD SKIN STAPLERS (35 WIDE) CONTAINS 35 STAINLESS STEEL STAPLES: Brand: PROXIMATE

## (undated) DEVICE — GAUZE,SPONGE,4"X4",16PLY,XRAY,STRL,LF: Brand: MEDLINE

## (undated) DEVICE — Device: Brand: DEFENDO AIR/WATER/SUCTION AND BIOPSY VALVE

## (undated) DEVICE — GLV SURG BIOGEL LTX PF 7

## (undated) DEVICE — SUCTION CANISTER, 1500CC, RIGID: Brand: DEROYAL

## (undated) DEVICE — PATIENT RETURN ELECTRODE, SINGLE-USE, CONTACT QUALITY MONITORING, ADULT, WITH 9FT CORD, FOR PATIENTS WEIGING OVER 33LBS. (15KG): Brand: MEGADYNE

## (undated) DEVICE — PK BASIC 70

## (undated) DEVICE — SUT VIC 2/0 CT1 36IN

## (undated) DEVICE — APPL CHLORAPREP 26ML CLR

## (undated) DEVICE — ULTRACLEAN ACCESSORY ELECTRODE 6" (15.24 CM) COATED BLADE: Brand: ULTRACLEAN

## (undated) DEVICE — Device: Brand: ENDOGATOR

## (undated) DEVICE — SINGLE PORT MANIFOLD: Brand: NEPTUNE 2

## (undated) DEVICE — THE BITE BLOCK MAXI, LATEX FREE STRAP IS USED TO PROTECT THE ENDOSCOPE INSERTION TUBE FROM BEING BITTEN BY THE PATIENT.

## (undated) DEVICE — CONN Y IRR DISP 1P/U

## (undated) DEVICE — MARKER,SKIN,W/RULER,DUAL,STOP: Brand: MEDLINE

## (undated) DEVICE — DISPOSABLE BIPOLAR FORCEPS 4" (10.2CM) JEWELERS, STRAIGHT 0.4MM TIP AND 12 FT. (3.6M) CABLE: Brand: KIRWAN

## (undated) DEVICE — ANTIBACTERIAL UNDYED BRAIDED (POLYGLACTIN 910), SYNTHETIC ABSORBABLE SUTURE: Brand: COATED VICRYL

## (undated) DEVICE — SUT ETHLN 3/0 FS1 663G

## (undated) DEVICE — SPNG LAP PREWSH SFTPK 18X18IN STRL PK/5

## (undated) DEVICE — DRSNG SURESITE WNDW 4X4.5

## (undated) DEVICE — PROBE 8225825 3PK INCREMT STD PRASS ROHS

## (undated) DEVICE — SUT VIC 3/0 SH CR8 27IN DYED J784G